# Patient Record
Sex: FEMALE | Race: WHITE | NOT HISPANIC OR LATINO | URBAN - METROPOLITAN AREA
[De-identification: names, ages, dates, MRNs, and addresses within clinical notes are randomized per-mention and may not be internally consistent; named-entity substitution may affect disease eponyms.]

---

## 2017-07-13 ENCOUNTER — OFFICE (OUTPATIENT)
Dept: URBAN - METROPOLITAN AREA CLINIC 64 | Facility: CLINIC | Age: 77
End: 2017-07-13

## 2017-07-13 VITALS
HEIGHT: 64 IN | SYSTOLIC BLOOD PRESSURE: 129 MMHG | DIASTOLIC BLOOD PRESSURE: 64 MMHG | WEIGHT: 135 LBS | HEART RATE: 72 BPM

## 2017-07-13 DIAGNOSIS — K59.1 FUNCTIONAL DIARRHEA: ICD-10-CM

## 2017-07-13 DIAGNOSIS — K51.90 ULCERATIVE COLITIS, UNSPECIFIED, WITHOUT COMPLICATIONS: ICD-10-CM

## 2017-07-13 PROCEDURE — 99213 OFFICE O/P EST LOW 20 MIN: CPT | Performed by: NURSE PRACTITIONER

## 2017-07-17 LAB
C DIFFICILE TOXINS A+B, EIA: NEGATIVE
CALPROTECTIN, FECAL: 44 UG/G (ref 0–120)
RESULT: RESULT 1: (no result)
STOOL CULTURE: CAMPYLOBACTER CULTURE: (no result)
STOOL CULTURE: E COLI SHIGA TOXIN EIA: NEGATIVE
STOOL CULTURE: SALMONELLA/SHIGELLA SCREEN: (no result)
WHITE BLOOD CELLS (WBC), STOOL: (no result)

## 2017-08-18 ENCOUNTER — HOSPITAL ENCOUNTER (OUTPATIENT)
Dept: INFUSION THERAPY | Facility: HOSPITAL | Age: 77
Discharge: HOME | End: 2017-08-18
Attending: FAMILY MEDICINE | Admitting: FAMILY MEDICINE

## 2017-09-14 ENCOUNTER — OFFICE (OUTPATIENT)
Dept: URBAN - METROPOLITAN AREA CLINIC 64 | Facility: CLINIC | Age: 77
End: 2017-09-14

## 2017-09-14 VITALS
HEIGHT: 64 IN | WEIGHT: 131 LBS | DIASTOLIC BLOOD PRESSURE: 68 MMHG | HEART RATE: 87 BPM | SYSTOLIC BLOOD PRESSURE: 124 MMHG

## 2017-09-14 DIAGNOSIS — K59.1 FUNCTIONAL DIARRHEA: ICD-10-CM

## 2017-09-14 DIAGNOSIS — K51.90 ULCERATIVE COLITIS, UNSPECIFIED, WITHOUT COMPLICATIONS: ICD-10-CM

## 2017-09-14 PROCEDURE — 99213 OFFICE O/P EST LOW 20 MIN: CPT | Performed by: NURSE PRACTITIONER

## 2017-12-07 ENCOUNTER — OFFICE (OUTPATIENT)
Dept: URBAN - METROPOLITAN AREA CLINIC 64 | Facility: CLINIC | Age: 77
End: 2017-12-07

## 2017-12-07 VITALS
HEART RATE: 94 BPM | WEIGHT: 139 LBS | SYSTOLIC BLOOD PRESSURE: 134 MMHG | HEIGHT: 64 IN | DIASTOLIC BLOOD PRESSURE: 72 MMHG

## 2017-12-07 DIAGNOSIS — K51.90 ULCERATIVE COLITIS, UNSPECIFIED, WITHOUT COMPLICATIONS: ICD-10-CM

## 2017-12-07 PROCEDURE — 99213 OFFICE O/P EST LOW 20 MIN: CPT | Performed by: NURSE PRACTITIONER

## 2017-12-07 RX ORDER — DICYCLOMINE HYDROCHLORIDE 10 MG/1
CAPSULE ORAL
Qty: 180 | Refills: 3 | Status: ACTIVE

## 2018-01-22 ENCOUNTER — HOSPITAL ENCOUNTER (OUTPATIENT)
Dept: CARDIOLOGY | Facility: HOSPITAL | Age: 78
Discharge: HOME | End: 2018-01-22
Attending: INTERNAL MEDICINE | Admitting: INTERNAL MEDICINE

## 2018-02-05 ENCOUNTER — HOSPITAL ENCOUNTER (OUTPATIENT)
Dept: PREADMISSION TESTING | Facility: HOSPITAL | Age: 78
Discharge: HOME | End: 2018-02-05
Attending: INTERNAL MEDICINE | Admitting: INTERNAL MEDICINE

## 2018-02-05 LAB
ANION GAP SERPL CALC-SCNC: 11.6 MMOL/L (ref 10–20)
BASOPHILS # BLD AUTO: 0 10*3/UL (ref 0–0.2)
BASOPHILS NFR BLD AUTO: 1 % (ref 0–2)
BUN SERPL-MCNC: 9 MG/DL (ref 8–20)
BUN/CREAT SERPL: 15 (ref 5.4–26.2)
CALCIUM SERPL-MCNC: 9 MG/DL (ref 8.9–10.3)
CHLORIDE SERPL-SCNC: 102 MMOL/L (ref 101–111)
CONV CO2: 27 MMOL/L (ref 22–32)
CREAT UR-MCNC: 0.6 MG/DL (ref 0.4–1)
DIFFERENTIAL METHOD BLD: (no result)
EOSINOPHIL # BLD AUTO: 0 10*3/UL (ref 0–0.3)
EOSINOPHIL # BLD AUTO: 1 % (ref 0–3)
ERYTHROCYTE [DISTWIDTH] IN BLOOD BY AUTOMATED COUNT: 14 % (ref 11.5–14.5)
GLUCOSE SERPL-MCNC: 241 MG/DL (ref 65–99)
HCT VFR BLD AUTO: 36 % (ref 35–49)
HGB BLD-MCNC: 11.8 G/DL (ref 12–15)
INR PPP: 1.1
LYMPHOCYTES # BLD AUTO: 0.6 10*3/UL (ref 0.8–4.8)
LYMPHOCYTES NFR BLD AUTO: 19 % (ref 18–42)
MCH RBC QN AUTO: 27.3 PG (ref 26–32)
MCHC RBC AUTO-ENTMCNC: 32.9 G/DL (ref 32–36)
MCV RBC AUTO: 83 FL (ref 80–94)
MONOCYTES # BLD AUTO: 0.2 10*3/UL (ref 0.1–1.3)
MONOCYTES NFR BLD AUTO: 7 % (ref 2–11)
NEUTROPHILS # BLD AUTO: 2.2 10*3/UL (ref 2.3–8.6)
NEUTROPHILS NFR BLD AUTO: 72 % (ref 50–75)
NRBC BLD AUTO-RTO: 0 /100{WBCS}
NRBC/RBC NFR BLD MANUAL: 0 10*3/UL
PLATELET # BLD AUTO: 210 10*3/UL (ref 150–450)
PMV BLD AUTO: 7.9 FL (ref 7.4–10.4)
POTASSIUM SERPL-SCNC: 3.6 MMOL/L (ref 3.6–5.1)
PROTHROMBIN TIME: 12 SEC (ref 9.6–11.7)
RBC # BLD AUTO: 4.34 10*6/UL (ref 4–5.4)
SODIUM SERPL-SCNC: 137 MMOL/L (ref 136–144)
WBC # BLD AUTO: 3.1 10*3/UL (ref 4.5–11.5)

## 2018-03-06 ENCOUNTER — HOSPITAL ENCOUNTER (OUTPATIENT)
Dept: INFUSION THERAPY | Facility: HOSPITAL | Age: 78
Discharge: HOME | End: 2018-03-06
Attending: FAMILY MEDICINE | Admitting: FAMILY MEDICINE

## 2018-08-01 ENCOUNTER — HOSPITAL ENCOUNTER (OUTPATIENT)
Dept: LAB | Facility: HOSPITAL | Age: 78
Discharge: HOME | End: 2018-08-01
Attending: INTERNAL MEDICINE | Admitting: INTERNAL MEDICINE

## 2018-08-01 LAB
ALBUMIN SERPL-MCNC: 3.8 G/DL (ref 3.5–4.8)
ALBUMIN/GLOB SERPL: 1.6 {RATIO} (ref 1–1.7)
ALP SERPL-CCNC: 35 IU/L (ref 32–91)
ALT SERPL-CCNC: 19 IU/L (ref 14–54)
ANION GAP SERPL CALC-SCNC: 11.7 MMOL/L (ref 10–20)
AST SERPL-CCNC: 26 IU/L (ref 15–41)
BILIRUB SERPL-MCNC: 0.7 MG/DL (ref 0.3–1.2)
BUN SERPL-MCNC: 8 MG/DL (ref 8–20)
BUN/CREAT SERPL: 13.3 (ref 5.4–26.2)
CALCIUM SERPL-MCNC: 9.2 MG/DL (ref 8.9–10.3)
CHLORIDE SERPL-SCNC: 101 MMOL/L (ref 101–111)
CHOLEST SERPL-MCNC: 116 MG/DL
CHOLEST/HDLC SERPL: 3 {RATIO}
CK SERPL-CCNC: 43 IU/L (ref 38–234)
CONV CO2: 30 MMOL/L (ref 22–32)
CONV LDL CHOLESTEROL DIRECT: 63 MG/DL (ref 0–100)
CONV TOTAL PROTEIN: 6.2 G/DL (ref 6.1–7.9)
CREAT UR-MCNC: 0.6 MG/DL (ref 0.4–1)
GLOBULIN UR ELPH-MCNC: 2.4 G/DL (ref 2.5–3.8)
GLUCOSE SERPL-MCNC: 120 MG/DL (ref 65–99)
HDLC SERPL-MCNC: 38 MG/DL
LDLC/HDLC SERPL: 1.6 {RATIO}
LIPID INTERPRETATION: ABNORMAL
POTASSIUM SERPL-SCNC: 3.7 MMOL/L (ref 3.6–5.1)
SODIUM SERPL-SCNC: 139 MMOL/L (ref 136–144)
TRIGL SERPL-MCNC: 76 MG/DL
VLDLC SERPL CALC-MCNC: 14.6 MG/DL

## 2018-09-12 ENCOUNTER — HOSPITAL ENCOUNTER (OUTPATIENT)
Dept: ONCOLOGY | Facility: CLINIC | Age: 78
Setting detail: INFUSION SERIES
Discharge: HOME | End: 2018-09-12
Attending: NURSE PRACTITIONER | Admitting: NURSE PRACTITIONER

## 2019-03-26 ENCOUNTER — OFFICE (OUTPATIENT)
Dept: URBAN - METROPOLITAN AREA CLINIC 64 | Facility: CLINIC | Age: 79
End: 2019-03-26

## 2019-03-26 VITALS
DIASTOLIC BLOOD PRESSURE: 59 MMHG | SYSTOLIC BLOOD PRESSURE: 118 MMHG | HEART RATE: 66 BPM | WEIGHT: 129 LBS | HEIGHT: 64 IN

## 2019-03-26 DIAGNOSIS — Z12.11 ENCOUNTER FOR SCREENING FOR MALIGNANT NEOPLASM OF COLON: ICD-10-CM

## 2019-03-26 DIAGNOSIS — K51.90 ULCERATIVE COLITIS, UNSPECIFIED, WITHOUT COMPLICATIONS: ICD-10-CM

## 2019-03-26 PROCEDURE — 99213 OFFICE O/P EST LOW 20 MIN: CPT | Performed by: NURSE PRACTITIONER

## 2019-03-26 RX ORDER — DICYCLOMINE HYDROCHLORIDE 10 MG/1
20 CAPSULE ORAL
Qty: 60 | Refills: 11 | Status: COMPLETED
Start: 2019-03-26 | End: 2021-10-11

## 2019-03-26 RX ORDER — BUDESONIDE 3 MG/1
9 CAPSULE ORAL
Qty: 90 | Refills: 3 | Status: COMPLETED
Start: 2019-03-26 | End: 2020-07-07

## 2019-07-19 ENCOUNTER — TELEPHONE (OUTPATIENT)
Dept: ONCOLOGY | Facility: CLINIC | Age: 79
End: 2019-07-19

## 2019-07-19 NOTE — TELEPHONE ENCOUNTER
Received call from  Dr. Saeed Wayne's office.  She states patient is needing to be scheduled for Prolia.  Chart reviewed.  Attempted to contact Bev and ISABEL on VM informing her that we would get the patient schedule and we would notify patient of date and time.  mathew Ramires

## 2019-07-25 ENCOUNTER — TELEPHONE (OUTPATIENT)
Dept: ONCOLOGY | Facility: CLINIC | Age: 79
End: 2019-07-25

## 2019-09-09 ENCOUNTER — LAB (OUTPATIENT)
Dept: LAB | Facility: HOSPITAL | Age: 79
End: 2019-09-09

## 2019-09-09 ENCOUNTER — TRANSCRIBE ORDERS (OUTPATIENT)
Dept: ADMINISTRATIVE | Facility: HOSPITAL | Age: 79
End: 2019-09-09

## 2019-09-09 DIAGNOSIS — E78.5 HYPERLIPIDEMIA, UNSPECIFIED HYPERLIPIDEMIA TYPE: ICD-10-CM

## 2019-09-09 DIAGNOSIS — I25.10 MILD CAD: ICD-10-CM

## 2019-09-09 DIAGNOSIS — R06.00 DYSPNEA, UNSPECIFIED TYPE: Primary | ICD-10-CM

## 2019-09-09 DIAGNOSIS — R06.00 DYSPNEA, UNSPECIFIED TYPE: ICD-10-CM

## 2019-09-09 LAB — BNP SERPL-MCNC: 214 PG/ML

## 2019-09-09 PROCEDURE — 83880 ASSAY OF NATRIURETIC PEPTIDE: CPT

## 2019-09-09 PROCEDURE — 36415 COLL VENOUS BLD VENIPUNCTURE: CPT

## 2019-09-12 ENCOUNTER — TELEPHONE (OUTPATIENT)
Dept: ONCOLOGY | Facility: HOSPITAL | Age: 79
End: 2019-09-12

## 2019-09-12 ENCOUNTER — HOSPITAL ENCOUNTER (OUTPATIENT)
Dept: ONCOLOGY | Facility: HOSPITAL | Age: 79
Setting detail: INFUSION SERIES
Discharge: HOME | End: 2019-09-12

## 2019-09-12 VITALS
DIASTOLIC BLOOD PRESSURE: 60 MMHG | RESPIRATION RATE: 18 BRPM | HEIGHT: 63 IN | WEIGHT: 122.2 LBS | SYSTOLIC BLOOD PRESSURE: 128 MMHG | HEART RATE: 69 BPM | BODY MASS INDEX: 21.65 KG/M2 | TEMPERATURE: 98.2 F

## 2019-09-12 DIAGNOSIS — M81.0 AGE-RELATED OSTEOPOROSIS WITHOUT CURRENT PATHOLOGICAL FRACTURE: Primary | ICD-10-CM

## 2019-09-12 PROCEDURE — 96372 THER/PROPH/DIAG INJ SC/IM: CPT | Performed by: NURSE PRACTITIONER

## 2019-09-12 PROCEDURE — 25010000002 DENOSUMAB 60 MG/ML SOLUTION PREFILLED SYRINGE: Performed by: FAMILY MEDICINE

## 2019-09-12 RX ORDER — METFORMIN HYDROCHLORIDE 500 MG/1
2000 TABLET, EXTENDED RELEASE ORAL
COMMUNITY
Start: 2019-08-24

## 2019-09-12 RX ORDER — ASPIRIN 81 MG/1
81 TABLET ORAL DAILY
COMMUNITY
Start: 2018-09-13

## 2019-09-12 RX ORDER — ISOSORBIDE MONONITRATE 60 MG/1
TABLET, EXTENDED RELEASE ORAL
COMMUNITY
Start: 2019-08-24 | End: 2019-09-18 | Stop reason: SDUPTHER

## 2019-09-12 RX ORDER — ATORVASTATIN CALCIUM 40 MG/1
TABLET, FILM COATED ORAL
COMMUNITY
Start: 2019-08-24 | End: 2019-09-18 | Stop reason: SDUPTHER

## 2019-09-12 RX ORDER — BUDESONIDE 3 MG/1
3 CAPSULE, COATED PELLETS ORAL EVERY MORNING
COMMUNITY
Start: 2017-12-18 | End: 2022-06-27

## 2019-09-12 RX ORDER — NITROGLYCERIN 0.4 MG/1
TABLET SUBLINGUAL
COMMUNITY
Start: 2018-03-09 | End: 2021-11-01 | Stop reason: SDUPTHER

## 2019-09-12 RX ORDER — FENOFIBRATE 145 MG/1
145 TABLET, COATED ORAL DAILY
Refills: 1 | COMMUNITY
Start: 2019-07-30 | End: 2019-09-18 | Stop reason: SDUPTHER

## 2019-09-12 RX ORDER — GLIMEPIRIDE 2 MG/1
TABLET ORAL EVERY 24 HOURS
COMMUNITY
Start: 2015-06-01 | End: 2020-10-05

## 2019-09-12 RX ADMIN — DENOSUMAB 60 MG: 60 INJECTION SUBCUTANEOUS at 11:28

## 2019-09-12 NOTE — TELEPHONE ENCOUNTER
The patient is in the office today for her Prolia. The patient is here by the request of Dr. Tipton. She was seen recently by Dr. Tipton where labs were done, all information has been faxed by Chandrika from [facility]. The patient stated that she had dental work done last week but denies any jaw pain. Spoke with Chandrika at [facility] where the approval to give the injection today was noted and to be faxed. Chandrika stated that Dr. Tipton is going to call the patient and talk to her about some other issues and would address the dental work/any pain the patient may have. She reported to having an appointment in four months with Dr. Tipton.   RN notified.

## 2019-09-18 RX ORDER — FENOFIBRATE 145 MG/1
TABLET, COATED ORAL EVERY 24 HOURS
COMMUNITY
Start: 2017-12-18 | End: 2020-10-05

## 2019-09-18 RX ORDER — METFORMIN HYDROCHLORIDE 500 MG/1
TABLET, EXTENDED RELEASE ORAL
COMMUNITY
Start: 2015-06-01 | End: 2019-09-23 | Stop reason: SDUPTHER

## 2019-09-18 RX ORDER — ISOSORBIDE MONONITRATE 60 MG/1
TABLET, EXTENDED RELEASE ORAL EVERY 24 HOURS
COMMUNITY
Start: 2018-09-14 | End: 2019-11-21 | Stop reason: SDUPTHER

## 2019-09-18 RX ORDER — ATORVASTATIN CALCIUM 40 MG/1
TABLET, FILM COATED ORAL
COMMUNITY
Start: 2018-09-14 | End: 2019-11-21 | Stop reason: SDUPTHER

## 2019-09-18 RX ORDER — ACETAMINOPHEN 160 MG
2000 TABLET,DISINTEGRATING ORAL EVERY EVENING
COMMUNITY
Start: 2017-12-19

## 2019-09-23 ENCOUNTER — OFFICE VISIT (OUTPATIENT)
Dept: CARDIOLOGY | Facility: CLINIC | Age: 79
End: 2019-09-23

## 2019-09-23 VITALS
SYSTOLIC BLOOD PRESSURE: 102 MMHG | HEART RATE: 64 BPM | OXYGEN SATURATION: 96 % | DIASTOLIC BLOOD PRESSURE: 59 MMHG | BODY MASS INDEX: 21.79 KG/M2 | WEIGHT: 123 LBS | HEIGHT: 63 IN

## 2019-09-23 DIAGNOSIS — I25.110 CORONARY ARTERY DISEASE INVOLVING NATIVE CORONARY ARTERY OF NATIVE HEART WITH UNSTABLE ANGINA PECTORIS (HCC): ICD-10-CM

## 2019-09-23 DIAGNOSIS — E11.9 TYPE 2 DIABETES MELLITUS WITHOUT COMPLICATION, WITHOUT LONG-TERM CURRENT USE OF INSULIN (HCC): ICD-10-CM

## 2019-09-23 DIAGNOSIS — I10 ESSENTIAL HYPERTENSION: ICD-10-CM

## 2019-09-23 DIAGNOSIS — E78.5 DYSLIPIDEMIA: ICD-10-CM

## 2019-09-23 DIAGNOSIS — I20.0 UNSTABLE ANGINA (HCC): Primary | ICD-10-CM

## 2019-09-23 DIAGNOSIS — I50.33 ACUTE ON CHRONIC DIASTOLIC CONGESTIVE HEART FAILURE (HCC): ICD-10-CM

## 2019-09-23 PROCEDURE — 93000 ELECTROCARDIOGRAM COMPLETE: CPT | Performed by: INTERNAL MEDICINE

## 2019-09-23 PROCEDURE — 99214 OFFICE O/P EST MOD 30 MIN: CPT | Performed by: INTERNAL MEDICINE

## 2019-09-23 NOTE — PROGRESS NOTES
Subjective:     Encounter Date:09/23/2019      Patient ID: Mindi Quinteros is a 78 y.o. female.    Chief Complaint: Acute visit for chest pain and shortness of breath and elevated BNP level  History of Present Illness See below      Past Medical History:  Past Medical History:   Diagnosis Date   • Age-related osteoporosis without current pathological fracture 9/12/2019   • CAD (coronary artery disease)    • Diabetes (CMS/HCC)    • HTN (hypertension)    • Hyperlipemia      Past Surgical History:  Past Surgical History:   Procedure Laterality Date   • ANKLE ARTHROSCOPY     • BELPHAROPTOSIS REPAIR     • CARDIAC CATHETERIZATION     • CATARACT EXTRACTION     • HEMORROIDECTOMY     • HYSTERECTOMY        Allergies:  Allergies   Allergen Reactions   • Asacol [Mesalamine] Swelling     Home Meds:  Current Meds:     Current Outpatient Medications:   •  aspirin (ASPIR-LOW) 81 MG EC tablet, Daily., Disp: , Rfl:   •  atorvastatin (LIPITOR) 40 MG tablet, ATORVASTATIN CALCIUM 40 MG TABS, Disp: , Rfl:   •  Budesonide (ENTOCORT EC) 3 MG 24 hr capsule, BUDESONIDE 3 MG CPEP, Disp: , Rfl:   •  Calcium Citrate-Vitamin D (CALCIUM + D PO), Take  by mouth., Disp: , Rfl:   •  Cholecalciferol (VITAMIN D3) 2000 units capsule, VITAMIN D3 CAPS, Disp: , Rfl:   •  Coenzyme Q10 10 MG capsule, COQ10 CAPS, Disp: , Rfl:   •  denosumab (PROLIA) 60 MG/ML solution prefilled syringe syringe, PROLIA 60 MG/ML SOSY, Disp: , Rfl:   •  fenofibrate (TRICOR) 145 MG tablet, Daily., Disp: , Rfl:   •  glimepiride (AMARYL) 2 MG tablet, Daily., Disp: , Rfl:   •  isosorbide mononitrate (IMDUR) 60 MG 24 hr tablet, Daily., Disp: , Rfl:   •  metFORMIN ER (GLUCOPHAGE-XR) 500 MG 24 hr tablet, Take 500 mg by mouth. 4 pills a day, Disp: , Rfl:   •  metoprolol tartrate (LOPRESSOR) 25 MG tablet, Every 12 (Twelve) Hours., Disp: , Rfl:   •  nitroglycerin (NITROSTAT) 0.4 MG SL tablet, NITROSTAT 0.4 MG SUBL, Disp: , Rfl:   Social History:   Social History     Tobacco Use   •  "Smoking status: Never Smoker   • Smokeless tobacco: Never Used   Substance Use Topics   • Alcohol use: Not on file      Family History:  Family History   Problem Relation Age of Onset   • Diabetes Mother    • Heart disease Father    • Heart disease Brother    • Diabetes Brother         The following portions of the patient's history were reviewed and updated as appropriate: allergies, current medications, past family history, past medical history, past social history, past surgical history and problem list.    Review of Systems   Cardiovascular: Positive for chest pain. Negative for leg swelling and palpitations.   Respiratory: Positive for shortness of breath.    Neurological: Negative for dizziness and numbness.         ECG 12 Lead  Date/Time: 9/23/2019 1:32 PM  Performed by: Natan Terrazas MD  Authorized by: Natan Terrazas MD   Comparison: compared with previous ECG   Comparison to previous ECG: EKG done today reviewed by me shows sinus rhythm with a rate of 64 bpm with first-degree AV block incomplete right bundle branch block LVH and ST segment depression in inferior and lateral leads which is worse compared to EKG from 3/14/2019                  Objective:     Physical Exam   /59 (BP Location: Left arm, Patient Position: Sitting, Cuff Size: Adult)   Pulse 64   Ht 160 cm (63\")   Wt 55.8 kg (123 lb)   SpO2 96%   BMI 21.79 kg/m²   General:  Appears in no acute distress  Eyes: Sclera is anicteric,  conjunctiva is clear   HEENT:  No JVD. Thyroid not visibly enlarged. No mucosal pallor or cyanosis  Respiratory: Respirations regular and unlabored at rest.  Bilaterally good breath sounds, with good air entry in all fields. No crackles, rubs or wheezes auscultated  Cardiovascular: S1,S2 Regular rate and rhythm. No murmur, rub or gallop auscultated.No pretibial pitting edema  Gastrointestinal: Abdomen soft, flat, non tender. Bowel sounds present. No hepatosplenomegaly. No " ascites  Musculoskeletal:  No abnormal movements  Extremities: No digital clubbing or cyanosis  Skin: Color pink. Skin warm and dry to touch. No rashes  No xanthoma  Neuro: Alert and awake, no lateralizing deficits appreciated    Lab Review:       Assessment:          Diagnosis Plan   1. Unstable angina (CMS/[facility])  Stress Test With Myocardial Perfusion One Day   2. Coronary artery disease involving native coronary artery of native heart with unstable angina pectoris (CMS/HCC)  Stress Test With Myocardial Perfusion One Day   3. Essential hypertension  Stress Test With Myocardial Perfusion One Day   4. Dyslipidemia  Stress Test With Myocardial Perfusion One Day   5. Type 2 diabetes mellitus without complication, without long-term current use of insulin (CMS/[facility])  Stress Test With Myocardial Perfusion One Day   6. Acute on chronic diastolic congestive heart failure (CMS/HCC)         CC: Acute visit for chest pain and shortness of breath and abnormal BNP ,    History of Present Illness:    This is a 78-year-old with PMH     # CAD, cardiac cath 2/7/18  calcified 50% proximal 70% mid LAD and 70% mid LCX, normal LVEF  #  diabetes  #  hypertension, hyperlipidemia  #  ulcerative colitis  #  allergy to aspertane  #  hemorrhoidectomy, hysterectomy, bladder repair, left ankle surgery,      here for   followup.. patient is complaining of an episode of severe shortness of breath associated with chest tightness which lasted 2 to 3 hours the other day with no aggravating or relieving factors,   denies any palpitation,lightheadedness dizziness loss of conscious.  patient's is a diabetes is running good in the 120s had a recent hemoglobin A1c done 5//14/18 was 6.1. . labs from 08/01/2018 showed cholesterol 116 triglycerides 76 HDL low at 38 LDL 63 repeat labs from 11/14/2018, CMP, CBC unremarkable except for glucose of 122, cholesterol 136 LDL 75 HDL 39., hemoglobin A1c of 7.2. patient's arterial blood pressure is 102/59   patient is  under lot of stress due to 's stroke and having to take care of him.  Patient had BNP level done 9/9/2019 which was elevated at 214     ASSESSMENT:  #  dyspnea on exertion, chest pain prolonged consistent with unstable angina, abnormal EKG  #  CAD  # chf  #  hyperlipidemia  #  diabetes hypertension     PLAN:  Reviewed abnormal EKG results with patient  Reviewed previous cardiac cath result patient seemed to be surprised to hear about her CAD  Patient's arterial blood pressure is 102 not much room to increase medical management  We will schedule stress test to guide therapy to see which of the 2 vessels is the culprit for her symptoms and consider revascularization  Risk benefits alternatives explained at length, spent 40 minutes time with patient face-to-face explaining the same and reviewing cardiac cath films   counseled on walking exercise for low HDL   reviewed labs with patient   Will continue her on aspirin beta-blockers ,  long-acting nitrate   give nitroglycerin sublingual care instructions   patient  has symptoms on maximal medical management , will bring her back for rota ablation possible PCI. risk benefits alternatives explained   reviewed diet and exercise instructions       Plan:

## 2019-10-03 ENCOUNTER — APPOINTMENT (OUTPATIENT)
Dept: CARDIOLOGY | Facility: HOSPITAL | Age: 79
End: 2019-10-03

## 2019-10-10 ENCOUNTER — HOSPITAL ENCOUNTER (OUTPATIENT)
Dept: CARDIOLOGY | Facility: HOSPITAL | Age: 79
Discharge: HOME | End: 2019-10-10

## 2019-10-10 DIAGNOSIS — E11.9 TYPE 2 DIABETES MELLITUS WITHOUT COMPLICATION, WITHOUT LONG-TERM CURRENT USE OF INSULIN (HCC): ICD-10-CM

## 2019-10-10 DIAGNOSIS — I10 ESSENTIAL HYPERTENSION: ICD-10-CM

## 2019-10-10 DIAGNOSIS — I25.110 CORONARY ARTERY DISEASE INVOLVING NATIVE CORONARY ARTERY OF NATIVE HEART WITH UNSTABLE ANGINA PECTORIS (HCC): ICD-10-CM

## 2019-10-10 DIAGNOSIS — E78.5 DYSLIPIDEMIA: ICD-10-CM

## 2019-10-10 DIAGNOSIS — I20.0 UNSTABLE ANGINA (HCC): ICD-10-CM

## 2019-10-10 PROCEDURE — 0 TECHNETIUM SESTAMIBI: Performed by: INTERNAL MEDICINE

## 2019-10-10 PROCEDURE — 78452 HT MUSCLE IMAGE SPECT MULT: CPT

## 2019-10-10 PROCEDURE — 93016 CV STRESS TEST SUPVJ ONLY: CPT | Performed by: INTERNAL MEDICINE

## 2019-10-10 PROCEDURE — 25010000002 REGADENOSON 0.4 MG/5ML SOLUTION: Performed by: INTERNAL MEDICINE

## 2019-10-10 PROCEDURE — 93017 CV STRESS TEST TRACING ONLY: CPT

## 2019-10-10 PROCEDURE — A9500 TC99M SESTAMIBI: HCPCS | Performed by: INTERNAL MEDICINE

## 2019-10-10 RX ADMIN — TECHNETIUM TC 99M SESTAMIBI 1 DOSE: 1 INJECTION INTRAVENOUS at 14:30

## 2019-10-10 RX ADMIN — TECHNETIUM TC 99M SESTAMIBI 1 DOSE: 1 INJECTION INTRAVENOUS at 13:45

## 2019-10-10 RX ADMIN — REGADENOSON 0.4 MG: 0.08 INJECTION, SOLUTION INTRAVENOUS at 14:30

## 2019-10-11 ENCOUNTER — OFFICE VISIT (OUTPATIENT)
Dept: CARDIOLOGY | Facility: CLINIC | Age: 79
End: 2019-10-11

## 2019-10-11 ENCOUNTER — TELEPHONE (OUTPATIENT)
Dept: CARDIOLOGY | Facility: CLINIC | Age: 79
End: 2019-10-11

## 2019-10-11 VITALS
WEIGHT: 122 LBS | HEIGHT: 63 IN | SYSTOLIC BLOOD PRESSURE: 103 MMHG | DIASTOLIC BLOOD PRESSURE: 62 MMHG | OXYGEN SATURATION: 97 % | BODY MASS INDEX: 21.62 KG/M2 | HEART RATE: 68 BPM

## 2019-10-11 DIAGNOSIS — I10 ESSENTIAL HYPERTENSION: ICD-10-CM

## 2019-10-11 DIAGNOSIS — I50.32 HYPERTENSIVE HEART DISEASE WITH CHRONIC DIASTOLIC CONGESTIVE HEART FAILURE (HCC): ICD-10-CM

## 2019-10-11 DIAGNOSIS — I25.110 CORONARY ARTERY DISEASE INVOLVING NATIVE CORONARY ARTERY OF NATIVE HEART WITH UNSTABLE ANGINA PECTORIS (HCC): Primary | ICD-10-CM

## 2019-10-11 DIAGNOSIS — E11.9 TYPE 2 DIABETES MELLITUS WITHOUT COMPLICATION, WITHOUT LONG-TERM CURRENT USE OF INSULIN (HCC): ICD-10-CM

## 2019-10-11 DIAGNOSIS — I11.0 HYPERTENSIVE HEART DISEASE WITH CHRONIC DIASTOLIC CONGESTIVE HEART FAILURE (HCC): ICD-10-CM

## 2019-10-11 DIAGNOSIS — E78.5 DYSLIPIDEMIA: ICD-10-CM

## 2019-10-11 PROCEDURE — 99214 OFFICE O/P EST MOD 30 MIN: CPT | Performed by: INTERNAL MEDICINE

## 2019-10-11 RX ORDER — ERGOCALCIFEROL (VITAMIN D2) 10 MCG
TABLET ORAL DAILY
COMMUNITY
End: 2020-10-05

## 2019-10-11 NOTE — TELEPHONE ENCOUNTER
Patient contacted and made aware that her PCP checked her cholesterol in July, she does not need a recheck until her f/u.

## 2019-10-11 NOTE — TELEPHONE ENCOUNTER
Patient was told that Dr Terrazas wanted to check her cholesterol to see if he needed to adjust her medications. He ordered labs to be done prior to her 6 mth f/u. She wants to know if her cholesterol needs to be checked now, in case her medication needs to be changed. Please advise and call patient.

## 2019-10-13 ENCOUNTER — APPOINTMENT (OUTPATIENT)
Dept: GENERAL RADIOLOGY | Facility: HOSPITAL | Age: 79
End: 2019-10-13

## 2019-10-13 ENCOUNTER — APPOINTMENT (OUTPATIENT)
Dept: CT IMAGING | Facility: HOSPITAL | Age: 79
End: 2019-10-13

## 2019-10-13 ENCOUNTER — PREP FOR SURGERY (OUTPATIENT)
Dept: OTHER | Facility: HOSPITAL | Age: 79
End: 2019-10-13

## 2019-10-13 ENCOUNTER — HOSPITAL ENCOUNTER (INPATIENT)
Facility: HOSPITAL | Age: 79
LOS: 1 days | Discharge: HOME HEALTH | End: 2019-10-15
Attending: EMERGENCY MEDICINE | Admitting: SURGERY

## 2019-10-13 DIAGNOSIS — K80.00 CALCULUS OF GALLBLADDER WITH ACUTE CHOLECYSTITIS WITHOUT OBSTRUCTION: ICD-10-CM

## 2019-10-13 DIAGNOSIS — R10.13 EPIGASTRIC PAIN: Primary | ICD-10-CM

## 2019-10-13 DIAGNOSIS — K80.10 CHOLECYSTITIS WITH CHOLELITHIASIS: Primary | ICD-10-CM

## 2019-10-13 DIAGNOSIS — K80.10 CHOLECYSTITIS WITH CHOLELITHIASIS: ICD-10-CM

## 2019-10-13 LAB
ABO GROUP BLD: NORMAL
ALBUMIN SERPL-MCNC: 3.9 G/DL (ref 3.5–4.8)
ALBUMIN/GLOB SERPL: 1.5 G/DL (ref 1–1.7)
ALP SERPL-CCNC: 42 U/L (ref 32–91)
ALT SERPL W P-5'-P-CCNC: 18 U/L (ref 14–54)
ANION GAP SERPL CALCULATED.3IONS-SCNC: 13.6 MMOL/L (ref 5–15)
ANION GAP SERPL CALCULATED.3IONS-SCNC: 16.5 MMOL/L (ref 5–15)
APTT PPP: 26.1 SECONDS (ref 24–31)
AST SERPL-CCNC: 27 U/L (ref 15–41)
BASOPHILS # BLD AUTO: 0 10*3/MM3 (ref 0–0.2)
BASOPHILS # BLD AUTO: 0 10*3/MM3 (ref 0–0.2)
BASOPHILS NFR BLD AUTO: 0.2 % (ref 0–1.5)
BASOPHILS NFR BLD AUTO: 0.7 % (ref 0–1.5)
BILIRUB SERPL-MCNC: 0.6 MG/DL (ref 0.3–1.2)
BILIRUB UR QL STRIP: NEGATIVE
BLD GP AB SCN SERPL QL: NEGATIVE
BUN BLD-MCNC: 10 MG/DL (ref 8–20)
BUN BLD-MCNC: 7 MG/DL (ref 8–20)
BUN/CREAT SERPL: 11.7 (ref 5.4–26.2)
BUN/CREAT SERPL: 14.3 (ref 5.4–26.2)
CALCIUM SPEC-SCNC: 8.4 MG/DL (ref 8.9–10.3)
CALCIUM SPEC-SCNC: 9.5 MG/DL (ref 8.9–10.3)
CHLORIDE SERPL-SCNC: 101 MMOL/L (ref 101–111)
CHLORIDE SERPL-SCNC: 98 MMOL/L (ref 101–111)
CLARITY UR: CLEAR
CO2 SERPL-SCNC: 25 MMOL/L (ref 22–32)
CO2 SERPL-SCNC: 26 MMOL/L (ref 22–32)
COLOR UR: YELLOW
CREAT BLD-MCNC: 0.6 MG/DL (ref 0.4–1)
CREAT BLD-MCNC: 0.7 MG/DL (ref 0.4–1)
DEPRECATED RDW RBC AUTO: 42 FL (ref 37–54)
DEPRECATED RDW RBC AUTO: 43.8 FL (ref 37–54)
EOSINOPHIL # BLD AUTO: 0 10*3/MM3 (ref 0–0.4)
EOSINOPHIL # BLD AUTO: 0 10*3/MM3 (ref 0–0.4)
EOSINOPHIL NFR BLD AUTO: 0.2 % (ref 0.3–6.2)
EOSINOPHIL NFR BLD AUTO: 0.4 % (ref 0.3–6.2)
ERYTHROCYTE [DISTWIDTH] IN BLOOD BY AUTOMATED COUNT: 14.2 % (ref 12.3–15.4)
ERYTHROCYTE [DISTWIDTH] IN BLOOD BY AUTOMATED COUNT: 14.2 % (ref 12.3–15.4)
GFR SERPL CREATININE-BSD FRML MDRD: 81 ML/MIN/1.73
GFR SERPL CREATININE-BSD FRML MDRD: 97 ML/MIN/1.73
GLOBULIN UR ELPH-MCNC: 2.6 GM/DL (ref 2.5–3.8)
GLUCOSE BLD-MCNC: 233 MG/DL (ref 65–99)
GLUCOSE BLD-MCNC: 254 MG/DL (ref 65–99)
GLUCOSE BLDC GLUCOMTR-MCNC: 125 MG/DL (ref 70–105)
GLUCOSE BLDC GLUCOMTR-MCNC: 195 MG/DL (ref 70–105)
GLUCOSE BLDC GLUCOMTR-MCNC: 289 MG/DL (ref 70–105)
GLUCOSE UR STRIP-MCNC: ABNORMAL MG/DL
HCT VFR BLD AUTO: 35.2 % (ref 34–46.6)
HCT VFR BLD AUTO: 40.4 % (ref 34–46.6)
HGB BLD-MCNC: 12.1 G/DL (ref 12–15.9)
HGB BLD-MCNC: 13.2 G/DL (ref 12–15.9)
HGB UR QL STRIP.AUTO: NEGATIVE
INR PPP: 1.13 (ref 0.9–1.1)
KETONES UR QL STRIP: NEGATIVE
LEUKOCYTE ESTERASE UR QL STRIP.AUTO: NEGATIVE
LIPASE SERPL-CCNC: 25 U/L (ref 22–51)
LYMPHOCYTES # BLD AUTO: 0.4 10*3/MM3 (ref 0.7–3.1)
LYMPHOCYTES # BLD AUTO: 0.5 10*3/MM3 (ref 0.7–3.1)
LYMPHOCYTES NFR BLD AUTO: 10.9 % (ref 19.6–45.3)
LYMPHOCYTES NFR BLD AUTO: 5.1 % (ref 19.6–45.3)
MCH RBC QN AUTO: 28.2 PG (ref 26.6–33)
MCH RBC QN AUTO: 28.7 PG (ref 26.6–33)
MCHC RBC AUTO-ENTMCNC: 32.7 G/DL (ref 31.5–35.7)
MCHC RBC AUTO-ENTMCNC: 34.3 G/DL (ref 31.5–35.7)
MCV RBC AUTO: 83.9 FL (ref 79–97)
MCV RBC AUTO: 86.4 FL (ref 79–97)
MONOCYTES # BLD AUTO: 0.3 10*3/MM3 (ref 0.1–0.9)
MONOCYTES # BLD AUTO: 0.5 10*3/MM3 (ref 0.1–0.9)
MONOCYTES NFR BLD AUTO: 6.1 % (ref 5–12)
MONOCYTES NFR BLD AUTO: 6.2 % (ref 5–12)
NEUTROPHILS # BLD AUTO: 4 10*3/MM3 (ref 1.7–7)
NEUTROPHILS # BLD AUTO: 7.2 10*3/MM3 (ref 1.7–7)
NEUTROPHILS NFR BLD AUTO: 81.9 % (ref 42.7–76)
NEUTROPHILS NFR BLD AUTO: 88.3 % (ref 42.7–76)
NITRITE UR QL STRIP: NEGATIVE
NRBC BLD AUTO-RTO: 0 /100 WBC (ref 0–0.2)
NRBC BLD AUTO-RTO: 0 /100 WBC (ref 0–0.2)
PH UR STRIP.AUTO: 7.5 [PH] (ref 5–8)
PLATELET # BLD AUTO: 182 10*3/MM3 (ref 140–450)
PLATELET # BLD AUTO: 229 10*3/MM3 (ref 140–450)
PMV BLD AUTO: 7.4 FL (ref 6–12)
PMV BLD AUTO: 7.6 FL (ref 6–12)
POTASSIUM BLD-SCNC: 3.5 MMOL/L (ref 3.6–5.1)
POTASSIUM BLD-SCNC: 3.6 MMOL/L (ref 3.6–5.1)
PROT SERPL-MCNC: 6.5 G/DL (ref 6.1–7.9)
PROT UR QL STRIP: NEGATIVE
PROTHROMBIN TIME: 11.6 SECONDS (ref 9.6–11.7)
RBC # BLD AUTO: 4.2 10*6/MM3 (ref 3.77–5.28)
RBC # BLD AUTO: 4.68 10*6/MM3 (ref 3.77–5.28)
RH BLD: POSITIVE
SODIUM BLD-SCNC: 136 MMOL/L (ref 136–144)
SODIUM BLD-SCNC: 137 MMOL/L (ref 136–144)
SP GR UR STRIP: 1.01 (ref 1–1.03)
T&S EXPIRATION DATE: NORMAL
UROBILINOGEN UR QL STRIP: ABNORMAL
WBC NRBC COR # BLD: 4.9 10*3/MM3 (ref 3.4–10.8)
WBC NRBC COR # BLD: 8.1 10*3/MM3 (ref 3.4–10.8)

## 2019-10-13 PROCEDURE — 99233 SBSQ HOSP IP/OBS HIGH 50: CPT | Performed by: INTERNAL MEDICINE

## 2019-10-13 PROCEDURE — 25010000002 PIPERACILLIN SOD-TAZOBACTAM PER 1 G: Performed by: EMERGENCY MEDICINE

## 2019-10-13 PROCEDURE — G0378 HOSPITAL OBSERVATION PER HR: HCPCS

## 2019-10-13 PROCEDURE — 25010000002 KETOROLAC TROMETHAMINE PER 15 MG: Performed by: NURSE PRACTITIONER

## 2019-10-13 PROCEDURE — 85610 PROTHROMBIN TIME: CPT | Performed by: INTERNAL MEDICINE

## 2019-10-13 PROCEDURE — 83690 ASSAY OF LIPASE: CPT | Performed by: EMERGENCY MEDICINE

## 2019-10-13 PROCEDURE — 85025 COMPLETE CBC W/AUTO DIFF WBC: CPT | Performed by: EMERGENCY MEDICINE

## 2019-10-13 PROCEDURE — 87040 BLOOD CULTURE FOR BACTERIA: CPT | Performed by: INTERNAL MEDICINE

## 2019-10-13 PROCEDURE — 99222 1ST HOSP IP/OBS MODERATE 55: CPT | Performed by: SURGERY

## 2019-10-13 PROCEDURE — 86901 BLOOD TYPING SEROLOGIC RH(D): CPT | Performed by: INTERNAL MEDICINE

## 2019-10-13 PROCEDURE — 86850 RBC ANTIBODY SCREEN: CPT | Performed by: INTERNAL MEDICINE

## 2019-10-13 PROCEDURE — 86900 BLOOD TYPING SEROLOGIC ABO: CPT | Performed by: INTERNAL MEDICINE

## 2019-10-13 PROCEDURE — 82962 GLUCOSE BLOOD TEST: CPT

## 2019-10-13 PROCEDURE — P9612 CATHETERIZE FOR URINE SPEC: HCPCS

## 2019-10-13 PROCEDURE — 63710000001 INSULIN LISPRO (HUMAN) PER 5 UNITS: Performed by: NURSE PRACTITIONER

## 2019-10-13 PROCEDURE — 81003 URINALYSIS AUTO W/O SCOPE: CPT | Performed by: EMERGENCY MEDICINE

## 2019-10-13 PROCEDURE — 83036 HEMOGLOBIN GLYCOSYLATED A1C: CPT | Performed by: NURSE PRACTITIONER

## 2019-10-13 PROCEDURE — 25010000002 PROCHLORPERAZINE 10 MG/2ML SOLUTION: Performed by: EMERGENCY MEDICINE

## 2019-10-13 PROCEDURE — 86900 BLOOD TYPING SEROLOGIC ABO: CPT

## 2019-10-13 PROCEDURE — 71045 X-RAY EXAM CHEST 1 VIEW: CPT

## 2019-10-13 PROCEDURE — 74176 CT ABD & PELVIS W/O CONTRAST: CPT

## 2019-10-13 PROCEDURE — 85025 COMPLETE CBC W/AUTO DIFF WBC: CPT | Performed by: NURSE PRACTITIONER

## 2019-10-13 PROCEDURE — 80053 COMPREHEN METABOLIC PANEL: CPT | Performed by: EMERGENCY MEDICINE

## 2019-10-13 PROCEDURE — 25010000002 PIPERACILLIN SOD-TAZOBACTAM PER 1 G: Performed by: NURSE PRACTITIONER

## 2019-10-13 PROCEDURE — 99284 EMERGENCY DEPT VISIT MOD MDM: CPT

## 2019-10-13 PROCEDURE — 99223 1ST HOSP IP/OBS HIGH 75: CPT | Performed by: INTERNAL MEDICINE

## 2019-10-13 PROCEDURE — 86901 BLOOD TYPING SEROLOGIC RH(D): CPT

## 2019-10-13 PROCEDURE — 85730 THROMBOPLASTIN TIME PARTIAL: CPT | Performed by: INTERNAL MEDICINE

## 2019-10-13 RX ORDER — ISOSORBIDE MONONITRATE 60 MG/1
60 TABLET, EXTENDED RELEASE ORAL
Status: DISCONTINUED | OUTPATIENT
Start: 2019-10-13 | End: 2019-10-15 | Stop reason: HOSPADM

## 2019-10-13 RX ORDER — ATORVASTATIN CALCIUM 40 MG/1
40 TABLET, FILM COATED ORAL NIGHTLY
Status: DISCONTINUED | OUTPATIENT
Start: 2019-10-13 | End: 2019-10-15 | Stop reason: HOSPADM

## 2019-10-13 RX ORDER — BISACODYL 10 MG
10 SUPPOSITORY, RECTAL RECTAL DAILY PRN
Status: DISCONTINUED | OUTPATIENT
Start: 2019-10-13 | End: 2019-10-15 | Stop reason: HOSPADM

## 2019-10-13 RX ORDER — SODIUM CHLORIDE 0.9 % (FLUSH) 0.9 %
10 SYRINGE (ML) INJECTION EVERY 12 HOURS SCHEDULED
Status: DISCONTINUED | OUTPATIENT
Start: 2019-10-13 | End: 2019-10-15 | Stop reason: HOSPADM

## 2019-10-13 RX ORDER — ACETAMINOPHEN 160 MG/5ML
650 SOLUTION ORAL EVERY 4 HOURS PRN
Status: DISCONTINUED | OUTPATIENT
Start: 2019-10-13 | End: 2019-10-15 | Stop reason: HOSPADM

## 2019-10-13 RX ORDER — NITROGLYCERIN 0.4 MG/1
0.4 TABLET SUBLINGUAL
Status: DISCONTINUED | OUTPATIENT
Start: 2019-10-13 | End: 2019-10-15 | Stop reason: HOSPADM

## 2019-10-13 RX ORDER — NICOTINE POLACRILEX 4 MG
15 LOZENGE BUCCAL
Status: DISCONTINUED | OUTPATIENT
Start: 2019-10-13 | End: 2019-10-15 | Stop reason: HOSPADM

## 2019-10-13 RX ORDER — FENOFIBRATE 145 MG/1
145 TABLET, COATED ORAL DAILY
Status: DISCONTINUED | OUTPATIENT
Start: 2019-10-13 | End: 2019-10-15 | Stop reason: HOSPADM

## 2019-10-13 RX ORDER — ONDANSETRON 2 MG/ML
4 INJECTION INTRAMUSCULAR; INTRAVENOUS EVERY 6 HOURS PRN
Status: DISCONTINUED | OUTPATIENT
Start: 2019-10-13 | End: 2019-10-13

## 2019-10-13 RX ORDER — ALUMINA, MAGNESIA, AND SIMETHICONE 2400; 2400; 240 MG/30ML; MG/30ML; MG/30ML
15 SUSPENSION ORAL EVERY 6 HOURS PRN
Status: DISCONTINUED | OUTPATIENT
Start: 2019-10-13 | End: 2019-10-15 | Stop reason: HOSPADM

## 2019-10-13 RX ORDER — SODIUM CHLORIDE 9 MG/ML
100 INJECTION, SOLUTION INTRAVENOUS CONTINUOUS
Status: DISCONTINUED | OUTPATIENT
Start: 2019-10-13 | End: 2019-10-15 | Stop reason: HOSPADM

## 2019-10-13 RX ORDER — CHOLECALCIFEROL (VITAMIN D3) 125 MCG
5 CAPSULE ORAL NIGHTLY PRN
Status: DISCONTINUED | OUTPATIENT
Start: 2019-10-13 | End: 2019-10-15 | Stop reason: HOSPADM

## 2019-10-13 RX ORDER — HYDRALAZINE HYDROCHLORIDE 20 MG/ML
10 INJECTION INTRAMUSCULAR; INTRAVENOUS EVERY 6 HOURS PRN
Status: DISCONTINUED | OUTPATIENT
Start: 2019-10-13 | End: 2019-10-15 | Stop reason: HOSPADM

## 2019-10-13 RX ORDER — SODIUM CHLORIDE 0.9 % (FLUSH) 0.9 %
10 SYRINGE (ML) INJECTION AS NEEDED
Status: DISCONTINUED | OUTPATIENT
Start: 2019-10-13 | End: 2019-10-15 | Stop reason: HOSPADM

## 2019-10-13 RX ORDER — BUDESONIDE 3 MG/1
3 CAPSULE, COATED PELLETS ORAL DAILY
Status: DISCONTINUED | OUTPATIENT
Start: 2019-10-13 | End: 2019-10-15 | Stop reason: HOSPADM

## 2019-10-13 RX ORDER — ONDANSETRON 2 MG/ML
4 INJECTION INTRAMUSCULAR; INTRAVENOUS EVERY 4 HOURS PRN
Status: DISCONTINUED | OUTPATIENT
Start: 2019-10-13 | End: 2019-10-15 | Stop reason: HOSPADM

## 2019-10-13 RX ORDER — ONDANSETRON 4 MG/1
4 TABLET, FILM COATED ORAL EVERY 6 HOURS PRN
Status: DISCONTINUED | OUTPATIENT
Start: 2019-10-13 | End: 2019-10-15 | Stop reason: HOSPADM

## 2019-10-13 RX ORDER — PANTOPRAZOLE SODIUM 40 MG/10ML
40 INJECTION, POWDER, LYOPHILIZED, FOR SOLUTION INTRAVENOUS ONCE
Status: COMPLETED | OUTPATIENT
Start: 2019-10-13 | End: 2019-10-13

## 2019-10-13 RX ORDER — KETOROLAC TROMETHAMINE 15 MG/ML
15 INJECTION, SOLUTION INTRAMUSCULAR; INTRAVENOUS EVERY 6 HOURS PRN
Status: DISPENSED | OUTPATIENT
Start: 2019-10-13 | End: 2019-10-15

## 2019-10-13 RX ORDER — DEXTROSE MONOHYDRATE 25 G/50ML
25 INJECTION, SOLUTION INTRAVENOUS
Status: DISCONTINUED | OUTPATIENT
Start: 2019-10-13 | End: 2019-10-15 | Stop reason: HOSPADM

## 2019-10-13 RX ORDER — ACETAMINOPHEN 325 MG/1
650 TABLET ORAL EVERY 4 HOURS PRN
Status: DISCONTINUED | OUTPATIENT
Start: 2019-10-13 | End: 2019-10-15 | Stop reason: HOSPADM

## 2019-10-13 RX ORDER — PROCHLORPERAZINE EDISYLATE 5 MG/ML
10 INJECTION INTRAMUSCULAR; INTRAVENOUS ONCE
Status: COMPLETED | OUTPATIENT
Start: 2019-10-13 | End: 2019-10-13

## 2019-10-13 RX ORDER — ASPIRIN 81 MG/1
81 TABLET ORAL DAILY
Status: DISCONTINUED | OUTPATIENT
Start: 2019-10-13 | End: 2019-10-15 | Stop reason: HOSPADM

## 2019-10-13 RX ORDER — ONDANSETRON 4 MG/1
4 TABLET, FILM COATED ORAL EVERY 6 HOURS PRN
Status: DISCONTINUED | OUTPATIENT
Start: 2019-10-13 | End: 2019-10-13

## 2019-10-13 RX ORDER — ACETAMINOPHEN 650 MG/1
650 SUPPOSITORY RECTAL EVERY 4 HOURS PRN
Status: DISCONTINUED | OUTPATIENT
Start: 2019-10-13 | End: 2019-10-15 | Stop reason: HOSPADM

## 2019-10-13 RX ADMIN — PIPERACILLIN AND TAZOBACTAM 3.38 G: 3; .375 INJECTION, POWDER, LYOPHILIZED, FOR SOLUTION INTRAVENOUS at 17:51

## 2019-10-13 RX ADMIN — Medication 10 ML: at 09:29

## 2019-10-13 RX ADMIN — METOPROLOL TARTRATE 25 MG: 25 TABLET, FILM COATED ORAL at 21:42

## 2019-10-13 RX ADMIN — ISOSORBIDE MONONITRATE 60 MG: 60 TABLET, EXTENDED RELEASE ORAL at 09:22

## 2019-10-13 RX ADMIN — SODIUM CHLORIDE 100 ML/HR: 900 INJECTION, SOLUTION INTRAVENOUS at 21:43

## 2019-10-13 RX ADMIN — INSULIN LISPRO 4 UNITS: 100 INJECTION, SOLUTION INTRAVENOUS; SUBCUTANEOUS at 21:42

## 2019-10-13 RX ADMIN — ATORVASTATIN CALCIUM 40 MG: 40 TABLET, FILM COATED ORAL at 21:42

## 2019-10-13 RX ADMIN — KETOROLAC TROMETHAMINE 15 MG: 15 INJECTION, SOLUTION INTRAMUSCULAR; INTRAVENOUS at 10:36

## 2019-10-13 RX ADMIN — PANTOPRAZOLE SODIUM 40 MG: 40 INJECTION, POWDER, FOR SOLUTION INTRAVENOUS at 01:35

## 2019-10-13 RX ADMIN — METOPROLOL TARTRATE 25 MG: 25 TABLET, FILM COATED ORAL at 09:22

## 2019-10-13 RX ADMIN — KETOROLAC TROMETHAMINE 15 MG: 15 INJECTION, SOLUTION INTRAMUSCULAR; INTRAVENOUS at 03:49

## 2019-10-13 RX ADMIN — INSULIN LISPRO 3 UNITS: 100 INJECTION, SOLUTION INTRAVENOUS; SUBCUTANEOUS at 09:17

## 2019-10-13 RX ADMIN — ACETAMINOPHEN 650 MG: 650 SUPPOSITORY RECTAL at 14:50

## 2019-10-13 RX ADMIN — PROCHLORPERAZINE EDISYLATE 10 MG: 5 INJECTION INTRAMUSCULAR; INTRAVENOUS at 01:36

## 2019-10-13 RX ADMIN — INSULIN LISPRO 2 UNITS: 100 INJECTION, SOLUTION INTRAVENOUS; SUBCUTANEOUS at 17:50

## 2019-10-13 RX ADMIN — PIPERACILLIN AND TAZOBACTAM 3.38 G: 3; .375 INJECTION, POWDER, LYOPHILIZED, FOR SOLUTION INTRAVENOUS at 09:19

## 2019-10-13 RX ADMIN — SODIUM CHLORIDE 500 ML: 900 INJECTION, SOLUTION INTRAVENOUS at 02:19

## 2019-10-13 RX ADMIN — SODIUM CHLORIDE 75 ML/HR: 900 INJECTION, SOLUTION INTRAVENOUS at 03:50

## 2019-10-13 RX ADMIN — PIPERACILLIN AND TAZOBACTAM 3.38 G: 3; .375 INJECTION, POWDER, LYOPHILIZED, FOR SOLUTION INTRAVENOUS at 03:51

## 2019-10-13 NOTE — H&P
[facility] MEDICAL [facility] HOSPITALIST     Dayana Tipton MD    CHIEF COMPLAINT:     Epigastric abdominal pain    HISTORY OF PRESENT ILLNESS:    She is a 70-year-old female past medical history of coronary artery disease and ulcerative colitis, scented to the ER for a 1 day history of sharp stabbing epigastric and right upper quadrant abdominal pain.  It was not associated with eating.  It was associated with nausea but no vomiting.  She states she ate her normal dinner at 830 and then later on in the night she had a severe abdominal pain that prompted her to come to the ER.  In the ER she had a CT abdomen done showing possible formation around the gallbladder, was given pain control and antibiotics and admitted for further care.  Only she still reports some abdominal pain and nausea but has not had any vomiting.  She reports she is thirsty.      Record review show she is currently getting outpt workup for her CAD and had stress test 10/10/19 results pending (previous LHCcalcified 50% proximal 70% mid LAD and 70% mid LCX, normal LVEF)      Past Medical History:   Diagnosis Date   • Age-related osteoporosis without current pathological fracture 9/12/2019   • Arthritis    • CAD (coronary artery disease)    • Diabetes (CMS/[facility])    • Elevated cholesterol    • HTN (hypertension)    • Hyperlipemia      Past Surgical History:   Procedure Laterality Date   • ANKLE ARTHROSCOPY     • BELPHAROPTOSIS REPAIR     • CARDIAC CATHETERIZATION     • CATARACT EXTRACTION     • COLONOSCOPY     • COSMETIC SURGERY     • ENDOSCOPY     • HEMORROIDECTOMY     • HYSTERECTOMY       Family History   Problem Relation Age of Onset   • Diabetes Mother    • Heart disease Father    • Heart disease Brother    • Diabetes Brother      Social History     Tobacco Use   • Smoking status: Never Smoker   • Smokeless tobacco: Never Used   Substance Use Topics   • Alcohol use: No     Frequency: Never   • Drug use: No     Medications Prior to Admission  "  Medication Sig Dispense Refill Last Dose   • aspirin (ASPIR-LOW) 81 MG EC tablet Daily.   10/12/2019 at 9:30PM time   • atorvastatin (LIPITOR) 40 MG tablet ATORVASTATIN CALCIUM 40 MG TABS   10/12/2019 at Unknown time   • Budesonide (ENTOCORT EC) 3 MG 24 hr capsule BUDESONIDE 3 MG CPEP   10/12/2019 at Unknown time   • Calcium Citrate-Vitamin D (CALCIUM + D PO) Take  by mouth.   10/12/2019 at Unknown time   • Cholecalciferol (VITAMIN D3) 2000 units capsule VITAMIN D3 CAPS   10/12/2019 at Unknown time   • Coenzyme Q10 10 MG capsule COQ10 CAPS   10/12/2019 at Unknown time   • denosumab (PROLIA) 60 MG/ML solution prefilled syringe syringe PROLIA 60 MG/ML SOSY   10/12/2019 at Unknown time   • fenofibrate (TRICOR) 145 MG tablet Daily.   10/12/2019 at Unknown time   • glimepiride (AMARYL) 2 MG tablet Daily. 1 1/2   10/12/2019 at Unknown time   • isosorbide mononitrate (IMDUR) 60 MG 24 hr tablet Daily.   10/12/2019 at Unknown time   • metFORMIN ER (GLUCOPHAGE-XR) 500 MG 24 hr tablet Take 500 mg by mouth. 4 pills a day   10/12/2019 at Unknown time   • metoprolol tartrate (LOPRESSOR) 25 MG tablet Every 12 (Twelve) Hours.   10/12/2019 at Unknown time   • Vitamin D, Cholecalciferol, (CHOLECALCIFEROL) 400 units tablet Take  by mouth Daily. Vit D2 50,000 weekly   10/12/2019 at Unknown time   • nitroglycerin (NITROSTAT) 0.4 MG SL tablet NITROSTAT 0.4 MG SUBL   Unknown at Unknown time     Allergies:  Asacol [mesalamine]      There is no immunization history on file for this patient.        REVIEW OF SYSTEMS:  Please see the above history of present illness for pertinent positives and negatives.  The remainder of the patient's systems have been reviewed and are negative.     Vital Signs  Temp:  [97.6 °F (36.4 °C)-98.4 °F (36.9 °C)] 98.4 °F (36.9 °C)  Heart Rate:  [] 112  Resp:  [18-20] 18  BP: (135-184)/(36-79) 143/79    Flowsheet Rows      First Filed Value   Admission Height  160 cm (63\") Documented at 10/13/2019 0107 "   Admission Weight  58.3 kg (128 lb 8.5 oz) [stretcher scale] Documented at 10/13/2019 0107           Physical Exam:  Physical Exam   Constitutional: Patient appears  in no acute distress     HEENT:   Head: Normocephalic and atraumatic.   Eyes:  Pupils are equal, round, and reactive to light. EOM are intact. Sclera are anicteric and non-injected.  Mouth and Throat: Patient has moist mucous membranes. Oropharynx is clear of any erythema or exudate.       Neck: Neck supple.  No thyromegaly present. No lymphadenopathy present. No  masses.     Cardiovascular: Inspection: No JVD present. Palpation: No parasternal heave. Pedal pulses +1 bilaterally. No leg edema. Auscultation: Regular rate, regular rhythm, S1 normal and S2 normal. reveals no gallop and no friction rub. No Carotid bruit bilaterally.    Pulmonary/Chest: Inspection: No distress, no use of accessory muscles. Lungs are clear to auscultation bilaterally. No respiratory distress. No wheezes. No rhonchi. No rales.     Abdomen /Gastrointestinal: Inspection: no distension. TTP in the epigastric an RUQ area but no guarding.    Musculoskeletal: Normal Muscle tone. Age appropriate, no deformities.    Neurological: Patient is alert and oriented to person, place, and time. Cranial nerves II-XII are grossly intact with no focal deficits. Sensori-motor exam is normal. No cerebellar signs.    Skin: Skin is warm. No rash noted. Nails show no clubbing.  No cyanosis or erythema. No bruising.      Results Review:    I reviewed the patient's new clinical results.  Lab Results (most recent)     Procedure Component Value Units Date/Time    Basic Metabolic Panel [[id number]]  (Abnormal) Collected:  10/13/19 0701    Specimen:  Blood Updated:  10/13/19 0742     Glucose 233 mg/dL      BUN 7 mg/dL      Creatinine 0.60 mg/dL      Sodium 136 mmol/L      Potassium 3.5 mmol/L      Chloride 98 mmol/L      CO2 25.0 mmol/L      Calcium 8.4 mg/dL      eGFR Non African Amer 97 mL/min/1.73       BUN/Creatinine Ratio 11.7     Anion Gap 16.5 mmol/L     Narrative:       The MDRD GFR formula is only valid for adults with stable renal function between ages 18 and 70.    CBC Auto Differential [[id number]]  (Abnormal) Collected:  10/13/19 0701    Specimen:  Blood Updated:  10/13/19 0710     WBC 8.10 10*3/mm3      RBC 4.68 10*6/mm3      Hemoglobin 13.2 g/dL      Hematocrit 40.4 %      MCV 86.4 fL      MCH 28.2 pg      MCHC 32.7 g/dL      RDW 14.2 %      RDW-SD 43.8 fl      MPV 7.6 fL      Platelets 229 10*3/mm3      Neutrophil % 88.3 %      Lymphocyte % 5.1 %      Monocyte % 6.2 %      Eosinophil % 0.2 %      Basophil % 0.2 %      Neutrophils, Absolute 7.20 10*3/mm3      Lymphocytes, Absolute 0.40 10*3/mm3      Monocytes, Absolute 0.50 10*3/mm3      Eosinophils, Absolute 0.00 10*3/mm3      Basophils, Absolute 0.00 10*3/mm3      nRBC 0.0 /100 WBC     Hemoglobin A1c [[id number]] Collected:  10/13/19 0134    Specimen:  Blood Updated:  10/13/19 0638    Comprehensive Metabolic Panel [[id number]]  (Abnormal) Collected:  10/13/19 0134    Specimen:  Blood Updated:  10/13/19 0223     Glucose 254 mg/dL      BUN 10 mg/dL      Creatinine 0.70 mg/dL      Sodium 137 mmol/L      Potassium 3.6 mmol/L      Chloride 101 mmol/L      CO2 26.0 mmol/L      Calcium 9.5 mg/dL      Total Protein 6.5 g/dL      Albumin 3.90 g/dL      ALT (SGPT) 18 U/L      AST (SGOT) 27 U/L      Alkaline Phosphatase 42 U/L      Total Bilirubin 0.6 mg/dL      eGFR Non African Amer 81 mL/min/1.73      Globulin 2.6 gm/dL      A/G Ratio 1.5 g/dL      BUN/Creatinine Ratio 14.3     Anion Gap 13.6 mmol/L     Narrative:       The MDRD GFR formula is only valid for adults with stable renal function between ages 18 and 70.    Lipase [[id number]]  (Normal) Collected:  10/13/19 0134    Specimen:  Blood Updated:  10/13/19 0223     Lipase 25 U/L     Urinalysis With Culture If Indicated - Urine, Catheter [[id number]]  (Abnormal) Collected:  10/13/19 0157    Specimen:  Urine,  Catheter Updated:  10/13/19 0208     Color, UA Yellow     Appearance, UA Clear     pH, UA 7.5     Specific Gravity, UA 1.015     Glucose,  mg/dL (2+)     Ketones, UA Negative     Bilirubin, UA Negative     Blood, UA Negative     Protein, UA Negative     Leuk Esterase, UA Negative     Nitrite, UA Negative     Urobilinogen, UA 1.0 E.U./dL    Narrative:       Urine microscopic not indicated.    CBC & Differential [[id number]] Collected:  10/13/19 0134    Specimen:  Blood Updated:  10/13/19 0151    Narrative:       The following orders were created for panel order CBC & Differential.  Procedure                               Abnormality         Status                     ---------                               -----------         ------                     CBC Auto Differential[[id number]]        Abnormal            Final result                 Please view results for these tests on the individual orders.    CBC Auto Differential [[id number]]  (Abnormal) Collected:  10/13/19 0134    Specimen:  Blood Updated:  10/13/19 0151     WBC 4.90 10*3/mm3      RBC 4.20 10*6/mm3      Hemoglobin 12.1 g/dL      Hematocrit 35.2 %      MCV 83.9 fL      MCH 28.7 pg      MCHC 34.3 g/dL      RDW 14.2 %      RDW-SD 42.0 fl      MPV 7.4 fL      Platelets 182 10*3/mm3      Neutrophil % 81.9 %      Lymphocyte % 10.9 %      Monocyte % 6.1 %      Eosinophil % 0.4 %      Basophil % 0.7 %      Neutrophils, Absolute 4.00 10*3/mm3      Lymphocytes, Absolute 0.50 10*3/mm3      Monocytes, Absolute 0.30 10*3/mm3      Eosinophils, Absolute 0.00 10*3/mm3      Basophils, Absolute 0.00 10*3/mm3      nRBC 0.0 /100 WBC           Imaging Results (most recent)     Procedure Component Value Units Date/Time    CT Abdomen Pelvis Without Contrast [[id number]] Collected:  10/13/19 0048     Updated:  10/13/19 0253    Narrative:       CT OF THE ABDOMEN AND PELVIS WITHOUT CONTRAST    Clinical indication: Abdominal pain.    Comparison: None.    Procedure: Noncontrast  CT images of the abdomen and pelvis were obtained.  CT dose lowering techniques were used, to include: automated exposure control, adjustment for patient size, and or use of iterative reconstruction.    Findings:     Lung Bases: Lung bases are clear. No pleural effusion. Heart size is normal without pericardial effusion. Moderate hiatal hernia.    Liver and biliary system: The liver is normal in size and configuration without focal lesion. No intra or extrahepatic biliary ductal dilatation is noted. The gallbladder is distended with layering radiodense intraluminal material present. The   gallbladder wall appears thickened and there is a small amount of pericholecystic fluid.    Pancreas: The pancreas is unremarkable.     Spleen: The spleen is normal.    Adrenals: The adrenal glands are within normal limits.     Kidneys: No urolithiasis or hydronephrosis. Simple appearing left renal cortical cyst is present.    Gastrointestinal: The stomach and scattered loops of small and large bowel have a nonobstructive pattern. Diverticulosis without acute diverticulitis. The appendix is normal in the right lower quadrant.     Mesentery and retroperitoneum: No mesenteric or retroperitoneal adenopathy. No abnormal fluid collection, mass or free air. Atherosclerosis in the infrarenal aorta and iliac vessels.    Pelvis: The urinary bladder is moderately distended with a smooth contour. Rectum is normal. No free fluid. No deep pelvic or inguinal adenopathy.     Reproductive: No acute abnormality.    Body wall: Normal.    Bones: No acute osseous abnormality.      Impression:       Impression:   1. No urolithiasis or hydronephrosis.  2. Cholelithiasis, gallbladder wall thickening and adjacent fluid are suspicious for acute cholecystitis in the correct clinical setting. Consider right upper quadrant ultrasound if clinically warranted.    Electronically signed by:  Rolando Mercedes M.D.    10/13/2019 12:52 AM        Reviewed  personally    ECG/EMG Results (most recent)     None        Will get pre-op EKG as none done in the ER      Assessment/Plan      ABD pain likey d/t acute cholecystitis  -CT abd as above  -pain control, anti-emetics  -c/w zosyn  -NPO for now  -general surgery consulted     CAD   -no current chest pain, but was getting workup this past week  -get pre-op EKG  -c/w ASA, statin, BB, imdur  -consult cardiology to try to get stress test results    HTN, HLD  -chronic   -medicines as above    Type II DM  -chronic  -SSI, hold home po meds    Ulcerative colitis  -chronic, no evidence for exacerbation  -c/w budesonide    DVT ppx-SCD    High risk     I discussed the patient's findings and my recommendations with patient.     Boni Gomez DO  10/13/19  8:50 AM

## 2019-10-13 NOTE — NURSING NOTE
Dr. Gomez returned call about patient's fever. Said to obtain blood cultures if patient becomes febrile again. Will notify patient's floor nurse, Kinza.

## 2019-10-13 NOTE — CONSULTS
Leeanne Quinteros is a 78 y.o. female.     History of present illness  Ms. Quinteros is seen this morning in consultation at the request of Dr. Gomez for epigastric discomfort and right upper quadrant discomfort.  Started yesterday.  No prior history of similar complaint.  He was being worked up by Dr. Dallas as an outpatient for some vague chest discomfort and epigastric discomfort which may be related to her gallbladder.  Had a stress test done recently and were trying to get the results of that.  Work-up shows that she has gallstones thickened gallbladder wall and pericholecystic fluid consistent with acute cholecystitis cholelithiasis.  We have recommended that she undergo a laparoscopic cholecystectomy.  Of course we are needing cardiology clearance to do that.    Past Medical History:   Diagnosis Date   • Age-related osteoporosis without current pathological fracture 9/12/2019   • Arthritis    • CAD (coronary artery disease)    • Diabetes (CMS/HCC)    • Elevated cholesterol    • HTN (hypertension)    • Hyperlipemia        Past Surgical History:   Procedure Laterality Date   • ANKLE ARTHROSCOPY     • BELPHAROPTOSIS REPAIR     • CARDIAC CATHETERIZATION     • CATARACT EXTRACTION     • COLONOSCOPY     • COSMETIC SURGERY     • ENDOSCOPY     • HEMORROIDECTOMY     • HYSTERECTOMY           Current Facility-Administered Medications:   •  acetaminophen (TYLENOL) tablet 650 mg, 650 mg, Oral, Q4H PRN **OR** acetaminophen (TYLENOL) 160 MG/5ML solution 650 mg, 650 mg, Oral, Q4H PRN **OR** acetaminophen (TYLENOL) suppository 650 mg, 650 mg, Rectal, Q4H PRN, Tucker, Genet, APRN  •  aluminum-magnesium hydroxide-simethicone (MAALOX MAX) 400-400-40 MG/5ML suspension 15 mL, 15 mL, Oral, Q6H PRN, CeballosGenet, APRN  •  aspirin EC tablet 81 mg, 81 mg, Oral, Daily, Boni Gomez M, DO  •  atorvastatin (LIPITOR) tablet 40 mg, 40 mg, Oral, Nightly, Boni Gomez, DO  •  bisacodyl (DULCOLAX) suppository 10 mg, 10 mg, Rectal, Daily  PRN, Genet Ceballos, APRN  •  Budesonide (ENTOCORT EC) 24 hr capsule 3 mg, 3 mg, Oral, Daily, Boni Gomez, DO  •  dextrose (D50W) 25 g/ 50mL Intravenous Solution 25 g, 25 g, Intravenous, Q15 Min PRN, Tucker, Genet, APRN  •  dextrose (D50W) 25 g/ 50mL Intravenous Solution 25 g, 25 g, Intravenous, Q15 Min PRN, Boni Gomez, DO  •  dextrose (GLUTOSE) oral gel 15 g, 15 g, Oral, Q15 Min PRN, Tucker, Genet, APRN  •  dextrose (GLUTOSE) oral gel 15 g, 15 g, Oral, Q15 Min PRN, Boni Gomez, DO  •  fenofibrate (TRICOR) tablet 145 mg, 145 mg, Oral, Daily, Boni Gomez, DO  •  glucagon (human recombinant) (GLUCAGEN DIAGNOSTIC) injection 1 mg, 1 mg, Subcutaneous, PRN, Genet Ceballos, APRN  •  glucagon (human recombinant) (GLUCAGEN DIAGNOSTIC) injection 1 mg, 1 mg, Subcutaneous, Q15 Min PRN, Boni Gomez, DO  •  hydrALAZINE (APRESOLINE) injection 10 mg, 10 mg, Intravenous, Q6H PRN, Boni Gomez, DO  •  insulin lispro (humaLOG) injection 0-7 Units, 0-7 Units, Subcutaneous, 4x Daily With Meals & Nightly, Genet Ceballos, APRN, 3 Units at 10/13/19 0917  •  isosorbide mononitrate (IMDUR) 24 hr tablet 60 mg, 60 mg, Oral, Q24H, Boni Gomez, DO, 60 mg at 10/13/19 0922  •  ketorolac (TORADOL) injection 15 mg, 15 mg, Intravenous, Q6H PRN, Genet Ceballos, APRN, 15 mg at 10/13/19 0349  •  magnesium hydroxide (MILK OF MAGNESIA) suspension 2400 mg/10mL 10 mL, 10 mL, Oral, Daily PRN, Ceballos, Genet, APRN  •  melatonin tablet 5 mg, 5 mg, Oral, Nightly PRN, Genet Ceballos, APRN  •  metoprolol tartrate (LOPRESSOR) tablet 25 mg, 25 mg, Oral, Q12H, Boni Gomez, DO, 25 mg at 10/13/19 0922  •  nitroglycerin (NITROSTAT) SL tablet 0.4 mg, 0.4 mg, Sublingual, Q5 Min PRN, Boni Gomez, DO  •  ondansetron (ZOFRAN) tablet 4 mg, 4 mg, Oral, Q6H PRN **OR** ondansetron (ZOFRAN) injection 4 mg, 4 mg, Intravenous, Q4H PRN, Boni Gomez, DO  •  piperacillin-tazobactam (ZOSYN) IVPB 3.375 g in 100 mL NS (CD), 3.375 g, Intravenous, Q8H, Genet Ceballos, APRN,  3.375 g at 10/13/19 0919  •  [COMPLETED] Insert peripheral IV, , , Once **AND** sodium chloride 0.9 % flush 10 mL, 10 mL, Intravenous, PRN, Otto Cortez MD  •  sodium chloride 0.9 % flush 10 mL, 10 mL, Intravenous, Q12H, Ceballos, Genet, APRN, 10 mL at 10/13/19 0929  •  sodium chloride 0.9 % flush 10 mL, 10 mL, Intravenous, PRN, Ceballos, Genet, APRN  •  sodium chloride 0.9 % infusion, 75 mL/hr, Intravenous, Continuous, Ceballos, Genet, APRN, Last Rate: 75 mL/hr at 10/13/19 0900, 75 mL/hr at 10/13/19 0900    Allergies   Allergen Reactions   • Asacol [Mesalamine] Swelling       Family History   Problem Relation Age of Onset   • Diabetes Mother    • Heart disease Father    • Heart disease Brother    • Diabetes Brother        Social History     Socioeconomic History   • Marital status:      Spouse name: Not on file   • Number of children: Not on file   • Years of education: Not on file   • Highest education level: Not on file   Tobacco Use   • Smoking status: Never Smoker   • Smokeless tobacco: Never Used   Substance and Sexual Activity   • Alcohol use: No     Frequency: Never   • Drug use: No   • Sexual activity: Defer       The following portions of the patient's history were reviewed and updated as appropriate: allergies, current medications, past family history, past medical history, past social history, past surgical history and problem list.      Assessment/Plan   Complete review of systems is done and unremarkable with exception of the chief complaint and the above-noted exceptions    Physical exam shows a pleasant 78-year-old female.  HEENT is negative.  Heart is regular.  Lungs are clear.  Abdomen is soft.  She is tender primarily in the right upper quadrant fairly lateral over the gallbladder.  No true guarding or rebound.  No palpable mass.  Extremities show equal range of motion in the upper and lower extremities.  She has symmetrical strength and usage.  Neuro shows no obvious focal  deficit.    Impression: Acute cholecystitis cholelithiasis    Recommendation: Laparoscopic cholecystectomy after cleared by cardiology    Vijay Guerra DO  10/13/2019  9:49 AM

## 2019-10-13 NOTE — ED PROVIDER NOTES
Subjective   Chief complaint abdominal pain.  This is a 78-year-old presents with epigastric abdominal pain the patient states began around 10 PM tonight.  She states is a sharp stabbing constant epigastric abdominal pain with no radiation no other alleviating or exacerbating factors.  She states she has had a hysterectomy and oophorectomy but no other abdominal surgeries.  She states does have a prior history of ulcerative colitis.  She has had no fevers or chills.  She has nausea but no vomiting.  No diarrhea melena or hematochezia.  No dysuria frequency urgency or hematuria.  She states she feels somewhat different than she felt in the past with her ulcerative colitis.  She rates her pain a 6/10        History provided by:  Patient      Review of Systems   Constitutional: Negative for chills and fever.   HENT: Negative for congestion and sore throat.    Eyes: Negative for redness.   Respiratory: Negative for shortness of breath.    Cardiovascular: Negative for chest pain.   Gastrointestinal: Positive for abdominal pain and nausea.   Endocrine: Negative for cold intolerance and heat intolerance.   Genitourinary: Negative for difficulty urinating and dysuria.   Musculoskeletal: Negative for back pain.   Skin: Negative for rash.   Allergic/Immunologic: Negative for immunocompromised state.   Neurological: Negative for dizziness and weakness.   Hematological: Negative for adenopathy.   Psychiatric/Behavioral: Negative for confusion.   All other systems reviewed and are negative.      Past Medical History:   Diagnosis Date   • Age-related osteoporosis without current pathological fracture 9/12/2019   • CAD (coronary artery disease)    • Diabetes (CMS/HCC)    • HTN (hypertension)    • Hyperlipemia        Allergies   Allergen Reactions   • Asacol [Mesalamine] Swelling       Past Surgical History:   Procedure Laterality Date   • ANKLE ARTHROSCOPY     • BELPHAROPTOSIS REPAIR     • CARDIAC CATHETERIZATION     • CATARACT  EXTRACTION     • HEMORROIDECTOMY     • HYSTERECTOMY         Family History   Problem Relation Age of Onset   • Diabetes Mother    • Heart disease Father    • Heart disease Brother    • Diabetes Brother        Social History     Socioeconomic History   • Marital status:      Spouse name: Not on file   • Number of children: Not on file   • Years of education: Not on file   • Highest education level: Not on file   Tobacco Use   • Smoking status: Never Smoker   • Smokeless tobacco: Never Used           Objective   Physical Exam   Constitutional: She is oriented to person, place, and time. She appears well-developed and well-nourished. No distress.   HENT:   Head: Normocephalic and atraumatic.   Right Ear: External ear normal.   Left Ear: External ear normal.   Nose: Nose normal.   Eyes: Conjunctivae and EOM are normal. Pupils are equal, round, and reactive to light.   Neck: Normal range of motion. Neck supple. No JVD present.   Cardiovascular: Normal rate, regular rhythm, normal heart sounds and intact distal pulses.   Pulmonary/Chest: Effort normal and breath sounds normal. No stridor. She has no wheezes. She has no rales.   Abdominal: Soft. Bowel sounds are normal. She exhibits no mass. There is tenderness in the epigastric area. There is no rebound and no guarding. No hernia.   Musculoskeletal: Normal range of motion.   Lymphadenopathy:     She has no cervical adenopathy.   Neurological: She is alert and oriented to person, place, and time. No cranial nerve deficit.   Skin: Skin is warm and dry. Capillary refill takes less than 2 seconds. No rash noted.   Psychiatric: She has a normal mood and affect.   Nursing note and vitals reviewed.      Procedures           ED Course      CT Abdomen Pelvis Without Contrast   Final Result   Impression:    1. No urolithiasis or hydronephrosis.   2. Cholelithiasis, gallbladder wall thickening and adjacent fluid are suspicious for acute cholecystitis in the correct clinical  setting. Consider right upper quadrant ultrasound if clinically warranted.      Electronically signed by:  Rolando Mercedes M.D.     10/13/2019 12:52 AM        Lab Results (last 72 hours)     Procedure Component Value Units Date/Time    CBC & Differential [[id number]] Collected:  10/13/19 0134    Specimen:  Blood Updated:  10/13/19 0151    Narrative:       The following orders were created for panel order CBC & Differential.  Procedure                               Abnormality         Status                     ---------                               -----------         ------                     CBC Auto Differential[[id number]]        Abnormal            Final result                 Please view results for these tests on the individual orders.    Comprehensive Metabolic Panel [[id number]]  (Abnormal) Collected:  10/13/19 0134    Specimen:  Blood Updated:  10/13/19 0223     Glucose 254 mg/dL      BUN 10 mg/dL      Creatinine 0.70 mg/dL      Sodium 137 mmol/L      Potassium 3.6 mmol/L      Chloride 101 mmol/L      CO2 26.0 mmol/L      Calcium 9.5 mg/dL      Total Protein 6.5 g/dL      Albumin 3.90 g/dL      ALT (SGPT) 18 U/L      AST (SGOT) 27 U/L      Alkaline Phosphatase 42 U/L      Total Bilirubin 0.6 mg/dL      eGFR Non African Amer 81 mL/min/1.73      Globulin 2.6 gm/dL      A/G Ratio 1.5 g/dL      BUN/Creatinine Ratio 14.3     Anion Gap 13.6 mmol/L     Narrative:       The MDRD GFR formula is only valid for adults with stable renal function between ages 18 and 70.    Lipase [[id number]]  (Normal) Collected:  10/13/19 0134    Specimen:  Blood Updated:  10/13/19 0223     Lipase 25 U/L     CBC Auto Differential [[id number]]  (Abnormal) Collected:  10/13/19 0134    Specimen:  Blood Updated:  10/13/19 0151     WBC 4.90 10*3/mm3      RBC 4.20 10*6/mm3      Hemoglobin 12.1 g/dL      Hematocrit 35.2 %      MCV 83.9 fL      MCH 28.7 pg      MCHC 34.3 g/dL      RDW 14.2 %      RDW-SD 42.0 fl      MPV 7.4 fL      Platelets 182  "10*3/mm3      Neutrophil % 81.9 %      Lymphocyte % 10.9 %      Monocyte % 6.1 %      Eosinophil % 0.4 %      Basophil % 0.7 %      Neutrophils, Absolute 4.00 10*3/mm3      Lymphocytes, Absolute 0.50 10*3/mm3      Monocytes, Absolute 0.30 10*3/mm3      Eosinophils, Absolute 0.00 10*3/mm3      Basophils, Absolute 0.00 10*3/mm3      nRBC 0.0 /100 WBC     Urinalysis With Culture If Indicated - Urine, Catheter [[id number]]  (Abnormal) Collected:  10/13/19 0157    Specimen:  Urine, Catheter Updated:  10/13/19 0208     Color, UA Yellow     Appearance, UA Clear     pH, UA 7.5     Specific Gravity, UA 1.015     Glucose,  mg/dL (2+)     Ketones, UA Negative     Bilirubin, UA Negative     Blood, UA Negative     Protein, UA Negative     Leuk Esterase, UA Negative     Nitrite, UA Negative     Urobilinogen, UA 1.0 E.U./dL    Narrative:       Urine microscopic not indicated.        Medications   sodium chloride 0.9 % flush 10 mL (not administered)   piperacillin-tazobactam (ZOSYN) IVPB 3.375 g in 100 mL NS (CD) (not administered)   sodium chloride 0.9 % bolus 500 mL (500 mL Intravenous New Bag 10/13/19 0219)   prochlorperazine (COMPAZINE) injection 10 mg (10 mg Intravenous Given 10/13/19 0136)   pantoprazole (PROTONIX) injection 40 mg (40 mg Intravenous Given 10/13/19 0135)     BP (!) 184/58   Pulse 69   Temp 97.6 °F (36.4 °C) (Oral)   Resp 18   Ht 160 cm (63\")   Wt 58.3 kg (128 lb 8.5 oz) Comment: stretcher scale  SpO2 99%   BMI 22.77 kg/m²     Old records reviewed.  There are no previous laboratories available for comparison.  Patient did have a previous BNP of 214 on 9/9/2019.  I discussed results with the patient family as well as call discussed results with the hospitalist on-call who agreed to admit          MDM  Differential diagnosis; this does not constitute the entirety of considered causes:        DKA, intra-abdominal infection, dissection, pneumoperitoneum, peritonitis, peptic ulcer disease, pancreatitis, " hepatitis, ischemic bowel, bowel obstruction, appendicitis, cholelithiasis, cholecystitis    Final diagnoses:   Epigastric pain   Calculus of gallbladder with acute cholecystitis without obstruction              Otto Cortez MD  10/13/19 4470

## 2019-10-13 NOTE — CONSULTS
Cardiology Consult Note      Requesting Physician    Boni Gomez DO    PATIENT IDENTIFICATION    Name: Mindi Quinteros Age: 78 y.o. Sex: female :  1940 MRN: [id number]    REASON FOR CONSULTATION:  This is a 78-year-old with PMH      # CAD, cardiac cath 18  calcified 50% proximal 70% mid LAD and 70% mid LCX, normal LVEF  #  diabetes  #  hypertension, hyperlipidemia  #  ulcerative colitis  #  allergy to aspertane  #  hemorrhoidectomy, hysterectomy, bladder repair, left ankle surgery,     Patient was seen by her primary cardiologist, Dr. Dallas, 2019 with episodes of severe shortness of breath associated with chest tightness lasting 2-3 hours.  Plan at that time was to schedule patient for outpatient nuclear stress test.       CC:  Surgical cardiac risk assessment undergo gallbladder surgery    HISTORY OF PRESENT ILLNESS:   Patient presented to the ER with report of sharp, stabbing right upper quadrant abdominal pain.   In the ER she had a CT abdomen done showing possible formation around the gallbladder, was given pain control and antibiotics and admitted for further care and surgical consult.    Surgery has evaluated the patient recommending laparoscopic cholecystectomy after clearance by cardiology.    Upon my evaluation, patient is lying supine in bed.  She has recently received pain medication and is very groggy.  She is able to state that she has had no recent or current chest pain or palpitations.  She denies any shortness of breath or lower extremity edema.  Chest x-ray shows mild nonspecific diffuse interstitial prominence throughout both lungs- possibly represent an atypical pneumonia or mild pulmonary edema.  EKG shows NSR, IRBBB, 1 deg AVB.      REVIEW OF SYSTEMS:  Pertinent items are noted in HPI, all other systems reviewed and negative    OBJECTIVE       ASSESSMENT/PLAN    Cholecystitis with cholelithiasis    Epigastric pain            Vital Signs  Visit Vitals  /57   Pulse  "90   Temp (!) 102.8 °F (39.3 °C) Comment: recheck nurse is aware   Resp 16   Ht 160 cm (63\")   Wt 55.6 kg (122 lb 9.2 oz)   SpO2 93%   BMI 21.71 kg/m²     Oxygen Therapy  SpO2: 93 %  Device (Oxygen Therapy): room air  Flowsheet Rows      First Filed Value   Admission Height  160 cm (63\") Documented at 10/13/2019 0107   Admission Weight  58.3 kg (128 lb 8.5 oz) [stretcher scale] Documented at 10/13/2019 0107        Intake & Output (last 3 days)       10/10 0701 - 10/11 0700 10/11 0701 - 10/12 0700 10/12 0701 - 10/13 0700 10/13 0701 - 10/14 0700    IV Piggyback   500     Total Intake(mL/kg)   500 (8.6)     Urine (mL/kg/hr)    800 (1.6)    Stool    0    Total Output    800    Net   +500 -800            Stool Unmeasured Occurrence    1 x        Lines, Drains & Airways    Active LDAs     Name:   Placement date:   Placement time:   Site:   Days:    Peripheral IV 10/13/19 0147 Left Forearm   10/13/19    0147    Forearm   less than 1                Medical History    Past Medical History:   Diagnosis Date   • Age-related osteoporosis without current pathological fracture 9/12/2019   • Arthritis    • CAD (coronary artery disease)    • Diabetes (CMS/HCC)    • Elevated cholesterol    • HTN (hypertension)    • Hyperlipemia         Surgical History    Past Surgical History:   Procedure Laterality Date   • ANKLE ARTHROSCOPY     • BELPHAROPTOSIS REPAIR     • CARDIAC CATHETERIZATION     • CATARACT EXTRACTION     • COLONOSCOPY     • COSMETIC SURGERY     • ENDOSCOPY     • HEMORROIDECTOMY     • HYSTERECTOMY          Family History    Family History   Problem Relation Age of Onset   • Diabetes Mother    • Heart disease Father    • Heart disease Brother    • Diabetes Brother        Social History    Social History     Tobacco Use   • Smoking status: Never Smoker   • Smokeless tobacco: Never Used   Substance Use Topics   • Alcohol use: No     Frequency: Never        Allergies    Allergies   Allergen Reactions   • Asacol [Mesalamine] " "Swelling              /57   Pulse 90   Temp (!) 102.8 °F (39.3 °C) Comment: recheck nurse is aware  Resp 16   Ht 160 cm (63\")   Wt 55.6 kg (122 lb 9.2 oz)   SpO2 93%   BMI 21.71 kg/m²   Intake/Output last 3 shifts:  I/O last 3 completed shifts:  In: 500 [IV Piggyback:500]  Out: -   Intake/Output this shift:  I/O this shift:  In: -   Out: 800 [Urine:800]    PHYSICAL EXAM:    General: Alert, cooperative, no distress, appears stated age  Head:  Normocephalic, atraumatic, mucous membranes moist  Eyes:  Conjunctiva/corneas clear, EOM's intact     Neck:  Supple,  no adenopathy;      Lungs: Clear to auscultation bilaterally, no wheezes rhonchi rales are noted  Chest wall: No tenderness  Heart::  Regular rate and rhythm, S1 and S2 normal, no murmur, rub or gallop  Abdomen: Soft, non-tender, nondistended bowel sounds active  Extremities: No cyanosis, clubbing, or edema groin soft no hematoma.  Pulses: 2+ and symmetric all extremities  Skin:  No rashes or lesions  Neuro/psych: A&O x3. CN II through XII are grossly intact with appropriate affect      Scheduled Meds:        aspirin 81 mg Oral Daily   atorvastatin 40 mg Oral Nightly   Budesonide 3 mg Oral Daily   fenofibrate 145 mg Oral Daily   insulin lispro 0-7 Units Subcutaneous 4x Daily With Meals & Nightly   isosorbide mononitrate 60 mg Oral Q24H   metoprolol tartrate 25 mg Oral Q12H   piperacillin-tazobactam 3.375 g Intravenous Q8H   sodium chloride 10 mL Intravenous Q12H       Continuous Infusions:      sodium chloride 100 mL/hr Last Rate: 75 mL/hr (10/13/19 1401)       PRN Meds:    •  acetaminophen **OR** acetaminophen **OR** acetaminophen  •  aluminum-magnesium hydroxide-simethicone  •  bisacodyl  •  dextrose  •  dextrose  •  dextrose  •  dextrose  •  glucagon (human recombinant)  •  glucagon (human recombinant)  •  hydrALAZINE  •  ketorolac  •  magnesium hydroxide  •  melatonin  •  nitroglycerin  •  ondansetron **OR** ondansetron  •  [COMPLETED] Insert " peripheral IV **AND** sodium chloride  •  sodium chloride        Results Review:     I reviewed the patient's new clinical results.    CBC    Results from last 7 days   Lab Units 10/13/19  0701 10/13/19  0134   WBC 10*3/mm3 8.10 4.90   HEMOGLOBIN g/dL 13.2 12.1   PLATELETS 10*3/mm3 229 182     Cr Clearance Estimated Creatinine Clearance: 50.9 mL/min (by C-G formula based on SCr of 0.6 mg/dL).  Coag   Results from last 7 days   Lab Units 10/13/19  1328   INR  1.13*   APTT seconds 26.1     HbA1C No results found for: HGBA1C  Blood Glucose   Glucose   Date/Time Value Ref Range Status   10/13/2019 1157 125 (H) 70 - 105 mg/dL Final     Comment:     Serial Number: [id number]:  [id number]     Infection     CMP Results from last 7 days   Lab Units 10/13/19  0701 10/13/19  0134   SODIUM mmol/L 136 137   POTASSIUM mmol/L 3.5* 3.6   CHLORIDE mmol/L 98* 101   CO2 mmol/L 25.0 26.0   BUN mg/dL 7* 10   CREATININE mg/dL 0.60 0.70   GLUCOSE mg/dL 233* 254*   ALBUMIN g/dL  --  3.90   BILIRUBIN mg/dL  --  0.6   ALK PHOS U/L  --  42   AST (SGOT) U/L  --  27   ALT (SGPT) U/L  --  18   LIPASE U/L  --  25     ABG      UA  Results from last 7 days   Lab Units 10/13/19  0157   NITRITE UA  Negative     ELYSE  No results found for: POCMETH, POCAMPHET, POCBARBITUR, POCBENZO, POCCOCAINE, POCOPIATES, POCOXYCODO, POCPHENCYC, POCPROPOXY, POCTHC, POCTRICYC  Lysis Labs Results from last 7 days   Lab Units 10/13/19  1328 10/13/19  0701 10/13/19  0134   INR  1.13*  --   --    APTT seconds 26.1  --   --    HEMOGLOBIN g/dL  --  13.2 12.1   PLATELETS 10*3/mm3  --  229 182   CREATININE mg/dL  --  0.60 0.70     Radiology(recent) Ct Abdomen Pelvis Without Contrast    Result Date: 10/13/2019  Impression: 1. No urolithiasis or hydronephrosis. 2. Cholelithiasis, gallbladder wall thickening and adjacent fluid are suspicious for acute cholecystitis in the correct clinical setting. Consider right upper quadrant ultrasound if clinically warranted.  Electronically signed by:  Rolando Mercedes M.D.  10/13/2019 12:52 AM    Xr Chest 1 View    Result Date: 10/13/2019   1. Mild nonspecific diffuse interstitial prominence throughout both lungs, which could represent atypical pneumonia or mild pulmonary edema. 2. Partially visualized moderate hiatal hernia.  Electronically Signed By-Edwin Mcintyre On:10/13/2019 3:22 PM This report was finalized on [id number] by  Edwin Mcintyre, .              Xrays, labs reviewed personally by physician.    ECG/EMG Results (most recent)     None            Medication Review:   I have reviewed the patient's current medication list  Scheduled Meds:  aspirin 81 mg Oral Daily   atorvastatin 40 mg Oral Nightly   Budesonide 3 mg Oral Daily   fenofibrate 145 mg Oral Daily   insulin lispro 0-7 Units Subcutaneous 4x Daily With Meals & Nightly   isosorbide mononitrate 60 mg Oral Q24H   metoprolol tartrate 25 mg Oral Q12H   piperacillin-tazobactam 3.375 g Intravenous Q8H   sodium chloride 10 mL Intravenous Q12H     Continuous Infusions:  sodium chloride 100 mL/hr Last Rate: 75 mL/hr (10/13/19 1401)     PRN Meds:.•  acetaminophen **OR** acetaminophen **OR** acetaminophen  •  aluminum-magnesium hydroxide-simethicone  •  bisacodyl  •  dextrose  •  dextrose  •  dextrose  •  dextrose  •  glucagon (human recombinant)  •  glucagon (human recombinant)  •  hydrALAZINE  •  ketorolac  •  magnesium hydroxide  •  melatonin  •  nitroglycerin  •  ondansetron **OR** ondansetron  •  [COMPLETED] Insert peripheral IV **AND** sodium chloride  •  sodium chloride    Imaging:  Imaging Results (last 72 hours)     Procedure Component Value Units Date/Time    XR Chest 1 View [[id number]] Collected:  10/13/19 1519     Updated:  10/13/19 1524    Narrative:       DATE OF EXAM:  10/13/2019 2:10 PM     PROCEDURE:  XR CHEST 1 VW-     INDICATIONS:  pre- op; R10.13-Epigastric pain; K80.00-Calculus of gallbladder with  acute cholecystitis without obstruction     COMPARISON:  Chest  radiographs to 5/20/2018. CT abdomen and pelvis 10/13/2019.     TECHNIQUE:   Single radiographic AP view of the chest was obtained.     FINDINGS:  Calcified remnants of prior granulomatous disease. Subsegmental  atelectasis and/or scarring in the left lung base. Mild diffuse  interstitial prominence throughout both lungs. No pneumothorax.  Cardiomediastinal contours within normal limits given technique.  Partially visualized moderate hiatal hernia. No acute osseous  abnormality is identified.        Impression:          1. Mild nonspecific diffuse interstitial prominence throughout both  lungs, which could represent atypical pneumonia or mild pulmonary edema.  2. Partially visualized moderate hiatal hernia.     Electronically Signed By-Edwin Mcintyre On:10/13/2019 3:22 PM  This report was finalized on [id number] by  Edwin Mcintyre, .    CT Abdomen Pelvis Without Contrast [[id number]] Collected:  10/13/19 0048     Updated:  10/13/19 0253    Narrative:       CT OF THE ABDOMEN AND PELVIS WITHOUT CONTRAST    Clinical indication: Abdominal pain.    Comparison: None.    Procedure: Noncontrast CT images of the abdomen and pelvis were obtained.  CT dose lowering techniques were used, to include: automated exposure control, adjustment for patient size, and or use of iterative reconstruction.    Findings:     Lung Bases: Lung bases are clear. No pleural effusion. Heart size is normal without pericardial effusion. Moderate hiatal hernia.    Liver and biliary system: The liver is normal in size and configuration without focal lesion. No intra or extrahepatic biliary ductal dilatation is noted. The gallbladder is distended with layering radiodense intraluminal material present. The   gallbladder wall appears thickened and there is a small amount of pericholecystic fluid.    Pancreas: The pancreas is unremarkable.     Spleen: The spleen is normal.    Adrenals: The adrenal glands are within normal limits.     Kidneys: No urolithiasis  or hydronephrosis. Simple appearing left renal cortical cyst is present.    Gastrointestinal: The stomach and scattered loops of small and large bowel have a nonobstructive pattern. Diverticulosis without acute diverticulitis. The appendix is normal in the right lower quadrant.     Mesentery and retroperitoneum: No mesenteric or retroperitoneal adenopathy. No abnormal fluid collection, mass or free air. Atherosclerosis in the infrarenal aorta and iliac vessels.    Pelvis: The urinary bladder is moderately distended with a smooth contour. Rectum is normal. No free fluid. No deep pelvic or inguinal adenopathy.     Reproductive: No acute abnormality.    Body wall: Normal.    Bones: No acute osseous abnormality.      Impression:       Impression:   1. No urolithiasis or hydronephrosis.  2. Cholelithiasis, gallbladder wall thickening and adjacent fluid are suspicious for acute cholecystitis in the correct clinical setting. Consider right upper quadrant ultrasound if clinically warranted.    Electronically signed by:  Rolando Mercedes M.D.    10/13/2019 12:52 AM            DAKOTA Michel  10/13/19  4:14 PM

## 2019-10-13 NOTE — CONSULTS
CARDIOLOGY CONSULT      Requesting Provider    Boni Gomez DO      PATIENT IDENTIFICATION    Name: Mindi Quinteros Age: 78 y.o. Sex: female :  1940 MRN: [id number]    REASON FOR CONSULTATION:  This is a 78-year-old with PMH      # CAD, cardiac cath 18  calcified 50% proximal 70% mid LAD and 70% mid LCX, normal LVEF  #  diabetes  #  hypertension, hyperlipidemia  #  ulcerative colitis  #  allergy to aspertane  #  hemorrhoidectomy, hysterectomy, bladder repair, left ankle surgery,      Patient was seen by her primary cardiologist, Dr. Dallas, 2019 with episodes of severe shortness of breath associated with chest tightness lasting 2-3 hours.  Plan at that time was to schedule patient for outpatient nuclear stress test.    Evaluation for preoperative clearance    CHIEF COMPLAINT:  Abdominal pain    HISTORY OF PRESENT ILLNESS:   Patient presented to the ER with report of sharp, stabbing right upper quadrant abdominal pain.   In the ER she had a CT abdomen done showing possible formation around the gallbladder, was given pain control and antibiotics and admitted for further care and surgical consult.    Surgery has evaluated the patient recommending laparoscopic cholecystectomy after clearance by cardiology.     Upon my evaluation, patient is lying supine in bed.  She has recently received pain medication and is very groggy.  She is able to state that she has had no recent or current chest pain or palpitations.  She denies any shortness of breath or lower extremity edema.  Chest x-ray shows mild nonspecific diffuse interstitial prominence throughout both lungs- possibly represent an atypical pneumonia or mild pulmonary edema.  EKG shows NSR, IRBBB, 1 deg AVB.    The patient reports that she saw Dr. Terrazas a couple of days ago and he went over the results of her stress test that were abnormal.  He had recommended cardiac catheterization however the patient said she was not excited about the prospect of  "proceeding with another catheterization.  She was supposed to follow-up in 6 months.    IMPRESSIONS  Acute cholecystitis with cholelithiasis  Coronary artery disease with abnormal nuclear stress test recently  Hypertension with hypertensive cardiovascular disease  Hyperlipidemia    RECOMMENDATIONS:  Patient will be moderate risk for Mace for percutaneous cholecystectomy.  Patient will be moderate to high or high risk for open procedure.    Medical History    Past Medical History:   Diagnosis Date   • Age-related osteoporosis without current pathological fracture 9/12/2019   • Arthritis    • CAD (coronary artery disease)    • Diabetes (CMS/HCC)    • Elevated cholesterol    • HTN (hypertension)    • Hyperlipemia         Surgical History    Past Surgical History:   Procedure Laterality Date   • ANKLE ARTHROSCOPY     • BELPHAROPTOSIS REPAIR     • CARDIAC CATHETERIZATION     • CATARACT EXTRACTION     • COLONOSCOPY     • COSMETIC SURGERY     • ENDOSCOPY     • HEMORROIDECTOMY     • HYSTERECTOMY          Family History    Family History   Problem Relation Age of Onset   • Diabetes Mother    • Heart disease Father    • Heart disease Brother    • Diabetes Brother        Social History    Social History     Tobacco Use   • Smoking status: Never Smoker   • Smokeless tobacco: Never Used   Substance Use Topics   • Alcohol use: No     Frequency: Never        Allergies    Allergies   Allergen Reactions   • Asacol [Mesalamine] Swelling       REVIEW OF SYSTEMS:  Pertinent items are noted in HPI, all other systems reviewed and negative    OBJECTIVE     VITAL SIGNS:  Visit Vitals  BP 91/54   Pulse 93   Temp 98.5 °F (36.9 °C)   Resp 16   Ht 160 cm (63\")   Wt 55.6 kg (122 lb 9.2 oz)   SpO2 95%   BMI 21.71 kg/m²     Oxygen Therapy  SpO2: 95 %  Device (Oxygen Therapy): room air  Flowsheet Rows      First Filed Value   Admission Height  160 cm (63\") Documented at 10/13/2019 0107   Admission Weight  58.3 kg (128 lb 8.5 oz) [stretcher scale] " "Documented at 10/13/2019 0107        Intake & Output (last 3 days)       10/11 0701 - 10/12 0700 10/12 0701 - 10/13 0700 10/13 0701 - 10/14 0700    P.O.   240    I.V. (mL/kg)   612 (11)    IV Piggyback  500     Total Intake(mL/kg)  500 (8.6) 852 (15.3)    Urine (mL/kg/hr)   1100 (1.6)    Stool   0    Total Output   1100    Net  +500 -248           Stool Unmeasured Occurrence   1 x        Lines, Drains & Airways    Active LDAs     Name:   Placement date:   Placement time:   Site:   Days:    Peripheral IV 10/13/19 0147 Left Forearm   10/13/19    0147    Forearm   less than 1              BP 91/54   Pulse 93   Temp 98.5 °F (36.9 °C)   Resp 16   Ht 160 cm (63\")   Wt 55.6 kg (122 lb 9.2 oz)   SpO2 95%   BMI 21.71 kg/m²     INTAKE/OUTPUT:    Intake/Output this shift:  No intake/output data recorded.  Intake/Output last 3 shifts:  I/O last 3 completed shifts:  In: 1352 [P.O.:240; I.V.:612; IV Piggyback:500]  Out: 1100 [Urine:1100]      PHYSICAL EXAM:    General: Alert, cooperative, no distress, appears stated age  Head:  Normocephalic, atraumatic, mucous membranes moist  Eyes:  Conjunctiva/corneas clear, EOM's intact     Neck:  Supple,  no adenopathy;      Lungs: Clear to auscultation bilaterally, no wheezes rhonchi rales are noted  Chest wall: No tenderness  Heart::  Regular rate and rhythm, S1 and S2 normal, 1/6 holosystolic murmur.  No rub or gallop  Abdomen: Mild tenderness to palpation diffusely.  Bowel sounds normal  Extremities: No cyanosis, clubbing, or edema  Pulses: 2+ and symmetric all extremities  Skin:  No rashes or lesions  Neuro/psych: A&O x3. CN II through XII are grossly intact with appropriate affect    Scheduled Meds:        aspirin 81 mg Oral Daily   atorvastatin 40 mg Oral Nightly   Budesonide 3 mg Oral Daily   fenofibrate 145 mg Oral Daily   insulin lispro 0-7 Units Subcutaneous 4x Daily With Meals & Nightly   isosorbide mononitrate 60 mg Oral Q24H   metoprolol tartrate 25 mg Oral Q12H "   piperacillin-tazobactam 3.375 g Intravenous Q8H   sodium chloride 10 mL Intravenous Q12H       Continuous Infusions:      sodium chloride 100 mL/hr Last Rate: 100 mL/hr (10/13/19 1830)       PRN Meds:    •  acetaminophen **OR** acetaminophen **OR** acetaminophen  •  aluminum-magnesium hydroxide-simethicone  •  bisacodyl  •  dextrose  •  dextrose  •  dextrose  •  dextrose  •  glucagon (human recombinant)  •  glucagon (human recombinant)  •  hydrALAZINE  •  ketorolac  •  magnesium hydroxide  •  melatonin  •  nitroglycerin  •  ondansetron **OR** ondansetron  •  [COMPLETED] Insert peripheral IV **AND** sodium chloride  •  sodium chloride     Data Review:     Xrays, labs reviewed personally by provider.    CBC    Results from last 7 days   Lab Units 10/13/19  0701 10/13/19  0134   WBC 10*3/mm3 8.10 4.90   HEMOGLOBIN g/dL 13.2 12.1   PLATELETS 10*3/mm3 229 182     Cr Clearance Estimated Creatinine Clearance: 50.9 mL/min (by C-G formula based on SCr of 0.6 mg/dL).  Coag   Results from last 7 days   Lab Units 10/13/19  1328   INR  1.13*   APTT seconds 26.1     HbA1C No results found for: HGBA1C  Blood Glucose   Glucose   Date/Time Value Ref Range Status   10/13/2019 1659 195 (H) 70 - 105 mg/dL Final     Comment:     Serial Number: [id number]:  [id number]   10/13/2019 1157 125 (H) 70 - 105 mg/dL Final     Comment:     Serial Number: [id number]:  [id number]     Infection     CMP Results from last 7 days   Lab Units 10/13/19  0701 10/13/19  0134   SODIUM mmol/L 136 137   POTASSIUM mmol/L 3.5* 3.6   CHLORIDE mmol/L 98* 101   CO2 mmol/L 25.0 26.0   BUN mg/dL 7* 10   CREATININE mg/dL 0.60 0.70   GLUCOSE mg/dL 233* 254*   ALBUMIN g/dL  --  3.90   BILIRUBIN mg/dL  --  0.6   ALK PHOS U/L  --  42   AST (SGOT) U/L  --  27   ALT (SGPT) U/L  --  18   LIPASE U/L  --  25     ABG      UA  Results from last 7 days   Lab Units 10/13/19  0157   NITRITE UA  Negative     ELYSE  No results found for: POCMETH, POCAMPHET, POCBARBITUR,  POCBENZO, POCCOCAINE, POCOPIATES, POCOXYCODO, POCPHENCYC, POCPROPOXY, POCTHC, POCTRICYC  Lysis Labs Results from last 7 days   Lab Units 10/13/19  1328 10/13/19  0701 10/13/19  0134   INR  1.13*  --   --    APTT seconds 26.1  --   --    HEMOGLOBIN g/dL  --  13.2 12.1   PLATELETS 10*3/mm3  --  229 182   CREATININE mg/dL  --  0.60 0.70     Radiology(recent) Ct Abdomen Pelvis Without Contrast    Result Date: 10/13/2019  Impression: 1. No urolithiasis or hydronephrosis. 2. Cholelithiasis, gallbladder wall thickening and adjacent fluid are suspicious for acute cholecystitis in the correct clinical setting. Consider right upper quadrant ultrasound if clinically warranted. Electronically signed by:  Rolando Mercedes M.D.  10/13/2019 12:52 AM    Xr Chest 1 View    Result Date: 10/13/2019   1. Mild nonspecific diffuse interstitial prominence throughout both lungs, which could represent atypical pneumonia or mild pulmonary edema. 2. Partially visualized moderate hiatal hernia.  Electronically Signed By-Edwin Mcintyre On:10/13/2019 3:22 PM This report was finalized on [id number] by  Edwin Mcintyre, .              ECG/EMG Results (most recent)     None          Imaging:  Imaging Results (last 72 hours)     Procedure Component Value Units Date/Time    XR Chest 1 View [[id number]] Collected:  10/13/19 1519     Updated:  10/13/19 1524    Narrative:       DATE OF EXAM:  10/13/2019 2:10 PM     PROCEDURE:  XR CHEST 1 VW-     INDICATIONS:  pre- op; R10.13-Epigastric pain; K80.00-Calculus of gallbladder with  acute cholecystitis without obstruction     COMPARISON:  Chest radiographs to 5/20/2018. CT abdomen and pelvis 10/13/2019.     TECHNIQUE:   Single radiographic AP view of the chest was obtained.     FINDINGS:  Calcified remnants of prior granulomatous disease. Subsegmental  atelectasis and/or scarring in the left lung base. Mild diffuse  interstitial prominence throughout both lungs. No pneumothorax.  Cardiomediastinal contours within  normal limits given technique.  Partially visualized moderate hiatal hernia. No acute osseous  abnormality is identified.        Impression:          1. Mild nonspecific diffuse interstitial prominence throughout both  lungs, which could represent atypical pneumonia or mild pulmonary edema.  2. Partially visualized moderate hiatal hernia.     Electronically Signed By-Edwin Mcintyre On:10/13/2019 3:22 PM  This report was finalized on [id number] by  Edwin Mcintyre, .    CT Abdomen Pelvis Without Contrast [[id number]] Collected:  10/13/19 0048     Updated:  10/13/19 0253    Narrative:       CT OF THE ABDOMEN AND PELVIS WITHOUT CONTRAST    Clinical indication: Abdominal pain.    Comparison: None.    Procedure: Noncontrast CT images of the abdomen and pelvis were obtained.  CT dose lowering techniques were used, to include: automated exposure control, adjustment for patient size, and or use of iterative reconstruction.    Findings:     Lung Bases: Lung bases are clear. No pleural effusion. Heart size is normal without pericardial effusion. Moderate hiatal hernia.    Liver and biliary system: The liver is normal in size and configuration without focal lesion. No intra or extrahepatic biliary ductal dilatation is noted. The gallbladder is distended with layering radiodense intraluminal material present. The   gallbladder wall appears thickened and there is a small amount of pericholecystic fluid.    Pancreas: The pancreas is unremarkable.     Spleen: The spleen is normal.    Adrenals: The adrenal glands are within normal limits.     Kidneys: No urolithiasis or hydronephrosis. Simple appearing left renal cortical cyst is present.    Gastrointestinal: The stomach and scattered loops of small and large bowel have a nonobstructive pattern. Diverticulosis without acute diverticulitis. The appendix is normal in the right lower quadrant.     Mesentery and retroperitoneum: No mesenteric or retroperitoneal adenopathy. No abnormal  fluid collection, mass or free air. Atherosclerosis in the infrarenal aorta and iliac vessels.    Pelvis: The urinary bladder is moderately distended with a smooth contour. Rectum is normal. No free fluid. No deep pelvic or inguinal adenopathy.     Reproductive: No acute abnormality.    Body wall: Normal.    Bones: No acute osseous abnormality.      Impression:       Impression:   1. No urolithiasis or hydronephrosis.  2. Cholelithiasis, gallbladder wall thickening and adjacent fluid are suspicious for acute cholecystitis in the correct clinical setting. Consider right upper quadrant ultrasound if clinically warranted.    Electronically signed by:  Rolando Mercedes M.D.    10/13/2019 12:52 AM            Stanley Morrissey DO  10/13/19  7:13 PM

## 2019-10-14 ENCOUNTER — ANESTHESIA EVENT (OUTPATIENT)
Dept: PERIOP | Facility: HOSPITAL | Age: 79
End: 2019-10-14

## 2019-10-14 ENCOUNTER — ANESTHESIA (OUTPATIENT)
Dept: PERIOP | Facility: HOSPITAL | Age: 79
End: 2019-10-14

## 2019-10-14 LAB
ANION GAP SERPL CALCULATED.3IONS-SCNC: 13.1 MMOL/L (ref 5–15)
APTT PPP: 30.9 SECONDS (ref 24–31)
BASOPHILS # BLD AUTO: 0 10*3/MM3 (ref 0–0.2)
BASOPHILS NFR BLD AUTO: 0.1 % (ref 0–1.5)
BH CV STRESS COMMENTS STAGE 1: NORMAL
BH CV STRESS DOSE REGADENOSON STAGE 1: 0.4
BH CV STRESS DURATION MIN STAGE 1: 0
BH CV STRESS DURATION SEC STAGE 1: 10
BH CV STRESS PROTOCOL 1: NORMAL
BH CV STRESS RECOVERY BP: NORMAL MMHG
BH CV STRESS RECOVERY HR: 112 BPM
BH CV STRESS STAGE 1: 1
BUN BLD-MCNC: 9 MG/DL (ref 8–20)
BUN/CREAT SERPL: 11.3 (ref 5.4–26.2)
CALCIUM SPEC-SCNC: 7.5 MG/DL (ref 8.9–10.3)
CHLORIDE SERPL-SCNC: 100 MMOL/L (ref 101–111)
CO2 SERPL-SCNC: 25 MMOL/L (ref 22–32)
CREAT BLD-MCNC: 0.8 MG/DL (ref 0.4–1)
DEPRECATED RDW RBC AUTO: 42.4 FL (ref 37–54)
EOSINOPHIL # BLD AUTO: 0 10*3/MM3 (ref 0–0.4)
EOSINOPHIL NFR BLD AUTO: 0.1 % (ref 0.3–6.2)
ERYTHROCYTE [DISTWIDTH] IN BLOOD BY AUTOMATED COUNT: 14.2 % (ref 12.3–15.4)
GFR SERPL CREATININE-BSD FRML MDRD: 69 ML/MIN/1.73
GLUCOSE BLD-MCNC: 209 MG/DL (ref 65–99)
GLUCOSE BLDC GLUCOMTR-MCNC: 163 MG/DL (ref 70–105)
GLUCOSE BLDC GLUCOMTR-MCNC: 164 MG/DL (ref 70–105)
GLUCOSE BLDC GLUCOMTR-MCNC: 181 MG/DL (ref 70–105)
GLUCOSE BLDC GLUCOMTR-MCNC: 183 MG/DL (ref 70–105)
HBA1C MFR BLD: 6.5 % (ref 3.5–5.6)
HCT VFR BLD AUTO: 33.9 % (ref 34–46.6)
HGB BLD-MCNC: 11.4 G/DL (ref 12–15.9)
INR PPP: 1.37 (ref 0.9–1.1)
LV EF NUC BP: 67 %
LYMPHOCYTES # BLD AUTO: 0.3 10*3/MM3 (ref 0.7–3.1)
LYMPHOCYTES NFR BLD AUTO: 4.2 % (ref 19.6–45.3)
MAXIMAL PREDICTED HEART RATE: 142 BPM
MCH RBC QN AUTO: 28.5 PG (ref 26.6–33)
MCHC RBC AUTO-ENTMCNC: 33.6 G/DL (ref 31.5–35.7)
MCV RBC AUTO: 84.7 FL (ref 79–97)
MONOCYTES # BLD AUTO: 0.6 10*3/MM3 (ref 0.1–0.9)
MONOCYTES NFR BLD AUTO: 9.2 % (ref 5–12)
NEUTROPHILS # BLD AUTO: 5.4 10*3/MM3 (ref 1.7–7)
NEUTROPHILS NFR BLD AUTO: 86.4 % (ref 42.7–76)
NRBC BLD AUTO-RTO: 0.1 /100 WBC (ref 0–0.2)
PLATELET # BLD AUTO: 161 10*3/MM3 (ref 140–450)
PMV BLD AUTO: 8 FL (ref 6–12)
POTASSIUM BLD-SCNC: 3.1 MMOL/L (ref 3.6–5.1)
PROTHROMBIN TIME: 13.7 SECONDS (ref 9.6–11.7)
RBC # BLD AUTO: 4 10*6/MM3 (ref 3.77–5.28)
SODIUM BLD-SCNC: 135 MMOL/L (ref 136–144)
STRESS BASELINE BP: NORMAL MMHG
STRESS BASELINE HR: 70 BPM
STRESS TARGET HR: 121 BPM
WBC NRBC COR # BLD: 6.2 10*3/MM3 (ref 3.4–10.8)

## 2019-10-14 PROCEDURE — 87015 SPECIMEN INFECT AGNT CONCNTJ: CPT | Performed by: SURGERY

## 2019-10-14 PROCEDURE — 25010000002 DEXAMETHASONE PER 1 MG: Performed by: NURSE ANESTHETIST, CERTIFIED REGISTERED

## 2019-10-14 PROCEDURE — 25010000002 MORPHINE PER 10 MG: Performed by: NURSE ANESTHETIST, CERTIFIED REGISTERED

## 2019-10-14 PROCEDURE — 78452 HT MUSCLE IMAGE SPECT MULT: CPT | Performed by: INTERNAL MEDICINE

## 2019-10-14 PROCEDURE — 85025 COMPLETE CBC W/AUTO DIFF WBC: CPT | Performed by: INTERNAL MEDICINE

## 2019-10-14 PROCEDURE — 87186 SC STD MICRODIL/AGAR DIL: CPT | Performed by: SURGERY

## 2019-10-14 PROCEDURE — 25010000002 HEPARIN (PORCINE) PER 1000 UNITS: Performed by: SURGERY

## 2019-10-14 PROCEDURE — 88304 TISSUE EXAM BY PATHOLOGIST: CPT | Performed by: SURGERY

## 2019-10-14 PROCEDURE — 87077 CULTURE AEROBIC IDENTIFY: CPT | Performed by: SURGERY

## 2019-10-14 PROCEDURE — 80048 BASIC METABOLIC PNL TOTAL CA: CPT | Performed by: INTERNAL MEDICINE

## 2019-10-14 PROCEDURE — 87075 CULTR BACTERIA EXCEPT BLOOD: CPT | Performed by: SURGERY

## 2019-10-14 PROCEDURE — 87205 SMEAR GRAM STAIN: CPT | Performed by: SURGERY

## 2019-10-14 PROCEDURE — 82962 GLUCOSE BLOOD TEST: CPT

## 2019-10-14 PROCEDURE — 85610 PROTHROMBIN TIME: CPT | Performed by: SURGERY

## 2019-10-14 PROCEDURE — 25010000002 PROPOFOL 10 MG/ML EMULSION: Performed by: NURSE ANESTHETIST, CERTIFIED REGISTERED

## 2019-10-14 PROCEDURE — 25010000003 POTASSIUM CHLORIDE 10 MEQ/100ML SOLUTION: Performed by: NURSE PRACTITIONER

## 2019-10-14 PROCEDURE — 25010000002 FENTANYL CITRATE (PF) 100 MCG/2ML SOLUTION: Performed by: NURSE ANESTHETIST, CERTIFIED REGISTERED

## 2019-10-14 PROCEDURE — 99232 SBSQ HOSP IP/OBS MODERATE 35: CPT | Performed by: HOSPITALIST

## 2019-10-14 PROCEDURE — 0FT44ZZ RESECTION OF GALLBLADDER, PERCUTANEOUS ENDOSCOPIC APPROACH: ICD-10-PCS | Performed by: SURGERY

## 2019-10-14 PROCEDURE — 85730 THROMBOPLASTIN TIME PARTIAL: CPT | Performed by: SURGERY

## 2019-10-14 PROCEDURE — 25010000002 ONDANSETRON PER 1 MG: Performed by: ANESTHESIOLOGY

## 2019-10-14 PROCEDURE — 99232 SBSQ HOSP IP/OBS MODERATE 35: CPT | Performed by: INTERNAL MEDICINE

## 2019-10-14 PROCEDURE — 25010000002 PIPERACILLIN SOD-TAZOBACTAM PER 1 G: Performed by: NURSE PRACTITIONER

## 2019-10-14 PROCEDURE — 87070 CULTURE OTHR SPECIMN AEROBIC: CPT | Performed by: SURGERY

## 2019-10-14 PROCEDURE — 47562 LAPAROSCOPIC CHOLECYSTECTOMY: CPT | Performed by: NURSE PRACTITIONER

## 2019-10-14 PROCEDURE — 93018 CV STRESS TEST I&R ONLY: CPT | Performed by: INTERNAL MEDICINE

## 2019-10-14 PROCEDURE — 47562 LAPAROSCOPIC CHOLECYSTECTOMY: CPT | Performed by: SURGERY

## 2019-10-14 RX ORDER — ONDANSETRON 4 MG/1
4 TABLET, FILM COATED ORAL EVERY 6 HOURS PRN
Status: DISCONTINUED | OUTPATIENT
Start: 2019-10-14 | End: 2019-10-15 | Stop reason: HOSPADM

## 2019-10-14 RX ORDER — IPRATROPIUM BROMIDE AND ALBUTEROL SULFATE 2.5; .5 MG/3ML; MG/3ML
3 SOLUTION RESPIRATORY (INHALATION) ONCE AS NEEDED
Status: DISCONTINUED | OUTPATIENT
Start: 2019-10-14 | End: 2019-10-14

## 2019-10-14 RX ORDER — FLUMAZENIL 0.1 MG/ML
0.2 INJECTION INTRAVENOUS AS NEEDED
Status: DISCONTINUED | OUTPATIENT
Start: 2019-10-14 | End: 2019-10-14

## 2019-10-14 RX ORDER — PROMETHAZINE HYDROCHLORIDE 25 MG/1
25 TABLET ORAL ONCE AS NEEDED
Status: DISCONTINUED | OUTPATIENT
Start: 2019-10-14 | End: 2019-10-14

## 2019-10-14 RX ORDER — PROMETHAZINE HYDROCHLORIDE 25 MG/ML
6.25 INJECTION, SOLUTION INTRAMUSCULAR; INTRAVENOUS ONCE AS NEEDED
Status: DISCONTINUED | OUTPATIENT
Start: 2019-10-14 | End: 2019-10-14

## 2019-10-14 RX ORDER — NEOSTIGMINE METHYLSULFATE 5 MG/5 ML
SYRINGE (ML) INTRAVENOUS AS NEEDED
Status: DISCONTINUED | OUTPATIENT
Start: 2019-10-14 | End: 2019-10-14 | Stop reason: SURG

## 2019-10-14 RX ORDER — NALOXONE HCL 0.4 MG/ML
0.4 VIAL (ML) INJECTION
Status: DISCONTINUED | OUTPATIENT
Start: 2019-10-14 | End: 2019-10-15 | Stop reason: HOSPADM

## 2019-10-14 RX ORDER — ACETAMINOPHEN 325 MG/1
650 TABLET ORAL ONCE AS NEEDED
Status: DISCONTINUED | OUTPATIENT
Start: 2019-10-14 | End: 2019-10-14

## 2019-10-14 RX ORDER — LABETALOL HYDROCHLORIDE 5 MG/ML
5 INJECTION, SOLUTION INTRAVENOUS
Status: DISCONTINUED | OUTPATIENT
Start: 2019-10-14 | End: 2019-10-14

## 2019-10-14 RX ORDER — BUPIVACAINE HYDROCHLORIDE 2.5 MG/ML
INJECTION, SOLUTION INFILTRATION; PERINEURAL AS NEEDED
Status: DISCONTINUED | OUTPATIENT
Start: 2019-10-14 | End: 2019-10-15 | Stop reason: HOSPADM

## 2019-10-14 RX ORDER — SODIUM CHLORIDE, SODIUM LACTATE, POTASSIUM CHLORIDE, AND CALCIUM CHLORIDE .6; .31; .03; .02 G/100ML; G/100ML; G/100ML; G/100ML
INJECTION, SOLUTION INTRAVENOUS AS NEEDED
Status: DISCONTINUED | OUTPATIENT
Start: 2019-10-14 | End: 2019-10-14 | Stop reason: HOSPADM

## 2019-10-14 RX ORDER — ACETAMINOPHEN 650 MG/1
650 SUPPOSITORY RECTAL EVERY 4 HOURS PRN
Status: DISCONTINUED | OUTPATIENT
Start: 2019-10-14 | End: 2019-10-15 | Stop reason: HOSPADM

## 2019-10-14 RX ORDER — MORPHINE SULFATE 4 MG/ML
4 INJECTION, SOLUTION INTRAMUSCULAR; INTRAVENOUS
Status: DISCONTINUED | OUTPATIENT
Start: 2019-10-14 | End: 2019-10-15 | Stop reason: HOSPADM

## 2019-10-14 RX ORDER — PROMETHAZINE HYDROCHLORIDE 25 MG/1
25 SUPPOSITORY RECTAL ONCE AS NEEDED
Status: DISCONTINUED | OUTPATIENT
Start: 2019-10-14 | End: 2019-10-14

## 2019-10-14 RX ORDER — EPHEDRINE SULFATE 50 MG/ML
5 INJECTION, SOLUTION INTRAVENOUS ONCE AS NEEDED
Status: DISCONTINUED | OUTPATIENT
Start: 2019-10-14 | End: 2019-10-14

## 2019-10-14 RX ORDER — HEPARIN SODIUM 1000 [USP'U]/ML
INJECTION, SOLUTION INTRAVENOUS; SUBCUTANEOUS AS NEEDED
Status: DISCONTINUED | OUTPATIENT
Start: 2019-10-14 | End: 2019-10-14 | Stop reason: HOSPADM

## 2019-10-14 RX ORDER — MEPERIDINE HYDROCHLORIDE 25 MG/ML
12.5 INJECTION INTRAMUSCULAR; INTRAVENOUS; SUBCUTANEOUS
Status: DISCONTINUED | OUTPATIENT
Start: 2019-10-14 | End: 2019-10-14

## 2019-10-14 RX ORDER — ACETAMINOPHEN 325 MG/1
650 TABLET ORAL EVERY 4 HOURS PRN
Status: DISCONTINUED | OUTPATIENT
Start: 2019-10-14 | End: 2019-10-15 | Stop reason: HOSPADM

## 2019-10-14 RX ORDER — MORPHINE SULFATE 4 MG/ML
2 INJECTION, SOLUTION INTRAMUSCULAR; INTRAVENOUS
Status: DISCONTINUED | OUTPATIENT
Start: 2019-10-14 | End: 2019-10-14

## 2019-10-14 RX ORDER — OXYCODONE HYDROCHLORIDE 5 MG/1
7.5 TABLET ORAL ONCE AS NEEDED
Status: DISCONTINUED | OUTPATIENT
Start: 2019-10-14 | End: 2019-10-14

## 2019-10-14 RX ORDER — PROMETHAZINE HYDROCHLORIDE 25 MG/ML
12.5 INJECTION, SOLUTION INTRAMUSCULAR; INTRAVENOUS ONCE AS NEEDED
Status: DISCONTINUED | OUTPATIENT
Start: 2019-10-14 | End: 2019-10-14

## 2019-10-14 RX ORDER — NALOXONE HCL 0.4 MG/ML
0.4 VIAL (ML) INJECTION AS NEEDED
Status: DISCONTINUED | OUTPATIENT
Start: 2019-10-14 | End: 2019-10-14

## 2019-10-14 RX ORDER — GLYCOPYRROLATE 1 MG/5 ML
SYRINGE (ML) INTRAVENOUS AS NEEDED
Status: DISCONTINUED | OUTPATIENT
Start: 2019-10-14 | End: 2019-10-14 | Stop reason: SURG

## 2019-10-14 RX ORDER — PROPOFOL 10 MG/ML
VIAL (ML) INTRAVENOUS AS NEEDED
Status: DISCONTINUED | OUTPATIENT
Start: 2019-10-14 | End: 2019-10-14 | Stop reason: SURG

## 2019-10-14 RX ORDER — OXYCODONE HYDROCHLORIDE 5 MG/1
10 TABLET ORAL EVERY 4 HOURS PRN
Status: DISCONTINUED | OUTPATIENT
Start: 2019-10-14 | End: 2019-10-15 | Stop reason: HOSPADM

## 2019-10-14 RX ORDER — FENTANYL CITRATE 50 UG/ML
INJECTION, SOLUTION INTRAMUSCULAR; INTRAVENOUS AS NEEDED
Status: DISCONTINUED | OUTPATIENT
Start: 2019-10-14 | End: 2019-10-14 | Stop reason: SURG

## 2019-10-14 RX ORDER — DEXAMETHASONE SODIUM PHOSPHATE 4 MG/ML
INJECTION, SOLUTION INTRA-ARTICULAR; INTRALESIONAL; INTRAMUSCULAR; INTRAVENOUS; SOFT TISSUE AS NEEDED
Status: DISCONTINUED | OUTPATIENT
Start: 2019-10-14 | End: 2019-10-14 | Stop reason: SURG

## 2019-10-14 RX ORDER — ROCURONIUM BROMIDE 10 MG/ML
INJECTION, SOLUTION INTRAVENOUS AS NEEDED
Status: DISCONTINUED | OUTPATIENT
Start: 2019-10-14 | End: 2019-10-14 | Stop reason: SURG

## 2019-10-14 RX ORDER — ONDANSETRON 2 MG/ML
4 INJECTION INTRAMUSCULAR; INTRAVENOUS ONCE AS NEEDED
Status: DISCONTINUED | OUTPATIENT
Start: 2019-10-14 | End: 2019-10-14

## 2019-10-14 RX ORDER — ONDANSETRON 2 MG/ML
4 INJECTION INTRAMUSCULAR; INTRAVENOUS ONCE AS NEEDED
Status: COMPLETED | OUTPATIENT
Start: 2019-10-14 | End: 2019-10-14

## 2019-10-14 RX ORDER — FAMOTIDINE 20 MG/1
20 TABLET, FILM COATED ORAL 2 TIMES DAILY
Status: DISCONTINUED | OUTPATIENT
Start: 2019-10-14 | End: 2019-10-15 | Stop reason: HOSPADM

## 2019-10-14 RX ORDER — HYDROCODONE BITARTRATE AND ACETAMINOPHEN 5; 325 MG/1; MG/1
1 TABLET ORAL EVERY 4 HOURS PRN
Status: DISCONTINUED | OUTPATIENT
Start: 2019-10-14 | End: 2019-10-15 | Stop reason: HOSPADM

## 2019-10-14 RX ORDER — POTASSIUM CHLORIDE 7.45 MG/ML
10 INJECTION INTRAVENOUS
Status: DISCONTINUED | OUTPATIENT
Start: 2019-10-14 | End: 2019-10-15 | Stop reason: HOSPADM

## 2019-10-14 RX ORDER — HYDRALAZINE HYDROCHLORIDE 20 MG/ML
5 INJECTION INTRAMUSCULAR; INTRAVENOUS
Status: DISCONTINUED | OUTPATIENT
Start: 2019-10-14 | End: 2019-10-14

## 2019-10-14 RX ORDER — ONDANSETRON 2 MG/ML
4 INJECTION INTRAMUSCULAR; INTRAVENOUS EVERY 6 HOURS PRN
Status: DISCONTINUED | OUTPATIENT
Start: 2019-10-14 | End: 2019-10-15 | Stop reason: HOSPADM

## 2019-10-14 RX ORDER — PHENYLEPHRINE HCL IN 0.9% NACL 0.5 MG/5ML
.5-3 SYRINGE (ML) INTRAVENOUS
Status: DISCONTINUED | OUTPATIENT
Start: 2019-10-14 | End: 2019-10-14

## 2019-10-14 RX ORDER — ACETAMINOPHEN 160 MG/5ML
650 SOLUTION ORAL EVERY 4 HOURS PRN
Status: DISCONTINUED | OUTPATIENT
Start: 2019-10-14 | End: 2019-10-15 | Stop reason: HOSPADM

## 2019-10-14 RX ORDER — LIDOCAINE HYDROCHLORIDE 20 MG/ML
INJECTION, SOLUTION EPIDURAL; INFILTRATION; INTRACAUDAL; PERINEURAL AS NEEDED
Status: DISCONTINUED | OUTPATIENT
Start: 2019-10-14 | End: 2019-10-14 | Stop reason: SURG

## 2019-10-14 RX ORDER — PROMETHAZINE HYDROCHLORIDE 25 MG/ML
12.5 INJECTION, SOLUTION INTRAMUSCULAR; INTRAVENOUS EVERY 6 HOURS PRN
Status: DISCONTINUED | OUTPATIENT
Start: 2019-10-14 | End: 2019-10-15 | Stop reason: HOSPADM

## 2019-10-14 RX ORDER — ACETAMINOPHEN 650 MG/1
650 SUPPOSITORY RECTAL ONCE AS NEEDED
Status: DISCONTINUED | OUTPATIENT
Start: 2019-10-14 | End: 2019-10-14

## 2019-10-14 RX ORDER — MORPHINE SULFATE 4 MG/ML
6 INJECTION, SOLUTION INTRAMUSCULAR; INTRAVENOUS
Status: DISCONTINUED | OUTPATIENT
Start: 2019-10-14 | End: 2019-10-15 | Stop reason: HOSPADM

## 2019-10-14 RX ORDER — PHENYLEPHRINE HCL IN 0.9% NACL 0.5 MG/5ML
SYRINGE (ML) INTRAVENOUS AS NEEDED
Status: DISCONTINUED | OUTPATIENT
Start: 2019-10-14 | End: 2019-10-14 | Stop reason: SURG

## 2019-10-14 RX ADMIN — ROCURONIUM BROMIDE 35 MG: 10 INJECTION, SOLUTION INTRAVENOUS at 14:40

## 2019-10-14 RX ADMIN — PHENYLEPHRINE HYDROCHLORIDE 100 MCG: 10 INJECTION INTRAVENOUS at 14:51

## 2019-10-14 RX ADMIN — LABETALOL 20 MG/4 ML (5 MG/ML) INTRAVENOUS SYRINGE 5 MG: at 15:51

## 2019-10-14 RX ADMIN — FENTANYL CITRATE 50 MCG: 50 INJECTION, SOLUTION INTRAMUSCULAR; INTRAVENOUS at 15:03

## 2019-10-14 RX ADMIN — LABETALOL 20 MG/4 ML (5 MG/ML) INTRAVENOUS SYRINGE 5 MG: at 15:10

## 2019-10-14 RX ADMIN — FENTANYL CITRATE 50 MCG: 50 INJECTION, SOLUTION INTRAMUSCULAR; INTRAVENOUS at 14:35

## 2019-10-14 RX ADMIN — Medication 0.6 MG: at 15:45

## 2019-10-14 RX ADMIN — FAMOTIDINE 20 MG: 20 TABLET ORAL at 21:57

## 2019-10-14 RX ADMIN — ATORVASTATIN CALCIUM 40 MG: 40 TABLET, FILM COATED ORAL at 21:57

## 2019-10-14 RX ADMIN — PIPERACILLIN AND TAZOBACTAM 3.38 G: 3; .375 INJECTION, POWDER, LYOPHILIZED, FOR SOLUTION INTRAVENOUS at 02:16

## 2019-10-14 RX ADMIN — POTASSIUM CHLORIDE 10 MEQ: 7.46 INJECTION, SOLUTION INTRAVENOUS at 09:34

## 2019-10-14 RX ADMIN — LIDOCAINE HYDROCHLORIDE 100 MG: 20 INJECTION, SOLUTION EPIDURAL; INFILTRATION; INTRACAUDAL; PERINEURAL at 14:40

## 2019-10-14 RX ADMIN — Medication 10 ML: at 08:25

## 2019-10-14 RX ADMIN — LABETALOL 20 MG/4 ML (5 MG/ML) INTRAVENOUS SYRINGE 10 MG: at 15:50

## 2019-10-14 RX ADMIN — PIPERACILLIN AND TAZOBACTAM 3.38 G: 3; .375 INJECTION, POWDER, LYOPHILIZED, FOR SOLUTION INTRAVENOUS at 21:56

## 2019-10-14 RX ADMIN — PROPOFOL 120 MG: 10 INJECTION, EMULSION INTRAVENOUS at 14:40

## 2019-10-14 RX ADMIN — Medication 3 MG: at 15:45

## 2019-10-14 RX ADMIN — POTASSIUM CHLORIDE 10 MEQ: 7.46 INJECTION, SOLUTION INTRAVENOUS at 08:24

## 2019-10-14 RX ADMIN — POTASSIUM CHLORIDE 10 MEQ: 7.46 INJECTION, SOLUTION INTRAVENOUS at 10:46

## 2019-10-14 RX ADMIN — ACETAMINOPHEN 650 MG: 325 TABLET ORAL at 02:16

## 2019-10-14 RX ADMIN — DEXAMETHASONE SODIUM PHOSPHATE 4 MG: 4 INJECTION, SOLUTION INTRAMUSCULAR; INTRAVENOUS at 15:03

## 2019-10-14 RX ADMIN — POTASSIUM CHLORIDE 10 MEQ: 7.46 INJECTION, SOLUTION INTRAVENOUS at 11:49

## 2019-10-14 RX ADMIN — MORPHINE SULFATE 2 MG: 4 INJECTION INTRAVENOUS at 16:50

## 2019-10-14 RX ADMIN — PIPERACILLIN AND TAZOBACTAM 3.38 G: 3; .375 INJECTION, POWDER, LYOPHILIZED, FOR SOLUTION INTRAVENOUS at 10:46

## 2019-10-14 RX ADMIN — METOPROLOL TARTRATE 25 MG: 25 TABLET, FILM COATED ORAL at 21:57

## 2019-10-14 RX ADMIN — ONDANSETRON 4 MG: 2 INJECTION INTRAMUSCULAR; INTRAVENOUS at 15:16

## 2019-10-14 RX ADMIN — PHENYLEPHRINE HYDROCHLORIDE 100 MCG: 10 INJECTION INTRAVENOUS at 14:44

## 2019-10-14 NOTE — PROGRESS NOTES
Cardiology Progress Note    Patient Identification:  Name: Mindi Quinteros  Age: 78 y.o.  Sex: female  :  1940  MRN: [id number]                 Follow Up / Chief Complaint: Preop for cholecystectomy, epigastric pain, CAD  Chief Complaint   Patient presents with   • Abdominal Pain     pt has been told she has hernia       Interval History: Patient had CT showing cholelithiasis with acute cholecystitis       Subjective: Patient was seen on Thursday, 10/10/2019 and on Saturday started developing abdominal pain and right upper quadrant pain therefore presented to the hospital was found to have acute cholecystitis       History of Present Illness:     This is a 78-year-old with PMH      # CAD, cardiac cath 18  calcified 50% proximal 70% mid LAD and 70% mid LCX, normal LVEF  #  diabetes  #  hypertension, hyperlipidemia  #  ulcerative colitis  #  allergy to aspertane  #  hemorrhoidectomy, hysterectomy, bladder repair, left ankle surgery,       here with complaint of epigastric and right upper quadrant pain was found to have acute cholecystitis, is preop for cholecystectomy.Patient was recently seen in the office complaining of an episode of severe shortness of breath associated with chest tightness which lasted 2 to 3 hours the other day with no aggravating or relieving factors, denies any palpitation,lightheadedness dizziness loss of conscious.  patient's is a diabetes is running good in the 120s had a recent hemoglobin A1c done 18 was 6.1. . labs from 2018 showed cholesterol 116 triglycerides 76 HDL low at 38 LDL 63 repeat labs from 2018, CMP, CBC unremarkable except for glucose of 122, cholesterol 136 LDL 75 HDL 39., hemoglobin A1c of 7.2.  patient is under lot of stress due to 's stroke and having to take care of him.  Patient had BNP level done 2019 which was elevated at 214.  Patient had a Lexiscan Cardiolite 10/10/2019 which revealed equivocal results with predominantly fixed  inferoapical defect      ASSESSMENT:  #  Preoperative cardiovascular evaluation for cholecystectomy for the patient with acute cholecystitis  #  CAD  # Chronic diastolic CHF  #  hyperlipidemia  #  diabetes hypertension      PLAN:  Patient states that she is asymptomatic at the current time after therapy  Reviewed stress test results with patient patient was recently in the office on 10/10/2019 and refused any invasive treatment, because she was asymptomatic from cardiovascular standpoint at that time, on medical management.  Risk benefits alternatives explained at length with patient face-to-face explaining the risk benefits alternative   Patient has acute cholecystitis would benefit from cholecystectomy should carry a low to moderate risk since cholecystectomy is a low risk procedure ,will monitor closely perioperatively.  Discussed with general surgeon Dr. Guerra       Past Medical History:  Past Medical History:   Diagnosis Date   • Age-related osteoporosis without current pathological fracture 9/12/2019   • Arthritis    • CAD (coronary artery disease)    • Diabetes (CMS/HCC)    • Elevated cholesterol    • HTN (hypertension)    • Hyperlipemia      Past Surgical History:  Past Surgical History:   Procedure Laterality Date   • ANKLE ARTHROSCOPY     • BELPHAROPTOSIS REPAIR     • CARDIAC CATHETERIZATION     • CATARACT EXTRACTION     • COLONOSCOPY     • COSMETIC SURGERY     • ENDOSCOPY     • HEMORROIDECTOMY     • HYSTERECTOMY          Social History:   Social History     Tobacco Use   • Smoking status: Never Smoker   • Smokeless tobacco: Never Used   Substance Use Topics   • Alcohol use: No     Frequency: Never      Family History:  Family History   Problem Relation Age of Onset   • Diabetes Mother    • Heart disease Father    • Heart disease Brother    • Diabetes Brother           Allergies:  Allergies   Allergen Reactions   • Asacol [Mesalamine] Swelling     Scheduled Meds:    aspirin 81 mg Daily   atorvastatin 40  "mg Nightly   Budesonide 3 mg Daily   fenofibrate 145 mg Daily   insulin lispro 0-7 Units 4x Daily With Meals & Nightly   isosorbide mononitrate 60 mg Q24H   metoprolol tartrate 25 mg Q12H   piperacillin-tazobactam 3.375 g Q8H   sodium chloride 10 mL Q12H           INTAKE AND OUTPUT:    Intake/Output Summary (Last 24 hours) at 10/14/2019 1034  Last data filed at 10/14/2019 0700  Gross per 24 hour   Intake 972 ml   Output 2000 ml   Net -1028 ml       Review of Systems:   ROS  Constitutional: No weakness,fatigue, fever, rigors, chills   Eyes: No vision changes, eye pain   ENT/oropharynx: No difficulty swallowing, sore throat, epistaxis, changes in hearing   Cardiovascular: c/o chest pain, no palpitations, paroxysmal nocturnal dyspnea, orthopnea, diaphoresis, dizziness / syncopal episode   Respiratory: No shortness of breath, dyspnea on exertion, cough, wheezing hemoptysis   Gastrointestinal: c/o abdominal pain, nausea, no vomiting, diarrhea, bloody stools   Genitourinary: No hematuria, dysuria   Neurological: No headache, tremors, numbness,  one-sided weakness    Musculoskeletal: No cramps, myalgias,  joint pain, joint swelling   Integument: No rash, edema         Constitutional:  Temp:  [98.3 °F (36.8 °C)-102.8 °F (39.3 °C)] 98.3 °F (36.8 °C)  Heart Rate:  [77-99] 99  Resp:  [14-16] 15  BP: ()/(54-66) 105/61    Physical Exam   /61 (BP Location: Right arm, Patient Position: Lying)   Pulse 99   Temp 98.3 °F (36.8 °C) (Oral)   Resp 15   Ht 160 cm (63\")   Wt 56 kg (123 lb 7.3 oz)   SpO2 93%   BMI 21.87 kg/m²   General:  Appears in no acute distress  Eyes: Sclera is anicteric,  conjunctiva is clear   HEENT:  No JVD. Thyroid not visibly enlarged. No mucosal pallor or cyanosis  Respiratory: Respirations regular and unlabored at rest.  Bilaterally good breath sounds, with good air entry in all fields. No crackles, rubs or wheezes auscultated  Cardiovascular: S1,S2 Regular rate and rhythm. No murmur, rub or " gallop auscultated.No pretibial pitting edema  Gastrointestinal: Nondistended and soft  Musculoskeletal:  No abnormal movements  Extremities: No digital clubbing or cyanosis  Skin: Color pink. Skin warm and dry to touch. No rashes  No xanthoma  Neuro: Alert and awake, no lateralizing deficits appreciated    Impression:   1. No urolithiasis or hydronephrosis.  2. Cholelithiasis, gallbladder wall thickening and adjacent fluid are suspicious for acute cholecystitis in the correct clinical setting. Consider right upper quadrant ultrasound if clinically warranted.     Electronically signed by:  Rolando Mercedes M.D.    10/13/2019 12:52 AM    Cardiographics  Telemetry:     ECG:   ECG 12 Lead    (Results Pending)   ECG 12 Lead    (Results Pending)     I have personally reviewed EKG    Echocardiogram:      Lab Review   I have reviewed the labs          Results from last 7 days   Lab Units 10/14/19  0340   SODIUM mmol/L 135*   POTASSIUM mmol/L 3.1*   BUN mg/dL 9   CREATININE mg/dL 0.80   CALCIUM mg/dL 7.5*     @[id number]@  Results from last 7 days   Lab Units 10/14/19  0340 10/13/19  0701 10/13/19  0134   WBC 10*3/mm3 6.20 8.10 4.90   HEMOGLOBIN g/dL 11.4* 13.2 12.1   HEMATOCRIT % 33.9* 40.4 35.2   PLATELETS 10*3/mm3 161 229 182     Results from last 7 days   Lab Units 10/14/19  0340 10/13/19  1328   INR  1.37* 1.13*   APTT seconds 30.9 26.1       RADIOLOGY:  Imaging Results (last 24 hours)     Procedure Component Value Units Date/Time    XR Chest 1 View [[id number]] Collected:  10/13/19 1519     Updated:  10/13/19 1524    Narrative:       DATE OF EXAM:  10/13/2019 2:10 PM     PROCEDURE:  XR CHEST 1 VW-     INDICATIONS:  pre- op; R10.13-Epigastric pain; K80.00-Calculus of gallbladder with  acute cholecystitis without obstruction     COMPARISON:  Chest radiographs to 5/20/2018. CT abdomen and pelvis 10/13/2019.     TECHNIQUE:   Single radiographic AP view of the chest was obtained.     FINDINGS:  Calcified remnants of prior  "granulomatous disease. Subsegmental  atelectasis and/or scarring in the left lung base. Mild diffuse  interstitial prominence throughout both lungs. No pneumothorax.  Cardiomediastinal contours within normal limits given technique.  Partially visualized moderate hiatal hernia. No acute osseous  abnormality is identified.        Impression:          1. Mild nonspecific diffuse interstitial prominence throughout both  lungs, which could represent atypical pneumonia or mild pulmonary edema.  2. Partially visualized moderate hiatal hernia.     Electronically Signed By-Edwin Mcintyre On:10/13/2019 3:22 PM  This report was finalized on [id number] by  Edwin Mcintyre, .              )10/14/2019  Natan Terrazas MD      [facility] Ailyn/Transcription:   \"Dictated utilizing Dragon dictation\".   "

## 2019-10-14 NOTE — ANESTHESIA POSTPROCEDURE EVALUATION
Patient: Mindi Quinteros    Procedure Summary     Date:  10/14/19 Room / Location:  [facility] OR 08 / [facility] MAIN OR    Anesthesia Start:  1435 Anesthesia Stop:  1604    Procedure:  Laparoscopic cholecystectomy, possible open (N/A Abdomen) Diagnosis:       Cholecystitis with cholelithiasis      (Cholecystitis with cholelithiasis [K80.10])    Surgeon:  Vijay Guerra DO Provider:  Carmenza Calderón MD    Anesthesia Type:  general ASA Status:  3          Anesthesia Type: general  Last vitals  BP   104/49 (10/14/19 1315)   Temp   100.4 °F (38 °C) (10/14/19 1315)   Pulse   90 (10/14/19 1315)   Resp   28 (10/14/19 1315)     SpO2   93 % (10/14/19 1315)     Post Anesthesia Care and Evaluation    Patient location during evaluation: PACU  Patient participation: complete - patient participated  Level of consciousness: awake  Pain score: 0 (See nurse's notes for pain score)  Pain management: adequate  Airway patency: patent  Anesthetic complications: No anesthetic complications  PONV Status: none  Cardiovascular status: acceptable  Respiratory status: acceptable  Hydration status: acceptable    Comments: Patient seen and examined postoperatively; vital signs stable; SpO2 greater than or equal to 90%; cardiopulmonary status stable; nausea/vomiting adequately controlled; pain adequately controlled; no apparent anesthesia complications; patient discharged from anesthesia care when discharge criteria were met

## 2019-10-14 NOTE — PROGRESS NOTES
Discharge Planning Assessment   Diomedes     Patient Name: Mindi Quinteros  MRN: [id number]  Today's Date: 10/14/2019    Admit Date: 10/13/2019    Discharge Needs Assessment     Row Name 10/14/19 1437       Living Environment    Lives With  spouse    Unique Family Situation  Spouse is dibilitated from a stroke this year.    Current Living Arrangements  home/apartment/condo    Primary Care Provided by  self    Provides Primary Care For  spouse    Family Caregiver if Needed  child(kendall), adult    Able to Return to Prior Arrangements  yes       Resource/Environmental Concerns    Resource/Environmental Concerns  none    Transportation Concerns  car, none       Transition Planning    Patient/Family Anticipated Services at Transition  none    Transportation Anticipated  family or friend will provide       Discharge Needs Assessment    Readmission Within the Last 30 Days  no previous admission in last 30 days    Equipment Currently Used at Home  none    Equipment Needed After Discharge  -- unknown at this time.    Discharge Coordination/Progress  Patient may need to go to inpatient rehab.         Discharge Plan     Row Name 10/14/19 1439       Plan    Plan  Patient may need to go to inpatient rehab.     Patient/Family in Agreement with Plan  yes    Plan Comments  Barriers to Discharge: Patient in surgery at this time. Not medically ready for discharge.           Demographic Summary     Row Name 10/14/19 1434       General Information    Admission Type  inpatient    Arrived From  home;emergency department    Required Notices Provided  Observation Status Notice    Referral Source  other (see comments)    Reason for Consult  discharge planning    Preferred Language  English     Used During This Interaction  no        Functional Status     Row Name 10/14/19 1434       Functional Status    Usual Activity Tolerance  good    Current Activity Tolerance  fair       Functional Status, IADL    Medications  independent    Meal  Preparation  independent    Housekeeping  independent    Laundry  independent    Shopping  independent       Mental Status    General Appearance WDL  WDL       Mental Status Summary    Recent Changes in Mental Status/Cognitive Functioning  no changes        Psychosocial     Row Name 10/14/19 1435       Values/Beliefs    Spiritual, Cultural Beliefs, Buddhist Practices, Values that Affect Care  no       Behavior WDL    Behavior WDL  WDL       Emotion Mood WDL    Emotion/Mood/Affect WDL  WDL       Speech WDL    Speech WDL  WDL       Perceptual State WDL    Perceptual State WDL  WDL       Thought Process WDL    Thought Process WDL  WDL       Intellectual Performance WDL    Intellectual Performance WDL  WDL       Coping/Stress    Major Change/Loss/Stressor  medical condition/diagnosis    Patient Personal Strengths  able to adapt    Sources of Support  none    Reaction to Health Status  adjusting    Understanding of Condition and Treatment  adequate understanding of medical condition       C-SSRS (Screen-Recent) Past Month    Q1 Wished to be Dead (Past Month)  no    Q2 Suicidal Thoughts (Past Month)  no    Q6 Suicide Behavior (Lifetime)  no       Violence Risk    Feels Like Hurting Others  no    Previous Attempt to Harm Others  no        Abuse/Neglect     Row Name 10/14/19 1437       Personal Safety    Feels Unsafe at Home or Work/School  no    Feels Threatened by Someone  no    Does Anyone Try to Keep You From Having Contact with Others or Doing Things Outside Your Home?  no    Physical Signs of Abuse Present  no          Anna Naegele RN CM   Phone [phone]  Fax   [phone]

## 2019-10-14 NOTE — PROGRESS NOTES
[facility] Medicine Services Daily Progress Note                  Hospitalist Team  LOS 0 days      Patient Care Team:  Dayana Tipton MD as PCP - General  Dayana Tipton MD as PCP - Family Medicine  Natna Terrazas MD as Consulting Physician (Cardiology)    Assessment / Plan    ABD pain d/t acute cholecystitis  -CT abd noted  -pain control, anti-emetics  -c/w zosyn  -general surgery consulted and performed laparoscopic cholecystectomy 10/14/2019    CAD   -Patient had a stress Myoview on 10/10/2019:   1.  Lexiscan Cardiolite with predominantly fixed inferior and inferoapical wall consistent with soft tissue attenuation versus ischemia  2.  Normal wall motion LVEF of 67%.  -She is considered a low to moderate risk for cholecystectomy  -c/w ASA, statin, BB, imdur  -Cardiology following     HLD, chronic  -Continue statin    Primary hypertension  -chronic and controlled  -Continue beta-blocker     Type II DM  -chronic  -SSI, hold home po meds     Ulcerative colitis  -chronic, no evidence for exacerbation  -c/w budesonide     DVT ppx-SCD     High risk             Chief Complaint  Abdominal pain with nausea    Subjective  Patient reports doing okay since surgery.  She denies specific complaints at this time and is drowsy.    Brief Synopsis of Hospital Course/HPI    Patient is a 70-year-old female with a past medical history of coronary artery disease and ulcerative colitis,  presented to the ER planing of a 1 day history of sharp stabbing epigastric and right upper quadrant abdominal pain.  It was not associated with eating.  It was associated with nausea but no vomiting.  She states she ate her normal dinner at 830 and then later on in the night she had a severe abdominal pain that prompted her to come to the ER.  In the ER she had a CT abdomen done showing possible formation around the gallbladder, was given pain control and antibiotics and admitted for further care.  Only she  "still reports some abdominal pain and nausea but has not had any vomiting.    Record review show she is currently getting outpt workup for her CAD and had stress test 10/10/19 results pending (previous LHC: Calcified 50% proximal 70% mid LAD and 70% mid LCX, normal LVEF).    Review of systems  12 point review of systems was reviewed and was negative except as above.    Family History   Problem Relation Age of Onset   • Diabetes Mother    • Heart disease Father    • Heart disease Brother    • Diabetes Brother        Past Medical History:   Diagnosis Date   • Age-related osteoporosis without current pathological fracture 9/12/2019   • Arthritis    • CAD (coronary artery disease)    • Diabetes (CMS/HCC)    • Elevated cholesterol    • HTN (hypertension)    • Hyperlipemia        Social History     Socioeconomic History   • Marital status:      Spouse name: Not on file   • Number of children: Not on file   • Years of education: Not on file   • Highest education level: Not on file   Tobacco Use   • Smoking status: Never Smoker   • Smokeless tobacco: Never Used   Substance and Sexual Activity   • Alcohol use: No     Frequency: Never   • Drug use: No   • Sexual activity: Defer           Objective      Vital Signs  Temp:  [97.1 °F (36.2 °C)-101 °F (38.3 °C)] 97.1 °F (36.2 °C)  Heart Rate:  [72-99] 95  Resp:  [14-28] 16  BP: (104-133)/(47-66) 133/55  Oxygen Therapy  SpO2: 100 %  Pulse Oximetry Type: Intermittent  Device (Oxygen Therapy): nasal cannula  Flow (L/min): 2  Flowsheet Rows      First Filed Value   Admission Height  160 cm (63\") Documented at 10/13/2019 0107   Admission Weight  58.3 kg (128 lb 8.5 oz) [stretcher scale] Documented at 10/13/2019 0107        Intake & Output (last 3 days)       10/12 0701 - 10/13 0700 10/13 0701 - 10/14 0700 10/14 0701 - 10/15 0700    P.O.  360     I.V. (mL/kg)  612 (10.9) 700 (12.5)    IV Piggyback 500      Total Intake(mL/kg) 500 (8.6) 972 (17.4) 700 (12.5)    Urine (mL/kg/hr)  " 2800 (2.1) 300 (0.4)    Stool  0 0    Total Output  2800 300    Net +500 -1828 +400           Urine Unmeasured Occurrence  1 x     Stool Unmeasured Occurrence  2 x 1 x        Lines, Drains & Airways    Active LDAs     Name:   Placement date:   Placement time:   Site:   Days:    Peripheral IV 10/13/19 0147 Left Forearm   10/13/19    0147    Forearm   1                  Physical Exam:  Well-developed well-nourished thin elderly female in no acute distress sitting up in bed awake and alert; mucous membranes moist; sclerae anicteric; lungs clear to auscultation bilaterally; CV regular rate and rhythm; abdomen soft nontender nondistended with active bowel sounds; extremities with no edema, cyanosis or calf tenderness; palpable pedal pulses bilaterally; neurologic exam grossly nonfocal; no Castañeda catheter.    Procedures:    Procedure(s):  Laparoscopic cholecystectomy          Results Review:     I reviewed the patient's new clinical results.    Results from last 7 days   Lab Units 10/14/19  0340 10/13/19  0701 10/13/19  0134   WBC 10*3/mm3 6.20 8.10 4.90   HEMOGLOBIN g/dL 11.4* 13.2 12.1   HEMATOCRIT % 33.9* 40.4 35.2   PLATELETS 10*3/mm3 161 229 182     Results from last 7 days   Lab Units 10/14/19  0340 10/13/19  0701 10/13/19  0134   SODIUM mmol/L 135* 136 137   POTASSIUM mmol/L 3.1* 3.5* 3.6   CHLORIDE mmol/L 100* 98* 101   CO2 mmol/L 25.0 25.0 26.0   BUN mg/dL 9 7* 10   CREATININE mg/dL 0.80 0.60 0.70   CALCIUM mg/dL 7.5* 8.4* 9.5   BILIRUBIN mg/dL  --   --  0.6   ALK PHOS U/L  --   --  42   ALT (SGPT) U/L  --   --  18   AST (SGOT) U/L  --   --  27   GLUCOSE mg/dL 209* 233* 254*         Lab Results   Component Value Date    CALCIUM 7.5 (L) 10/14/2019     Hemoglobin A1C   Date Value Ref Range Status   10/13/2019 6.5 (H) 3.5 - 5.6 % Final     Results from last 7 days   Lab Units 10/14/19  0340 10/13/19  1328   INR  1.37* 1.13*               Microbiology Results (last 10 days)     Procedure Component Value - Date/Time     Body Fluid Culture - Body Fluid, Gallbladder [[id number]] Collected:  10/14/19 1504    Lab Status:  Preliminary result Specimen:  Body Fluid from Gallbladder Updated:  10/14/19 1951     Gram Stain Moderate (3+) WBCs seen      No organisms seen    Blood Culture - Blood, Hand, Left [[id number]] Collected:  10/13/19 1637    Lab Status:  Preliminary result Specimen:  Blood from Hand, Left Updated:  10/14/19 1645     Blood Culture No growth at 24 hours    Blood Culture - Blood, Hand, Right [[id number]] Collected:  10/13/19 1636    Lab Status:  Preliminary result Specimen:  Blood from Hand, Right Updated:  10/14/19 1645     Blood Culture No growth at 24 hours          ECG/EMG Results (most recent)     None                    Ct Abdomen Pelvis Without Contrast    Result Date: 10/13/2019  Impression: 1. No urolithiasis or hydronephrosis. 2. Cholelithiasis, gallbladder wall thickening and adjacent fluid are suspicious for acute cholecystitis in the correct clinical setting. Consider right upper quadrant ultrasound if clinically warranted. Electronically signed by:  Rolando Mercedes M.D.  10/13/2019 12:52 AM    Xr Chest 1 View    Result Date: 10/13/2019   1. Mild nonspecific diffuse interstitial prominence throughout both lungs, which could represent atypical pneumonia or mild pulmonary edema. 2. Partially visualized moderate hiatal hernia.  Electronically Signed By-Edwin Mcintyre On:10/13/2019 3:22 PM This report was finalized on [id number] by  Edwin Mcintyre, .      Xrays, labs reviewed personally by physician.    Medication Review:   I have reviewed the patient's current medication list    Scheduled Meds    aspirin 81 mg Oral Daily   atorvastatin 40 mg Oral Nightly   Budesonide 3 mg Oral Daily   famotidine 20 mg Oral BID   fenofibrate 145 mg Oral Daily   insulin lispro 0-7 Units Subcutaneous 4x Daily With Meals & Nightly   isosorbide mononitrate 60 mg Oral Q24H   metoprolol tartrate 25 mg Oral Q12H   piperacillin-tazobactam 3.375  g Intravenous Q8H   sodium chloride 10 mL Intravenous Q12H       Meds Infusions    sodium chloride 100 mL/hr Last Rate: 0 mL/hr (10/14/19 0426)       Meds PRN  •  acetaminophen **OR** acetaminophen **OR** acetaminophen  •  acetaminophen **OR** acetaminophen **OR** acetaminophen  •  aluminum-magnesium hydroxide-simethicone  •  bisacodyl  •  bupivacaine  •  dextrose  •  dextrose  •  dextrose  •  dextrose  •  glucagon (human recombinant)  •  glucagon (human recombinant)  •  hydrALAZINE  •  HYDROcodone-acetaminophen  •  ketorolac  •  magnesium hydroxide  •  melatonin  •  Morphine **AND** naloxone  •  Morphine **AND** naloxone  •  nitroglycerin  •  ondansetron **OR** ondansetron  •  ondansetron **OR** ondansetron  •  oxyCODONE  •  potassium chloride  •  promethazine **OR** promethazine  •  [COMPLETED] Insert peripheral IV **AND** sodium chloride  •  sodium chloride        Genia Pennington MD  10/14/19  8:24 PM

## 2019-10-14 NOTE — PLAN OF CARE
Problem: Patient Care Overview  Goal: Plan of Care Review  Outcome: Ongoing (interventions implemented as appropriate)    Goal: Individualization and Mutuality  Outcome: Ongoing (interventions implemented as appropriate)    Goal: Discharge Needs Assessment  Outcome: Ongoing (interventions implemented as appropriate)    Goal: Interprofessional Rounds/Family Conf  Outcome: Ongoing (interventions implemented as appropriate)      Problem: Cholecystectomy (Adult)  Goal: Signs and Symptoms of Listed Potential Problems Will be Absent, Minimized or Managed (Cholecystectomy)  Outcome: Ongoing (interventions implemented as appropriate)    Goal: Anesthesia/Sedation Recovery  Outcome: Ongoing (interventions implemented as appropriate)

## 2019-10-14 NOTE — OP NOTE
CHOLECYSTECTOMY LAPAROSCOPIC  Procedure Report    Patient Name:  Mindi Quinteros  YOB: 1940    Date of Surgery:  10/14/2019     Indications: Cholecystitis cholelithiasis    Pre-op Diagnosis:   Cholecystitis with cholelithiasis [K80.10]       Post-Op Diagnosis Codes:     * Cholecystitis with cholelithiasis [K80.10] with cystic duct obstruction    Procedure/CPT® Codes:      Procedure(s):  Laparoscopic cholecystectomy, possible open    Staff:  Surgeon(s):  Vijay Guerra DO    Assistant: Ese Dumont APRN    Anesthesia: General    Anesthetist: Lottie Barboza CRNA    Estimated Blood Loss: minimal    Implants:    Nothing was implanted during the procedure    Specimen:                  Findings: Acute cholecystitis cholelithiasis with cystic duct obstruction    Complications: None    Description of Procedure: Ms. Stevens is a 78-year-old female admitted with abdominal pain several days ago.  Work-up shows she has acute cholecystitis cholelithiasis.  We obtain cardiology clearance today finally so we are able to proceed with her lap klaudia.    Patient was taken the operating room placed in supine position.  General was done by Lottie Barboza CRNA and covering anesthesiologist.  Timeout done identity verified abdomen is prepped and draped after 3-minute dry time.  An infraumbilical incision is made and varies needle was passed through all layers.  Saline drop test is done is negative and the abdomen insufflated with CO2 to approximately 15 mmHg pressure.  Possible 11 mm trocar sheath is passed and the laparoscope introduced confirming intra-abdominal positioning with no injury.  Subxiphoid and 2 lateral ports and grafts placed under direct vision.  Gallbladder was a markedly distended we had to aspirate bile we aspirated about 75 cc of clear bile out of it to be able to grasp it.  She had severe dense adhesions to the gallbladder from the omentum and surrounding tissues all these were taken down using cautery  dissection.  We are eventually able then to grasp the area of Landaverde pouch placing the cholecysto duodenal ligament on traction.  This was carefully explored and the cystic duct identified we used Hydro dissection to be able to do this safely.  Cystic duct was then clipped twice proximally twice distally and the duct divided the artery was likewise identified clipped and divided and the gallbladder was removed from the liver bed using J-hook electrocautery it was placed in an Endo Catch bag and retrieved out the subxiphoid incision after enlarging the incision 3 times because of the thickness of the wall.  Liver bed was then inspected it was found to be hemostatic no ongoing bleeding we did place some Jyotsna into the liver bed prophylactically and we also placed a 19 Diomedes drain through 1 of the 5 mm ports into the liver bed.  We are concerned about postop bile leak or bleeding.  0 Vicryl stitches were then placed through both 11 mm port sites under direct vision all ports were then removed allowing CO2 to escape to the atmosphere proving no bleeding from the port sites fascial stitches were tied down then skin closed with 3-0 Vicryl in subcu manner sterile OpSite dressings applied over Mastisol and Steri-Strips.  She tolerated procedure well was awakened and transferred to recovery in satisfactory condition.  Final sponge instrument needle counts were correct.      Vijay Guerra DO     Date: 10/14/2019  Time: 3:54 PM

## 2019-10-14 NOTE — ANESTHESIA PREPROCEDURE EVALUATION
Anesthesia Evaluation     Patient summary reviewed and Nursing notes reviewed   NPO Solid Status: > 8 hours  NPO Liquid Status: > 8 hours           Airway   Mallampati: I  TM distance: >3 FB  Neck ROM: full  No difficulty expected and Narrow palate  Dental - normal exam         Pulmonary - negative pulmonary ROS and normal exam   Cardiovascular - negative cardio ROS and normal exam        Neuro/Psych- negative ROS  GI/Hepatic/Renal/Endo - negative ROS     Musculoskeletal (-) negative ROS    Abdominal  - normal exam    Bowel sounds: normal.   Substance History - negative use     OB/GYN negative ob/gyn ROS         Other                        Anesthesia Plan    ASA 3     general     Anesthetic plan, all risks, benefits, and alternatives have been provided, discussed and informed consent has been obtained with: patient.    Plan discussed with CRNA.

## 2019-10-14 NOTE — ANESTHESIA PROCEDURE NOTES
Airway  Urgency: elective    Date/Time: 10/14/2019 2:42 PM  Airway not difficult    General Information and Staff    Patient location during procedure: OR    Indications and Patient Condition  Indications for airway management: airway protection    Preoxygenated: yes  MILS maintained throughout  Mask difficulty assessment: 1 - vent by mask    Final Airway Details  Final airway type: endotracheal airway      Successful airway: ETT  Cuffed: yes   Successful intubation technique: direct laryngoscopy  Endotracheal tube insertion site: oral  Blade: Adalid  Blade size: 3  ETT size (mm): 7.0  Cormack-Lehane Classification: grade I - full view of glottis  Placement verified by: chest auscultation and capnometry   Measured from: lips  ETT/EBT  to lips (cm): 20  Number of attempts at approach: 1  Assessment: lips, teeth, and gum same as pre-op and atraumatic intubation

## 2019-10-15 VITALS
RESPIRATION RATE: 20 BRPM | TEMPERATURE: 98.4 F | OXYGEN SATURATION: 93 % | DIASTOLIC BLOOD PRESSURE: 57 MMHG | HEART RATE: 87 BPM | SYSTOLIC BLOOD PRESSURE: 111 MMHG | HEIGHT: 63 IN | BODY MASS INDEX: 21.88 KG/M2 | WEIGHT: 123.46 LBS

## 2019-10-15 PROBLEM — R10.13 EPIGASTRIC PAIN: Status: RESOLVED | Noted: 2019-10-13 | Resolved: 2019-10-15

## 2019-10-15 PROBLEM — K80.10 CHOLECYSTITIS WITH CHOLELITHIASIS: Status: RESOLVED | Noted: 2019-10-13 | Resolved: 2019-10-15

## 2019-10-15 LAB
GLUCOSE BLDC GLUCOMTR-MCNC: 172 MG/DL (ref 70–105)
GLUCOSE BLDC GLUCOMTR-MCNC: 264 MG/DL (ref 70–105)
GLUCOSE BLDC GLUCOMTR-MCNC: 312 MG/DL (ref 70–105)

## 2019-10-15 PROCEDURE — 97162 PT EVAL MOD COMPLEX 30 MIN: CPT

## 2019-10-15 PROCEDURE — 99232 SBSQ HOSP IP/OBS MODERATE 35: CPT | Performed by: INTERNAL MEDICINE

## 2019-10-15 PROCEDURE — 82962 GLUCOSE BLOOD TEST: CPT

## 2019-10-15 PROCEDURE — 25010000002 PIPERACILLIN SOD-TAZOBACTAM PER 1 G: Performed by: NURSE PRACTITIONER

## 2019-10-15 PROCEDURE — 63710000001 INSULIN LISPRO (HUMAN) PER 5 UNITS: Performed by: NURSE PRACTITIONER

## 2019-10-15 PROCEDURE — 99238 HOSP IP/OBS DSCHRG MGMT 30/<: CPT | Performed by: NURSE PRACTITIONER

## 2019-10-15 RX ORDER — OXYCODONE HYDROCHLORIDE 10 MG/1
10 TABLET ORAL EVERY 6 HOURS PRN
Qty: 28 TABLET | Refills: 0 | Status: SHIPPED | OUTPATIENT
Start: 2019-10-15 | End: 2019-10-19

## 2019-10-15 RX ORDER — HYDROCODONE BITARTRATE AND ACETAMINOPHEN 5; 325 MG/1; MG/1
1 TABLET ORAL EVERY 4 HOURS PRN
Qty: 25 TABLET | Refills: 0 | Status: SHIPPED | OUTPATIENT
Start: 2019-10-15 | End: 2019-10-24

## 2019-10-15 RX ADMIN — METOPROLOL TARTRATE 25 MG: 25 TABLET, FILM COATED ORAL at 08:54

## 2019-10-15 RX ADMIN — PIPERACILLIN AND TAZOBACTAM 3.38 G: 3; .375 INJECTION, POWDER, LYOPHILIZED, FOR SOLUTION INTRAVENOUS at 02:56

## 2019-10-15 RX ADMIN — Medication 10 ML: at 08:55

## 2019-10-15 RX ADMIN — INSULIN LISPRO 2 UNITS: 100 INJECTION, SOLUTION INTRAVENOUS; SUBCUTANEOUS at 08:55

## 2019-10-15 RX ADMIN — ISOSORBIDE MONONITRATE 60 MG: 60 TABLET, EXTENDED RELEASE ORAL at 08:55

## 2019-10-15 RX ADMIN — FENOFIBRATE 145 MG: 145 TABLET ORAL at 08:54

## 2019-10-15 RX ADMIN — PIPERACILLIN AND TAZOBACTAM 3.38 G: 3; .375 INJECTION, POWDER, LYOPHILIZED, FOR SOLUTION INTRAVENOUS at 09:01

## 2019-10-15 RX ADMIN — HYDROCODONE BITARTRATE AND ACETAMINOPHEN 1 TABLET: 5; 325 TABLET ORAL at 14:57

## 2019-10-15 RX ADMIN — BUDESONIDE 3 MG: 3 CAPSULE, COATED PELLETS ORAL at 09:00

## 2019-10-15 RX ADMIN — FAMOTIDINE 20 MG: 20 TABLET ORAL at 08:55

## 2019-10-15 RX ADMIN — ASPIRIN 81 MG: 81 TABLET, COATED ORAL at 08:54

## 2019-10-15 RX ADMIN — INSULIN LISPRO 2 UNITS: 100 INJECTION, SOLUTION INTRAVENOUS; SUBCUTANEOUS at 12:50

## 2019-10-15 NOTE — PROGRESS NOTES
Continued Stay Note  JOHNATHON Hercules     Patient Name: Mindi Quinteros  MRN: [id number]  Today's Date: 10/15/2019    Admit Date: 10/13/2019    Discharge Plan     Row Name 10/15/19 1603       Plan    Plan Comments  NAVYA provided pt with [facility] and  program for seniors with resources.        Discharge Codes    No documentation.       Expected Discharge Date and Time     Expected Discharge Date Expected Discharge Time    Oct 15, 2019         CHERELLE Coleman    Phone # [phone]  Cell #[phone]  Fax#[phone]  [email or web address]      CHERELLE Coleman

## 2019-10-15 NOTE — PROGRESS NOTES
Continued Stay Note  JOHNATHON Hercules     Patient Name: Mindi Quinteros  MRN: [id number]  Today's Date: 10/15/2019    Admit Date: 10/13/2019    Discharge Plan     Row Name 10/15/19 1007       Plan    Plan  Home with [facility], (referral and order in [facility]) and [facility] notified.    Patient/Family in Agreement with Plan  yes    Plan Comments  Patient to be discharged today.             Anna Naegele RN CM   Phone [phone]  Fax   [phone]

## 2019-10-15 NOTE — DISCHARGE PLACEMENT REQUEST
"StacyMindi figueroa (78 y.o. Female)     Date of Birth Social Security Number Address Home Phone MRN    1940  [address] [place] [address] [phone] [id number]    Gnosticism Marital Status          Confucianist        Admission Date Admission Type Admitting Provider Attending Provider Department, Room/Bed    10/13/19 Emergency Genia Pennington MD Hall, Kelli G, MD [facility] SURGICAL INPATIENT, 4109/1    Discharge Date Discharge Disposition Discharge Destination                       Attending Provider:  Genia Pennington MD    Allergies:  Asacol [Mesalamine]    Isolation:  None   Infection:  None   Code Status:  CPR    Ht:  160 cm (63\")   Wt:  56 kg (123 lb 7.3 oz)    Admission Cmt:  None   Principal Problem:  Cholecystitis with cholelithiasis [K80.10] More...                 Active Insurance as of 10/13/2019     Primary Coverage     Payor Plan Insurance Group Employer/Plan Group    MEDICARE MEDICARE A & B      Payor Plan Address Payor Plan Phone Number Payor Plan Fax Number Effective Dates    [address] [phone]  12/1/2005 - None Entered    [facility] [address]       Subscriber Name Subscriber Birth Date Member MINDI KIDD 1940 [id number]           Secondary Coverage     Payor Plan Insurance Group Employer/Plan Group    [facility] SUPP [id number]     Payor Plan Address Payor Plan Phone Number Payor Plan Fax Number Effective Dates    [address]   12/1/2016 - None Entered    [facility] [id number]       Subscriber Name Subscriber Birth Date Member MINDI KIDD 1940 [id number]                 Emergency Contacts      (Rel.) Home Phone Work Phone Mobile Phone    ORLANDO GARCIA (Spouse) [phone] -- --    RADHA NATHANAEL (Daughter) [phone] -- [phone]              "

## 2019-10-15 NOTE — THERAPY EVALUATION
Acute Care - Physical Therapy Initial Evaluation  [facility]     Patient Name: Mindi Quinteros  : 1940  MRN: [id number]  Today's Date: 10/15/2019                Admit Date: 10/13/2019    Visit Dx:     ICD-10-CM ICD-9-CM   1. Epigastric pain R10.13 789.06   2. Calculus of gallbladder with acute cholecystitis without obstruction K80.00 574.00   3. Cholecystitis with cholelithiasis K80.10 574.10     Patient Active Problem List   Diagnosis   • Age-related osteoporosis without current pathological fracture     Past Medical History:   Diagnosis Date   • Age-related osteoporosis without current pathological fracture 2019   • Arthritis    • CAD (coronary artery disease)    • Diabetes (CMS/HCC)    • Elevated cholesterol    • HTN (hypertension)    • Hyperlipemia      Past Surgical History:   Procedure Laterality Date   • ANKLE ARTHROSCOPY     • BELPHAROPTOSIS REPAIR     • CARDIAC CATHETERIZATION     • CATARACT EXTRACTION     • CHOLECYSTECTOMY N/A 10/14/2019    Procedure: Laparoscopic cholecystectomy, possible open;  Surgeon: Vijay Guerra DO;  Location: [facility] OR;  Service: General   • COLONOSCOPY     • COSMETIC SURGERY     • ENDOSCOPY     • HEMORROIDECTOMY     • HYSTERECTOMY          PT ASSESSMENT (last 12 hours)      Physical Therapy Evaluation     Row Name 10/15/19 110          PT Evaluation Time/Intention    Subjective Information  complains of;pain across abdomen  -SS (r) RM (t) SS (c)     Document Type  evaluation  -SS (r) RM (t) SS (c)     Mode of Treatment  physical therapy  -SS (r) RM (t) SS (c)     Patient Effort  good  -SS (r) RM (t) SS (c)     Comment  Pt is primary caregiver to   -SS (r) RM (t) SS (c)     Row Name 10/15/19 110          General Information    General Observations of Patient  Pt was in bathroom upon entry  -SS (r) RM (t) SS (c)     Prior Level of Function  independent:;all household mobility;gait;transfer;home management;driving;shopping  -SS (r) RM (t) SS (c)     Equipment  Currently Used at Home  none  -SS (r) RM (t) SS (c)     Pertinent History of Current Functional Problem  Pt is a 77 y/o F POD 1 s/p lap klaudia admitted for epigastric pain and acute cholecystitis.  -SS (r) RM (t) SS (c)     Existing Precautions/Restrictions  no known precautions/restrictions  -SS (r) RM (t) SS (c)     Row Name 10/15/19 1103          Living Environment    Living Arrangements  house  -SS (r) RM (t) SS (c)     Home Accessibility  wheelchair accessible  -SS (r) RM (t) SS (c)     Living Environment Comment  Pt reports living in a multilevel home and has to go to the basement for laundry, which is 13 steps. She is primary caregiver to her  and has to occasionally has to help him get up from WC.  -SS (r) RM (t) SS (c)     Row Name 10/15/19 1103          Cognitive Assessment/Intervention- PT/OT    Orientation Status (Cognition)  oriented x 4  -SS (r) RM (t) SS (c)     Row Name 10/15/19 1103          Bed Mobility Assessment/Treatment    Bed Mobility Assessment/Treatment  bed mobility (all) activities  -SS (r) RM (t) SS (c)     [place] Level (Bed Mobility)  independent  -SS (r) RM (t) SS (c)     Row Name 10/15/19 1103          Transfer Assessment/Treatment    Transfer Assessment/Treatment  sit-stand transfer;stand-sit transfer;stand pivot/stand step transfer  -SS (r) RM (t) SS (c)     Sit-Stand [place] (Transfers)  independent  -SS (r) RM (t) SS (c)     Stand-Sit [place] (Transfers)  independent  -SS (r) RM (t) SS (c)     Row Name 10/15/19 1103          Stand Pivot/Stand Step Transfer    Stand Pivot/Stand Step [place]  independent  -SS (r) RM (t) SS (c)     Row Name 10/15/19 1103          Gait/Stairs Assessment/Training    Gait/Stairs Assessment/Training  gait/ambulation independence  -SS (r) RM (t) SS (c)     [place] Level (Gait)  supervision  -SS (r) RM (t) SS (c)     Comment (Gait/Stairs)  Pt has slow gt speed, walks with limited trunk/BUE motion, and walks with limited knee  flexion despite having functional range. Pt reports this is typical for her  -SS (r) RM (t) SS (c)     Row Name 10/15/19 1103          General ROM    GENERAL ROM COMMENTS  Shoulder flexion limited 25%, all other ROM WFL  -SS (r) RM (t) SS (c)     Row Name 10/15/19 1103          MMT (Manual Muscle Testing)    General MMT Comments  3+/5 grossly MMT  -SS (r) RM (t) SS (c)     Row Name             Wound 10/14/19 1547 Other (See comments) abdomen Incision    Wound - Properties Group Date first assessed: 10/14/19  -SAMMI Time first assessed: 1547  -SAMMI Side: Other (See comments)  -SAMMI Location: abdomen  -SAMMI Primary Wound Type: Incision  -SAMMI    Row Name 10/15/19 1103          Physical Therapy Clinical Impression    Patient/Family Goals Statement (PT Clinical Impression)  To return home with   -SS (r) RM (t) SS (c)     Criteria for Skilled Interventions Met (PT Clinical Impression)  yes;treatment indicated  -SS (r) RM (t) SS (c)     Pathology/Pathophysiology Noted (Describe Specifically for Each System)  musculoskeletal  -SS (r) RM (t) SS (c)     Impairments Found (describe specific impairments)  aerobic capacity/endurance;gait, locomotion, and balance  -SS (r) RM (t) SS (c)     Rehab Potential (PT Clinical Summary)  good, to achieve stated therapy goals  -SS (r) RM (t) SS (c)     Predicted Duration of Therapy (PT)  Until d/c from [facility]  -SS (r) RM (t) SS (c)     Row Name 10/15/19 1103          Physical Therapy Goals    Gait Training Goal Selection (PT)  gait training, PT goal 1  -SS (r) RM (t) SS (c)     Row Name 10/15/19 1103          Gait Training Goal 1 (PT)    Activity/Assistive Device (Gait Training Goal 1, PT)  gait (walking locomotion)  -SS (r) RM (t) SS (c)     [place] Level (Gait Training Goal 1, PT)  independent  -SS (r) RM (t) SS (c)     Distance (Gait Goal 1, PT)  150'  -SS (r) RM (t) SS (c)     Time Frame (Gait Training Goal 1, PT)  long term goal (LTG);2 weeks  -SS (r) RM (t) SS (c)     Row Name 10/15/19  1103          Positioning and Restraints    Pre-Treatment Position  bathroom  -SS (r) RM (t) SS (c)     Post Treatment Position  bed  -SS (r) RM (t) SS (c)     In Bed  sitting EOB;with other staff With MD  -SS (r) RM (t) SS (c)       User Key  (r) = Recorded By, (t) = Taken By, (c) = Cosigned By    Initials Name Provider Type    SS Catie Galan, PT Physical Therapist    Ximena Dee RN Registered Nurse     Juan Carlos Ambrosio, PT Student PT Student        Physical Therapy Education     Title: PT OT SLP Therapies (Done)     Topic: Physical Therapy (Done)     Point: Mobility training (Done)     Learning Progress Summary           Patient Acceptance, E, VU by  at 10/15/2019  1:36 PM                   Point: Home exercise program (Done)     Learning Progress Summary           Patient Acceptance, E, VU by  at 10/15/2019  1:36 PM                               User Key     Initials Effective Dates Name Provider Type Discipline     08/22/19 -  Juan Carlos Ambrosio, PT Student PT Student PT              PT Recommendation and Plan  Anticipated Discharge Disposition (PT): home with home health  Planned Therapy Interventions (PT Eval): gait training, balance training, home exercise program, patient/family education, strengthening  Therapy Frequency (PT Clinical Impression): 3 times/wk  Outcome Summary/Treatment Plan (PT)  Anticipated Discharge Disposition (PT): home with home health  Outcome Summary: Pt is a 79 y/o F POD 1 s/p lap klaudia admitted for epigastric pain and acute cholecystitis. Upon assessment, pt is able to complete transfers and bed mobility independently and complete a gait of 100' with supervision. Pt is primary caregiver for  after he had a stroke. Pt has MMT grossly 3+/5. PT recommends home health upon d/c to address activity tolerance and safety. Will follow 3x/wk until d/c from [facility].     Time Calculation:   PT Charges     Row Name 10/15/19 0353             Time Calculation    Start Time  1103  -SS  (r) RM (t) SS (c)      Stop Time  1118  -SS (r) RM (t) SS (c)      Time Calculation (min)  15 min  -SS (r) RM (t)      PT Received On  10/15/19  -SS (r) RM (t) SS (c)      PT - Next Appointment  10/17/19  -SS (r) RM (t) SS (c)      PT Goal Re-Cert Due Date  10/29/19  -SS (r) RM (t) SS (c)         Time Calculation- PT    Total Timed Code Minutes- PT  0 minute(s)  -SS (r) RM (t) SS (c)        User Key  (r) = Recorded By, (t) = Taken By, (c) = Cosigned By    Initials Name Provider Type     Catie Galan, PT Physical Therapist    Juan Carlos Lambert, PT Student PT Student        Therapy Charges for Today     Code Description Service Date Service Provider Modifiers Qty    [id number] HC PT EVAL MOD COMPLEXITY 3 10/15/2019 Juan Carlos Ambrosoi, PT Student GP 1                 Juan Carlos Ambrosio PT Student  10/15/2019

## 2019-10-15 NOTE — DISCHARGE SUMMARY
Date of Admission: 10/13/2019    Date of Discharge:  10/15/2019    Length of stay:  LOS: 1 day     Discharge Diagnosis:   ABD pain d/t acute cholecystitis  -general surgery consulted and performed laparoscopic cholecystectomy 10/14/2019     CAD   -Patient had a stress Myoview on 10/10/2019:   1.  Lexiscan Cardiolite with predominantly fixed inferior and inferoapical wall consistent with soft tissue attenuation versus ischemia  2.  Normal wall motion LVEF of 67%.     HLD, chronic  -On statin     Primary hypertension  -On beta-blocker     Type II DM  -On glimeperide and metformin      Ulcerative colitis  -On budesonide    Presenting Problem/History of Present Illness  Active Hospital Problems    Diagnosis  POA   • **Cholecystitis with cholelithiasis [K80.10]  Unknown   • Epigastric pain [R10.13]  Yes      Resolved Hospital Problems   No resolved problems to display.            Hospital Course  Patient is a 70-year-old female with a past medical history of coronary artery disease and ulcerative colitis,  presented to the ER on 10/13/2019 complaining of a 1 day history of sharp stabbing epigastric and right upper quadrant abdominal pain.  It was not associated with eating.  It was associated with nausea but no vomiting.  She stated she ate her normal dinner at 830 and then later on in the night she had a severe abdominal pain that prompted her to come to the ER.  In the ER she had a CT abdomen done showing possible formation around the gallbladder, was given pain control and antibiotics and admitted for further care.  Only she still reported some abdominal pain and nausea but has not had any vomiting.    Record review show she is currently getting outpt workup for her CAD and had stress test 10/10/19 results pending (previous [facility]: Calcified 50% proximal 70% mid LAD and 70% mid LCX, normal LVEF).  General surgery and cardiology consulted upon admission.    10/14/2019 Cardiology saw patient and plan for outpatient nuclear  stress test and cleared patient for surgery.  Patient underwent laparoscopic cholecystectomy by Dr. Guerra.    10/15/2019 Patient is stable for discharge.  Patient is to follow-up with Dr. Terrazas in 2 weeks.  Patient to follow-up with Dr. Guerra in 1 week.  Patient to follow-up with PCP in 1 week.  Patient to take medications as prescribed.    Past Medical History:     Past Medical History:   Diagnosis Date   • Age-related osteoporosis without current pathological fracture 9/12/2019   • Arthritis    • CAD (coronary artery disease)    • Diabetes (CMS/HCC)    • Elevated cholesterol    • HTN (hypertension)    • Hyperlipemia        Past Surgical History:     Past Surgical History:   Procedure Laterality Date   • ANKLE ARTHROSCOPY     • BELPHAROPTOSIS REPAIR     • CARDIAC CATHETERIZATION     • CATARACT EXTRACTION     • COLONOSCOPY     • COSMETIC SURGERY     • ENDOSCOPY     • HEMORROIDECTOMY     • HYSTERECTOMY         Social History:   Social History     Socioeconomic History   • Marital status:      Spouse name: Not on file   • Number of children: Not on file   • Years of education: Not on file   • Highest education level: Not on file   Tobacco Use   • Smoking status: Never Smoker   • Smokeless tobacco: Never Used   Substance and Sexual Activity   • Alcohol use: No     Frequency: Never   • Drug use: No   • Sexual activity: Defer         Procedures Performed    Procedure(s):  Laparoscopic cholecystectomy, possible open  -------------------       Consults:   Consults     Date and Time Order Name Status Description    10/13/2019 0914 Inpatient Cardiology Consult Completed     10/13/2019 0306 Inpatient General Surgery Consult            Pertinent Test Results:     Microbiology Results (last 10 days)     Procedure Component Value - Date/Time    Body Fluid Culture - Body Fluid, Gallbladder [[id number]]  (Abnormal) Collected:  10/14/19 1074    Lab Status:  Preliminary result Specimen:  Body Fluid from Gallbladder  Updated:  10/15/19 1009     Body Fluid Culture Scant growth (1+) Gram Negative Bacilli     Gram Stain Moderate (3+) WBCs seen      No organisms seen    Blood Culture - Blood, Hand, Left [[id number]] Collected:  10/13/19 1637    Lab Status:  Preliminary result Specimen:  Blood from Hand, Left Updated:  10/14/19 1645     Blood Culture No growth at 24 hours    Blood Culture - Blood, Hand, Right [[id number]] Collected:  10/13/19 1636    Lab Status:  Preliminary result Specimen:  Blood from Hand, Right Updated:  10/14/19 1645     Blood Culture No growth at 24 hours               Blood Culture   Date Value Ref Range Status   10/13/2019 No growth at 24 hours  Preliminary   10/13/2019 No growth at 24 hours  Preliminary       Body Fluid Culture   Date Value Ref Range Status   10/14/2019 Scant growth (1+) Gram Negative Bacilli (A)  Preliminary              Imaging Results (all)     Procedure Component Value Units Date/Time    XR Chest 1 View [[id number]] Collected:  10/13/19 1519     Updated:  10/13/19 1524    Narrative:       DATE OF EXAM:  10/13/2019 2:10 PM     PROCEDURE:  XR CHEST 1 VW-     INDICATIONS:  pre- op; R10.13-Epigastric pain; K80.00-Calculus of gallbladder with  acute cholecystitis without obstruction     COMPARISON:  Chest radiographs to 5/20/2018. CT abdomen and pelvis 10/13/2019.     TECHNIQUE:   Single radiographic AP view of the chest was obtained.     FINDINGS:  Calcified remnants of prior granulomatous disease. Subsegmental  atelectasis and/or scarring in the left lung base. Mild diffuse  interstitial prominence throughout both lungs. No pneumothorax.  Cardiomediastinal contours within normal limits given technique.  Partially visualized moderate hiatal hernia. No acute osseous  abnormality is identified.        Impression:          1. Mild nonspecific diffuse interstitial prominence throughout both  lungs, which could represent atypical pneumonia or mild pulmonary edema.  2. Partially visualized moderate  hiatal hernia.     Electronically Signed By-Edwin Mcintyre On:10/13/2019 3:22 PM  This report was finalized on [id number] by  Edwin Mcintyre, .    CT Abdomen Pelvis Without Contrast [[id number]] Collected:  10/13/19 0048     Updated:  10/13/19 0253    Narrative:       CT OF THE ABDOMEN AND PELVIS WITHOUT CONTRAST    Clinical indication: Abdominal pain.    Comparison: None.    Procedure: Noncontrast CT images of the abdomen and pelvis were obtained.  CT dose lowering techniques were used, to include: automated exposure control, adjustment for patient size, and or use of iterative reconstruction.    Findings:     Lung Bases: Lung bases are clear. No pleural effusion. Heart size is normal without pericardial effusion. Moderate hiatal hernia.    Liver and biliary system: The liver is normal in size and configuration without focal lesion. No intra or extrahepatic biliary ductal dilatation is noted. The gallbladder is distended with layering radiodense intraluminal material present. The   gallbladder wall appears thickened and there is a small amount of pericholecystic fluid.    Pancreas: The pancreas is unremarkable.     Spleen: The spleen is normal.    Adrenals: The adrenal glands are within normal limits.     Kidneys: No urolithiasis or hydronephrosis. Simple appearing left renal cortical cyst is present.    Gastrointestinal: The stomach and scattered loops of small and large bowel have a nonobstructive pattern. Diverticulosis without acute diverticulitis. The appendix is normal in the right lower quadrant.     Mesentery and retroperitoneum: No mesenteric or retroperitoneal adenopathy. No abnormal fluid collection, mass or free air. Atherosclerosis in the infrarenal aorta and iliac vessels.    Pelvis: The urinary bladder is moderately distended with a smooth contour. Rectum is normal. No free fluid. No deep pelvic or inguinal adenopathy.     Reproductive: No acute abnormality.    Body wall: Normal.    Bones: No acute  osseous abnormality.      Impression:       Impression:   1. No urolithiasis or hydronephrosis.  2. Cholelithiasis, gallbladder wall thickening and adjacent fluid are suspicious for acute cholecystitis in the correct clinical setting. Consider right upper quadrant ultrasound if clinically warranted.    Electronically signed by:  Rolando Mercedes M.D.    10/13/2019 12:52 AM          ECG 12 Lead    (Results Pending)   ECG 12 Lead    (Results Pending)       Condition on Discharge:  stable    Vital Signs  Temp:  [97.1 °F (36.2 °C)-100.4 °F (38 °C)] 98.5 °F (36.9 °C)  Heart Rate:  [] 91  Resp:  [16-28] 18  BP: (100-133)/(47-74) 129/68    Physical Exam:  Physical Exam    Discharge Disposition      Discharge Medications     Discharge Medications      ASK your doctor about these medications      Instructions Start Date   ASPIR-LOW 81 MG EC tablet  Generic drug:  aspirin   Every 24 Hours      atorvastatin 40 MG tablet  Commonly known as:  LIPITOR   ATORVASTATIN CALCIUM 40 MG TABS      Budesonide 3 MG 24 hr capsule  Commonly known as:  ENTOCORT EC   BUDESONIDE 3 MG CPEP      CALCIUM + D PO   Oral      Coenzyme Q10 10 MG capsule   COQ10 CAPS      fenofibrate 145 MG tablet  Commonly known as:  TRICOR   Every 24 Hours      glimepiride 2 MG tablet  Commonly known as:  AMARYL   Every 24 Hours, 1 1/2      isosorbide mononitrate 60 MG 24 hr tablet  Commonly known as:  IMDUR   Every 24 Hours      metFORMIN  MG 24 hr tablet  Commonly known as:  GLUCOPHAGE-XR   500 mg, Oral, 4 pills a day      metoprolol tartrate 25 MG tablet  Commonly known as:  LOPRESSOR   Every 12 Hours      NITROSTAT 0.4 MG SL tablet  Generic drug:  nitroglycerin   NITROSTAT 0.4 MG SUBL      PROLIA 60 MG/ML solution prefilled syringe syringe  Generic drug:  denosumab   PROLIA 60 MG/ML SOSY      Vitamin D (Cholecalciferol) 400 units tablet  Commonly known as:  CHOLECALCIFEROL   Oral, Daily, Vit D2 50,000 weekly       Vitamin D3 2000 units capsule   VITAMIN  D3 CAPS             Discharge Diet:     Activity at Discharge:     Follow-up Appointments  Future Appointments   Date Time Provider Department Center   5/29/2020  9:40 AM Natan Terrazas MD [facility]     Additional Instructions for the Follow-ups that You Need to Schedule     Ambulatory Referral to Home Health   As directed      [facility] to evaluate and treat as indicated.    Order Comments:  [facility] to evaluate and treat as indicated.     Face to Face Visit Date:  10/15/2019    Follow-up provider for Plan of Care?:  I treated the patient in an acute care facility and will not continue treatment after discharge.    Follow-up provider:  VEE MELCHOR [[id number]]    Reason/Clinical Findings:  s/p surgery, ALEX remains    Describe mobility limitations that make leaving home difficult:  patient is unable to leave home.    Nursing/Therapeutic Services Requested:  Other    Frequency:  1 Week 1               Test Results Pending at Discharge   Order Current Status    Anaerobic Culture - Body Fluid, Gallbladder In process    Tissue Pathology Exam In process    Blood Culture - Blood, Hand, Left Preliminary result    Blood Culture - Blood, Hand, Right Preliminary result    Body Fluid Culture - Body Fluid, Gallbladder Preliminary result           Risk for Readmission (LACE) Score: 5 (10/15/2019  6:00 AM)

## 2019-10-15 NOTE — PROGRESS NOTES
Cardiology Progress Note    Patient Identification:  Name: Mindi Quinteros  Age: 78 y.o.  Sex: female  :  1940  MRN: [id number]                 Follow Up / Chief Complaint: Preop for cholecystectomy, epigastric pain, CAD  Chief Complaint   Patient presents with   • Abdominal Pain     pt has been told she has hernia       Interval History: Patient had cholecystectomy 10/14/2019       Subjective: Patient was seen on Thursday, 10/10/2019 and on Saturday started developing abdominal pain and right upper quadrant pain therefore presented to the hospital was found to have acute cholecystitis, underwent cholecystitis 10/14/2019 2 days pain-free       History of Present Illness:     This is a 78-year-old with PMH      # CAD, cardiac cath 18  calcified 50% proximal 70% mid LAD and 70% mid LCX, normal LVEF  #  diabetes  #  hypertension, hyperlipidemia  #  ulcerative colitis  #  allergy to aspertane  #  hemorrhoidectomy, hysterectomy, bladder repair, left ankle surgery,       here with complaint of epigastric and right upper quadrant pain was found to have acute cholecystitis, is preop for cholecystectomy.Patient was recently seen in the office complaining of an episode of severe shortness of breath associated with chest tightness which lasted 2 to 3 hours the other day with no aggravating or relieving factors, denies any palpitation,lightheadedness dizziness loss of conscious.  patient's is a diabetes is running good in the 120s had a recent hemoglobin A1c done 18 was 6.1. . labs from 2018 showed cholesterol 116 triglycerides 76 HDL low at 38 LDL 63 repeat labs from 2018, CMP, CBC unremarkable except for glucose of 122, cholesterol 136 LDL 75 HDL 39., hemoglobin A1c of 7.2.  patient is under lot of stress due to 's stroke and having to take care of him.  Patient had BNP level done 2019 which was elevated at 214.  Patient had a Lexiscan Cardiolite 10/10/2019 which revealed equivocal  results with predominantly fixed inferoapical defect      ASSESSMENT:  #  Preoperative cardiovascular evaluation for cholecystectomy for the patient with acute cholecystitis  #  CAD  # Chronic diastolic CHF  #  hyperlipidemia  #  diabetes   # hypertension      PLAN:  Patient states that she is asymptomatic at the current time after therapy  Patient underwent cholecystectomy 10/14/2019.  Ambulated today with rehab without chest pain or shortness of breath.  Continues to have mild incisional pain  We will put her on telemetry  Increase activity as tolerated  DC home if stable from surgical standpoint  We will follow-up as outpatient  Diabetes control       Past Medical History:  Past Medical History:   Diagnosis Date   • Age-related osteoporosis without current pathological fracture 9/12/2019   • Arthritis    • CAD (coronary artery disease)    • Diabetes (CMS/HCC)    • Elevated cholesterol    • HTN (hypertension)    • Hyperlipemia      Past Surgical History:  Past Surgical History:   Procedure Laterality Date   • ANKLE ARTHROSCOPY     • BELPHAROPTOSIS REPAIR     • CARDIAC CATHETERIZATION     • CATARACT EXTRACTION     • COLONOSCOPY     • COSMETIC SURGERY     • ENDOSCOPY     • HEMORROIDECTOMY     • HYSTERECTOMY          Social History:   Social History     Tobacco Use   • Smoking status: Never Smoker   • Smokeless tobacco: Never Used   Substance Use Topics   • Alcohol use: No     Frequency: Never      Family History:  Family History   Problem Relation Age of Onset   • Diabetes Mother    • Heart disease Father    • Heart disease Brother    • Diabetes Brother           Allergies:  Allergies   Allergen Reactions   • Asacol [Mesalamine] Swelling     Scheduled Meds:    aspirin 81 mg Daily   atorvastatin 40 mg Nightly   Budesonide 3 mg Daily   famotidine 20 mg BID   fenofibrate 145 mg Daily   insulin lispro 0-7 Units 4x Daily With Meals & Nightly   isosorbide mononitrate 60 mg Q24H   metoprolol tartrate 25 mg Q12H  "  piperacillin-tazobactam 3.375 g Q8H   sodium chloride 10 mL Q12H           INTAKE AND OUTPUT:    Intake/Output Summary (Last 24 hours) at 10/15/2019 1058  Last data filed at 10/15/2019 0852  Gross per 24 hour   Intake 1751 ml   Output 560 ml   Net 1191 ml       Review of Systems:   ROS  Constitutional: No weakness,fatigue, fever, rigors, chills   Eyes: No vision changes, eye pain   ENT/oropharynx: No difficulty swallowing, sore throat, epistaxis, changes in hearing   Cardiovascular: c/o chest pain, no palpitations, paroxysmal nocturnal dyspnea, orthopnea, diaphoresis, dizziness / syncopal episode   Respiratory: No shortness of breath, dyspnea on exertion, cough, wheezing hemoptysis   Gastrointestinal: c/o abdominal pain, no nausea, no vomiting, diarrhea, bloody stools   Genitourinary: No hematuria, dysuria   Neurological: No headache, tremors, numbness,  one-sided weakness    Musculoskeletal: No cramps, myalgias,  joint pain, joint swelling   Integument: No rash, edema         Constitutional:  Temp:  [97.1 °F (36.2 °C)-100.4 °F (38 °C)] 98.4 °F (36.9 °C)  Heart Rate:  [] 64  Resp:  [16-28] 18  BP: (100-133)/(47-74) 126/74    Physical Exam   /74 (BP Location: Right arm, Patient Position: Lying)   Pulse 64   Temp 98.4 °F (36.9 °C) (Oral)   Resp 18   Ht 160 cm (63\")   Wt 56 kg (123 lb 7.3 oz)   SpO2 97%   BMI 21.87 kg/m²   General:  Appears in no acute distress  Eyes: Sclera is anicteric,  conjunctiva is clear   HEENT:  No JVD. Thyroid not visibly enlarged. No mucosal pallor or cyanosis  Respiratory: Respirations regular and unlabored at rest.  Bilaterally good breath sounds, with good air entry in all fields. No crackles, rubs or wheezes auscultated  Cardiovascular: S1,S2 Regular rate and rhythm. No murmur, rub or gallop auscultated.No pretibial pitting edema  Gastrointestinal: Nondistended and soft  Musculoskeletal:  No abnormal movements  Extremities: No digital clubbing or cyanosis  Skin: Color " "pink. Skin warm and dry to touch. No rashes  No xanthoma  Neuro: Alert and awake, no lateralizing deficits appreciated    Impression:   1. No urolithiasis or hydronephrosis.  2. Cholelithiasis, gallbladder wall thickening and adjacent fluid are suspicious for acute cholecystitis in the correct clinical setting. Consider right upper quadrant ultrasound if clinically warranted.     Electronically signed by:  Rolando Mercedes M.D.    10/13/2019 12:52 AM    Cardiographics  Telemetry: Sinus rhythm    ECG:   ECG 12 Lead    (Results Pending)   ECG 12 Lead    (Results Pending)     I have personally reviewed EKG    Echocardiogram:      Lab Review   I have reviewed the labs          Results from last 7 days   Lab Units 10/14/19  0340   SODIUM mmol/L 135*   POTASSIUM mmol/L 3.1*   BUN mg/dL 9   CREATININE mg/dL 0.80   CALCIUM mg/dL 7.5*     @[id number]@  Results from last 7 days   Lab Units 10/14/19  0340 10/13/19  0701 10/13/19  0134   WBC 10*3/mm3 6.20 8.10 4.90   HEMOGLOBIN g/dL 11.4* 13.2 12.1   HEMATOCRIT % 33.9* 40.4 35.2   PLATELETS 10*3/mm3 161 229 182     Results from last 7 days   Lab Units 10/14/19  0340 10/13/19  1328   INR  1.37* 1.13*   APTT seconds 30.9 26.1       RADIOLOGY:  Imaging Results (last 24 hours)     ** No results found for the last 24 hours. **              )10/15/2019  Natan Terrazas MD      EMR Dragon/Transcription:   \"Dictated utilizing Dragon dictation\".   "

## 2019-10-15 NOTE — PLAN OF CARE
Problem: Patient Care Overview  Goal: Plan of Care Review  Outcome: Ongoing (interventions implemented as appropriate)   10/15/19 3816   OTHER   Outcome Summary Pt is a 79 y/o F POD 1 s/p lap klaudia admitted for epigastric pain and acute cholecystitis. Upon assessment, pt is able to complete transfers and bed mobility independently and complete a gait trial of 100' with supervision. Pt is primary caregiver for  after he had a stroke. Pt has MMT grossly 3+/5. PT recommends home health upon d/c to address activity tolerance and safety. Will follow 3x/wk until d/c from [facility].

## 2019-10-16 ENCOUNTER — READMISSION MANAGEMENT (OUTPATIENT)
Dept: CALL CENTER | Facility: HOSPITAL | Age: 79
End: 2019-10-16

## 2019-10-16 LAB
LAB AP CASE REPORT: NORMAL
PATH REPORT.FINAL DX SPEC: NORMAL
PATH REPORT.GROSS SPEC: NORMAL

## 2019-10-16 NOTE — OUTREACH NOTE
Prep Survey      Responses   Facility patient discharged from?  [facility]   Is patient eligible?  Yes   Discharge diagnosis  Cholecystitis with cholelithiasis     Does the patient have one of the following disease processes/diagnoses(primary or secondary)?  General Surgery   Does the patient have Home health ordered?  Yes   What is the Home health agency?   [facility]   Is there a DME ordered?  No   Comments regarding appointments  Dr. Guerra Oct 29 @ 11, 00   Prep survey completed?  Yes          Lottie Conley LPN

## 2019-10-16 NOTE — PROGRESS NOTES
Case Management Discharge Note    Final Note: Home with [facility], (referral and order in Epic) and [facility] notified.         Final Discharge Disposition Code: 06 - home with home health care

## 2019-10-17 ENCOUNTER — READMISSION MANAGEMENT (OUTPATIENT)
Dept: CALL CENTER | Facility: HOSPITAL | Age: 79
End: 2019-10-17

## 2019-10-17 NOTE — OUTREACH NOTE
General Surgery Week 1 Survey      Responses   Facility patient discharged from?  [facility]   Does the patient have one of the following disease processes/diagnoses(primary or secondary)?  General Surgery   Is there a successful TCM telephone encounter documented?  No   Week 1 attempt successful?  Yes   Call start time  1107   Call end time  1117   Discharge diagnosis  Cholecystitis with cholelithiasis     Meds reviewed with patient/caregiver?  Yes   Does the patient have all medications related to this admission filled (includes all antibiotics, pain medications, etc.)  No   What is keeping the patient from filling the prescriptions?  Script on hold per patient [PATIENT PREFERS TAKING TYLENOL OR IBUPROFEN FOR PAIN. ]   Is the patient taking all medications as directed (includes completed medication regime)?  No   What is preventing the patient from taking all medications as directed?  Other   Nursing Interventions  Nurse provided patient education   Does the patient have a follow up appointment scheduled with their surgeon?  Yes   Has the patient kept scheduled appointments due by today?  N/A   What is the Home health agency?   [facility]   Has home health visited the patient within 72 hours of discharge?  No   Home health interventions  Called Home Health agency   Home health comments  DARSHAN AT [facility] STATES THEY ARE WAITING ON MD ORDERS TO GET STARTED WITH PATIENT'S HOME HEALTH SERVICES. DARSHAN STATES AS SOON AS THEY RECEIVE ORDERS, THEY WILL CALL THIS PATIENT. PATIENT NOTIFIED OF SAME INFORMATION.    Did the patient receive a copy of their discharge instructions?  Yes   Nursing interventions  Reviewed instructions with patient   What is the patient's perception of their health status since discharge?  Improving   Nursing interventions  Nurse provided patient education   Is the patient /caregiver able to teach back basic post-op care?  Continue use of incentive spirometry at least 1 week post discharge,  Practice 'cough and deep breath', Drive as instructed by MD in discharge instructions, Take showers only when approved by MD-sponge bathe until then, No tub bath, swimming, or hot tub until instructed by MD, Do not remove steri-strips, Keep incision areas clean,dry and protected, Lifting as instructed by MD in discharge instructions   Is the patient/caregiver able to teach back signs and symptoms of incisional infection?  Increased redness, swelling or pain at the incisonal site, Increased drainage or bleeding, Incisional warmth, Pus or odor from incision, Fever   Is the patient/caregiver able to teach back steps to recovery at home?  Set small, achievable goals for return to baseline health, Rest and rebuild strength, gradually increase activity, Make a list of questions for surgeon's appointment   Is the patient/caregiver able to teach back the hierarchy of who to call/visit for symptoms/problems? PCP, Specialist, Home health nurse, Urgent Care, ED, 911  Yes   Week 1 call completed?  Yes   Revoked  No further contact(revokes)-requires comment          Darling Galan LPN

## 2019-10-18 LAB
BACTERIA FLD CULT: ABNORMAL
BACTERIA FLD CULT: ABNORMAL
BACTERIA SPEC AEROBE CULT: NORMAL
BACTERIA SPEC AEROBE CULT: NORMAL
GRAM STN SPEC: ABNORMAL
GRAM STN SPEC: ABNORMAL

## 2019-10-19 LAB — BACTERIA SPEC ANAEROBE CULT: NORMAL

## 2019-10-19 RX ORDER — OXYCODONE HYDROCHLORIDE 5 MG/1
5 TABLET ORAL EVERY 4 HOURS PRN
Qty: 30 TABLET | Refills: 0 | Status: SHIPPED | OUTPATIENT
Start: 2019-10-19 | End: 2020-10-05

## 2019-10-20 ENCOUNTER — NURSE TRIAGE (OUTPATIENT)
Dept: CALL CENTER | Facility: HOSPITAL | Age: 79
End: 2019-10-20

## 2019-10-20 NOTE — TELEPHONE ENCOUNTER
"Caller states that the upper right abd incision is still draining.  She states that she has to change the dressing every 5 or 6 hours and the drainage is pinkish with possibly a small amount of yellow. Discussed options with caller and also explained that antibiotics are not routinely given after surgery and the rationale for this.  Pre op antibiotic rationale discussed with caller.  Caller then states that she has one more question, she states that her lips and the inside of her mouth are swollen.  She states that she thinks she is allergic to tylenol and she had been taking norco.  She states that she changed to plain oxycodone and felt like her lips and the inside of her mouth are still swelling.  Caller states that her lips are peeling.  Instructed caller to go to be seen as she is having an allergic reaction to something.    Reason for Disposition  • [1] Caller requesting NON-URGENT health information AND [2] PCP's office is the best resource    Additional Information  • Negative: [1] Caller is not with the adult (patient) AND [2] reporting urgent symptoms  • Negative: Lab result questions  • Negative: Medication questions  • Negative: Caller cannot be reached by phone  • Negative: Caller has already spoken to PCP or another triager  • Negative: RN needs further essential information from caller in order to complete triage  • Negative: Requesting regular office appointment  • Negative: Health Information question, no triage required and triager able to answer question  • Negative: General information question, no triage required and triager able to answer question    Answer Assessment - Initial Assessment Questions  1. REASON FOR CALL or QUESTION: \"What is your reason for calling today?\" or \"How can I best help you?\" or \"What question do you have that I can help answer?\"      I had gall bladder surgery and was discharged on Friday.  I have quite a bit of drainage from one of my incisions.  I was not given an " antibiotic after surgery.  I am having an allergic reaction to something.    Protocols used: INFORMATION ONLY CALL-ADULT-AH

## 2019-10-29 ENCOUNTER — OFFICE VISIT (OUTPATIENT)
Dept: SURGERY | Facility: CLINIC | Age: 79
End: 2019-10-29

## 2019-10-29 VITALS
WEIGHT: 123.6 LBS | DIASTOLIC BLOOD PRESSURE: 65 MMHG | SYSTOLIC BLOOD PRESSURE: 152 MMHG | HEIGHT: 63 IN | TEMPERATURE: 97.1 F | HEART RATE: 66 BPM | OXYGEN SATURATION: 96 % | BODY MASS INDEX: 21.9 KG/M2

## 2019-10-29 DIAGNOSIS — K80.01 CHOLELITHIASIS AND ACUTE CHOLECYSTITIS WITH OBSTRUCTION: Primary | ICD-10-CM

## 2019-10-29 PROBLEM — I20.9 ANGINA PECTORIS: Status: ACTIVE | Noted: 2018-03-09

## 2019-10-29 PROBLEM — I25.10 CHRONIC CORONARY ARTERY DISEASE: Status: ACTIVE | Noted: 2018-03-09

## 2019-10-29 PROBLEM — E78.5 HYPERLIPIDEMIA: Status: ACTIVE | Noted: 2019-10-29

## 2019-10-29 PROBLEM — R06.09 DYSPNEA ON EXERTION: Status: ACTIVE | Noted: 2017-12-19

## 2019-10-29 PROCEDURE — 99024 POSTOP FOLLOW-UP VISIT: CPT | Performed by: SURGERY

## 2019-10-29 RX ORDER — MONTELUKAST SODIUM 4 MG/1
2 TABLET, CHEWABLE ORAL DAILY
COMMUNITY
Start: 2019-10-24 | End: 2022-07-28

## 2019-10-29 NOTE — PROGRESS NOTES
SUBJECTIVE:    Ms Quinteros is seen in the office today follow-up from her lap klaudia done several weeks ago for acute disease with stones.  She is feeling much better.  She is experiencing loose stool.  I have explained that foods with fat Rhoda grease will cause this.  Started on colestipol by her primary doctor and that is helped.    OBJECTIVE:    Incisions are healing appropriately without infection.    ASSESSMENT:    Satisfactory postop progress    PLAN:    Recheck in the office as needed

## 2019-11-21 RX ORDER — ATORVASTATIN CALCIUM 40 MG/1
TABLET, FILM COATED ORAL
Qty: 90 TABLET | Refills: 3 | Status: SHIPPED | OUTPATIENT
Start: 2019-11-21 | End: 2020-12-10

## 2019-11-21 RX ORDER — ISOSORBIDE MONONITRATE 60 MG/1
TABLET, EXTENDED RELEASE ORAL
Qty: 90 TABLET | Refills: 3 | Status: SHIPPED | OUTPATIENT
Start: 2019-11-21 | End: 2020-11-12

## 2020-04-13 ENCOUNTER — OFFICE VISIT (OUTPATIENT)
Dept: CARDIOLOGY | Facility: CLINIC | Age: 80
End: 2020-04-13

## 2020-04-13 VITALS
HEART RATE: 70 BPM | DIASTOLIC BLOOD PRESSURE: 50 MMHG | WEIGHT: 130 LBS | SYSTOLIC BLOOD PRESSURE: 120 MMHG | BODY MASS INDEX: 23.03 KG/M2

## 2020-04-13 DIAGNOSIS — I10 ESSENTIAL HYPERTENSION: ICD-10-CM

## 2020-04-13 DIAGNOSIS — I25.10 CORONARY ARTERY DISEASE INVOLVING NATIVE CORONARY ARTERY OF NATIVE HEART WITHOUT ANGINA PECTORIS: ICD-10-CM

## 2020-04-13 DIAGNOSIS — I50.32 CHRONIC HEART FAILURE WITH PRESERVED EJECTION FRACTION (HCC): Primary | ICD-10-CM

## 2020-04-13 DIAGNOSIS — E78.5 DYSLIPIDEMIA: ICD-10-CM

## 2020-04-13 DIAGNOSIS — E11.9 TYPE 2 DIABETES MELLITUS WITHOUT COMPLICATION, WITHOUT LONG-TERM CURRENT USE OF INSULIN (HCC): ICD-10-CM

## 2020-04-13 PROCEDURE — 99442 PR PHYS/QHP TELEPHONE EVALUATION 11-20 MIN: CPT | Performed by: INTERNAL MEDICINE

## 2020-04-13 RX ORDER — FUROSEMIDE 20 MG/1
20 TABLET ORAL DAILY
Qty: 30 TABLET | Refills: 11 | Status: SHIPPED | OUTPATIENT
Start: 2020-04-13 | End: 2020-10-05

## 2020-04-13 RX ORDER — FENOFIBRATE 160 MG/1
160 TABLET ORAL DAILY
COMMUNITY
Start: 2020-02-09

## 2020-04-13 NOTE — PROGRESS NOTES
You have chosen to receive care through a telephone visit. Do you consent to use a telephone visit for your medical care today? Yes  This visit has been rescheduled as a phone visit to comply with patient safety concerns in accordance with CDC recommendations. Total time of discussion was 15 minutes.      Subjective:     Encounter Date:04/13/2020      Patient ID: Mindi Quinteros is a 79 y.o. female.    Chief Complaint :  History of Present Illness      CC: Follow-up for CAD, chronic diastolic CHF    History of Present Illness:    This is a 79-year-old with PMH     # CAD, cardiac cath 2/7/18  calcified 50% proximal 70% mid LAD and 70% mid LCX, normal LVEF  #  diabetes  #  hypertension, hyperlipidemia  #  ulcerative colitis  #  allergy to aspertane  #  hemorrhoidectomy, hysterectomy, bladder repair, left ankle surgery,      here for   Followup.  Patient denies any chest pain or shortness of breath.  Patient is complaining of occasional edema.  Denies any aggravating or relieving factors.  Patient's is a diabetes is running good in the 120s had a   hemoglobin A1c done 5//14/18 was 6.1. . labs from 08/01/2018 showed cholesterol 116 triglycerides 76 HDL low at 38 LDL 63 repeat labs from 11/14/2018, CMP, CBC unremarkable except for glucose of 122, cholesterol 136 LDL 75 HDL 39., hemoglobin A1c of 7.2. patient's arterial blood pressure is 120/50 today.   patient is under lot of stress due to 's stroke and having to take care of him.  Patient had BNP level done 9/9/2019 which was elevated at 214.       ASSESSMENT:    #  CAD  # Chronic HFpEF  #  hyperlipidemia  #  diabetes   # hypertension     PLAN:  We will add furosemide for CHF   counseled on walking exercise for low HDL   reviewed labs with patient   Will continue her on aspirin beta-blockers ,  long-acting nitrate   give nitroglycerin sublingual care instructions   patient  has symptoms on maximal medical management , will bring her back for rota ablation possible  PCI. risk benefits alternatives explained.  Spent 15 minutes with patient explaining risk benefits alternatives   reviewed diet and exercise instructions  We will check lipid profile CMP and CK before visit, patient says she has had labs done with PMD Dr. Shah will get the labs before next visit        Assessment:          Diagnosis Plan   1. Chronic heart failure with preserved ejection fraction (CMS/HCC)     2. Coronary artery disease involving native coronary artery of native heart without angina pectoris     3. Dyslipidemia     4. Type 2 diabetes mellitus without complication, without long-term current use of insulin (CMS/HCC)     5. Essential hypertension            Plan:         Past Medical History:  Past Medical History:   Diagnosis Date   • Age-related osteoporosis without current pathological fracture 9/12/2019   • Arthritis    • CAD (coronary artery disease)    • Diabetes (CMS/HCC)    • Elevated cholesterol    • HTN (hypertension)    • Hyperlipemia      Past Surgical History:  Past Surgical History:   Procedure Laterality Date   • ANKLE ARTHROSCOPY     • BELPHAROPTOSIS REPAIR     • CARDIAC CATHETERIZATION     • CATARACT EXTRACTION     • CHOLECYSTECTOMY N/A 10/14/2019    Procedure: Laparoscopic cholecystectomy, possible open;  Surgeon: Vijay Guerra DO;  Location: [facility];  Service: General   • COLONOSCOPY     • COSMETIC SURGERY     • ENDOSCOPY     • HEMORROIDECTOMY     • HYSTERECTOMY        Allergies:  Allergies   Allergen Reactions   • Asacol [Mesalamine] Swelling     Home Meds:  Current Meds:     Current Outpatient Medications:   •  aspirin (ASPIR-LOW) 81 MG EC tablet, Daily., Disp: , Rfl:   •  atorvastatin (LIPITOR) 40 MG tablet, TAKE 1 TABLET DAILY AT BEDTIME, Disp: 90 tablet, Rfl: 3  •  Budesonide (ENTOCORT EC) 3 MG 24 hr capsule, BUDESONIDE 3 MG CPEP, Disp: , Rfl:   •  Calcium Citrate-Vitamin D (CALCIUM + D PO), Take  by mouth., Disp: , Rfl:   •  Cholecalciferol (VITAMIN D3) 2000 units  capsule, VITAMIN D3 CAPS, Disp: , Rfl:   •  Coenzyme Q10 10 MG capsule, COQ10 CAPS, Disp: , Rfl:   •  colestipol (COLESTID) 1 g tablet, , Disp: , Rfl:   •  denosumab (PROLIA) 60 MG/ML solution prefilled syringe syringe, PROLIA 60 MG/ML SOSY, Disp: , Rfl:   •  fenofibrate 160 MG tablet, Daily., Disp: , Rfl:   •  glimepiride (AMARYL) 2 MG tablet, Daily. 1 1/2, Disp: , Rfl:   •  isosorbide mononitrate (IMDUR) 60 MG 24 hr tablet, TAKE 1 TABLET DAILY, Disp: 90 tablet, Rfl: 3  •  metFORMIN ER (GLUCOPHAGE-XR) 500 MG 24 hr tablet, Take 500 mg by mouth. 4 pills a day, Disp: , Rfl:   •  metoprolol tartrate (LOPRESSOR) 25 MG tablet, TAKE 1 TABLET TWICE A DAY, Disp: 180 tablet, Rfl: 3  •  nitroglycerin (NITROSTAT) 0.4 MG SL tablet, NITROSTAT 0.4 MG SUBL, Disp: , Rfl:   •  fenofibrate (TRICOR) 145 MG tablet, Daily., Disp: , Rfl:   •  furosemide (LASIX) 20 MG tablet, Take 1 tablet by mouth Daily., Disp: 30 tablet, Rfl: 11  •  oxyCODONE (ROXICODONE) 5 MG immediate release tablet, Take 1 tablet by mouth Every 4 (Four) Hours As Needed for Moderate Pain ., Disp: 30 tablet, Rfl: 0  •  Vitamin D, Cholecalciferol, (CHOLECALCIFEROL) 400 units tablet, Take  by mouth Daily. Vit D2 50,000 weekly, Disp: , Rfl:   Social History:   Social History     Tobacco Use   • Smoking status: Never Smoker   • Smokeless tobacco: Never Used   Substance Use Topics   • Alcohol use: No     Frequency: Never      Family History:  Family History   Problem Relation Age of Onset   • Diabetes Mother    • Heart disease Father    • Heart disease Brother    • Diabetes Brother         The following portions of the patient's history were reviewed and updated as appropriate: allergies, current medications, past family history, past medical history, past social history, past surgical history and problem list.      Review of Systems   Constitution: Negative for fever and malaise/fatigue.   HENT: Negative for congestion and hearing loss.    Eyes: Negative for double vision  and visual disturbance.   Cardiovascular: Positive for leg swelling. Negative for chest pain, claudication, dyspnea on exertion, palpitations and syncope.   Respiratory: Negative for cough and shortness of breath.    Endocrine: Negative for cold intolerance.   Skin: Negative for color change and rash.   Musculoskeletal: Negative for arthritis and joint pain.   Gastrointestinal: Negative for abdominal pain and heartburn.   Genitourinary: Negative for hematuria.   Neurological: Positive for excessive daytime sleepiness and dizziness.   Psychiatric/Behavioral: Negative for depression. The patient is not nervous/anxious.    All other systems reviewed and are negative.    Comprehensive review of systems were reviewed and all others review of systems were found to be negative other than HPI    Procedures       Objective:     Physical Exam  /50   Pulse 70   Wt 59 kg (130 lb)   BMI 23.03 kg/m²       Lab Reviewed:

## 2020-10-05 ENCOUNTER — OFFICE VISIT (OUTPATIENT)
Dept: ORTHOPEDIC SURGERY | Facility: CLINIC | Age: 80
End: 2020-10-05

## 2020-10-05 ENCOUNTER — LAB (OUTPATIENT)
Dept: LAB | Facility: HOSPITAL | Age: 80
End: 2020-10-05

## 2020-10-05 VITALS
HEIGHT: 63 IN | SYSTOLIC BLOOD PRESSURE: 120 MMHG | HEART RATE: 84 BPM | BODY MASS INDEX: 22.18 KG/M2 | WEIGHT: 125.2 LBS | DIASTOLIC BLOOD PRESSURE: 72 MMHG

## 2020-10-05 DIAGNOSIS — K51.90 ULCERATIVE COLITIS WITHOUT COMPLICATIONS, UNSPECIFIED LOCATION (HCC): ICD-10-CM

## 2020-10-05 DIAGNOSIS — M81.0 AGE-RELATED OSTEOPOROSIS WITHOUT CURRENT PATHOLOGICAL FRACTURE: Primary | ICD-10-CM

## 2020-10-05 DIAGNOSIS — M81.0 AGE-RELATED OSTEOPOROSIS WITHOUT CURRENT PATHOLOGICAL FRACTURE: ICD-10-CM

## 2020-10-05 DIAGNOSIS — Z82.62 FAMILY HX OSTEOPOROSIS: ICD-10-CM

## 2020-10-05 DIAGNOSIS — E55.9 VITAMIN D DEFICIENCY: ICD-10-CM

## 2020-10-05 DIAGNOSIS — E11.69 TYPE 2 DIABETES MELLITUS WITH OTHER SPECIFIED COMPLICATION, UNSPECIFIED WHETHER LONG TERM INSULIN USE (HCC): ICD-10-CM

## 2020-10-05 LAB
25(OH)D3 SERPL-MCNC: 45.1 NG/ML (ref 30–100)
ALBUMIN SERPL-MCNC: 4.3 G/DL (ref 3.5–5.2)
ALBUMIN/GLOB SERPL: 1.7 G/DL
ALP SERPL-CCNC: 68 U/L (ref 39–117)
ALT SERPL W P-5'-P-CCNC: 13 U/L (ref 1–33)
ANION GAP SERPL CALCULATED.3IONS-SCNC: 11 MMOL/L (ref 5–15)
AST SERPL-CCNC: 19 U/L (ref 1–32)
BASOPHILS # BLD AUTO: 0.03 10*3/MM3 (ref 0–0.2)
BASOPHILS NFR BLD AUTO: 0.6 % (ref 0–1.5)
BILIRUB SERPL-MCNC: 0.4 MG/DL (ref 0–1.2)
BUN SERPL-MCNC: 12 MG/DL (ref 8–23)
BUN/CREAT SERPL: 19 (ref 7–25)
CA-I BLD-MCNC: 5.3 MG/DL (ref 4.6–5.4)
CA-I SERPL ISE-MCNC: 1.33 MMOL/L (ref 1.15–1.35)
CALCIUM SPEC-SCNC: 9.6 MG/DL (ref 8.6–10.5)
CHLORIDE SERPL-SCNC: 100 MMOL/L (ref 98–107)
CO2 SERPL-SCNC: 28 MMOL/L (ref 22–29)
CREAT SERPL-MCNC: 0.63 MG/DL (ref 0.57–1)
DEPRECATED RDW RBC AUTO: 36.9 FL (ref 37–54)
EOSINOPHIL # BLD AUTO: 0.05 10*3/MM3 (ref 0–0.4)
EOSINOPHIL NFR BLD AUTO: 1.1 % (ref 0.3–6.2)
ERYTHROCYTE [DISTWIDTH] IN BLOOD BY AUTOMATED COUNT: 12.3 % (ref 12.3–15.4)
GFR SERPL CREATININE-BSD FRML MDRD: 91 ML/MIN/1.73
GLOBULIN UR ELPH-MCNC: 2.6 GM/DL
GLUCOSE SERPL-MCNC: 193 MG/DL (ref 65–99)
HCT VFR BLD AUTO: 36.7 % (ref 34–46.6)
HGB BLD-MCNC: 12.1 G/DL (ref 12–15.9)
IMM GRANULOCYTES # BLD AUTO: 0.02 10*3/MM3 (ref 0–0.05)
IMM GRANULOCYTES NFR BLD AUTO: 0.4 % (ref 0–0.5)
LYMPHOCYTES # BLD AUTO: 0.64 10*3/MM3 (ref 0.7–3.1)
LYMPHOCYTES NFR BLD AUTO: 13.6 % (ref 19.6–45.3)
MAGNESIUM SERPL-MCNC: 1.7 MG/DL (ref 1.6–2.4)
MCH RBC QN AUTO: 27.5 PG (ref 26.6–33)
MCHC RBC AUTO-ENTMCNC: 33 G/DL (ref 31.5–35.7)
MCV RBC AUTO: 83.4 FL (ref 79–97)
MONOCYTES # BLD AUTO: 0.37 10*3/MM3 (ref 0.1–0.9)
MONOCYTES NFR BLD AUTO: 7.8 % (ref 5–12)
NEUTROPHILS NFR BLD AUTO: 3.61 10*3/MM3 (ref 1.7–7)
NEUTROPHILS NFR BLD AUTO: 76.5 % (ref 42.7–76)
NRBC BLD AUTO-RTO: 0 /100 WBC (ref 0–0.2)
PHOSPHATE SERPL-MCNC: 3.2 MG/DL (ref 2.5–4.5)
PLATELET # BLD AUTO: 222 10*3/MM3 (ref 140–450)
PMV BLD AUTO: 10 FL (ref 6–12)
POTASSIUM SERPL-SCNC: 4.2 MMOL/L (ref 3.5–5.2)
PROT SERPL-MCNC: 6.9 G/DL (ref 6–8.5)
PTH-INTACT SERPL-MCNC: 13.3 PG/ML (ref 15–65)
RBC # BLD AUTO: 4.4 10*6/MM3 (ref 3.77–5.28)
SODIUM SERPL-SCNC: 139 MMOL/L (ref 136–145)
TSH SERPL DL<=0.05 MIU/L-ACNC: 1.53 UIU/ML (ref 0.27–4.2)
WBC # BLD AUTO: 4.72 10*3/MM3 (ref 3.4–10.8)

## 2020-10-05 PROCEDURE — 83516 IMMUNOASSAY NONANTIBODY: CPT

## 2020-10-05 PROCEDURE — 85025 COMPLETE CBC W/AUTO DIFF WBC: CPT

## 2020-10-05 PROCEDURE — 36415 COLL VENOUS BLD VENIPUNCTURE: CPT

## 2020-10-05 PROCEDURE — 83735 ASSAY OF MAGNESIUM: CPT

## 2020-10-05 PROCEDURE — 83970 ASSAY OF PARATHORMONE: CPT

## 2020-10-05 PROCEDURE — 86255 FLUORESCENT ANTIBODY SCREEN: CPT

## 2020-10-05 PROCEDURE — 99204 OFFICE O/P NEW MOD 45 MIN: CPT | Performed by: PHYSICIAN ASSISTANT

## 2020-10-05 PROCEDURE — 80053 COMPREHEN METABOLIC PANEL: CPT

## 2020-10-05 PROCEDURE — 84100 ASSAY OF PHOSPHORUS: CPT

## 2020-10-05 PROCEDURE — 82306 VITAMIN D 25 HYDROXY: CPT

## 2020-10-05 PROCEDURE — 82784 ASSAY IGA/IGD/IGG/IGM EACH: CPT

## 2020-10-05 PROCEDURE — 84443 ASSAY THYROID STIM HORMONE: CPT

## 2020-10-05 PROCEDURE — 82330 ASSAY OF CALCIUM: CPT

## 2020-10-05 RX ORDER — GLIMEPIRIDE 2 MG/1
3 TABLET ORAL
COMMUNITY
End: 2022-06-03 | Stop reason: HOSPADM

## 2020-10-05 NOTE — PROGRESS NOTES
ORTHOPEDIC VISIT    Referring Provider: No ref. provider found  Primary Care Provider: Dayana Tipton MD         Subjective:  Chief Complaint:  Chief Complaint   Patient presents with   • Osteoporosis     New Consult, prev on Prolia last injection in 2017 reports only getting it once a year from the practitioner administering injections; thinks was on Prolia for 4 years total time       HPI:  Mindi Quinteros is a 79 y.o. female who presents for initial visit for osteoporosis.  The patient complains of Complains of: back pain and loss of height.  And denies Denies: hip pain, generalized bone pain and Any significant fractures over the age of 50.  Their risk factors include risk factors: Vitamin D deficiency, Low sun exposure, Steroid use, Elevated falls risk, Hypogonadism and Medication use.  The patient has the following associated illnesses: Associated illnesses: Vitamin D deficiency and Diabetes mellitus, ulcerative colitis.  Treatment to date has included Treatment to date: Prolia, Calcium supplementation and Vitamin D supplementation.  Patient reports that she has previously been on Prolia for about 4 years between 2013 and 2017, however she reports only having 1 injection/year.  Review of records show that she did have a Prolia injection on 9/12/2019.  She currently takes 2000 mg of calcium and 2000 international units of D3 per day.  She denies any significant fractures over the age of 50.  She reports a family history of osteoporosis in her paternal grandmother, and she is unsure if she had any fractures.  She reports menopause in her early 50s.  She does not smoke or drink alcohol.  She does not get any significant physical activity.  She does occasionally take oral steroids for her ulcerative colitis and is currently on Entocort.  She denies any history of cancer or kidney stones.  She does have chronic diarrhea.  She does see a dentist regularly.  She has not fallen in the last year, does not feel  unsteady with walking, however she is worried about falling.  She scored a 4 out of 14 on the STEADI risk for falling assessment.  Previous labs were reviewed.  Her most recent DEXA scan was done at [facility] radiology on 9/28/2020, which revealed a T score of -2.2 in the left femoral neck, with FRAX scores of 15.0% and 4.7%.  A DEXA scan done at [facility] radiology on 7/25/2017 did reveal a T score of -2.6 in the left total hip.    STEADI Fall Risk Assessment was completed, and patient is at MODERATE risk for falls. Assessment completed on:10/5/2020      Past Medical History:   Diagnosis Date   • Age-related osteoporosis without current pathological fracture 9/12/2019   • Arthritis    • CAD (coronary artery disease)    • Diabetes (CMS/HCC)    • Elevated cholesterol    • HTN (hypertension)    • Hyperlipemia        Past Surgical History:   Procedure Laterality Date   • ANKLE ARTHROSCOPY     • BELPHAROPTOSIS REPAIR     • CARDIAC CATHETERIZATION     • CATARACT EXTRACTION     • CHOLECYSTECTOMY N/A 10/14/2019    Procedure: Laparoscopic cholecystectomy, possible open;  Surgeon: Vijay Guerra DO;  Location: [facility];  Service: General   • COLONOSCOPY     • COSMETIC SURGERY     • ENDOSCOPY     • HEMORROIDECTOMY     • HYSTERECTOMY         Family History   Problem Relation Age of Onset   • Diabetes Mother    • Heart disease Father    • Heart disease Brother    • Diabetes Brother    • Osteoporosis Paternal Grandmother        Social History     Occupational History   • Not on file   Tobacco Use   • Smoking status: Never Smoker   • Smokeless tobacco: Never Used   Substance and Sexual Activity   • Alcohol use: No     Frequency: Never   • Drug use: No   • Sexual activity: Defer        Medications:    Current Outpatient Medications:   •  aspirin (ASPIR-LOW) 81 MG EC tablet, Daily., Disp: , Rfl:   •  atorvastatin (LIPITOR) 40 MG tablet, TAKE 1 TABLET DAILY AT BEDTIME, Disp: 90 tablet, Rfl: 3  •  Budesonide (ENTOCORT EC) 3 MG  "24 hr capsule, BUDESONIDE 3 MG CPEP, Disp: , Rfl:   •  Calcium Carbonate (CALCIUM 500 PO), Take 1,000 mg by mouth 2 (two) times a day., Disp: , Rfl:   •  Cholecalciferol (VITAMIN D3) 2000 units capsule, VITAMIN D3 CAPS, Disp: , Rfl:   •  Coenzyme Q10 10 MG capsule, COQ10 CAPS, Disp: , Rfl:   •  colestipol (COLESTID) 1 g tablet, , Disp: , Rfl:   •  fenofibrate 160 MG tablet, Daily., Disp: , Rfl:   •  glimepiride (AMARYL) 2 MG tablet, Take 2 mg by mouth Every Morning Before Breakfast., Disp: , Rfl:   •  isosorbide mononitrate (IMDUR) 60 MG 24 hr tablet, TAKE 1 TABLET DAILY, Disp: 90 tablet, Rfl: 3  •  metFORMIN ER (GLUCOPHAGE-XR) 500 MG 24 hr tablet, Take 500 mg by mouth. 4 pills a day, Disp: , Rfl:   •  metoprolol tartrate (LOPRESSOR) 25 MG tablet, TAKE ONE TABLET BY MOUTH TWICE A DAY, Disp: 60 tablet, Rfl: 0  •  nitroglycerin (NITROSTAT) 0.4 MG SL tablet, NITROSTAT 0.4 MG SUBL, Disp: , Rfl:     Allergies:  Allergies   Allergen Reactions   • Asacol [Mesalamine] Swelling   • Diclofenac Swelling     Gums swell only per patient         Review of Systems:  Gen -no fever, chills , sweats, headache   Eyes - no irritation or discharge   ENT -  no ear pain , runny nose , sore throat , difficulty swallowing   Resp - no cough , congestion , excessive expectoration   CVS - no chest pain , palpitations.   Abd - no pain , nausea , vomiting , diarrhea   Skin - no rash , lesions.   Neuro - no dizziness    Please see HPI for any other pertinent positives.  All other systems were reviewed and are negative.       Objective   Objective:    /72 (BP Location: Left arm, Patient Position: Sitting, Cuff Size: Adult)   Pulse 84   Ht 160 cm (63\")   Wt 56.8 kg (125 lb 3.2 oz)   BMI 22.18 kg/m²     Physical Examination:  Thin, somewhat frail appearing individual in no acute distress, patient is alert and cooperative with the exam, appears to have normal mood and affect with a normal attention span and concentration, ambulating " unassisted  normocephalic, atraumatic, extraocular movements intact, conjunctiva and sclera are clear without nystagmus, normal canals with grossly normal hearing, no nasal deformity, discharge, inflammation, or lesions  no lymphadenopathy or thyromegaly  normal respiratory effort  abdomen is nontender, without guarding or rebound and nondistended  Appears slightly kyphotic  pulses normal in all 4 extremities, no clubbing, cyanosis, or edema noted  cranial nerves II-XII grossly intact with no focal defects  skin intact without lesions or rashes visible         Imaging:  DEXA scan at [facility] radiology on 9/28/2020, revealed a T score of -2.2 in left femoral neck, with FRAX scores of 15.0% and 4.7%            Assessment:  1. Age-related osteoporosis without current pathological fracture    2. Vitamin D deficiency    3. Family hx osteoporosis    4. Ulcerative colitis without complications, unspecified location (CMS/[facility])    5. Type 2 diabetes mellitus with other specified complication, unspecified whether long term insulin use (CMS/[facility])                 Plan:  Today, I would like to check a CBC, CMP, celiac panel, ionized calcium, magnesium, phosphorus, PTH, TSH, and vitamin D to rule out secondary causes.  I recommend 1200 mg of calcium daily, either through diet or supplementation.  Calcium citrate is more easily absorbed and causes less side effects.  She should continue her vitamin D.  According to her hip FRAX score of 4.7%, she is at very high risk for fracture.  I have discussed various treatment options with the patient, including Reclast versus Prolia.  And the importance of compliance with Prolia injections that are every 6 months.  She would like to restart Prolia at this time.  We did review the risks and benefits of this medication today, including but not limited to, rash, hypocalcemia, osteonecrosis of the jaw, atypical femur fractures, and the risk of multiple vertebral fractures upon discontinuation.   She voiced understanding and would like to proceed.  Pending labs ordered today, she will be restarted on Prolia.  She will be referred for physical therapy for osteoporosis and fall prevention.  She will be given an osteoporosis folder today containing everything discussed, including weightbearing exercises and fall prevention.  I will plan to see her yearly and as needed, and she will be notified when the injection is available here in the office.  She should call with any questions or concerns.  PATIENT EDUCATION:    Education was provided to: patient  Patient response: expressed understanding, receptive and May need additional review  Topics discussed: medical condition, workup results, treatment options, therapeutic risks and benefits, medication use, health promotion, diet and nutrition, support systems available, drug interactions, compliance with medication, osteoporosis risks, prolia, prolia vs reclast  Informed how: verbally, demonstration, literature             STEPHANIE Owusu  10/05/20  15:23 EDT

## 2020-10-05 NOTE — PATIENT INSTRUCTIONS
Osteoporosis    Osteoporosis happens when your bones get thin and weak. This can cause your bones to break (fracture) more easily. You can do things at home to make your bones stronger.  Follow these instructions at home:    Activity  · Exercise as told by your doctor. Ask your doctor what activities are safe for you. You should do:  ? Exercises that make your muscles work to hold your body weight up (weight-bearing exercises). These include michelle chi, yoga, and walking.  ? Exercises to make your muscles stronger. One example is lifting weights.  Lifestyle  · Limit alcohol intake to no more than 1 drink a day for nonpregnant women and 2 drinks a day for men. One drink equals 12 oz of beer, 5 oz of wine, or 1½ oz of hard liquor.  · Do not use any products that have nicotine or tobacco in them. These include cigarettes and e-cigarettes. If you need help quitting, ask your doctor.  Preventing falls  · Use tools to help you move around (mobility aids) as needed. These include canes, walkers, scooters, and crutches.  · Keep rooms well-lit and free of clutter.  · Put away things that could make you trip. These include cords and rugs.  · Install safety rails on stairs. Install grab bars in bathrooms.  · Use rubber mats in slippery areas, like bathrooms.  · Wear shoes that:  ? Fit you well.  ? Support your feet.  ? Have closed toes.  ? Have rubber soles or low heels.  · Tell your doctor about all of the medicines you are taking. Some medicines can make you more likely to fall.  General instructions  · Eat plenty of calcium and vitamin D. These nutrients are good for your bones. Good sources of calcium and vitamin D include:  ? Some fatty fish, such as salmon and tuna.  ? Foods that have calcium and vitamin D added to them (fortified foods). For example, some breakfast cereals are fortified with calcium and vitamin D.  ? Egg yolks.  ? Cheese.  ? Liver.  · Take over-the-counter and prescription medicines only as told by your  doctor.  · Keep all follow-up visits as told by your doctor. This is important.  Contact a doctor if:  · You have not been tested (screened) for osteoporosis and you are:  ? A woman who is age 65 or older.  ? A man who is age 70 or older.  Get help right away if:  · You fall.  · You get hurt.  Summary  · Osteoporosis happens when your bones get thin and weak.  · Weak bones can break (fracture) more easily.  · Eat plenty of calcium and vitamin D. These nutrients are good for your bones.  · Tell your doctor about all of the medicines that you take.  This information is not intended to replace advice given to you by your health care provider. Make sure you discuss any questions you have with your health care provider.  Document Released: 03/11/2013 Document Revised: 11/30/2018 Document Reviewed: 10/12/2018  Elsevier Patient Education © 2020 Else[employer] Inc[employer]

## 2020-10-06 ENCOUNTER — TELEPHONE (OUTPATIENT)
Dept: ORTHOPEDIC SURGERY | Facility: CLINIC | Age: 80
End: 2020-10-06

## 2020-10-06 LAB
ENDOMYSIUM IGA SER QL: NEGATIVE
IGA SERPL-MCNC: 168 MG/DL (ref 64–422)
TTG IGA SER-ACNC: <2 U/ML (ref 0–3)

## 2020-10-06 NOTE — TELEPHONE ENCOUNTER
Benefits investigation started for Prolia. Thank you for your request. The Summary of Benefits will be available in 5 business days.

## 2020-10-06 NOTE — TELEPHONE ENCOUNTER
----- Message from STEPHANIE Owusu sent at 10/5/2020  3:23 PM EDT -----  Prolia pending labs ordered today

## 2020-10-14 NOTE — TELEPHONE ENCOUNTER
[employer] SOB received, No PA required, 0% OOP for one dose of Prolia.     Primary Medical Benefit COVERAGE DETAILS: Benefits subject to a $198.00 deductible ($198.00 met) and 20% co-insurance for the administration and cost of Prolia.    Secondary Medical Benefit COVERAGE DETAILS: For the Secondary MD Purchase access option, this plan is a Medicare Supplement Plan F and it covers the Medicare Part B deductible, co-insurance and 100% of the excess  Charges.     Call placed to patient, left message on machine advising of need to schedule but that I needed to speak with her personally to schedule so to please ask to speak with me personally.

## 2020-10-23 ENCOUNTER — OFFICE VISIT (OUTPATIENT)
Dept: CARDIOLOGY | Facility: CLINIC | Age: 80
End: 2020-10-23

## 2020-10-23 ENCOUNTER — LAB (OUTPATIENT)
Dept: LAB | Facility: HOSPITAL | Age: 80
End: 2020-10-23

## 2020-10-23 VITALS
WEIGHT: 123 LBS | BODY MASS INDEX: 21.79 KG/M2 | HEIGHT: 63 IN | DIASTOLIC BLOOD PRESSURE: 70 MMHG | OXYGEN SATURATION: 97 % | HEART RATE: 70 BPM | SYSTOLIC BLOOD PRESSURE: 126 MMHG

## 2020-10-23 DIAGNOSIS — I50.32 CHRONIC HEART FAILURE WITH PRESERVED EJECTION FRACTION (HCC): ICD-10-CM

## 2020-10-23 DIAGNOSIS — R42 DIZZINESS: ICD-10-CM

## 2020-10-23 DIAGNOSIS — E78.5 DYSLIPIDEMIA: ICD-10-CM

## 2020-10-23 DIAGNOSIS — I25.10 CORONARY ARTERY DISEASE INVOLVING NATIVE CORONARY ARTERY OF NATIVE HEART WITHOUT ANGINA PECTORIS: Primary | ICD-10-CM

## 2020-10-23 DIAGNOSIS — E11.9 TYPE 2 DIABETES MELLITUS WITHOUT COMPLICATION, WITHOUT LONG-TERM CURRENT USE OF INSULIN (HCC): ICD-10-CM

## 2020-10-23 DIAGNOSIS — I11.0 HYPERTENSIVE HEART DISEASE WITH CHRONIC DIASTOLIC CONGESTIVE HEART FAILURE (HCC): ICD-10-CM

## 2020-10-23 DIAGNOSIS — I10 ESSENTIAL HYPERTENSION: ICD-10-CM

## 2020-10-23 DIAGNOSIS — I50.32 HYPERTENSIVE HEART DISEASE WITH CHRONIC DIASTOLIC CONGESTIVE HEART FAILURE (HCC): ICD-10-CM

## 2020-10-23 DIAGNOSIS — R27.0 ATAXIA: ICD-10-CM

## 2020-10-23 DIAGNOSIS — R41.3 IMPAIRED MEMORY: ICD-10-CM

## 2020-10-23 DIAGNOSIS — I25.10 CORONARY ARTERY DISEASE INVOLVING NATIVE CORONARY ARTERY OF NATIVE HEART WITHOUT ANGINA PECTORIS: ICD-10-CM

## 2020-10-23 LAB
ALBUMIN SERPL-MCNC: 4.2 G/DL (ref 3.5–5.2)
ALBUMIN/GLOB SERPL: 1.9 G/DL
ALP SERPL-CCNC: 76 U/L (ref 39–117)
ALT SERPL W P-5'-P-CCNC: 13 U/L (ref 1–33)
ANION GAP SERPL CALCULATED.3IONS-SCNC: 9.7 MMOL/L (ref 5–15)
AST SERPL-CCNC: 26 U/L (ref 1–32)
BILIRUB SERPL-MCNC: 0.5 MG/DL (ref 0–1.2)
BUN SERPL-MCNC: 9 MG/DL (ref 8–23)
BUN/CREAT SERPL: 14.3 (ref 7–25)
CALCIUM SPEC-SCNC: 9.6 MG/DL (ref 8.6–10.5)
CHLORIDE SERPL-SCNC: 102 MMOL/L (ref 98–107)
CHOLEST SERPL-MCNC: 104 MG/DL (ref 0–200)
CO2 SERPL-SCNC: 28.3 MMOL/L (ref 22–29)
CREAT SERPL-MCNC: 0.63 MG/DL (ref 0.57–1)
FOLATE SERPL-MCNC: 13.6 NG/ML (ref 4.78–24.2)
GFR SERPL CREATININE-BSD FRML MDRD: 91 ML/MIN/1.73
GLOBULIN UR ELPH-MCNC: 2.2 GM/DL
GLUCOSE SERPL-MCNC: 192 MG/DL (ref 65–99)
HDLC SERPL-MCNC: 33 MG/DL (ref 40–60)
LDLC SERPL CALC-MCNC: 53 MG/DL (ref 0–100)
LDLC/HDLC SERPL: 1.58 {RATIO}
NT-PROBNP SERPL-MCNC: 516.3 PG/ML (ref 0–1800)
POTASSIUM SERPL-SCNC: 4.1 MMOL/L (ref 3.5–5.2)
PROT SERPL-MCNC: 6.4 G/DL (ref 6–8.5)
SODIUM SERPL-SCNC: 140 MMOL/L (ref 136–145)
TRIGL SERPL-MCNC: 95 MG/DL (ref 0–150)
VIT B12 BLD-MCNC: <150 PG/ML (ref 211–946)
VLDLC SERPL-MCNC: 18 MG/DL (ref 5–40)

## 2020-10-23 PROCEDURE — 82746 ASSAY OF FOLIC ACID SERUM: CPT

## 2020-10-23 PROCEDURE — 82607 VITAMIN B-12: CPT

## 2020-10-23 PROCEDURE — 93000 ELECTROCARDIOGRAM COMPLETE: CPT | Performed by: INTERNAL MEDICINE

## 2020-10-23 PROCEDURE — 80061 LIPID PANEL: CPT

## 2020-10-23 PROCEDURE — 83880 ASSAY OF NATRIURETIC PEPTIDE: CPT

## 2020-10-23 PROCEDURE — 36415 COLL VENOUS BLD VENIPUNCTURE: CPT

## 2020-10-23 PROCEDURE — 99214 OFFICE O/P EST MOD 30 MIN: CPT | Performed by: INTERNAL MEDICINE

## 2020-10-23 PROCEDURE — 80053 COMPREHEN METABOLIC PANEL: CPT

## 2020-10-23 RX ORDER — DICYCLOMINE HYDROCHLORIDE 10 MG/1
10 CAPSULE ORAL 2 TIMES DAILY
COMMUNITY
End: 2021-11-01

## 2020-10-23 RX ORDER — METOPROLOL SUCCINATE 25 MG/1
25 TABLET, EXTENDED RELEASE ORAL DAILY
Qty: 90 TABLET | Refills: 3 | Status: SHIPPED | OUTPATIENT
Start: 2020-10-23 | End: 2021-03-05

## 2020-10-23 NOTE — PROGRESS NOTES
Subjective:     Encounter Date:10/23/2020      Patient ID: Mindi Quinteros is a 79 y.o. female.    Chief Complaint : Complaining of difficulty finding words, dizziness, ataxia.  Here for follow-up for CAD, chronic CHF, diabetes hypertension dyslipidemia  History of Present Illness        This is a 79-year-old with PMH     # CAD, cardiac cath 2/7/18  calcified 50% proximal 70% mid LAD and 70% mid LCX, normal LVEF  #  diabetes  #  hypertension, hyperlipidemia  #  ulcerative colitis  #  allergy to aspertane  #  hemorrhoidectomy, hysterectomy, bladder repair, left ankle surgery,      here for   Followup.  Patient denies any chest pain or shortness of breath.  Patient is complaining of difficulty with balance and occasional dizziness denies any loss of consciousness no aggravating or relieving factors.  Also is having episodes where she has difficulty finding words to speak..  Denies any aggravating or relieving factors.  Patient's is a diabetes is running good in the 120s had a   hemoglobin A1c done 5//14/18 was 6.1.. labs from 08/01/2018 showed cholesterol 116 triglycerides 76 HDL low at 38 LDL 63 repeat labs from 11/14/2018, CMP, CBC unremarkable except for glucose of 122, cholesterol 136 LDL 75 HDL 39., hemoglobin A1c of 7.2.  Labs from 10/5/2020 revealed normal CMP.  10/30/2019 revealed hemoglobin A1c of 6.5.    Patient's arterial blood pressure is 126/70, heart rate 70, O2 sat of 97% on room air.   patient is under lot of stress due to 's stroke and having to take care of him.  Patient had BNP level done 9/9/2019 which was elevated at 214.       ASSESSMENT:    #Dizziness  #Loss of balance/ataxia  #Impaired memory/difficulty finding words  #  CAD  # Chronic HFpEF  #  hyperlipidemia  #  diabetes   # hypertension     PLAN:  We will decrease metoprolol to 25 daily  Check B12 and folate  Check CMP lipid profile and BNP level  Reviewed EKG results   counseled on walking exercise for low HDL   reviewed labs with  patient   Will continue her on aspirin beta-blockers ,  long-acting nitrate   give nitroglycerin sublingual care instructions  Patient is asymptomatic from CAD will continue medical management and consider PCI only if she has symptoms on medical management.   reviewed diet and exercise instructions  If patient's difficulty with finding words and ataxia has not improved I advised her to see PMD Dr. Tipton          Assessment:          Diagnosis Plan   1. Coronary artery disease involving native coronary artery of native heart without angina pectoris  Comprehensive Metabolic Panel    Lipid Panel    BNP    metoprolol succinate XL (TOPROL-XL) 25 MG 24 hr tablet    Vitamin B12    Folate   2. Dizziness  Comprehensive Metabolic Panel    Lipid Panel    BNP    metoprolol succinate XL (TOPROL-XL) 25 MG 24 hr tablet    Vitamin B12    Folate   3. Impaired memory  Comprehensive Metabolic Panel    Lipid Panel    BNP    metoprolol succinate XL (TOPROL-XL) 25 MG 24 hr tablet    Vitamin B12    Folate   4. Chronic heart failure with preserved ejection fraction (CMS/HCC)  Comprehensive Metabolic Panel    Lipid Panel    BNP    metoprolol succinate XL (TOPROL-XL) 25 MG 24 hr tablet    Vitamin B12    Folate   5. Dyslipidemia  Comprehensive Metabolic Panel    Lipid Panel    BNP    metoprolol succinate XL (TOPROL-XL) 25 MG 24 hr tablet    Vitamin B12    Folate   6. Type 2 diabetes mellitus without complication, without long-term current use of insulin (CMS/[facility])  Comprehensive Metabolic Panel    Lipid Panel    BNP    metoprolol succinate XL (TOPROL-XL) 25 MG 24 hr tablet    Vitamin B12    Folate   7. Essential hypertension  Comprehensive Metabolic Panel    Lipid Panel    BNP    metoprolol succinate XL (TOPROL-XL) 25 MG 24 hr tablet    Vitamin B12    Folate   8. Hypertensive heart disease with chronic diastolic congestive heart failure (CMS/HCC)  Comprehensive Metabolic Panel    Lipid Panel    BNP    metoprolol succinate XL (TOPROL-XL) 25 MG  24 hr tablet    Vitamin B12    Folate   9. Ataxia  Comprehensive Metabolic Panel    Lipid Panel    BNP    metoprolol succinate XL (TOPROL-XL) 25 MG 24 hr tablet    Vitamin B12    Folate          Plan:         Past Medical History:  Past Medical History:   Diagnosis Date   • Age-related osteoporosis without current pathological fracture 9/12/2019   • Arthritis    • CAD (coronary artery disease)    • Diabetes (CMS/HCC)    • Elevated cholesterol    • HTN (hypertension)    • Hyperlipemia      Past Surgical History:  Past Surgical History:   Procedure Laterality Date   • ANKLE ARTHROSCOPY     • BELPHAROPTOSIS REPAIR     • CARDIAC CATHETERIZATION     • CATARACT EXTRACTION     • CHOLECYSTECTOMY N/A 10/14/2019    Procedure: Laparoscopic cholecystectomy, possible open;  Surgeon: Vijay Guerra DO;  Location: [facility] OR;  Service: General   • COLONOSCOPY     • COSMETIC SURGERY     • ENDOSCOPY     • HEMORROIDECTOMY     • HYSTERECTOMY        Allergies:  Allergies   Allergen Reactions   • Asacol [Mesalamine] Swelling   • Diclofenac Swelling     Gums swell only per patient     Home Meds:  Current Meds:     Current Outpatient Medications:   •  aspirin (ASPIR-LOW) 81 MG EC tablet, Daily., Disp: , Rfl:   •  atorvastatin (LIPITOR) 40 MG tablet, TAKE 1 TABLET DAILY AT BEDTIME, Disp: 90 tablet, Rfl: 3  •  Budesonide (ENTOCORT EC) 3 MG 24 hr capsule, BUDESONIDE 3 MG CPEP, Disp: , Rfl:   •  Calcium Carbonate (CALCIUM 500 PO), Take 1,000 mg by mouth 2 (two) times a day., Disp: , Rfl:   •  Cholecalciferol (VITAMIN D3) 2000 units capsule, VITAMIN D3 CAPS, Disp: , Rfl:   •  Coenzyme Q10 10 MG capsule, COQ10 CAPS, Disp: , Rfl:   •  colestipol (COLESTID) 1 g tablet, , Disp: , Rfl:   •  dicyclomine (BENTYL) 10 MG capsule, Take 10 mg by mouth 2 (two) times a day., Disp: , Rfl:   •  fenofibrate 160 MG tablet, Daily., Disp: , Rfl:   •  glimepiride (AMARYL) 2 MG tablet, Take 2 mg by mouth Every Morning Before Breakfast., Disp: , Rfl:   •   "isosorbide mononitrate (IMDUR) 60 MG 24 hr tablet, TAKE 1 TABLET DAILY, Disp: 90 tablet, Rfl: 3  •  metFORMIN ER (GLUCOPHAGE-XR) 500 MG 24 hr tablet, Take 500 mg by mouth. 4 pills a day, Disp: , Rfl:   •  nitroglycerin (NITROSTAT) 0.4 MG SL tablet, NITROSTAT 0.4 MG SUBL, Disp: , Rfl:   •  metoprolol succinate XL (TOPROL-XL) 25 MG 24 hr tablet, Take 1 tablet by mouth Daily., Disp: 90 tablet, Rfl: 3  Social History:   Social History     Tobacco Use   • Smoking status: Never Smoker   • Smokeless tobacco: Never Used   Substance Use Topics   • Alcohol use: No     Frequency: Never      Family History:  Family History   Problem Relation Age of Onset   • Diabetes Mother    • Heart disease Father    • Heart disease Brother    • Diabetes Brother    • Osteoporosis Paternal Grandmother         The following portions of the patient's history were reviewed and updated as appropriate: allergies, current medications, past family history, past medical history, past social history, past surgical history and problem list.      Review of Systems   Cardiovascular: Negative for chest pain, leg swelling and palpitations.   Respiratory: Negative for shortness of breath.    Neurological: Positive for dizziness. Negative for numbness.     All other systems are negative      ECG 12 Lead    Date/Time: 10/23/2020 11:07 AM  Performed by: Natan Terrazas MD  Authorized by: Natan Terrazas MD   Comparison: compared with previous ECG from 9/23/2019  Comparison to previous ECG: EKG done today reviewed by me shows sinus rhythm with rate of 65 bpm with first-degree AV block, intraventricular conduction delay, no new change compared to EKG from 9/23/2019                 Objective:     Physical Exam  /70 (BP Location: Left arm, Patient Position: Sitting, Cuff Size: Adult)   Pulse 70   Ht 160 cm (63\")   Wt 55.8 kg (123 lb)   SpO2 97%   BMI 21.79 kg/m²   General:  Appears in no acute distress  Eyes: Sclera is anicteric,  " conjunctiva is clear   HEENT:  No JVD. Thyroid not visibly enlarged. No mucosal pallor or cyanosis  Respiratory: Respirations regular and unlabored at rest.  Bilaterally good breath sounds, with good air entry in all fields. No crackles, rubs or wheezes auscultated  Cardiovascular: S1,S2 Regular rate and rhythm. No murmur, rub or gallop auscultated. No pretibial pitting edema  Gastrointestinal: Abdomen soft, flat, non tender. Bowel sounds present.   Musculoskeletal:  No abnormal movements  Extremities: No digital clubbing or cyanosis  Skin: Color pink. Skin warm and dry to touch. No rashes  No xanthoma  Neuro: Alert and awake, no lateralizing deficits appreciated    Lab Reviewed:

## 2020-10-24 ENCOUNTER — TELEPHONE (OUTPATIENT)
Dept: CARDIOLOGY | Facility: CLINIC | Age: 80
End: 2020-10-24

## 2020-10-24 NOTE — TELEPHONE ENCOUNTER
Called patient with low B12 level advised her to follow-up with PMD for B12 injections evaluation and treatment

## 2020-10-26 NOTE — TELEPHONE ENCOUNTER
Call placed to patient to schedule for first Prolia injection, left message on machine to call back to speak with me personally to get scheduled for the Prolia injection.

## 2020-11-03 ENCOUNTER — CLINICAL SUPPORT (OUTPATIENT)
Dept: ORTHOPEDIC SURGERY | Facility: CLINIC | Age: 80
End: 2020-11-03

## 2020-11-03 VITALS
BODY MASS INDEX: 21.79 KG/M2 | HEIGHT: 63 IN | HEART RATE: 71 BPM | WEIGHT: 123 LBS | DIASTOLIC BLOOD PRESSURE: 65 MMHG | SYSTOLIC BLOOD PRESSURE: 128 MMHG

## 2020-11-03 DIAGNOSIS — M81.0 AGE-RELATED OSTEOPOROSIS WITHOUT CURRENT PATHOLOGICAL FRACTURE: Primary | ICD-10-CM

## 2020-11-03 PROCEDURE — 96372 THER/PROPH/DIAG INJ SC/IM: CPT | Performed by: PHYSICIAN ASSISTANT

## 2020-11-03 NOTE — PROGRESS NOTES
Patient presents for First Prolia injection. Patient's last Calcium was 9.6mg/dL. Injection administered and patient tolerated without complication after waiting in office for 15 minutes.

## 2020-11-12 RX ORDER — ISOSORBIDE MONONITRATE 60 MG/1
TABLET, EXTENDED RELEASE ORAL
Qty: 90 TABLET | Refills: 3 | Status: SHIPPED | OUTPATIENT
Start: 2020-11-12 | End: 2021-11-02

## 2020-12-10 RX ORDER — ATORVASTATIN CALCIUM 40 MG/1
TABLET, FILM COATED ORAL
Qty: 88 TABLET | Refills: 1 | Status: SHIPPED | OUTPATIENT
Start: 2020-12-10 | End: 2021-03-05

## 2021-03-05 RX ORDER — ATORVASTATIN CALCIUM 40 MG/1
TABLET, FILM COATED ORAL
Qty: 90 TABLET | Refills: 1 | Status: SHIPPED | OUTPATIENT
Start: 2021-03-05 | End: 2021-03-08

## 2021-03-08 RX ORDER — ATORVASTATIN CALCIUM 40 MG/1
TABLET, FILM COATED ORAL
Qty: 90 TABLET | Refills: 1 | Status: SHIPPED | OUTPATIENT
Start: 2021-03-08 | End: 2021-03-12

## 2021-03-12 RX ORDER — METOPROLOL SUCCINATE 25 MG/1
25 TABLET, EXTENDED RELEASE ORAL DAILY
Qty: 90 TABLET | Refills: 1 | Status: SHIPPED | OUTPATIENT
Start: 2021-03-12 | End: 2021-03-16 | Stop reason: SDUPTHER

## 2021-03-12 RX ORDER — ATORVASTATIN CALCIUM 40 MG/1
TABLET, FILM COATED ORAL
Qty: 90 TABLET | Refills: 0 | Status: SHIPPED | OUTPATIENT
Start: 2021-03-12 | End: 2022-01-19 | Stop reason: SDUPTHER

## 2021-03-15 ENCOUNTER — TELEPHONE (OUTPATIENT)
Dept: CARDIOLOGY | Facility: CLINIC | Age: 81
End: 2021-03-15

## 2021-03-15 NOTE — TELEPHONE ENCOUNTER
Pt called LM   Confused about her Metoprolol and how she is supposed to be taking it,  Nightly or bid.  She said Dr Terrazas prescribed it one way and Dr Jara prescribed it another way.

## 2021-03-16 RX ORDER — METOPROLOL SUCCINATE 25 MG/1
25 TABLET, EXTENDED RELEASE ORAL DAILY
Qty: 90 TABLET | Refills: 3 | OUTPATIENT
Start: 2021-03-16 | End: 2021-11-02 | Stop reason: SDUPTHER

## 2021-03-16 NOTE — TELEPHONE ENCOUNTER
LOV Oct 2020 Metoprolol was decreased to 25mg daily     Called spoke to pt.   She said her pharm is [facility] on [address]     Not [facility].      Called [facility] they said rx is cx , she does not have an account set up with them.     [facility]ed [facility] said they are closed, will try again.

## 2021-05-06 ENCOUNTER — CLINICAL SUPPORT (OUTPATIENT)
Dept: ORTHOPEDIC SURGERY | Facility: CLINIC | Age: 81
End: 2021-05-06

## 2021-05-06 VITALS
BODY MASS INDEX: 22.08 KG/M2 | HEIGHT: 63 IN | WEIGHT: 124.6 LBS | SYSTOLIC BLOOD PRESSURE: 133 MMHG | DIASTOLIC BLOOD PRESSURE: 71 MMHG | HEART RATE: 65 BPM

## 2021-05-06 DIAGNOSIS — M81.0 AGE-RELATED OSTEOPOROSIS WITHOUT CURRENT PATHOLOGICAL FRACTURE: ICD-10-CM

## 2021-05-06 PROCEDURE — 96372 THER/PROPH/DIAG INJ SC/IM: CPT | Performed by: PHYSICIAN ASSISTANT

## 2021-05-06 NOTE — PROGRESS NOTES
Patient presents for Prolia injection. Patient had no issues with the last Prolia injection. Patient last Calcium was 9.6mg/dL. Injection administered and patient tolerated without complication.

## 2021-08-04 RX ORDER — METOPROLOL SUCCINATE 25 MG/1
25 TABLET, EXTENDED RELEASE ORAL DAILY
COMMUNITY
End: 2021-08-04 | Stop reason: SDUPTHER

## 2021-08-04 NOTE — TELEPHONE ENCOUNTER
Received refill request for Metoprolol bid.  LOV it was decreased to daily.    There is another phone note re this     I called [facility] spoke to [facility]   Called in refill for med daily not bid    Tried calling pt NA, No VM    Will try again

## 2021-08-11 RX ORDER — METOPROLOL SUCCINATE 25 MG/1
25 TABLET, EXTENDED RELEASE ORAL DAILY
Qty: 90 TABLET | Refills: 3 | OUTPATIENT
Start: 2021-08-11 | End: 2021-11-01

## 2021-08-16 ENCOUNTER — TELEPHONE (OUTPATIENT)
Dept: CARDIOLOGY | Facility: CLINIC | Age: 81
End: 2021-08-16

## 2021-08-16 NOTE — TELEPHONE ENCOUNTER
DIRECTIONS HAVE CHANGED ON METOPROLOL BOTTLE. OLD DIRECTIONS WERE TO TAKE TWICE A DAY.  NEW BOTTLE SAYS TO TAKE ONCE A DAY.

## 2021-08-16 NOTE — TELEPHONE ENCOUNTER
Tried getting a hold of pt multiple times last week.     There is a phone note.   There is also a phone note from March when we explained to pt take one a day of 25mg.    Called pt , she answered she said okay she understands.     Completed

## 2021-10-11 ENCOUNTER — OFFICE (OUTPATIENT)
Dept: URBAN - METROPOLITAN AREA CLINIC 64 | Facility: CLINIC | Age: 81
End: 2021-10-11

## 2021-10-11 VITALS
HEIGHT: 64 IN | SYSTOLIC BLOOD PRESSURE: 105 MMHG | WEIGHT: 124 LBS | DIASTOLIC BLOOD PRESSURE: 51 MMHG | HEART RATE: 69 BPM

## 2021-10-11 DIAGNOSIS — K51.90 ULCERATIVE COLITIS, UNSPECIFIED, WITHOUT COMPLICATIONS: ICD-10-CM

## 2021-10-11 PROCEDURE — 99213 OFFICE O/P EST LOW 20 MIN: CPT | Performed by: NURSE PRACTITIONER

## 2021-10-18 ENCOUNTER — TELEPHONE (OUTPATIENT)
Dept: ORTHOPEDIC SURGERY | Facility: CLINIC | Age: 81
End: 2021-10-18

## 2021-10-26 ENCOUNTER — TELEPHONE (OUTPATIENT)
Dept: ORTHOPEDIC SURGERY | Facility: CLINIC | Age: 81
End: 2021-10-26

## 2021-11-01 ENCOUNTER — OFFICE VISIT (OUTPATIENT)
Dept: CARDIOLOGY | Facility: CLINIC | Age: 81
End: 2021-11-01

## 2021-11-01 VITALS
OXYGEN SATURATION: 96 % | WEIGHT: 124 LBS | HEART RATE: 68 BPM | SYSTOLIC BLOOD PRESSURE: 115 MMHG | BODY MASS INDEX: 21.97 KG/M2 | DIASTOLIC BLOOD PRESSURE: 55 MMHG | HEIGHT: 63 IN

## 2021-11-01 DIAGNOSIS — I25.10 CORONARY ARTERY DISEASE INVOLVING NATIVE CORONARY ARTERY OF NATIVE HEART WITHOUT ANGINA PECTORIS: ICD-10-CM

## 2021-11-01 DIAGNOSIS — E11.9 TYPE 2 DIABETES MELLITUS WITHOUT COMPLICATION, WITHOUT LONG-TERM CURRENT USE OF INSULIN (HCC): ICD-10-CM

## 2021-11-01 DIAGNOSIS — I50.32 CHRONIC HEART FAILURE WITH PRESERVED EJECTION FRACTION (HCC): ICD-10-CM

## 2021-11-01 DIAGNOSIS — I10 ESSENTIAL HYPERTENSION: ICD-10-CM

## 2021-11-01 DIAGNOSIS — R42 DIZZINESS: Primary | ICD-10-CM

## 2021-11-01 DIAGNOSIS — E78.5 DYSLIPIDEMIA: ICD-10-CM

## 2021-11-01 PROCEDURE — 93000 ELECTROCARDIOGRAM COMPLETE: CPT | Performed by: INTERNAL MEDICINE

## 2021-11-01 PROCEDURE — 99214 OFFICE O/P EST MOD 30 MIN: CPT | Performed by: INTERNAL MEDICINE

## 2021-11-01 RX ORDER — NITROGLYCERIN 0.4 MG/1
0.4 TABLET SUBLINGUAL
Qty: 25 TABLET | Refills: 11 | Status: SHIPPED | OUTPATIENT
Start: 2021-11-01 | End: 2022-06-03 | Stop reason: HOSPADM

## 2021-11-01 RX ORDER — CYANOCOBALAMIN 1000 UG/ML
1000 INJECTION, SOLUTION INTRAMUSCULAR; SUBCUTANEOUS
COMMUNITY
Start: 2021-10-04

## 2021-11-01 NOTE — PROGRESS NOTES
Subjective:     Encounter Date:11/01/2021      Patient ID: Mindi Quinteros is a 80 y.o. female.    Chief Complaint : Follow-up for CAD, hypertension, dyslipidemia, diabetes  History of Present Illness      This is a 80-year-old with PMH      # CAD, cardiac cath 2/7/18  calcified 50% proximal 70% mid LAD and 70% mid LCX, normal LVEF  #  diabetes  #  hypertension, hyperlipidemia  #  ulcerative colitis  #  allergy to aspertane  #  hemorrhoidectomy, hysterectomy, bladder repair, left ankle surgery,       here for   Followup.  Patient denies any chest pain or shortness of breath.  Patient is complaining of difficulty with balance and occasional dizziness denies any loss of consciousness no aggravating or relieving factors.  Also is having episodes where she has difficulty finding words to speak..  Denies any aggravating or relieving factors.  Patient's is a diabetes is running good in the 120s had a   hemoglobin A1c done 5//14/18 was 6.1.. labs from 08/01/2018 showed cholesterol 116 triglycerides 76 HDL low at 38 LDL 63 repeat labs from 11/14/2018, CMP, CBC unremarkable except for glucose of 122, cholesterol 136 LDL 75 HDL 39., hemoglobin A1c of 7.2.  Labs from 10/5/2020 revealed normal CMP.  10/30/2019 revealed hemoglobin A1c of 6.5. Patient had BNP level done 9/9/2019 which was elevated at 214.  Labs from 10/23/2020 reveal cholesterol 104 triglycerides 95 HDL 43 LDL 53.  Patient's B12 is low is getting supplementation.    Patient's arterial blood pressure is 115/55, heart rate 68, O2 sat of 96% on room air.         ASSESSMENT:     #Dizziness  #Loss of balance/ataxia/B12 deficiency  #Impaired memory/difficulty finding words  #  CAD  # Chronic HFpEF  #  hyperlipidemia  #  diabetes   # hypertension      PLAN:  Reviewed low B12 with patient.  Reviewed EKG results with patient.   counseled on walking exercise for low HDL   Will continue her on aspirin beta-blockers ,  long-acting nitrate to help with CAD, chronic HFpEF,  hypertension, dyslipidemia as tolerated.   Patient is asymptomatic from CAD will continue medical management and consider PCI only if she has symptoms on medical management.  Patient is having issues with B12 deficiency advised her to follow-up with PMD.  Discussed about COVID-19 vaccination.  Patient already took both the doses.           ECG 12 Lead    Date/Time: 11/1/2021 6:56 PM  Performed by: Natan Terrazas MD  Authorized by: Natan Terrazas MD   Comparison: compared with previous ECG from 10/23/2020  Comparison to previous ECG: EKG done today reviewed/interpreted by me reveals sinus rhythm with rate of 63 bpm with first-degree AV block, PACs, nonspecific ST-T changes, no new change compared to EKG from 10/23/2020              The following portions of the patient's history were reviewed and updated as appropriate: allergies, current medications, past family history, past medical history, past social history, past surgical history and problem list.    Assessment:         [facility]     Diagnosis Plan   1. Dizziness     2. Coronary artery disease involving native coronary artery of native heart without angina pectoris     3. Chronic heart failure with preserved ejection fraction (HCC)     4. Dyslipidemia     5. Type 2 diabetes mellitus without complication, without long-term current use of insulin (HCC)     6. Essential hypertension            Plan:               Past Medical History:  Past Medical History:   Diagnosis Date   • Age-related osteoporosis without current pathological fracture     prolia([id number], 9/12/2019), restarted prolia(11/2020)   • Arthritis    • CAD (coronary artery disease)    • Diabetes (HCC)    • Elevated cholesterol    • HTN (hypertension)    • Hyperlipemia      Past Surgical History:  Past Surgical History:   Procedure Laterality Date   • ANKLE ARTHROSCOPY     • BELPHAROPTOSIS REPAIR     • CARDIAC CATHETERIZATION     • CATARACT EXTRACTION     • CHOLECYSTECTOMY N/A  10/14/2019    Procedure: Laparoscopic cholecystectomy, possible open;  Surgeon: Vijay Guerra DO;  Location: [facility] MAIN OR;  Service: General   • COLONOSCOPY     • COSMETIC SURGERY     • ENDOSCOPY     • HEMORROIDECTOMY     • HYSTERECTOMY        Allergies:  Allergies   Allergen Reactions   • Asacol [Mesalamine] Swelling   • Diclofenac Swelling     Gums swell only per patient     Home Meds:  Current Meds:     Current Outpatient Medications:   •  aspirin (ASPIR-LOW) 81 MG EC tablet, Daily., Disp: , Rfl:   •  atorvastatin (LIPITOR) 40 MG tablet, TAKE ONE TABLET BY MOUTH EVERY NIGHT AT BEDTIME, Disp: 90 tablet, Rfl: 0  •  Budesonide (ENTOCORT EC) 3 MG 24 hr capsule, BUDESONIDE 3 MG CPEP, Disp: , Rfl:   •  Calcium Carbonate (CALCIUM 500 PO), Take 1,000 mg by mouth 2 (two) times a day., Disp: , Rfl:   •  Cholecalciferol (VITAMIN D3) 2000 units capsule, VITAMIN D3 CAPS, Disp: , Rfl:   •  colestipol (COLESTID) 1 g tablet, , Disp: , Rfl:   •  cyanocobalamin 1000 MCG/ML injection, , Disp: , Rfl:   •  fenofibrate 160 MG tablet, Daily., Disp: , Rfl:   •  glimepiride (AMARYL) 2 MG tablet, Take 2 mg by mouth Every Morning Before Breakfast., Disp: , Rfl:   •  isosorbide mononitrate (IMDUR) 60 MG 24 hr tablet, TAKE ONE TABLET BY MOUTH DAILY, Disp: 90 tablet, Rfl: 3  •  metFORMIN ER (GLUCOPHAGE-XR) 500 MG 24 hr tablet, Take 500 mg by mouth. 4 pills a day, Disp: , Rfl:   •  nitroglycerin (Nitrostat) 0.4 MG SL tablet, Place 1 tablet under the tongue Every 5 (Five) Minutes As Needed for Chest Pain. Take no more than 3 doses in 15 minutes., Disp: 25 tablet, Rfl: 11  •  metoprolol succinate XL (TOPROL-XL) 25 MG 24 hr tablet, Take 1 tablet by mouth Daily., Disp: 90 tablet, Rfl: 3    Current Facility-Administered Medications:   •  denosumab (PROLIA) syringe 60 mg, 60 mg, Subcutaneous, Q6 Months, Elisabeth Royal PA, 60 mg at 05/06/21 0850  Social History:   Social History     Tobacco Use   • Smoking status: Never Smoker   • Smokeless  "tobacco: Never Used   Substance Use Topics   • Alcohol use: No      Family History:  Family History   Problem Relation Age of Onset   • Diabetes Mother    • Heart disease Father    • Heart disease Brother    • Diabetes Brother    • Osteoporosis Paternal Grandmother               Review of Systems   Cardiovascular: Negative for chest pain, leg swelling and palpitations.   Respiratory: Negative for shortness of breath.    Neurological: Negative for dizziness and numbness.     All other systems are negative         Objective:     Physical Exam  /55 (BP Location: Left arm, Patient Position: Sitting, Cuff Size: Adult)   Pulse 68   Ht 160 cm (63\")   Wt 56.2 kg (124 lb)   SpO2 96%   BMI 21.97 kg/m²   General:  Appears in no acute distress  Eyes: Sclera is anicteric,  conjunctiva is clear   HEENT:  No JVD.  No carotid bruits  Respiratory: Respirations regular and unlabored at rest.  Clear to auscultation  Cardiovascular: S1,S2 Regular rate and rhythm. No murmur, rub or gallop auscultated.   Extremities: No digital clubbing or cyanosis, no edema  Skin: Color pink. Skin warm and dry to touch. No rashes  No xanthoma  Neuro: Alert and awake, no lateralizing deficits appreciated    Lab Reviewed:                  "

## 2021-11-02 RX ORDER — ISOSORBIDE MONONITRATE 60 MG/1
TABLET, EXTENDED RELEASE ORAL
Qty: 90 TABLET | Refills: 3 | Status: SHIPPED | OUTPATIENT
Start: 2021-11-02 | End: 2022-01-19 | Stop reason: SDUPTHER

## 2021-11-02 RX ORDER — METOPROLOL SUCCINATE 25 MG/1
25 TABLET, EXTENDED RELEASE ORAL DAILY
Qty: 90 TABLET | Refills: 3 | OUTPATIENT
Start: 2021-11-02 | End: 2022-01-19 | Stop reason: SDUPTHER

## 2021-11-02 NOTE — TELEPHONE ENCOUNTER
Rx Refill Note  Requested Prescriptions     Pending Prescriptions Disp Refills   • metoprolol tartrate (LOPRESSOR) 25 MG tablet [Pharmacy Med Name: METOPROLOL TARTRATE 25 MG TAB] 90 tablet 3     Sig: TAKE ONE TABLET BY MOUTH DAILY   • isosorbide mononitrate (IMDUR) 60 MG 24 hr tablet [Pharmacy Med Name: ISOSORBIDE MONONIT ER 60 MG TB] 90 tablet 3     Sig: TAKE ONE TABLET BY MOUTH DAILY      Last office visit with prescribing clinician: 11/1/2021      Next office visit with prescribing clinician: 11/7/2022            Aileen Toledo MA  11/02/21, 13:20 EDT

## 2021-11-04 NOTE — TELEPHONE ENCOUNTER
Labs from PCP done on 10/29/2021 reviewed:  Creatinine 0.59  Calcium 10.0  Albumin 4.4  Alk phos 47

## 2021-11-08 ENCOUNTER — OFFICE VISIT (OUTPATIENT)
Dept: ORTHOPEDIC SURGERY | Facility: CLINIC | Age: 81
End: 2021-11-08

## 2021-11-08 VITALS
DIASTOLIC BLOOD PRESSURE: 69 MMHG | HEART RATE: 60 BPM | WEIGHT: 125.2 LBS | SYSTOLIC BLOOD PRESSURE: 130 MMHG | BODY MASS INDEX: 22.18 KG/M2 | HEIGHT: 63 IN

## 2021-11-08 DIAGNOSIS — E11.69 TYPE 2 DIABETES MELLITUS WITH OTHER SPECIFIED COMPLICATION, UNSPECIFIED WHETHER LONG TERM INSULIN USE (HCC): ICD-10-CM

## 2021-11-08 DIAGNOSIS — M81.0 AGE-RELATED OSTEOPOROSIS WITHOUT CURRENT PATHOLOGICAL FRACTURE: Primary | ICD-10-CM

## 2021-11-08 DIAGNOSIS — E55.9 VITAMIN D DEFICIENCY: ICD-10-CM

## 2021-11-08 DIAGNOSIS — Z82.62 FAMILY HX OSTEOPOROSIS: ICD-10-CM

## 2021-11-08 DIAGNOSIS — K51.90 ULCERATIVE COLITIS WITHOUT COMPLICATIONS, UNSPECIFIED LOCATION (HCC): ICD-10-CM

## 2021-11-08 DIAGNOSIS — Z51.81 MEDICATION MONITORING ENCOUNTER: ICD-10-CM

## 2021-11-08 PROCEDURE — 96372 THER/PROPH/DIAG INJ SC/IM: CPT | Performed by: PHYSICIAN ASSISTANT

## 2021-11-08 PROCEDURE — 99214 OFFICE O/P EST MOD 30 MIN: CPT | Performed by: PHYSICIAN ASSISTANT

## 2021-11-08 RX ORDER — FERROUS SULFATE 325(65) MG
325 TABLET ORAL 2 TIMES WEEKLY
Status: ON HOLD | COMMUNITY
End: 2022-05-15

## 2021-11-08 RX ORDER — DICYCLOMINE HYDROCHLORIDE 10 MG/1
10 CAPSULE ORAL DAILY
COMMUNITY

## 2021-11-08 RX ORDER — MULTIPLE VITAMINS W/ MINERALS TAB 9MG-400MCG
1 TAB ORAL DAILY
Status: ON HOLD | COMMUNITY
End: 2022-05-15

## 2021-11-08 NOTE — PATIENT INSTRUCTIONS
Osteoporosis    Osteoporosis is when the bones get thin and weak. This can cause your bones to break (fracture) more easily.  What are the causes?  The exact cause of this condition is not known.  What increases the risk?  · Having family members with this condition.  · Not eating enough healthy foods.  · Taking certain medicines.  · Being female.  · Being age 50 or older.  · Smoking or using other products that contain nicotine or tobacco, such as e-cigarettes or chewing tobacco.  · Not exercising.  · Being of  or  ancestry.  · Having a small body frame.  What are the signs or symptoms?  A broken bone might be the first sign, especially if the break results from a fall or injury that usually would not cause a bone to break.  Other signs and symptoms include:  · Pain in the neck or low back.  · Being hunched over (stooped posture).  · Getting shorter.  How is this treated?  · Eating more foods with more calcium and vitamin D in them.  · Doing exercises.  · Stopping tobacco use.  · Limiting how much alcohol you drink.  · Taking medicines to slow bone loss or help make the bones stronger.  · Taking supplements of calcium and vitamin D every day.  · Taking medicines to replace chemicals in the body (hormone replacement medicines).  · Monitoring your levels of calcium and vitamin D.  The goal of treatment is to strengthen your bones and lower your risk for a bone break.  Follow these instructions at home:  Eating and drinking  Eat plenty of calcium and vitamin D. These nutrients are good for your bones. Good sources of calcium and vitamin D include:  · Some fish, such as salmon and tuna.  · Foods that have calcium and vitamin D added to them (fortified foods), such as some breakfast cereals.  · Egg yolks.  · Cheese.  · Liver.    Activity  Do exercises as told by your doctor. Ask your doctor what exercises are safe for you. You should do:  · Exercises that make your muscles work to hold your body weight up  (weight-bearing exercises). These include michelle chi, yoga, and walking.  · Exercises to make your muscles stronger. One example is lifting weights.  Lifestyle  · Do not drink alcohol if:  ? Your doctor tells you not to drink.  ? You are pregnant, may be pregnant, or are planning to become pregnant.  · If you drink alcohol:  ? Limit how much you use to:  § 0-1 drink a day for women.  § 0-2 drinks a day for men.  · Know how much alcohol is in your drink. In the [place] one drink equals one 12 oz bottle of beer (355 mL), one 5 oz glass of wine (148 mL), or one 1½ oz glass of hard liquor (44 mL).  · Do not smoke or use any products that contain nicotine or tobacco. If you need help quitting, ask your doctor.  Preventing falls  · Use tools to help you move around (mobility aids) as needed. These include canes, walkers, scooters, and crutches.  · Keep rooms well-lit.  · Put away things on the floor that could make you trip. These include cords and rugs.  · Install safety rails on stairs. Install grab bars in bathrooms.  · Use rubber mats in slippery areas, like bathrooms.  · Wear shoes that:  ? Fit you well.  ? Support your feet.  ? Have closed toes.  ? Have rubber soles or low heels.  · Tell your doctor about all of the medicines you are taking. Some medicines can make you more likely to fall.  General instructions  · Take over-the-counter and prescription medicines only as told by your doctor.  · Keep all follow-up visits.  Contact a doctor if:  · You have not been tested (screened) for osteoporosis and you are:  ? A woman who is age 65 or older.  ? A man who is age 70 or older.  Get help right away if:  · You fall.  · You get hurt.  Summary  · Osteoporosis happens when your bones get thin and weak.  · Weak bones can break (fracture) more easily.  · Eat plenty of calcium and vitamin D. These are good for your bones.  · Tell your doctor about all of the medicines that you take.  This information is not intended to replace  advice given to you by your health care provider. Make sure you discuss any questions you have with your health care provider.  Document Revised: 06/03/2021 Document Reviewed: 06/03/2021  Elsevier Patient Education © 2021 [employer]      Fall Prevention in the Home, Adult  Falls can cause injuries. They can happen to people of all ages. There are many things you can do to make your home safe and to help prevent falls. Ask for help when making these changes, if needed.  What actions can I take to prevent falls?  General Instructions  · Use good lighting in all rooms. Replace any light bulbs that burn out.  · Turn on the lights when you go into a dark area. Use night-lights.  · Keep items that you use often in easy-to-reach places. Lower the shelves around your home if necessary.  · Set up your furniture so you have a clear path. Avoid moving your furniture around.  · Do not have throw rugs and other things on the floor that can make you trip.  · Avoid walking on wet floors.  · If any of your floors are uneven, fix them.  · Add color or contrast paint or tape to clearly freya and help you see:  ? Any grab bars or handrails.  ? First and last steps of stairways.  ? Where the edge of each step is.  · If you use a stepladder:  ? Make sure that it is fully opened. Do not climb a closed stepladder.  ? Make sure that both sides of the stepladder are locked into place.  ? Ask someone to hold the stepladder for you while you use it.  · If there are any pets around you, be aware of where they are.  What can I do in the bathroom?         · Keep the floor dry. Clean up any water that spills onto the floor as soon as it happens.  · Remove soap buildup in the tub or shower regularly.  · Use non-skid mats or decals on the floor of the tub or shower.  · Attach bath mats securely with double-sided, non-slip rug tape.  · If you need to sit down in the shower, use a plastic, non-slip stool.  · Install grab bars by the toilet and in  the tub and shower. Do not use towel bars as grab bars.  What can I do in the bedroom?  · Make sure that you have a light by your bed that is easy to reach.  · Do not use any sheets or blankets that are too big for your bed. They should not hang down onto the floor.  · Have a firm chair that has side arms. You can use this for support while you get dressed.  What can I do in the kitchen?  · Clean up any spills right away.  · If you need to reach something above you, use a strong step stool that has a grab bar.  · Keep electrical cords out of the way.  · Do not use floor polish or wax that makes floors slippery. If you must use wax, use non-skid floor wax.  What can I do with my stairs?  · Do not leave any items on the stairs.  · Make sure that you have a light switch at the top of the stairs and the bottom of the stairs. If you do not have them, ask someone to add them for you.  · Make sure that there are handrails on both sides of the stairs, and use them. Fix handrails that are broken or loose. Make sure that handrails are as long as the stairways.  · Install non-slip stair treads on all stairs in your home.  · Avoid having throw rugs at the top or bottom of the stairs. If you do have throw rugs, attach them to the floor with carpet tape.  · Choose a carpet that does not hide the edge of the steps on the stairway.  · Check any carpeting to make sure that it is firmly attached to the stairs. Fix any carpet that is loose or worn.  What can I do on the outside of my home?  · Use bright outdoor lighting.  · Regularly fix the edges of walkways and driveways and fix any cracks.  · Remove anything that might make you trip as you walk through a door, such as a raised step or threshold.  · Trim any bushes or trees on the path to your home.  · Regularly check to see if handrails are loose or broken. Make sure that both sides of any steps have handrails.  · Install guardrails along the edges of any raised decks and  porches.  · Clear walking paths of anything that might make someone trip, such as tools or rocks.  · Have any leaves, snow, or ice cleared regularly.  · Use sand or salt on walking paths during winter.  · Clean up any spills in your garage right away. This includes grease or oil spills.  What other actions can I take?  · Wear shoes that:  ? Have a low heel. Do not wear high heels.  ? Have rubber bottoms.  ? Are comfortable and fit you well.  ? Are closed at the toe. Do not wear open-toe sandals.  · Use tools that help you move around (mobility aids) if they are needed. These include:  ? Canes.  ? Walkers.  ? Scooters.  ? Crutches.  · Review your medicines with your doctor. Some medicines can make you feel dizzy. This can increase your chance of falling.  Ask your doctor what other things you can do to help prevent falls.  Where to find more information  · Centers for Disease Control and Prevention, STEADI: https://cdc.gov  · National [place] on Aging: https://nt2cwln.aida.nih.gov  Contact a doctor if:  · You are afraid of falling at home.  · You feel weak, drowsy, or dizzy at home.  · You fall at home.  Summary  · There are many simple things that you can do to make your home safe and to help prevent falls.  · Ways to make your home safe include removing tripping hazards and installing grab bars in the bathroom.  · Ask for help when making these changes in your home.  This information is not intended to replace advice given to you by your health care provider. Make sure you discuss any questions you have with your health care provider.  Document Revised: 04/09/2020 Document Reviewed: 08/02/2018  [employer] Patient Education © 2021 [employer]      Sit-to-Stand Exercise    The sit-to-stand exercise (also known as the chair stand or chair rise exercise) strengthens your lower body and helps you maintain or improve your mobility and independence. The goal is to do the sit-to-stand exercise without using your hands.  This will be easier as you become stronger. You should always talk with your health care provider before starting any exercise program, especially if you have had recent surgery.  Do the exercise exactly as told by your health care provider and adjust it as directed. It is normal to feel mild stretching, pulling, tightness, or discomfort as you do this exercise, but you should stop right away if you feel sudden pain or your pain gets worse. Do not begin doing this exercise until told by your health care provider.  What the sit-to-stand exercise does  The sit-to-stand exercise helps to strengthen the muscles in your thighs and the muscles in the center of your body that give you stability (core muscles). This exercise is especially helpful if:  · You have had knee or hip surgery.  · You have trouble getting up from a chair, out of a car, or off the toilet.  How to do the sit-to-stand exercise  1. Sit toward the front edge of a sturdy chair without armrests. Your knees should be bent and your feet should be flat on the floor and shoulder-width apart.  2. Place your hands lightly on each side of the seat. Keep your back and neck as straight as possible, with your chest slightly forward.  3. Breathe in slowly. Lean forward and slightly shift your weight to the front of your feet.  4. Breathe out as you slowly stand up. Use your hands as little as possible.  5. Stand and pause for a full breath in and out.  6. Breathe in as you sit down slowly. Tighten your core and abdominal muscles to control your lowering as much as possible.  7. Breathe out slowly.  8. Do this exercise 10-15 times. If needed, do it fewer times until you build up strength.  9. Rest for 1 minute, then do another set of 10-15 repetitions.  To change the difficulty of the sit-to-stand exercise  · If the exercise is too difficult, use a chair with sturdy armrests, and push off the armrests to help you come to the standing position. You can also use the  armrests to help slowly lower yourself back to sitting. As this gets easier, try to use your arms less. You can also place a firm cushion or pillow on the chair to make the surface higher.  · If this exercise is too easy, do not use your arms to help raise or lower yourself. You can also wear a weighted vest, use hand weights, increase your repetitions, or try a lower chair.  General tips  · You may feel tired when starting an exercise routine. This is normal.  · You may have muscle soreness that lasts a few days. This is normal. As you get stronger, you may not feel muscle soreness.  · Use smooth, steady movements.  · Do not  hold your breath during strength exercises. This can cause unsafe changes in your blood pressure.  · Breathe in slowly through your nose, and breathe out slowly through your mouth.  Summary  · Strengthening your lower body is an important step to help you move safely and independently.  · The sit-to-stand exercise helps strengthen the muscles in your thighs and core.  · You should always talk with your health care provider before starting any exercise program, especially if you have had recent surgery.  This information is not intended to replace advice given to you by your health care provider. Make sure you discuss any questions you have with your health care provider.  Document Revised: 10/16/2019 Document Reviewed: 02/08/2018  Elsevier Patient Education © 2021 [employer] Inc.      Advance Care Planning and Advance Directives     You make decisions on a daily basis - decisions about where you want to live, your career, your home, your life. Perhaps one of the most important decisions you face is your choice for future medical care. Take time to talk with your family and your healthcare team and start planning today.  Advance Care Planning is a process that can help you:  · Understand possible future healthcare decisions in light of your own experiences  · Reflect on those decision in light of  your goals and values  · Discuss your decisions with those closest to you and the healthcare professionals that care for you  · Make a plan by creating a document that reflects your wishes    Surrogate Decision Maker  In the event of a medical emergency, which has left you unable to communicate or to make your own decisions, you would need someone to make decisions for you.  It is important to discuss your preferences for medical treatment with this person while you are in good health.     Qualities of a surrogate decision maker:  • Willing to take on this role and responsibility  • Knows what you want for future medical care  • Willing to follow your wishes even if they don't agree with them  • Able to make difficult medical decisions under stressful circumstances    Advance Directives  These are legal documents you can create that will guide your healthcare team and decision maker(s) when needed. These documents can be stored in the electronic medical record.    · Living Will - a legal document to guide your care if you have a terminal condition or a serious illness and are unable to communicate. States vary by statute in document names/types, but most forms may include one or more of the following:        -  Directions regarding life-prolonging treatments        -  Directions regarding artificially provided nutrition/hydration        -  Choosing a healthcare decision maker        -  Direction regarding organ/tissue donation    · Durable Power of  for Healthcare - this document names an -in-fact to make medical decisions for you, but it may also allow this person to make personal and financial decisions for you. Please seek the advice of an  if you need this type of document.    **Advance Directives are not required and no one may discriminate against you if you do not sign one.    Medical Orders  Many states allow specific forms/orders signed by your physician to record your wishes for  medical treatment in your current state of health. This form, signed in personal communication with your physician, addresses resuscitation and other medical interventions that you may or may not want.      For more information or to schedule a time with a [facility] Advance Care Planning Facilitator contact: [facility]/ACP or call [phone] and someone will contact you directly.

## 2021-11-08 NOTE — PROGRESS NOTES
ORTHO FOLLOW UP       Subjective:    HPI:   Minid Quinteros is a 80 y.o. female who presents in follow-up for osteoporosis.  She is currently on Prolia and reports that she is doing well with no noticeable side effects.  This medication was restarted on 11/3/2020 with her last injection on 5/6/2021.  She also continues 2000 mg of calcium and 2000 international units of D3 per day.  She denies any significant fractures since last office visit, reporting 3 broken toes.  She gets physical activity pushing her  in a wheelchair.  She has fallen once without significant injury in the last year.  She does report that her back feels weak and she would like to repeat physical therapy as that did help her last year.  She denies any new pain or change in her existing pain.  Recent labs from her PCP were reviewed.  Her DEXA scan is currently up-to-date.    STEADI Fall Risk Assessment was completed, and patient is at HIGH risk for falls. Assessment completed on:11/8/2021      Past Medical History:   Diagnosis Date   • Age-related osteoporosis without current pathological fracture     prolia([id number], 9/12/2019), restarted prolia(11/3/20)   • Arthritis    • CAD (coronary artery disease)    • Diabetes (HCC)    • Elevated cholesterol    • HTN (hypertension)    • Hyperlipemia        Past Surgical History:   Procedure Laterality Date   • ANKLE ARTHROSCOPY     • BELPHAROPTOSIS REPAIR     • CARDIAC CATHETERIZATION     • CATARACT EXTRACTION     • CHOLECYSTECTOMY N/A 10/14/2019    Procedure: Laparoscopic cholecystectomy, possible open;  Surgeon: Vijay Guerra DO;  Location: [facility] MAIN OR;  Service: General   • COLONOSCOPY     • COSMETIC SURGERY     • ENDOSCOPY     • HEMORROIDECTOMY     • HYSTERECTOMY         Social History     Occupational History   • Not on file   Tobacco Use   • Smoking status: Never Smoker   • Smokeless tobacco: Never Used   Vaping Use   • Vaping Use: Never used   Substance and Sexual Activity   • Alcohol  use: No   • Drug use: No   • Sexual activity: Defer      The following portions of the patient's history were reviewed and updated as appropriate: allergies, current medications, past family history, past medical history, past social history, past surgical history and problem list.    Medications:    Current Outpatient Medications:   •  aspirin (ASPIR-LOW) 81 MG EC tablet, Daily., Disp: , Rfl:   •  atorvastatin (LIPITOR) 40 MG tablet, TAKE ONE TABLET BY MOUTH EVERY NIGHT AT BEDTIME, Disp: 90 tablet, Rfl: 0  •  Budesonide (ENTOCORT EC) 3 MG 24 hr capsule, BUDESONIDE 3 MG CPEP, Disp: , Rfl:   •  Calcium Carbonate (CALCIUM 500 PO), Take 600 mg by mouth 2 (two) times a day., Disp: , Rfl:   •  Cholecalciferol (VITAMIN D3) 2000 units capsule, VITAMIN D3 CAPS, Disp: , Rfl:   •  colestipol (COLESTID) 1 g tablet, , Disp: , Rfl:   •  Cyanocobalamin (Vitamin B-12) 6000 MCG sublingual tablet, Place  under the tongue., Disp: , Rfl:   •  cyanocobalamin 1000 MCG/ML injection, , Disp: , Rfl:   •  dicyclomine (BENTYL) 10 MG capsule, Take 10 mg by mouth Daily As Needed., Disp: , Rfl:   •  fenofibrate 160 MG tablet, Daily., Disp: , Rfl:   •  ferrous sulfate 325 (65 FE) MG tablet, Take 325 mg by mouth 2 (Two) Times a Week., Disp: , Rfl:   •  glimepiride (AMARYL) 2 MG tablet, Take 2 mg by mouth Every Morning Before Breakfast., Disp: , Rfl:   •  isosorbide mononitrate (IMDUR) 60 MG 24 hr tablet, TAKE ONE TABLET BY MOUTH DAILY, Disp: 90 tablet, Rfl: 3  •  metFORMIN ER (GLUCOPHAGE-XR) 500 MG 24 hr tablet, Take 500 mg by mouth. 4 pills a day, Disp: , Rfl:   •  metoprolol succinate XL (TOPROL-XL) 25 MG 24 hr tablet, Take 1 tablet by mouth Daily., Disp: 90 tablet, Rfl: 3  •  multivitamin with minerals (MULTIVITAMIN ADULT PO), Take 1 tablet by mouth Daily., Disp: , Rfl:   •  nitroglycerin (Nitrostat) 0.4 MG SL tablet, Place 1 tablet under the tongue Every 5 (Five) Minutes As Needed for Chest Pain. Take no more than 3 doses in 15 minutes.,  "Disp: 25 tablet, Rfl: 11    Current Facility-Administered Medications:   •  denosumab (PROLIA) syringe 60 mg, 60 mg, Subcutaneous, Q6 Months, Elisabeth Royal PA, 60 mg at 21 0901    Allergies:  Allergies   Allergen Reactions   • Asacol [Mesalamine] Swelling   • Diclofenac Swelling     Gums swell only per patient       Review of Systems:  Gen -no fever, chills , sweats, headache   Eyes - no irritation or discharge   ENT -  no ear pain , runny nose , sore throat , difficulty swallowing   Resp - no cough , congestion , excessive expectoration   CVS - no chest pain , palpitations.   Abd - no pain , nausea , vomiting , diarrhea   Skin - no rash , lesions.   Neuro - no dizziness    Please see HPI for any other pertinent positives.  All other systems were reviewed and are negative.       Objective   Objective:    /69 (BP Location: Left arm, Patient Position: Sitting, Cuff Size: Adult)   Pulse 60   Ht 160 cm (63\")   Wt 56.8 kg (125 lb 3.2 oz)   BMI 22.18 kg/m²     Physical Examination:  Thin, somewhat frail-appearing individual in no acute distress, patient is alert and cooperative with the exam, appears to have normal mood and affect with a normal attention span and concentration, ambulating unassisted  normocephalic, atraumatic, extraocular movements intact, conjunctiva and sclera are clear, grossly normal hearing  no lymphadenopathy or thyromegaly  normal respiratory effort  abdomen is nontender, without guarding or rebound and nondistended  Kyphotic  no LE edema noted  cranial nerves II-XII grossly intact with no focal defects  skin intact without lesions or rashes visible        [facility]agin[facility], revealed a T score -2.2 in the left femoral neck, FRAX scores of 15.0% and 4.7%.  11/3/2020-patient restarted on Prolia  2021-Prolia injection  2021-Prolia injection            Assessment:  1. Age-related osteoporosis without current pathological fracture    2. " Vitamin D deficiency    3. Family hx osteoporosis    4. Ulcerative colitis without complications, unspecified location (HCC)    5. Type 2 diabetes mellitus with other specified complication, unspecified whether long term insulin use (HCC)    6. Medication monitoring encounter         Currently on Prolia since 11/3/2020        Plan:  Today, she will be given orders to have her vitamin D checked with her next set of blood work and a new DEXA scan will be ordered for September 2022 at [facility].  Prolia injection today.  According to her hip FRAX score of 4.7%,  She continues to be at very high risk for hip fracture in the next 10 years.  At this time, I am recommending that she continue her Prolia injections every 6 months, as well as her calcium and vitamin D daily.  We did review weightbearing exercises and fall prevention today.  She will also be referred for formal physical therapy to help with muscle strengthening and fall prevention.  I will plan to see her back in 1 year and as needed, and she will be scheduled for next Prolia injection in 6 months.  She should call with any questions or concerns.    Advance Care Planning   ACP discussion was held with the patient during this visit. Patient does not have an advance directive, information provided.             STEPHANIE Owusu  11/08/21  09:15 EST    EMR Dragon/Transcription disclaimer:  Much of this encounter note is an electronic transcription/translation of spoken language to printed text. The electronic translation of spoken language may permit erroneous, or at times, nonsensical words or phrases to be inadvertently transcribed; Although I have reviewed the note for such errors, some may still exist.

## 2021-11-12 ENCOUNTER — TREATMENT (OUTPATIENT)
Dept: PHYSICAL THERAPY | Facility: CLINIC | Age: 81
End: 2021-11-12

## 2021-11-12 DIAGNOSIS — M81.0 OSTEOPOROSIS WITHOUT PATHOLOGICAL FRACTURE: Primary | ICD-10-CM

## 2021-11-12 PROCEDURE — 97110 THERAPEUTIC EXERCISES: CPT | Performed by: PHYSICAL THERAPIST

## 2021-11-12 PROCEDURE — 97162 PT EVAL MOD COMPLEX 30 MIN: CPT | Performed by: PHYSICAL THERAPIST

## 2021-11-12 PROCEDURE — 97530 THERAPEUTIC ACTIVITIES: CPT | Performed by: PHYSICAL THERAPIST

## 2021-11-12 NOTE — PROGRESS NOTES
Physical Therapy Initial Evaluation and Plan of Care    Patient: Mindi Quinteros   : 1940  Diagnosis/ICD-10 Code:  Osteoporosis without pathological fracture [M81.0]  Referring practitioner: STEPHANIE Owusu  Date of Initial Visit: 2021  Today's Date: 2021  Patient seen for 1 sessions           Subjective Questionnaire: Oswestry: 25/50 ( 50%)      Subjective Evaluation    History of Present Illness  Mechanism of injury: Pt is referred to therapy due to LBP and Osteoporosis. Pt reports her back pain started about 9 months ago. She went to therapy for a while and it helped. She still does her exercises. Feels her back is getting weak. She can't keep her back straight. Her  is in a wc and she has to help him with bathing and dressing and occasionally lifting him. She has to push his wc on the carpet and it is hard on her. Her daughter lives with them and helps out.   She also co pain in R knee, old injury that flares up every once in a while. It started hurting again couple of days.     Aggravating factors: walking, standing, shopping, house work    Relieving factors: rest, sit in her recliner    Functional limitation: house work, can't  line, can't stand longer than 10-15 min, vacuuming, mopping, cooking.     She is able to drive. Has 4 steps to get to the house. Steps down to the  basement but her daughter does the laundry.         Quality of life: good    Pain  Current pain ratin  At best pain ratin  At worst pain ratin  Location: back and R knee  Quality: dull ache  Progression: worsening    Social Support  Lives in: multiple-level home    Treatments  Previous treatment: physical therapy  Patient Goals  Patient goals for therapy: decreased pain, increased motion and increased strength  Patient goal: stand up straight and be able to walk a distance         Precautions: OP/ fall risk    Objective          Postural Observations  Seated posture: poor  Standing posture:  poor    Additional Postural Observation Details  Flexed posture, ambulates without a.d ,  flexed posture  FH, RS, Inc thoracic kyphosis,dec lumbar lordosis    Tenderness     Right Knee   Tenderness in the medial joint line.     Active Range of Motion   Left Hip   Normal active range of motion    Right Hip   Normal active range of motion  Left Knee   Flexion: 130 degrees   Extension: 0 degrees     Right Knee   Flexion: 78 degrees with pain  Extension: 0 degrees     Additional Active Range of Motion Details  Standing lumbar flex: wfl , no inc pain   Standing lumbar ext: max limitation , hardly to neutral, inc LBP  Supine flex: wfl, no inc pain  Prone ext: NA, reports she never gets on her stomach because she has a hard time getting up  Measured in supine    Strength/Myotome Testing     Left Hip   Normal muscle strength    Right Hip   Planes of Motion   Flexion: 4  Abduction: 4  Adduction: 4    Left Knee   Flexion: 5  Extension: 5  Quadriceps contraction: good    Right Knee   Flexion: 4  Extension: 4  Quadriceps contraction: fair    Left Ankle/Foot   Normal strength    Right Ankle/Foot   Normal strength    Functional Assessment     Comments  Sit to stand: needs to use arms , unable to stand w/o UE assist              Assessment & Plan     Assessment  Impairments: abnormal gait, abnormal or restricted ROM, activity intolerance, lacks appropriate home exercise program and pain with function  Assessment details: Pt is a 81 y/o f referred to therapy due to LBP, Osteoporosis.  Pt presents with poor posture, flexed posture, FH/RS, dec spinal mobility, dec tolerance to walking/ standing, inc pain with physical activities.   Upon initial evaluation pt exhibits the above impairments and functional limitations. Impairments affect going shopping, standing long enough to cook, has to take breaks, unable to perf her house work and clean, vacuum, mop .  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  Pt will benefit from skilled physical therapy to address impairments, resolve pain and maximize function.   Prognosis: good  Functional Limitations: carrying objects, lifting, walking, pulling, pushing, uncomfortable because of pain, standing and unable to perform repetitive tasks  Goals  Plan Goals: STGs:  In 4 weeks  1- Pt will  report at least 35 % improvement and pain reduction   2- Pt will be independent with initial HEP   3- Pt will tolerate progression of HEP and her exercise program     LTGs: By DC   1- Pt will report at least 75 % improvement and pain reduction   2- Pt will be independent with final HEP and self management of her condition   3- Pt will improve Oswestry score , =< 25%  indicating functional improvement   4- Pt will voice readiness for DC with independent program   5- Pt will demonstrate improved posture and body mechanics  6- Pt will be able to perf sit to stand x10 w/o UE assist    Plan  Therapy options: will be seen for skilled physical therapy services  Planned modality interventions: high voltage pulsed current (pain management) and ultrasound  Planned therapy interventions: abdominal trunk stabilization, balance/weight-bearing training, functional ROM exercises, home exercise program, manual therapy, neuromuscular re-education, postural training, strengthening, stretching and therapeutic activities  Frequency: 1x week  Duration in weeks: 12  Treatment plan discussed with: patient        See flow sheet for treatment detail    History # of Personal Factors and/or Comorbidities: MODERATE (1-2)  Examination of Body System(s): # of elements: MODERATE (3)  Clinical Presentation: EVOLVING  Clinical Decision Making: MODERATE          Timed:         Manual Therapy:         mins  62966;     Therapeutic Exercise:  15       mins  00111;     Neuromuscular Karon:        mins  88392;    Therapeutic Activity:   10       mins  42032;     Gait Training:           mins  14257;      Ultrasound:          mins  31923;    Ionto                                   mins   83129  Self Care                            mins   92671  Canal repositioning           mins    03647      Un-Timed:  Electrical Stimulation:         mins  47811 ( );  Dry Needling          mins self-pay  Traction          mins 82821  Low Eval          Mins  20852  Mod Eval     25     Mins  25817  High Eval                            Mins  51075  Re-Eval                               mins  46636        Timed Treatment: 25     mins   Total Treatment:     50   mins    PT SIGNATURE: Abrahan Sauceda PT, CLT   DATE TREATMENT INITIATED: 11/12/2021    Initial Certification  Certification Period: 2/10/2022  I certify that the therapy services are furnished while this patient is under my care.  The services outlined above are required by this patient, and will be reviewed every 90 days.     PHYSICIAN: Elisabeth Royal PA      DATE:     Please sign and return via fax to [phone].. Thank you, [facility].

## 2021-11-23 ENCOUNTER — TREATMENT (OUTPATIENT)
Dept: PHYSICAL THERAPY | Facility: CLINIC | Age: 81
End: 2021-11-23

## 2021-11-23 DIAGNOSIS — M81.0 OSTEOPOROSIS WITHOUT PATHOLOGICAL FRACTURE: Primary | ICD-10-CM

## 2021-11-23 PROCEDURE — 97112 NEUROMUSCULAR REEDUCATION: CPT | Performed by: PHYSICAL THERAPIST

## 2021-11-23 PROCEDURE — 97110 THERAPEUTIC EXERCISES: CPT | Performed by: PHYSICAL THERAPIST

## 2021-11-23 NOTE — PROGRESS NOTES
Physical Therapy Daily Progress Note    Patient: Mindi Quinteros  : 1940  Referring practitioner: STEPHANIE Owusu  Today's Date: 2021    VISIT#: 2    Subjective   Pt reports: doing ok, her back hurts a little. Her knee is better. The exercises going ok.       Objective     See Exercise, Manual, and Modality Logs for complete treatment. Progressed with there ex and HEP as noted.     Patient Education:    Assessment & Plan     Assessment    Assessment details: Good antonino to today's rx session, demonstrates understanding of her HEP and new exercises. Requires frequent vcs for postural corrections.           Progress per Plan of Care            Timed:         Manual Therapy:         mins  32307;     Therapeutic Exercise:    15     mins  78032;     Neuromuscular Karon:   15     mins  75390;    Therapeutic Activity:          mins  14941;     Gait Training:           mins  35684;     Ultrasound:          mins  43049;    Ionto                                   mins   59330  Self Care                            mins   66808  Canal repositioning           mins    83691    Un-Timed:  Electrical Stimulation:         mins  76663 ( );  Traction          mins 36276  Low Eval          Mins  49563  Mod Eval          Mins  62710  High Eval                            Mins  50099  Re-Eval                               mins  03647    Timed Treatment: 30     mins   Total Treatment:   30     mins    Abrahan Sauceda, PT, CLT  Physical Therapist

## 2021-12-01 ENCOUNTER — TREATMENT (OUTPATIENT)
Dept: PHYSICAL THERAPY | Facility: CLINIC | Age: 81
End: 2021-12-01

## 2021-12-01 DIAGNOSIS — M81.0 OSTEOPOROSIS WITHOUT PATHOLOGICAL FRACTURE: Primary | ICD-10-CM

## 2021-12-01 PROCEDURE — 97112 NEUROMUSCULAR REEDUCATION: CPT | Performed by: PHYSICAL THERAPIST

## 2021-12-01 PROCEDURE — 97110 THERAPEUTIC EXERCISES: CPT | Performed by: PHYSICAL THERAPIST

## 2021-12-01 NOTE — PROGRESS NOTES
Physical Therapy Daily Progress Note    Patient: Mindi Quinteros  : 1940  Referring practitioner: STEPHANIE Owusu  Today's Date: 2021    VISIT#: 3    Subjective   Pt reports: doing ok today, was not feeling well for a few days but doing better now. Doing ok with her HEP.       Objective     See Exercise, Manual, and Modality Logs for complete treatment.     Patient Education:    Assessment & Plan     Assessment    Assessment details: Good antonino today's treatment session. demonstrates understanding of her HEP. Still requires vcs for postural corrections.           Progress per Plan of Care            Timed:         Manual Therapy:         mins  08692;     Therapeutic Exercise:    15     mins  06262;     Neuromuscular Karon:    15    mins  87499;    Therapeutic Activity:          mins  30518;     Gait Training:           mins  06022;     Ultrasound:          mins  51988;    Ionto                                   mins   86540  Self Care                            mins   00221  Canal repositioning           mins    74256    Un-Timed:  Electrical Stimulation:         mins  56021 (MC );  Traction          mins 26653  Low Eval          Mins  06460  Mod Eval          Mins  59248  High Eval                            Mins  54989  Re-Eval                               mins  20686    Timed Treatment:  30    mins   Total Treatment:    30    mins    Abrahan Sauceda, PT, CLT  Physical Therapist

## 2021-12-10 ENCOUNTER — TREATMENT (OUTPATIENT)
Dept: PHYSICAL THERAPY | Facility: CLINIC | Age: 81
End: 2021-12-10

## 2021-12-10 DIAGNOSIS — M81.0 OSTEOPOROSIS WITHOUT PATHOLOGICAL FRACTURE: Primary | ICD-10-CM

## 2021-12-10 PROCEDURE — 97112 NEUROMUSCULAR REEDUCATION: CPT | Performed by: PHYSICAL THERAPIST

## 2021-12-10 PROCEDURE — 97530 THERAPEUTIC ACTIVITIES: CPT | Performed by: PHYSICAL THERAPIST

## 2021-12-10 PROCEDURE — 97110 THERAPEUTIC EXERCISES: CPT | Performed by: PHYSICAL THERAPIST

## 2021-12-10 NOTE — PROGRESS NOTES
Physical Therapy Daily Progress Note    Patient: Mindi Quinteros  : 1940  Referring practitioner: STEPHANIE Owusu  Today's Date: 12/10/2021    VISIT#: 4    Subjective   Pt reports: she is doing ok, doing HEP. Feels pretty good in the mornings and can walk straighter but keeps bending       Objective     See Exercise, Manual, and Modality Logs for complete treatment.     Patient Education:    Assessment & Plan     Assessment    Assessment details: Good antonino to today's rx session. Requires frequent vc to correct posture.           Progress per Plan of Care            Timed:         Manual Therapy:         mins  17640;     Therapeutic Exercise:   15      mins  68392;     Neuromuscular Karon:  15     mins  02553;    Therapeutic Activity:    10     mins  48841;     Gait Training:           mins  16680;     Ultrasound:          mins  34252;    Ionto                                   mins   09680  Self Care                            mins   80951  Canal repositioning           mins    60203    Un-Timed:  Electrical Stimulation:         mins  43608 ( );  Traction          mins 50174  Low Eval          Mins  68981  Mod Eval          Mins  32764  High Eval                            Mins  26814  Re-Eval                               mins  45501    Timed Treatment:  40    mins   Total Treatment:    40    mins    Abrahan Sauceda, PT, CLT  Physical Therapist   Patient is a 53 y/o female with Medical Hx of Type II DM,  CAD on ASA, Hypothyroidism, COPD, Dextrocardia,  Sciatica/Peripheral Neuralgia, Charcot foot deformity s/p reconstruction on 1/11/19, presenting to ED with bilateral lower extremity edema. and currently admitted to ICU for lactic acidosis, borderline BP, severe hyponatremia and electrolytes imbalance    *Hematochezia  -borderline BB, high lactate, r/o ischemic colitis  -get CT angio  -IV hydration  -NPO  -trend lactate     *Elevated Lfts / elevated INR since march   -US noted hepatomegaly?  -hepatitis panel, acute is pending. add hep AigG, hep B sAb, core Ab  -AST trending down  -consider Vitamin K 10 mg po for 3 days (unclear circumstances at home, nutrition status) Patient is a 53 y/o female with Medical Hx of Type II DM,  CAD on ASA, Hypothyroidism, COPD, Dextrocardia,  Sciatica/Peripheral Neuralgia, Charcot foot deformity s/p reconstruction on 1/11/19, presenting to ED with bilateral lower extremity edema. and currently admitted to ICU for lactic acidosis, borderline BP, severe hyponatremia and electrolytes imbalance    *Hematochezia  -borderline BB, high lactate, r/o ischemic colitis  -get CT angio  -IV hydration  -NPO  -trend lactate   -correct INR / unclear reason why elevated but this has been going on since march    *Elevated Lfts / elevated INR since march   -US noted hepatomegaly?  -hepatitis panel, acute is pending. add hep AigG, hep B sAb, core Ab  -AST trending down  -   Patient is a 53 y/o female with Medical Hx of Type II DM,  CAD on ASA, Hypothyroidism, COPD, Dextrocardia,  Sciatica/Peripheral Neuralgia, Charcot foot deformity s/p reconstruction on 1/11/19, presenting to ED with bilateral lower extremity edema. and currently admitted to ICU for lactic acidosis, borderline BP, severe hyponatremia and electrolytes imbalance    *Blood per rectum and Melena  -currently hypotensive   -get CT angio  -IV hydration  -NPO  -trend lactate, trend CBC  -correct INR / unclear reason why elevated but this has been going on since march  -IV PPI drip  -possible EGD     *Elevated Lfts / elevated INR since march   -US noted hepatomegaly?  -hepatitis panel, acute is pending. add hep AigG, hep B sAb, core Ab  -AST trending down  -if trends up again -> full CLD workup    Patient is a 51 y/o female with Medical Hx of Type II DM,  CAD on ASA, Hypothyroidism, COPD, Dextrocardia,  Sciatica/Peripheral Neuralgia, Charcot foot deformity s/p reconstruction on 1/11/19, presenting to ED with bilateral lower extremity edema. and currently admitted to ICU for lactic acidosis, borderline BP, severe hyponatremia and electrolytes imbalance    *Blood per rectum and Melena  -currently hypotensive   -get CT angio  -transfuse to target 8  -NPO  -trend lactate, trend CBC  -correct INR / unclear reason why elevated but this has been going on since march  -IV PPI drip  -possible EGD     *Elevated Lfts / elevated INR since march   -US noted hepatomegaly?  -hepatitis panel, acute is pending. add hep AigG, hep B sAb, core Ab  -AST trending down  -if trends up again -> full CLD workup    Patient is a 51 y/o female with Medical Hx of Type II DM,  CAD on ASA, Hypothyroidism, COPD, Dextrocardia,  Sciatica/Peripheral Neuralgia, Charcot foot deformity s/p reconstruction on 1/11/19, presenting to ED with bilateral lower extremity edema. and currently admitted to ICU for lactic acidosis, borderline BP, severe hyponatremia and electrolytes imbalance    *Blood per rectum and Melena  -currently hypotensive, altered  -transfuse to target 8  -NPO  -trend lactate, trend CBC  -correct INR / unclear reason why elevated but this has been going on since march  -IV PPI drip  -Discussed with daughter Lorena valenzuela [phone]: the risks of proceeding with EGD with anesthesia at this point outweighs the benefit     *Elevated Lfts / elevated INR since march   -US noted hepatomegaly?  -hepatitis panel, acute is pending. add hep AigG, hep B sAb, core Ab  -AST trending down  -if trends up again -> full CLD workup   -no asterixis, no signs of liver failure

## 2021-12-17 ENCOUNTER — TREATMENT (OUTPATIENT)
Dept: PHYSICAL THERAPY | Facility: CLINIC | Age: 81
End: 2021-12-17

## 2021-12-17 DIAGNOSIS — M81.0 OSTEOPOROSIS WITHOUT PATHOLOGICAL FRACTURE: Primary | ICD-10-CM

## 2021-12-17 PROCEDURE — 97112 NEUROMUSCULAR REEDUCATION: CPT | Performed by: PHYSICAL THERAPIST

## 2021-12-17 PROCEDURE — 97110 THERAPEUTIC EXERCISES: CPT | Performed by: PHYSICAL THERAPIST

## 2021-12-17 PROCEDURE — 97530 THERAPEUTIC ACTIVITIES: CPT | Performed by: PHYSICAL THERAPIST

## 2021-12-17 NOTE — PROGRESS NOTES
Physical Therapy Progress Note/ DC summary    Patient: Mindi Quinteros  : 1940  Referring practitioner: STEPHANIE Owusu  Today's Date: 2021    VISIT#: 5    Subjective   Pt reports: doing better feels therapy has helped. Her back doesn't hurt as much and is able to keep her back straighter. Able to do a little more around the house with less pain. It is hard for her to do her exercises since she has to take care of her  and has been taking him to therapy 2xweek.       Objective     See Exercise, Manual, and Modality Logs for complete treatment.     Patient Education:    Assessment & Plan     Assessment    Assessment details: Pt is a 81 y/o f referred to therapy due to LBP, Osteoporosis.  Pt presents with poor posture, flexed posture, FH/RS, dec spinal mobility, dec tolerance to walking/ standing, inc pain with physical activities.   She has responded well to therapy, subjective improvement is reported. Reports at least 50% improvement. She is able to do more around the house with less pain. Is able to walk and do her shopping with less pain. All STGs met, LTGs partially met. She would like to be DC from therapy to continue with her HEP .   Oswestry score: 21/50  ( 42%)    Goals  Plan Goals: Plan Goals: STGs:  In 4 weeks ( all MET)   1- Pt will  report at least 35 % improvement and pain reduction   2- Pt will be independent with initial HEP   3- Pt will tolerate progression of HEP and her exercise program     LTGs: By DC ( partially )  1- Pt will report at least 75 % improvement and pain reduction   2- Pt will be independent with final HEP and self management of her condition   3- Pt will improve Oswestry score , =< 25%  indicating functional improvement   4- Pt will voice readiness for DC with independent program   5- Pt will demonstrate improved posture and body mechanics  6- Pt will be able to perf sit to stand x10 w/o UE assist       Plan  Plan details: Pt will be DC to continue with her HEP  and self management of her condition.                       Timed:         Manual Therapy:         mins  69855;     Therapeutic Exercise:   15      mins  71163;     Neuromuscular Karon:   15     mins  49724;    Therapeutic Activity:    10      mins  86390;     Gait Training:           mins  22557;     Ultrasound:          mins  44249;    Ionto                                   mins   23132  Self Care                            mins   30648  Canal repositioning           mins    03293    Un-Timed:  Electrical Stimulation:         mins  26846 ( );  Traction          mins 43313  Low Eval          Mins  42413  Mod Eval          Mins  61416  High Eval                            Mins  72542  Re-Eval                               mins  49090    Timed Treatment:  40    mins   Total Treatment:    40    mins    Abrahan Sauceda PT, CLT  Physical Therapist  [place] License number: [id number]

## 2022-01-19 RX ORDER — ATORVASTATIN CALCIUM 40 MG/1
40 TABLET, FILM COATED ORAL
Qty: 90 TABLET | Refills: 1 | Status: SHIPPED | OUTPATIENT
Start: 2022-01-19 | End: 2022-01-21 | Stop reason: SDUPTHER

## 2022-01-19 RX ORDER — ISOSORBIDE MONONITRATE 60 MG/1
60 TABLET, EXTENDED RELEASE ORAL DAILY
Qty: 90 TABLET | Refills: 1 | Status: SHIPPED | OUTPATIENT
Start: 2022-01-19 | End: 2022-01-21 | Stop reason: SDUPTHER

## 2022-01-19 RX ORDER — METOPROLOL SUCCINATE 25 MG/1
25 TABLET, EXTENDED RELEASE ORAL DAILY
Qty: 90 TABLET | Refills: 1 | OUTPATIENT
Start: 2022-01-19 | End: 2022-01-21 | Stop reason: SDUPTHER

## 2022-01-19 NOTE — TELEPHONE ENCOUNTER
Rx Refill Note  Requested Prescriptions      No prescriptions requested or ordered in this encounter      Last office visit with prescribing clinician: 11/1/2021      Next office visit with prescribing clinician: 11/7/2022            Tatum Hammer MA  01/19/22, 12:42 EST

## 2022-01-21 RX ORDER — METOPROLOL SUCCINATE 25 MG/1
25 TABLET, EXTENDED RELEASE ORAL DAILY
Qty: 90 TABLET | Refills: 3 | OUTPATIENT
Start: 2022-01-21 | End: 2022-02-07 | Stop reason: SDUPTHER

## 2022-01-21 RX ORDER — ATORVASTATIN CALCIUM 40 MG/1
40 TABLET, FILM COATED ORAL
Qty: 90 TABLET | Refills: 3 | Status: SHIPPED | OUTPATIENT
Start: 2022-01-21

## 2022-01-21 RX ORDER — ISOSORBIDE MONONITRATE 60 MG/1
60 TABLET, EXTENDED RELEASE ORAL DAILY
Qty: 90 TABLET | Refills: 3 | Status: SHIPPED | OUTPATIENT
Start: 2022-01-21 | End: 2022-06-03 | Stop reason: HOSPADM

## 2022-01-21 NOTE — TELEPHONE ENCOUNTER
Rx Refill Note  Requested Prescriptions     Pending Prescriptions Disp Refills   • metoprolol succinate XL (TOPROL-XL) 25 MG 24 hr tablet 90 tablet 3     Sig: Take 1 tablet by mouth Daily.   • atorvastatin (LIPITOR) 40 MG tablet 90 tablet 3     Sig: Take 1 tablet by mouth every night at bedtime.   • isosorbide mononitrate (IMDUR) 60 MG 24 hr tablet 90 tablet 3     Sig: Take 1 tablet by mouth Daily.      Last office visit with prescribing clinician: 11/1/2021      Next office visit with prescribing clinician: 11/7/2022            Jocy Mart MA  01/21/22, 08:45 EST

## 2022-02-07 RX ORDER — METOPROLOL SUCCINATE 25 MG/1
25 TABLET, EXTENDED RELEASE ORAL DAILY
Qty: 90 TABLET | Refills: 3 | OUTPATIENT
Start: 2022-02-07 | End: 2022-02-08 | Stop reason: SDUPTHER

## 2022-02-07 NOTE — TELEPHONE ENCOUNTER
Rx Refill Note  Requested Prescriptions      No prescriptions requested or ordered in this encounter      Last office visit with prescribing clinician: 11/1/2021      Next office visit with prescribing clinician: 11/7/2022            Tatum Hammer MA  02/07/22, 09:43 EST

## 2022-02-08 ENCOUNTER — TELEPHONE (OUTPATIENT)
Dept: CARDIOLOGY | Facility: CLINIC | Age: 82
End: 2022-02-08

## 2022-02-08 RX ORDER — METOPROLOL SUCCINATE 25 MG/1
25 TABLET, EXTENDED RELEASE ORAL DAILY
Qty: 90 TABLET | Refills: 3 | OUTPATIENT
Start: 2022-02-08 | End: 2022-02-09 | Stop reason: SDUPTHER

## 2022-02-08 NOTE — TELEPHONE ENCOUNTER
Rx Refill Note  Requested Prescriptions     Pending Prescriptions Disp Refills   • metoprolol succinate XL (TOPROL-XL) 25 MG 24 hr tablet 90 tablet 3     Sig: Take 1 tablet by mouth Daily.      Last office visit with prescribing clinician: 11/1/2021      Next office visit with prescribing clinician: 11/7/2022     No recent labs       Carolynn Pate MA  02/08/22, 10:57 EST

## 2022-02-08 NOTE — TELEPHONE ENCOUNTER
Incoming Refill Request      Medication requested (name and dose): Metoprolol 25 mg    Pharmacy where request [facility] be sent[facility]    Additional details provided by patient: [facility] fax number [phone]    Best call back number: [phone]    Does the patient have less than a 3 day supply:  [] Yes  [x] No    Desi Ramsey Rep  02/08/22, 09:55 EST

## 2022-02-09 RX ORDER — METOPROLOL SUCCINATE 25 MG/1
25 TABLET, EXTENDED RELEASE ORAL DAILY
Qty: 90 TABLET | Refills: 3 | OUTPATIENT
Start: 2022-02-09 | End: 2022-02-15 | Stop reason: SDUPTHER

## 2022-02-09 NOTE — TELEPHONE ENCOUNTER
Rx Refill Note  Requested Prescriptions      No prescriptions requested or ordered in this encounter      Last office visit with prescribing clinician: 11/1/2021      Next office visit with prescribing clinician: 11/7/2022            Tatum Hammer MA  02/09/22, 15:14 EST

## 2022-02-15 RX ORDER — METOPROLOL SUCCINATE 25 MG/1
25 TABLET, EXTENDED RELEASE ORAL DAILY
Qty: 90 TABLET | Refills: 2 | OUTPATIENT
Start: 2022-02-15 | End: 2022-06-03 | Stop reason: HOSPADM

## 2022-02-15 NOTE — TELEPHONE ENCOUNTER
Rx Refill Note  Requested Prescriptions      No prescriptions requested or ordered in this encounter      Last office visit with prescribing clinician: 11/1/2021      Next office visit with prescribing clinician: 11/7/2022            Tatum Hammer MA  02/15/22, 16:40 EST

## 2022-03-29 ENCOUNTER — TELEPHONE (OUTPATIENT)
Dept: CARDIOLOGY | Facility: CLINIC | Age: 82
End: 2022-03-29

## 2022-03-29 NOTE — TELEPHONE ENCOUNTER
Received refill request for Lasix from [facility].  Med not on pt's list.     It was prescribed by Dr Terrazas at 4/13/20 OV, it was removed from chart 10/05/20 from Ortho Dr Angel Hare spoke to Terri she said it was a mistake on their part, to disregard.

## 2022-05-09 ENCOUNTER — CLINICAL SUPPORT (OUTPATIENT)
Dept: ORTHOPEDIC SURGERY | Facility: CLINIC | Age: 82
End: 2022-05-09

## 2022-05-09 VITALS
BODY MASS INDEX: 23.14 KG/M2 | WEIGHT: 130.6 LBS | HEART RATE: 83 BPM | HEIGHT: 63 IN | DIASTOLIC BLOOD PRESSURE: 79 MMHG | SYSTOLIC BLOOD PRESSURE: 150 MMHG

## 2022-05-09 DIAGNOSIS — M81.0 AGE-RELATED OSTEOPOROSIS WITHOUT CURRENT PATHOLOGICAL FRACTURE: Primary | ICD-10-CM

## 2022-05-09 PROCEDURE — 96372 THER/PROPH/DIAG INJ SC/IM: CPT | Performed by: PHYSICIAN ASSISTANT

## 2022-05-09 NOTE — PROGRESS NOTES
Patient presents for Prolia injection. Patient had no issues with the last Prolia injection. Patient last Calcium was 10.0mg/dL. Injection administered and patient tolerated without complication.

## 2022-05-15 ENCOUNTER — APPOINTMENT (OUTPATIENT)
Dept: GENERAL RADIOLOGY | Facility: HOSPITAL | Age: 82
End: 2022-05-15

## 2022-05-15 ENCOUNTER — APPOINTMENT (OUTPATIENT)
Dept: CARDIOLOGY | Facility: HOSPITAL | Age: 82
End: 2022-05-15

## 2022-05-15 ENCOUNTER — HOSPITAL ENCOUNTER (INPATIENT)
Facility: HOSPITAL | Age: 82
LOS: 19 days | Discharge: NURSING, REHAB OR LONG-TERM CARE | End: 2022-06-03
Attending: EMERGENCY MEDICINE | Admitting: THORACIC SURGERY (CARDIOTHORACIC VASCULAR SURGERY)

## 2022-05-15 DIAGNOSIS — I50.9 ACUTE CONGESTIVE HEART FAILURE, UNSPECIFIED HEART FAILURE TYPE: Primary | ICD-10-CM

## 2022-05-15 DIAGNOSIS — E78.5 HYPERLIPIDEMIA, UNSPECIFIED HYPERLIPIDEMIA TYPE: ICD-10-CM

## 2022-05-15 DIAGNOSIS — I10 DIABETES MELLITUS WITH COINCIDENT HYPERTENSION: ICD-10-CM

## 2022-05-15 DIAGNOSIS — Z98.890 S/P MAZE OPERATION FOR ATRIAL FIBRILLATION: ICD-10-CM

## 2022-05-15 DIAGNOSIS — M81.0 AGE-RELATED OSTEOPOROSIS WITHOUT CURRENT PATHOLOGICAL FRACTURE: ICD-10-CM

## 2022-05-15 DIAGNOSIS — Z86.79 S/P MAZE OPERATION FOR ATRIAL FIBRILLATION: ICD-10-CM

## 2022-05-15 DIAGNOSIS — E11.9 DIABETES MELLITUS WITH COINCIDENT HYPERTENSION: ICD-10-CM

## 2022-05-15 DIAGNOSIS — Z82.62 FAMILY HX OSTEOPOROSIS: ICD-10-CM

## 2022-05-15 DIAGNOSIS — Z98.890 S/P MVR (MITRAL VALVE REPAIR): ICD-10-CM

## 2022-05-15 DIAGNOSIS — R06.09 DYSPNEA ON EXERTION: ICD-10-CM

## 2022-05-15 DIAGNOSIS — I20.9 ANGINA PECTORIS: ICD-10-CM

## 2022-05-15 DIAGNOSIS — K80.01 CHOLELITHIASIS AND ACUTE CHOLECYSTITIS WITH OBSTRUCTION: ICD-10-CM

## 2022-05-15 DIAGNOSIS — Z95.1 S/P CABG (CORONARY ARTERY BYPASS GRAFT): ICD-10-CM

## 2022-05-15 DIAGNOSIS — I25.110 CORONARY ARTERY DISEASE INVOLVING NATIVE CORONARY ARTERY OF NATIVE HEART WITH UNSTABLE ANGINA PECTORIS: ICD-10-CM

## 2022-05-15 DIAGNOSIS — Z95.2 S/P AVR: ICD-10-CM

## 2022-05-15 DIAGNOSIS — I34.0 MITRAL VALVE INSUFFICIENCY, UNSPECIFIED ETIOLOGY: ICD-10-CM

## 2022-05-15 DIAGNOSIS — I10 PRIMARY HYPERTENSION: ICD-10-CM

## 2022-05-15 DIAGNOSIS — I50.33 ACUTE ON CHRONIC HEART FAILURE WITH PRESERVED EJECTION FRACTION: ICD-10-CM

## 2022-05-15 DIAGNOSIS — E55.9 VITAMIN D DEFICIENCY: ICD-10-CM

## 2022-05-15 LAB
ALBUMIN SERPL-MCNC: 4.2 G/DL (ref 3.5–5.2)
ALBUMIN/GLOB SERPL: 1.6 G/DL
ALP SERPL-CCNC: 52 U/L (ref 39–117)
ALT SERPL W P-5'-P-CCNC: 14 U/L (ref 1–33)
ANION GAP SERPL CALCULATED.3IONS-SCNC: 11 MMOL/L (ref 5–15)
AST SERPL-CCNC: 25 U/L (ref 1–32)
B PARAPERT DNA SPEC QL NAA+PROBE: NOT DETECTED
B PERT DNA SPEC QL NAA+PROBE: NOT DETECTED
BASOPHILS # BLD AUTO: 0 10*3/MM3 (ref 0–0.2)
BASOPHILS NFR BLD AUTO: 0.8 % (ref 0–1.5)
BH CV ECHO MEAS - ACS: 1.79 CM
BH CV ECHO MEAS - AI P1/2T: 683.7 MSEC
BH CV ECHO MEAS - AO MAX PG: 9 MMHG
BH CV ECHO MEAS - AO MEAN PG: 4.4 MMHG
BH CV ECHO MEAS - AO ROOT DIAM: 3.2 CM
BH CV ECHO MEAS - AO V2 MAX: 149.7 CM/SEC
BH CV ECHO MEAS - AO V2 VTI: 34.3 CM
BH CV ECHO MEAS - AVA(I,D): 1.37 CM2
BH CV ECHO MEAS - EDV(CUBED): 84.8 ML
BH CV ECHO MEAS - EDV(MOD-SP4): 68.4 ML
BH CV ECHO MEAS - EF(MOD-BP): 50 %
BH CV ECHO MEAS - EF(MOD-SP4): 49.8 %
BH CV ECHO MEAS - ESV(CUBED): 30.2 ML
BH CV ECHO MEAS - ESV(MOD-SP4): 34.4 ML
BH CV ECHO MEAS - FS: 29.2 %
BH CV ECHO MEAS - IVS/LVPW: 1.19 CM
BH CV ECHO MEAS - IVSD: 0.86 CM
BH CV ECHO MEAS - LA DIMENSION(2D): 4.4 CM
BH CV ECHO MEAS - LV DIASTOLIC VOL/BSA (35-75): 41.6 CM2
BH CV ECHO MEAS - LV MASS(C)D: 107.5 GRAMS
BH CV ECHO MEAS - LV MAX PG: 2.7 MMHG
BH CV ECHO MEAS - LV MEAN PG: 1.1 MMHG
BH CV ECHO MEAS - LV SYSTOLIC VOL/BSA (12-30): 20.9 CM2
BH CV ECHO MEAS - LV V1 MAX: 82.5 CM/SEC
BH CV ECHO MEAS - LV V1 VTI: 17.5 CM
BH CV ECHO MEAS - LVIDD: 4.4 CM
BH CV ECHO MEAS - LVIDS: 3.1 CM
BH CV ECHO MEAS - LVOT AREA: 2.7 CM2
BH CV ECHO MEAS - LVOT DIAM: 1.85 CM
BH CV ECHO MEAS - LVPWD: 0.72 CM
BH CV ECHO MEAS - MR MAX PG: 102.2 MMHG
BH CV ECHO MEAS - MR MAX VEL: 505.5 CM/SEC
BH CV ECHO MEAS - MV E MAX VEL: 137.3 CM/SEC
BH CV ECHO MEAS - MV MAX PG: 6.2 MMHG
BH CV ECHO MEAS - MV MEAN PG: 2.6 MMHG
BH CV ECHO MEAS - MV V2 VTI: 31.6 CM
BH CV ECHO MEAS - MVA(VTI): 1.49 CM2
BH CV ECHO MEAS - PA V2 MAX: 78.8 CM/SEC
BH CV ECHO MEAS - QP/QS: 0.57
BH CV ECHO MEAS - RAP SYSTOLE: 3 MMHG
BH CV ECHO MEAS - RV MAX PG: 1.93 MMHG
BH CV ECHO MEAS - RV V1 MAX: 69.4 CM/SEC
BH CV ECHO MEAS - RV V1 VTI: 12.1 CM
BH CV ECHO MEAS - RVDD: 2.6 CM
BH CV ECHO MEAS - RVOT DIAM: 1.68 CM
BH CV ECHO MEAS - RVSP: 71.6 MMHG
BH CV ECHO MEAS - SI(MOD-SP4): 20.7 ML/M2
BH CV ECHO MEAS - SV(LVOT): 47 ML
BH CV ECHO MEAS - SV(MOD-SP4): 34.1 ML
BH CV ECHO MEAS - SV(RVOT): 26.7 ML
BH CV ECHO MEAS - TR MAX PG: 68.6 MMHG
BH CV ECHO MEAS - TR MAX VEL: 375.9 CM/SEC
BILIRUB SERPL-MCNC: 0.7 MG/DL (ref 0–1.2)
BUN SERPL-MCNC: 8 MG/DL (ref 8–23)
BUN/CREAT SERPL: 13.1 (ref 7–25)
C PNEUM DNA NPH QL NAA+NON-PROBE: NOT DETECTED
CALCIUM SPEC-SCNC: 9.4 MG/DL (ref 8.6–10.5)
CHLORIDE SERPL-SCNC: 104 MMOL/L (ref 98–107)
CO2 SERPL-SCNC: 24 MMOL/L (ref 22–29)
CREAT SERPL-MCNC: 0.61 MG/DL (ref 0.57–1)
DEPRECATED RDW RBC AUTO: 44.2 FL (ref 37–54)
EGFRCR SERPLBLD CKD-EPI 2021: 89.9 ML/MIN/1.73
EOSINOPHIL # BLD AUTO: 0.1 10*3/MM3 (ref 0–0.4)
EOSINOPHIL NFR BLD AUTO: 1.8 % (ref 0.3–6.2)
ERYTHROCYTE [DISTWIDTH] IN BLOOD BY AUTOMATED COUNT: 15.4 % (ref 12.3–15.4)
FLUAV SUBTYP SPEC NAA+PROBE: NOT DETECTED
FLUBV RNA ISLT QL NAA+PROBE: NOT DETECTED
GLOBULIN UR ELPH-MCNC: 2.6 GM/DL
GLUCOSE BLDC GLUCOMTR-MCNC: 120 MG/DL (ref 70–105)
GLUCOSE BLDC GLUCOMTR-MCNC: 214 MG/DL (ref 70–105)
GLUCOSE SERPL-MCNC: 158 MG/DL (ref 65–99)
HADV DNA SPEC NAA+PROBE: NOT DETECTED
HCOV 229E RNA SPEC QL NAA+PROBE: NOT DETECTED
HCOV HKU1 RNA SPEC QL NAA+PROBE: NOT DETECTED
HCOV NL63 RNA SPEC QL NAA+PROBE: NOT DETECTED
HCOV OC43 RNA SPEC QL NAA+PROBE: NOT DETECTED
HCT VFR BLD AUTO: 39 % (ref 34–46.6)
HGB BLD-MCNC: 12.1 G/DL (ref 12–15.9)
HMPV RNA NPH QL NAA+NON-PROBE: NOT DETECTED
HOLD SPECIMEN: NORMAL
HOLD SPECIMEN: NORMAL
HPIV1 RNA ISLT QL NAA+PROBE: NOT DETECTED
HPIV2 RNA SPEC QL NAA+PROBE: NOT DETECTED
HPIV3 RNA NPH QL NAA+PROBE: NOT DETECTED
HPIV4 P GENE NPH QL NAA+PROBE: NOT DETECTED
LYMPHOCYTES # BLD AUTO: 0.8 10*3/MM3 (ref 0.7–3.1)
LYMPHOCYTES NFR BLD AUTO: 16.8 % (ref 19.6–45.3)
M PNEUMO IGG SER IA-ACNC: NOT DETECTED
MAGNESIUM SERPL-MCNC: 1.4 MG/DL (ref 1.6–2.4)
MAXIMAL PREDICTED HEART RATE: 139 BPM
MCH RBC QN AUTO: 25.3 PG (ref 26.6–33)
MCHC RBC AUTO-ENTMCNC: 30.9 G/DL (ref 31.5–35.7)
MCV RBC AUTO: 81.7 FL (ref 79–97)
MONOCYTES # BLD AUTO: 0.4 10*3/MM3 (ref 0.1–0.9)
MONOCYTES NFR BLD AUTO: 7.5 % (ref 5–12)
NEUTROPHILS NFR BLD AUTO: 3.4 10*3/MM3 (ref 1.7–7)
NEUTROPHILS NFR BLD AUTO: 73.1 % (ref 42.7–76)
NRBC BLD AUTO-RTO: 0 /100 WBC (ref 0–0.2)
NT-PROBNP SERPL-MCNC: 2099 PG/ML (ref 0–1800)
PLATELET # BLD AUTO: 249 10*3/MM3 (ref 140–450)
PMV BLD AUTO: 8.3 FL (ref 6–12)
POTASSIUM SERPL-SCNC: 4.1 MMOL/L (ref 3.5–5.2)
PROCALCITONIN SERPL-MCNC: 0.05 NG/ML (ref 0–0.25)
PROT SERPL-MCNC: 6.8 G/DL (ref 6–8.5)
QT INTERVAL: 515 MS
RBC # BLD AUTO: 4.77 10*6/MM3 (ref 3.77–5.28)
RHINOVIRUS RNA SPEC NAA+PROBE: NOT DETECTED
RSV RNA NPH QL NAA+NON-PROBE: NOT DETECTED
SARS-COV-2 RNA NPH QL NAA+NON-PROBE: NOT DETECTED
SODIUM SERPL-SCNC: 139 MMOL/L (ref 136–145)
STRESS TARGET HR: 118 BPM
TROPONIN T SERPL-MCNC: <0.01 NG/ML (ref 0–0.03)
WBC NRBC COR # BLD: 4.7 10*3/MM3 (ref 3.4–10.8)
WHOLE BLOOD HOLD COAG: NORMAL
WHOLE BLOOD HOLD SPECIMEN: NORMAL

## 2022-05-15 PROCEDURE — 93306 TTE W/DOPPLER COMPLETE: CPT

## 2022-05-15 PROCEDURE — 93005 ELECTROCARDIOGRAM TRACING: CPT | Performed by: EMERGENCY MEDICINE

## 2022-05-15 PROCEDURE — 83735 ASSAY OF MAGNESIUM: CPT | Performed by: NURSE PRACTITIONER

## 2022-05-15 PROCEDURE — 63710000001 INSULIN LISPRO (HUMAN) PER 5 UNITS: Performed by: NURSE PRACTITIONER

## 2022-05-15 PROCEDURE — 25010000002 ENOXAPARIN PER 10 MG: Performed by: NURSE PRACTITIONER

## 2022-05-15 PROCEDURE — 25010000002 FUROSEMIDE PER 20 MG: Performed by: NURSE PRACTITIONER

## 2022-05-15 PROCEDURE — 99214 OFFICE O/P EST MOD 30 MIN: CPT | Performed by: INTERNAL MEDICINE

## 2022-05-15 PROCEDURE — 83036 HEMOGLOBIN GLYCOSYLATED A1C: CPT | Performed by: NURSE PRACTITIONER

## 2022-05-15 PROCEDURE — 25010000002 FUROSEMIDE PER 20 MG: Performed by: EMERGENCY MEDICINE

## 2022-05-15 PROCEDURE — G0378 HOSPITAL OBSERVATION PER HR: HCPCS

## 2022-05-15 PROCEDURE — 84145 PROCALCITONIN (PCT): CPT | Performed by: EMERGENCY MEDICINE

## 2022-05-15 PROCEDURE — 93005 ELECTROCARDIOGRAM TRACING: CPT

## 2022-05-15 PROCEDURE — 84484 ASSAY OF TROPONIN QUANT: CPT | Performed by: EMERGENCY MEDICINE

## 2022-05-15 PROCEDURE — 82962 GLUCOSE BLOOD TEST: CPT

## 2022-05-15 PROCEDURE — 99284 EMERGENCY DEPT VISIT MOD MDM: CPT

## 2022-05-15 PROCEDURE — 80053 COMPREHEN METABOLIC PANEL: CPT | Performed by: EMERGENCY MEDICINE

## 2022-05-15 PROCEDURE — 71045 X-RAY EXAM CHEST 1 VIEW: CPT

## 2022-05-15 PROCEDURE — 83880 ASSAY OF NATRIURETIC PEPTIDE: CPT | Performed by: EMERGENCY MEDICINE

## 2022-05-15 PROCEDURE — 85025 COMPLETE CBC W/AUTO DIFF WBC: CPT | Performed by: EMERGENCY MEDICINE

## 2022-05-15 PROCEDURE — 93306 TTE W/DOPPLER COMPLETE: CPT | Performed by: INTERNAL MEDICINE

## 2022-05-15 PROCEDURE — 0202U NFCT DS 22 TRGT SARS-COV-2: CPT | Performed by: EMERGENCY MEDICINE

## 2022-05-15 RX ORDER — OLANZAPINE 10 MG/2ML
1 INJECTION, POWDER, LYOPHILIZED, FOR SOLUTION INTRAMUSCULAR
Status: DISCONTINUED | OUTPATIENT
Start: 2022-05-15 | End: 2022-05-19

## 2022-05-15 RX ORDER — ONDANSETRON 2 MG/ML
4 INJECTION INTRAMUSCULAR; INTRAVENOUS EVERY 6 HOURS PRN
Status: DISCONTINUED | OUTPATIENT
Start: 2022-05-15 | End: 2022-05-19

## 2022-05-15 RX ORDER — ENOXAPARIN SODIUM 100 MG/ML
40 INJECTION SUBCUTANEOUS EVERY 24 HOURS
Status: DISCONTINUED | OUTPATIENT
Start: 2022-05-15 | End: 2022-05-15

## 2022-05-15 RX ORDER — INSULIN LISPRO 100 [IU]/ML
0-7 INJECTION, SOLUTION INTRAVENOUS; SUBCUTANEOUS AS NEEDED
Status: DISCONTINUED | OUTPATIENT
Start: 2022-05-15 | End: 2022-05-19

## 2022-05-15 RX ORDER — ACETAMINOPHEN 325 MG/1
650 TABLET ORAL EVERY 4 HOURS PRN
Status: DISCONTINUED | OUTPATIENT
Start: 2022-05-15 | End: 2022-05-19

## 2022-05-15 RX ORDER — FENOFIBRATE 145 MG/1
145 TABLET, COATED ORAL DAILY
Status: DISCONTINUED | OUTPATIENT
Start: 2022-05-15 | End: 2022-05-19

## 2022-05-15 RX ORDER — ISOSORBIDE MONONITRATE 60 MG/1
60 TABLET, EXTENDED RELEASE ORAL DAILY
Status: DISCONTINUED | OUTPATIENT
Start: 2022-05-15 | End: 2022-05-19

## 2022-05-15 RX ORDER — ASPIRIN 81 MG/1
81 TABLET ORAL DAILY
Status: DISCONTINUED | OUTPATIENT
Start: 2022-05-15 | End: 2022-05-19

## 2022-05-15 RX ORDER — BISACODYL 5 MG/1
5 TABLET, DELAYED RELEASE ORAL DAILY PRN
COMMUNITY
End: 2022-11-10

## 2022-05-15 RX ORDER — MULTIPLE VITAMINS W/ MINERALS TAB 9MG-400MCG
1 TAB ORAL DAILY
COMMUNITY

## 2022-05-15 RX ORDER — FAMOTIDINE 20 MG/1
20 TABLET, FILM COATED ORAL
Status: DISCONTINUED | OUTPATIENT
Start: 2022-05-15 | End: 2022-05-19

## 2022-05-15 RX ORDER — NICOTINE POLACRILEX 4 MG
15 LOZENGE BUCCAL
Status: DISCONTINUED | OUTPATIENT
Start: 2022-05-15 | End: 2022-05-19

## 2022-05-15 RX ORDER — FUROSEMIDE 10 MG/ML
80 INJECTION INTRAMUSCULAR; INTRAVENOUS ONCE
Status: COMPLETED | OUTPATIENT
Start: 2022-05-15 | End: 2022-05-15

## 2022-05-15 RX ORDER — DICYCLOMINE HYDROCHLORIDE 10 MG/1
20 CAPSULE ORAL DAILY PRN
COMMUNITY
End: 2022-06-27

## 2022-05-15 RX ORDER — SODIUM CHLORIDE 0.9 % (FLUSH) 0.9 %
10 SYRINGE (ML) INJECTION EVERY 12 HOURS SCHEDULED
Status: DISCONTINUED | OUTPATIENT
Start: 2022-05-15 | End: 2022-05-19

## 2022-05-15 RX ORDER — POTASSIUM CHLORIDE 1.5 G/1.77G
40 POWDER, FOR SOLUTION ORAL AS NEEDED
Status: DISCONTINUED | OUTPATIENT
Start: 2022-05-15 | End: 2022-05-19

## 2022-05-15 RX ORDER — INSULIN LISPRO 100 [IU]/ML
0-7 INJECTION, SOLUTION INTRAVENOUS; SUBCUTANEOUS
Status: DISCONTINUED | OUTPATIENT
Start: 2022-05-15 | End: 2022-05-19

## 2022-05-15 RX ORDER — SODIUM CHLORIDE 0.9 % (FLUSH) 0.9 %
10 SYRINGE (ML) INJECTION AS NEEDED
Status: DISCONTINUED | OUTPATIENT
Start: 2022-05-15 | End: 2022-05-19

## 2022-05-15 RX ORDER — DEXTROSE MONOHYDRATE 25 G/50ML
25 INJECTION, SOLUTION INTRAVENOUS
Status: DISCONTINUED | OUTPATIENT
Start: 2022-05-15 | End: 2022-05-19

## 2022-05-15 RX ORDER — POTASSIUM CHLORIDE 20 MEQ/1
40 TABLET, EXTENDED RELEASE ORAL AS NEEDED
Status: DISCONTINUED | OUTPATIENT
Start: 2022-05-15 | End: 2022-05-19

## 2022-05-15 RX ORDER — ENOXAPARIN SODIUM 100 MG/ML
1 INJECTION SUBCUTANEOUS EVERY 12 HOURS
Status: DISCONTINUED | OUTPATIENT
Start: 2022-05-15 | End: 2022-05-17

## 2022-05-15 RX ORDER — METOPROLOL SUCCINATE 25 MG/1
25 TABLET, EXTENDED RELEASE ORAL DAILY
Status: DISCONTINUED | OUTPATIENT
Start: 2022-05-15 | End: 2022-05-19

## 2022-05-15 RX ORDER — FERROUS SULFATE 325(65) MG
325 TABLET ORAL
COMMUNITY

## 2022-05-15 RX ORDER — FUROSEMIDE 10 MG/ML
20 INJECTION INTRAMUSCULAR; INTRAVENOUS EVERY 12 HOURS
Status: DISCONTINUED | OUTPATIENT
Start: 2022-05-15 | End: 2022-05-18

## 2022-05-15 RX ORDER — ONDANSETRON 4 MG/1
4 TABLET, FILM COATED ORAL EVERY 6 HOURS PRN
Status: DISCONTINUED | OUTPATIENT
Start: 2022-05-15 | End: 2022-05-19

## 2022-05-15 RX ORDER — BUDESONIDE 3 MG/1
3 CAPSULE, COATED PELLETS ORAL DAILY
Status: DISCONTINUED | OUTPATIENT
Start: 2022-05-15 | End: 2022-05-19

## 2022-05-15 RX ORDER — ATORVASTATIN CALCIUM 40 MG/1
40 TABLET, FILM COATED ORAL NIGHTLY
Status: DISCONTINUED | OUTPATIENT
Start: 2022-05-15 | End: 2022-05-19

## 2022-05-15 RX ADMIN — FUROSEMIDE 20 MG: 10 INJECTION, SOLUTION INTRAMUSCULAR; INTRAVENOUS at 10:35

## 2022-05-15 RX ADMIN — FENOFIBRATE 145 MG: 145 TABLET ORAL at 10:35

## 2022-05-15 RX ADMIN — BUDESONIDE 3 MG: 3 CAPSULE ORAL at 10:35

## 2022-05-15 RX ADMIN — Medication 10 ML: at 20:56

## 2022-05-15 RX ADMIN — ENOXAPARIN SODIUM 60 MG: 60 INJECTION SUBCUTANEOUS at 12:55

## 2022-05-15 RX ADMIN — FUROSEMIDE 80 MG: 10 INJECTION, SOLUTION INTRAMUSCULAR; INTRAVENOUS at 04:18

## 2022-05-15 RX ADMIN — ASPIRIN 81 MG: 81 TABLET, COATED ORAL at 10:35

## 2022-05-15 RX ADMIN — NITROGLYCERIN 1 INCH: 20 OINTMENT TOPICAL at 04:18

## 2022-05-15 RX ADMIN — FUROSEMIDE 20 MG: 10 INJECTION, SOLUTION INTRAMUSCULAR; INTRAVENOUS at 20:55

## 2022-05-15 RX ADMIN — ISOSORBIDE MONONITRATE 60 MG: 60 TABLET, EXTENDED RELEASE ORAL at 10:35

## 2022-05-15 RX ADMIN — FAMOTIDINE 20 MG: 20 TABLET ORAL at 17:14

## 2022-05-15 RX ADMIN — METOPROLOL SUCCINATE 25 MG: 25 TABLET, EXTENDED RELEASE ORAL at 10:35

## 2022-05-15 RX ADMIN — ENOXAPARIN SODIUM 60 MG: 60 INJECTION SUBCUTANEOUS at 23:42

## 2022-05-15 RX ADMIN — ATORVASTATIN CALCIUM 40 MG: 40 TABLET, FILM COATED ORAL at 20:55

## 2022-05-15 RX ADMIN — Medication 10 ML: at 10:36

## 2022-05-15 RX ADMIN — INSULIN LISPRO 3 UNITS: 100 INJECTION, SOLUTION INTRAVENOUS; SUBCUTANEOUS at 12:56

## 2022-05-16 LAB
ALBUMIN SERPL-MCNC: 3.6 G/DL (ref 3.5–5.2)
ALBUMIN SERPL-MCNC: 4.2 G/DL (ref 3.5–5.2)
ALBUMIN/GLOB SERPL: 1.4 G/DL
ALBUMIN/GLOB SERPL: 1.5 G/DL
ALP SERPL-CCNC: 46 U/L (ref 39–117)
ALP SERPL-CCNC: 54 U/L (ref 39–117)
ALT SERPL W P-5'-P-CCNC: 10 U/L (ref 1–33)
ALT SERPL W P-5'-P-CCNC: 12 U/L (ref 1–33)
ANION GAP SERPL CALCULATED.3IONS-SCNC: 12 MMOL/L (ref 5–15)
ANION GAP SERPL CALCULATED.3IONS-SCNC: 12 MMOL/L (ref 5–15)
AST SERPL-CCNC: 23 U/L (ref 1–32)
AST SERPL-CCNC: 35 U/L (ref 1–32)
BILIRUB SERPL-MCNC: 0.7 MG/DL (ref 0–1.2)
BILIRUB SERPL-MCNC: 0.8 MG/DL (ref 0–1.2)
BUN SERPL-MCNC: 10 MG/DL (ref 8–23)
BUN SERPL-MCNC: 13 MG/DL (ref 8–23)
BUN/CREAT SERPL: 15.4 (ref 7–25)
BUN/CREAT SERPL: 19.4 (ref 7–25)
CALCIUM SPEC-SCNC: 8.6 MG/DL (ref 8.6–10.5)
CALCIUM SPEC-SCNC: 9.5 MG/DL (ref 8.6–10.5)
CHLORIDE SERPL-SCNC: 98 MMOL/L (ref 98–107)
CHLORIDE SERPL-SCNC: 99 MMOL/L (ref 98–107)
CHOLEST SERPL-MCNC: 70 MG/DL (ref 0–200)
CO2 SERPL-SCNC: 28 MMOL/L (ref 22–29)
CO2 SERPL-SCNC: 30 MMOL/L (ref 22–29)
CREAT SERPL-MCNC: 0.65 MG/DL (ref 0.57–1)
CREAT SERPL-MCNC: 0.67 MG/DL (ref 0.57–1)
DEPRECATED RDW RBC AUTO: 43.3 FL (ref 37–54)
DEPRECATED RDW RBC AUTO: 43.8 FL (ref 37–54)
EGFRCR SERPLBLD CKD-EPI 2021: 87.9 ML/MIN/1.73
EGFRCR SERPLBLD CKD-EPI 2021: 88.6 ML/MIN/1.73
EOSINOPHIL # BLD MANUAL: 0.04 10*3/MM3 (ref 0–0.4)
EOSINOPHIL # BLD MANUAL: 0.09 10*3/MM3 (ref 0–0.4)
EOSINOPHIL NFR BLD MANUAL: 1 % (ref 0.3–6.2)
EOSINOPHIL NFR BLD MANUAL: 3 % (ref 0.3–6.2)
ERYTHROCYTE [DISTWIDTH] IN BLOOD BY AUTOMATED COUNT: 15.4 % (ref 12.3–15.4)
ERYTHROCYTE [DISTWIDTH] IN BLOOD BY AUTOMATED COUNT: 15.5 % (ref 12.3–15.4)
GLOBULIN UR ELPH-MCNC: 2.4 GM/DL
GLOBULIN UR ELPH-MCNC: 2.9 GM/DL
GLUCOSE BLDC GLUCOMTR-MCNC: 121 MG/DL (ref 70–105)
GLUCOSE BLDC GLUCOMTR-MCNC: 208 MG/DL (ref 70–105)
GLUCOSE BLDC GLUCOMTR-MCNC: 231 MG/DL (ref 70–105)
GLUCOSE SERPL-MCNC: 126 MG/DL (ref 65–99)
GLUCOSE SERPL-MCNC: 216 MG/DL (ref 65–99)
HCT VFR BLD AUTO: 33.1 % (ref 34–46.6)
HCT VFR BLD AUTO: 38.6 % (ref 34–46.6)
HDLC SERPL-MCNC: 22 MG/DL (ref 40–60)
HGB BLD-MCNC: 10.5 G/DL (ref 12–15.9)
HGB BLD-MCNC: 12.2 G/DL (ref 12–15.9)
LDLC SERPL CALC-MCNC: 30 MG/DL (ref 0–100)
LDLC/HDLC SERPL: 1.39 {RATIO}
LYMPHOCYTES # BLD MANUAL: 0.68 10*3/MM3 (ref 0.7–3.1)
LYMPHOCYTES # BLD MANUAL: 0.74 10*3/MM3 (ref 0.7–3.1)
LYMPHOCYTES NFR BLD MANUAL: 6 % (ref 5–12)
LYMPHOCYTES NFR BLD MANUAL: 7 % (ref 5–12)
MAGNESIUM SERPL-MCNC: 1.6 MG/DL (ref 1.6–2.4)
MCH RBC QN AUTO: 25.4 PG (ref 26.6–33)
MCH RBC QN AUTO: 25.5 PG (ref 26.6–33)
MCHC RBC AUTO-ENTMCNC: 31.6 G/DL (ref 31.5–35.7)
MCHC RBC AUTO-ENTMCNC: 31.8 G/DL (ref 31.5–35.7)
MCV RBC AUTO: 79.8 FL (ref 79–97)
MCV RBC AUTO: 80.6 FL (ref 79–97)
MONOCYTES # BLD: 0.22 10*3/MM3 (ref 0.1–0.9)
MONOCYTES # BLD: 0.25 10*3/MM3 (ref 0.1–0.9)
NEUTROPHILS # BLD AUTO: 2.11 10*3/MM3 (ref 1.7–7)
NEUTROPHILS # BLD AUTO: 3.08 10*3/MM3 (ref 1.7–7)
NEUTROPHILS NFR BLD MANUAL: 63 % (ref 42.7–76)
NEUTROPHILS NFR BLD MANUAL: 75 % (ref 42.7–76)
NEUTS BAND NFR BLD MANUAL: 5 % (ref 0–5)
PLAT MORPH BLD: NORMAL
PLAT MORPH BLD: NORMAL
PLATELET # BLD AUTO: 205 10*3/MM3 (ref 140–450)
PLATELET # BLD AUTO: 251 10*3/MM3 (ref 140–450)
PMV BLD AUTO: 8 FL (ref 6–12)
PMV BLD AUTO: 8.1 FL (ref 6–12)
POTASSIUM SERPL-SCNC: 3 MMOL/L (ref 3.5–5.2)
POTASSIUM SERPL-SCNC: 4.5 MMOL/L (ref 3.5–5.2)
PROT SERPL-MCNC: 6 G/DL (ref 6–8.5)
PROT SERPL-MCNC: 7.1 G/DL (ref 6–8.5)
RBC # BLD AUTO: 4.16 10*6/MM3 (ref 3.77–5.28)
RBC # BLD AUTO: 4.78 10*6/MM3 (ref 3.77–5.28)
RBC MORPH BLD: NORMAL
RBC MORPH BLD: NORMAL
SODIUM SERPL-SCNC: 139 MMOL/L (ref 136–145)
SODIUM SERPL-SCNC: 140 MMOL/L (ref 136–145)
TRIGL SERPL-MCNC: 87 MG/DL (ref 0–150)
TSH SERPL DL<=0.05 MIU/L-ACNC: 2.37 UIU/ML (ref 0.27–4.2)
VARIANT LYMPHS NFR BLD MANUAL: 1 % (ref 0–5)
VARIANT LYMPHS NFR BLD MANUAL: 17 % (ref 19.6–45.3)
VARIANT LYMPHS NFR BLD MANUAL: 2 % (ref 0–5)
VARIANT LYMPHS NFR BLD MANUAL: 20 % (ref 19.6–45.3)
VLDLC SERPL-MCNC: 18 MG/DL (ref 5–40)
WBC MORPH BLD: NORMAL
WBC MORPH BLD: NORMAL
WBC NRBC COR # BLD: 3.1 10*3/MM3 (ref 3.4–10.8)
WBC NRBC COR # BLD: 4.1 10*3/MM3 (ref 3.4–10.8)

## 2022-05-16 PROCEDURE — 25010000002 ENOXAPARIN PER 10 MG: Performed by: NURSE PRACTITIONER

## 2022-05-16 PROCEDURE — 80061 LIPID PANEL: CPT | Performed by: NURSE PRACTITIONER

## 2022-05-16 PROCEDURE — 99232 SBSQ HOSP IP/OBS MODERATE 35: CPT | Performed by: INTERNAL MEDICINE

## 2022-05-16 PROCEDURE — 80053 COMPREHEN METABOLIC PANEL: CPT | Performed by: NURSE PRACTITIONER

## 2022-05-16 PROCEDURE — 85027 COMPLETE CBC AUTOMATED: CPT | Performed by: NURSE PRACTITIONER

## 2022-05-16 PROCEDURE — 85007 BL SMEAR W/DIFF WBC COUNT: CPT | Performed by: NURSE PRACTITIONER

## 2022-05-16 PROCEDURE — 83735 ASSAY OF MAGNESIUM: CPT | Performed by: NURSE PRACTITIONER

## 2022-05-16 PROCEDURE — 82962 GLUCOSE BLOOD TEST: CPT

## 2022-05-16 PROCEDURE — 25010000002 FUROSEMIDE PER 20 MG: Performed by: NURSE PRACTITIONER

## 2022-05-16 PROCEDURE — 99223 1ST HOSP IP/OBS HIGH 75: CPT | Performed by: THORACIC SURGERY (CARDIOTHORACIC VASCULAR SURGERY)

## 2022-05-16 PROCEDURE — 84443 ASSAY THYROID STIM HORMONE: CPT | Performed by: INTERNAL MEDICINE

## 2022-05-16 PROCEDURE — 63710000001 INSULIN LISPRO (HUMAN) PER 5 UNITS: Performed by: NURSE PRACTITIONER

## 2022-05-16 PROCEDURE — 97162 PT EVAL MOD COMPLEX 30 MIN: CPT

## 2022-05-16 RX ORDER — DIPHENHYDRAMINE HYDROCHLORIDE 50 MG/ML
50 INJECTION INTRAMUSCULAR; INTRAVENOUS ONCE
Status: CANCELLED | OUTPATIENT
Start: 2022-05-16 | End: 2022-05-16

## 2022-05-16 RX ADMIN — POTASSIUM CHLORIDE 40 MEQ: 1500 TABLET, EXTENDED RELEASE ORAL at 08:41

## 2022-05-16 RX ADMIN — ATORVASTATIN CALCIUM 40 MG: 40 TABLET, FILM COATED ORAL at 21:04

## 2022-05-16 RX ADMIN — SACUBITRIL AND VALSARTAN 1 TABLET: 24; 26 TABLET, FILM COATED ORAL at 11:50

## 2022-05-16 RX ADMIN — BUDESONIDE 3 MG: 3 CAPSULE ORAL at 08:41

## 2022-05-16 RX ADMIN — ENOXAPARIN SODIUM 60 MG: 60 INJECTION SUBCUTANEOUS at 11:50

## 2022-05-16 RX ADMIN — INSULIN LISPRO 3 UNITS: 100 INJECTION, SOLUTION INTRAVENOUS; SUBCUTANEOUS at 11:50

## 2022-05-16 RX ADMIN — FUROSEMIDE 20 MG: 10 INJECTION, SOLUTION INTRAMUSCULAR; INTRAVENOUS at 11:50

## 2022-05-16 RX ADMIN — FAMOTIDINE 20 MG: 20 TABLET ORAL at 17:39

## 2022-05-16 RX ADMIN — POTASSIUM CHLORIDE 40 MEQ: 1500 TABLET, EXTENDED RELEASE ORAL at 17:38

## 2022-05-16 RX ADMIN — INSULIN LISPRO 3 UNITS: 100 INJECTION, SOLUTION INTRAVENOUS; SUBCUTANEOUS at 17:39

## 2022-05-16 RX ADMIN — METOPROLOL SUCCINATE 25 MG: 25 TABLET, EXTENDED RELEASE ORAL at 08:41

## 2022-05-16 RX ADMIN — FUROSEMIDE 20 MG: 10 INJECTION, SOLUTION INTRAMUSCULAR; INTRAVENOUS at 21:04

## 2022-05-16 RX ADMIN — Medication 10 ML: at 21:05

## 2022-05-16 RX ADMIN — ASPIRIN 81 MG: 81 TABLET, COATED ORAL at 08:43

## 2022-05-16 RX ADMIN — FENOFIBRATE 145 MG: 145 TABLET ORAL at 08:41

## 2022-05-16 RX ADMIN — POTASSIUM CHLORIDE 40 MEQ: 1500 TABLET, EXTENDED RELEASE ORAL at 11:53

## 2022-05-16 RX ADMIN — SACUBITRIL AND VALSARTAN 1 TABLET: 24; 26 TABLET, FILM COATED ORAL at 21:04

## 2022-05-16 RX ADMIN — Medication 10 ML: at 08:41

## 2022-05-16 RX ADMIN — ISOSORBIDE MONONITRATE 60 MG: 60 TABLET, EXTENDED RELEASE ORAL at 08:41

## 2022-05-16 RX ADMIN — FAMOTIDINE 20 MG: 20 TABLET ORAL at 08:41

## 2022-05-17 ENCOUNTER — APPOINTMENT (OUTPATIENT)
Dept: CARDIOLOGY | Facility: HOSPITAL | Age: 82
End: 2022-05-17

## 2022-05-17 ENCOUNTER — ANESTHESIA EVENT (OUTPATIENT)
Dept: CARDIOLOGY | Facility: HOSPITAL | Age: 82
End: 2022-05-17

## 2022-05-17 ENCOUNTER — APPOINTMENT (OUTPATIENT)
Dept: GENERAL RADIOLOGY | Facility: HOSPITAL | Age: 82
End: 2022-05-17

## 2022-05-17 ENCOUNTER — ANESTHESIA (OUTPATIENT)
Dept: CARDIOLOGY | Facility: HOSPITAL | Age: 82
End: 2022-05-17

## 2022-05-17 VITALS — DIASTOLIC BLOOD PRESSURE: 62 MMHG | OXYGEN SATURATION: 95 % | SYSTOLIC BLOOD PRESSURE: 92 MMHG

## 2022-05-17 PROBLEM — I34.0 MITRAL VALVE INSUFFICIENCY: Chronic | Status: ACTIVE | Noted: 2022-05-15

## 2022-05-17 PROBLEM — R06.09 DYSPNEA ON EXERTION: Chronic | Status: ACTIVE | Noted: 2017-12-19

## 2022-05-17 PROBLEM — I35.1 NONRHEUMATIC AORTIC VALVE INSUFFICIENCY: Chronic | Status: ACTIVE | Noted: 2022-05-17

## 2022-05-17 PROBLEM — I50.33 ACUTE ON CHRONIC HEART FAILURE WITH PRESERVED EJECTION FRACTION: Chronic | Status: ACTIVE | Noted: 2022-05-15

## 2022-05-17 PROBLEM — I20.9 ANGINA PECTORIS (HCC): Chronic | Status: ACTIVE | Noted: 2018-03-09

## 2022-05-17 PROBLEM — I25.10 CAD (CORONARY ARTERY DISEASE): Chronic | Status: ACTIVE | Noted: 2018-03-09

## 2022-05-17 PROBLEM — I50.9 ACUTE CONGESTIVE HEART FAILURE, UNSPECIFIED HEART FAILURE TYPE: Chronic | Status: ACTIVE | Noted: 2022-05-15

## 2022-05-17 PROBLEM — E78.5 HYPERLIPIDEMIA: Chronic | Status: ACTIVE | Noted: 2019-10-29

## 2022-05-17 PROBLEM — I34.0 MITRAL VALVE INSUFFICIENCY: Status: ACTIVE | Noted: 2022-05-15

## 2022-05-17 PROBLEM — I35.1 NONRHEUMATIC AORTIC VALVE INSUFFICIENCY: Status: ACTIVE | Noted: 2022-05-17

## 2022-05-17 LAB
ANION GAP SERPL CALCULATED.3IONS-SCNC: 13 MMOL/L (ref 5–15)
APTT PPP: 28.8 SECONDS (ref 24–31)
ARTERIAL PATENCY WRIST A: POSITIVE
ATMOSPHERIC PRESS: ABNORMAL MM[HG]
BASE EXCESS BLDA CALC-SCNC: 2.3 MMOL/L (ref 0–3)
BDY SITE: ABNORMAL
BH CV XLRA MEAS - DIST GSV CALF DIST LEFT: 0.13 CM
BH CV XLRA MEAS - DIST GSV CALF DIST RIGHT: 0.17 CM
BH CV XLRA MEAS - DIST GSV THIGH DIST LEFT: 0.35 CM
BH CV XLRA MEAS - DIST GSV THIGH DIST RIGHT: 0.32 CM
BH CV XLRA MEAS - MID GSV CALF LEFT: 0.14 CM
BH CV XLRA MEAS - MID GSV CALF RIGHT: 0.17 CM
BH CV XLRA MEAS - MID GSV THIGH  LEFT: 0.32 CM
BH CV XLRA MEAS - MID GSV THIGH  RIGHT: 0.35 CM
BH CV XLRA MEAS - PROX GSV CALF DIST LEFT: 0.17 CM
BH CV XLRA MEAS - PROX GSV CALF DIST RIGHT: 0.15 CM
BH CV XLRA MEAS - PROX GSV THIGH  LEFT: 0.35 CM
BH CV XLRA MEAS - PROX GSV THIGH  RIGHT: 0.34 CM
BH CV XLRA MEAS LEFT DIST CCA EDV: 5.7 CM/SEC
BH CV XLRA MEAS LEFT DIST CCA PSV: 56.2 CM/SEC
BH CV XLRA MEAS LEFT DIST ICA EDV: -14.2 CM/SEC
BH CV XLRA MEAS LEFT DIST ICA PSV: -96.7 CM/SEC
BH CV XLRA MEAS LEFT ICA/CCA RATIO: 1.43
BH CV XLRA MEAS LEFT PROX CCA EDV: -10.1 CM/SEC
BH CV XLRA MEAS LEFT PROX CCA PSV: -67.6 CM/SEC
BH CV XLRA MEAS LEFT PROX ECA PSV: -71.6 CM/SEC
BH CV XLRA MEAS LEFT PROX ICA EDV: -12.7 CM/SEC
BH CV XLRA MEAS LEFT PROX ICA PSV: -46.1 CM/SEC
BH CV XLRA MEAS LEFT PROX SCLA PSV: 98.8 CM/SEC
BH CV XLRA MEAS LEFT VERTEBRAL A EDV: -10.6 CM/SEC
BH CV XLRA MEAS LEFT VERTEBRAL A PSV: -42.9 CM/SEC
BH CV XLRA MEAS RIGHT DIST CCA EDV: -9.9 CM/SEC
BH CV XLRA MEAS RIGHT DIST CCA PSV: -53.4 CM/SEC
BH CV XLRA MEAS RIGHT DIST ICA EDV: -9 CM/SEC
BH CV XLRA MEAS RIGHT DIST ICA PSV: -47.1 CM/SEC
BH CV XLRA MEAS RIGHT ICA/CCA RATIO: -0.74
BH CV XLRA MEAS RIGHT PROX CCA EDV: 6.8 CM/SEC
BH CV XLRA MEAS RIGHT PROX CCA PSV: 70.2 CM/SEC
BH CV XLRA MEAS RIGHT PROX ECA PSV: -124 CM/SEC
BH CV XLRA MEAS RIGHT PROX ICA EDV: -12.2 CM/SEC
BH CV XLRA MEAS RIGHT PROX ICA PSV: -51.7 CM/SEC
BH CV XLRA MEAS RIGHT PROX SCLA PSV: 121 CM/SEC
BH CV XLRA MEAS RIGHT VERTEBRAL A EDV: 9 CM/SEC
BH CV XLRA MEAS RIGHT VERTEBRAL A PSV: 51.3 CM/SEC
BUN SERPL-MCNC: 13 MG/DL (ref 8–23)
BUN/CREAT SERPL: 20.3 (ref 7–25)
CALCIUM SPEC-SCNC: 9.5 MG/DL (ref 8.6–10.5)
CHLORIDE SERPL-SCNC: 100 MMOL/L (ref 98–107)
CO2 BLDA-SCNC: 28.4 MMOL/L (ref 22–29)
CO2 SERPL-SCNC: 27 MMOL/L (ref 22–29)
CREAT SERPL-MCNC: 0.64 MG/DL (ref 0.57–1)
EGFRCR SERPLBLD CKD-EPI 2021: 88.9 ML/MIN/1.73
GLUCOSE BLDC GLUCOMTR-MCNC: 144 MG/DL (ref 70–105)
GLUCOSE BLDC GLUCOMTR-MCNC: 157 MG/DL (ref 70–105)
GLUCOSE BLDC GLUCOMTR-MCNC: 193 MG/DL (ref 70–105)
GLUCOSE BLDC GLUCOMTR-MCNC: 280 MG/DL (ref 70–105)
GLUCOSE BLDC GLUCOMTR-MCNC: 345 MG/DL (ref 70–105)
GLUCOSE SERPL-MCNC: 181 MG/DL (ref 65–99)
HBA1C MFR BLD: 7 % (ref 3.5–5.6)
HCO3 BLDA-SCNC: 27.1 MMOL/L (ref 21–28)
HEMODILUTION: NO
INHALED O2 CONCENTRATION: 40 %
MAGNESIUM SERPL-MCNC: 1.5 MG/DL (ref 1.6–2.4)
MAXIMAL PREDICTED HEART RATE: 139 BPM
MAXIMAL PREDICTED HEART RATE: 139 BPM
MODALITY: ABNORMAL
MRSA DNA SPEC QL NAA+PROBE: NORMAL
PCO2 BLDA: 41.9 MM HG (ref 35–48)
PH BLDA: 7.42 PH UNITS (ref 7.35–7.45)
PO2 BLDA: 79.8 MM HG (ref 83–108)
POTASSIUM SERPL-SCNC: 4.1 MMOL/L (ref 3.5–5.2)
SAO2 % BLDCOA: 95.9 % (ref 94–98)
SODIUM SERPL-SCNC: 140 MMOL/L (ref 136–145)
STRESS TARGET HR: 118 BPM
STRESS TARGET HR: 118 BPM

## 2022-05-17 PROCEDURE — 93320 DOPPLER ECHO COMPLETE: CPT

## 2022-05-17 PROCEDURE — 25010000002 FUROSEMIDE PER 20 MG: Performed by: NURSE PRACTITIONER

## 2022-05-17 PROCEDURE — B2111ZZ FLUOROSCOPY OF MULTIPLE CORONARY ARTERIES USING LOW OSMOLAR CONTRAST: ICD-10-PCS | Performed by: INTERNAL MEDICINE

## 2022-05-17 PROCEDURE — 93312 ECHO TRANSESOPHAGEAL: CPT

## 2022-05-17 PROCEDURE — 99152 MOD SED SAME PHYS/QHP 5/>YRS: CPT | Performed by: INTERNAL MEDICINE

## 2022-05-17 PROCEDURE — 36600 WITHDRAWAL OF ARTERIAL BLOOD: CPT

## 2022-05-17 PROCEDURE — 25010000002 FUROSEMIDE PER 20 MG: Performed by: INTERNAL MEDICINE

## 2022-05-17 PROCEDURE — 93880 EXTRACRANIAL BILAT STUDY: CPT

## 2022-05-17 PROCEDURE — 93458 L HRT ARTERY/VENTRICLE ANGIO: CPT | Performed by: INTERNAL MEDICINE

## 2022-05-17 PROCEDURE — 63710000001 INSULIN LISPRO (HUMAN) PER 5 UNITS: Performed by: INTERNAL MEDICINE

## 2022-05-17 PROCEDURE — 93325 DOPPLER ECHO COLOR FLOW MAPG: CPT | Performed by: INTERNAL MEDICINE

## 2022-05-17 PROCEDURE — 25010000002 FENTANYL CITRATE (PF) 100 MCG/2ML SOLUTION: Performed by: INTERNAL MEDICINE

## 2022-05-17 PROCEDURE — 93325 DOPPLER ECHO COLOR FLOW MAPG: CPT

## 2022-05-17 PROCEDURE — 25010000002 PROPOFOL 200 MG/20ML EMULSION: Performed by: ANESTHESIOLOGY

## 2022-05-17 PROCEDURE — 0 IOPAMIDOL PER 1 ML: Performed by: INTERNAL MEDICINE

## 2022-05-17 PROCEDURE — 93320 DOPPLER ECHO COMPLETE: CPT | Performed by: INTERNAL MEDICINE

## 2022-05-17 PROCEDURE — 99233 SBSQ HOSP IP/OBS HIGH 50: CPT | Performed by: INTERNAL MEDICINE

## 2022-05-17 PROCEDURE — 25010000002 ENOXAPARIN PER 10 MG: Performed by: NURSE PRACTITIONER

## 2022-05-17 PROCEDURE — 4A023N7 MEASUREMENT OF CARDIAC SAMPLING AND PRESSURE, LEFT HEART, PERCUTANEOUS APPROACH: ICD-10-PCS | Performed by: INTERNAL MEDICINE

## 2022-05-17 PROCEDURE — 80048 BASIC METABOLIC PNL TOTAL CA: CPT | Performed by: INTERNAL MEDICINE

## 2022-05-17 PROCEDURE — 82803 BLOOD GASES ANY COMBINATION: CPT

## 2022-05-17 PROCEDURE — 93970 EXTREMITY STUDY: CPT

## 2022-05-17 PROCEDURE — 85730 THROMBOPLASTIN TIME PARTIAL: CPT | Performed by: NURSE PRACTITIONER

## 2022-05-17 PROCEDURE — 93312 ECHO TRANSESOPHAGEAL: CPT | Performed by: INTERNAL MEDICINE

## 2022-05-17 PROCEDURE — 82962 GLUCOSE BLOOD TEST: CPT

## 2022-05-17 PROCEDURE — 71046 X-RAY EXAM CHEST 2 VIEWS: CPT

## 2022-05-17 PROCEDURE — C1769 GUIDE WIRE: HCPCS | Performed by: INTERNAL MEDICINE

## 2022-05-17 PROCEDURE — C1894 INTRO/SHEATH, NON-LASER: HCPCS | Performed by: INTERNAL MEDICINE

## 2022-05-17 PROCEDURE — 87641 MR-STAPH DNA AMP PROBE: CPT | Performed by: NURSE PRACTITIONER

## 2022-05-17 PROCEDURE — 83735 ASSAY OF MAGNESIUM: CPT | Performed by: INTERNAL MEDICINE

## 2022-05-17 PROCEDURE — 25010000002 MIDAZOLAM PER 1 MG: Performed by: INTERNAL MEDICINE

## 2022-05-17 PROCEDURE — B2151ZZ FLUOROSCOPY OF LEFT HEART USING LOW OSMOLAR CONTRAST: ICD-10-PCS | Performed by: INTERNAL MEDICINE

## 2022-05-17 RX ORDER — ACETAMINOPHEN 325 MG/1
650 TABLET ORAL EVERY 4 HOURS PRN
Status: DISCONTINUED | OUTPATIENT
Start: 2022-05-17 | End: 2022-05-19

## 2022-05-17 RX ORDER — MAGNESIUM SULFATE HEPTAHYDRATE 40 MG/ML
2 INJECTION, SOLUTION INTRAVENOUS AS NEEDED
Status: DISCONTINUED | OUTPATIENT
Start: 2022-05-17 | End: 2022-05-19

## 2022-05-17 RX ORDER — LIDOCAINE HYDROCHLORIDE 20 MG/ML
INJECTION, SOLUTION INFILTRATION; PERINEURAL AS NEEDED
Status: DISCONTINUED | OUTPATIENT
Start: 2022-05-17 | End: 2022-05-17 | Stop reason: HOSPADM

## 2022-05-17 RX ORDER — DIPHENHYDRAMINE HCL 25 MG
25 CAPSULE ORAL NIGHTLY PRN
Status: DISCONTINUED | OUTPATIENT
Start: 2022-05-17 | End: 2022-05-19

## 2022-05-17 RX ORDER — ALPRAZOLAM 0.25 MG/1
0.25 TABLET ORAL EVERY 8 HOURS PRN
Status: DISCONTINUED | OUTPATIENT
Start: 2022-05-17 | End: 2022-05-19

## 2022-05-17 RX ORDER — CHLORHEXIDINE GLUCONATE 500 MG/1
1 CLOTH TOPICAL EVERY 12 HOURS PRN
Status: DISCONTINUED | OUTPATIENT
Start: 2022-05-18 | End: 2022-05-19

## 2022-05-17 RX ORDER — CHLORHEXIDINE GLUCONATE 0.12 MG/ML
15 RINSE ORAL ONCE
Status: COMPLETED | OUTPATIENT
Start: 2022-05-19 | End: 2022-05-19

## 2022-05-17 RX ORDER — MIDAZOLAM HYDROCHLORIDE 1 MG/ML
INJECTION INTRAMUSCULAR; INTRAVENOUS AS NEEDED
Status: DISCONTINUED | OUTPATIENT
Start: 2022-05-17 | End: 2022-05-17 | Stop reason: HOSPADM

## 2022-05-17 RX ORDER — FENTANYL CITRATE 50 UG/ML
INJECTION, SOLUTION INTRAMUSCULAR; INTRAVENOUS AS NEEDED
Status: DISCONTINUED | OUTPATIENT
Start: 2022-05-17 | End: 2022-05-17 | Stop reason: HOSPADM

## 2022-05-17 RX ORDER — EPHEDRINE SULFATE 5 MG/ML
INJECTION INTRAVENOUS AS NEEDED
Status: DISCONTINUED | OUTPATIENT
Start: 2022-05-17 | End: 2022-05-17 | Stop reason: SURG

## 2022-05-17 RX ORDER — SODIUM CHLORIDE 9 MG/ML
100 INJECTION, SOLUTION INTRAVENOUS CONTINUOUS
Status: DISCONTINUED | OUTPATIENT
Start: 2022-05-17 | End: 2022-05-18

## 2022-05-17 RX ORDER — PROPOFOL 10 MG/ML
INJECTION, EMULSION INTRAVENOUS AS NEEDED
Status: DISCONTINUED | OUTPATIENT
Start: 2022-05-17 | End: 2022-05-17 | Stop reason: SURG

## 2022-05-17 RX ORDER — ENOXAPARIN SODIUM 100 MG/ML
1 INJECTION SUBCUTANEOUS EVERY 12 HOURS
Status: DISCONTINUED | OUTPATIENT
Start: 2022-05-17 | End: 2022-05-17

## 2022-05-17 RX ORDER — MAGNESIUM SULFATE HEPTAHYDRATE 40 MG/ML
4 INJECTION, SOLUTION INTRAVENOUS AS NEEDED
Status: DISCONTINUED | OUTPATIENT
Start: 2022-05-17 | End: 2022-05-19

## 2022-05-17 RX ORDER — ENOXAPARIN SODIUM 100 MG/ML
1 INJECTION SUBCUTANEOUS ONCE
Status: COMPLETED | OUTPATIENT
Start: 2022-05-17 | End: 2022-05-17

## 2022-05-17 RX ADMIN — EPHEDRINE SULFATE 10 MG: 5 INJECTION INTRAVENOUS at 15:00

## 2022-05-17 RX ADMIN — Medication 10 ML: at 20:28

## 2022-05-17 RX ADMIN — PROPOFOL 10 MG: 10 INJECTION, EMULSION INTRAVENOUS at 14:52

## 2022-05-17 RX ADMIN — SACUBITRIL AND VALSARTAN 1 TABLET: 24; 26 TABLET, FILM COATED ORAL at 08:50

## 2022-05-17 RX ADMIN — SACUBITRIL AND VALSARTAN 1 TABLET: 24; 26 TABLET, FILM COATED ORAL at 20:57

## 2022-05-17 RX ADMIN — SODIUM CHLORIDE: 0.9 INJECTION, SOLUTION INTRAVENOUS at 14:46

## 2022-05-17 RX ADMIN — Medication 10 ML: at 08:50

## 2022-05-17 RX ADMIN — ENOXAPARIN SODIUM 60 MG: 60 INJECTION SUBCUTANEOUS at 22:06

## 2022-05-17 RX ADMIN — FENOFIBRATE 145 MG: 145 TABLET ORAL at 08:50

## 2022-05-17 RX ADMIN — METOPROLOL SUCCINATE 25 MG: 25 TABLET, EXTENDED RELEASE ORAL at 08:50

## 2022-05-17 RX ADMIN — FAMOTIDINE 20 MG: 20 TABLET ORAL at 16:55

## 2022-05-17 RX ADMIN — BUDESONIDE 3 MG: 3 CAPSULE ORAL at 08:50

## 2022-05-17 RX ADMIN — PROPOFOL 50 MG: 10 INJECTION, EMULSION INTRAVENOUS at 14:48

## 2022-05-17 RX ADMIN — FUROSEMIDE 20 MG: 10 INJECTION, SOLUTION INTRAMUSCULAR; INTRAVENOUS at 10:24

## 2022-05-17 RX ADMIN — ATORVASTATIN CALCIUM 40 MG: 40 TABLET, FILM COATED ORAL at 20:26

## 2022-05-17 RX ADMIN — PROPOFOL 10 MG: 10 INJECTION, EMULSION INTRAVENOUS at 15:03

## 2022-05-17 RX ADMIN — PROPOFOL 10 MG: 10 INJECTION, EMULSION INTRAVENOUS at 14:56

## 2022-05-17 RX ADMIN — ENOXAPARIN SODIUM 60 MG: 60 INJECTION SUBCUTANEOUS at 00:01

## 2022-05-17 RX ADMIN — ISOSORBIDE MONONITRATE 60 MG: 60 TABLET, EXTENDED RELEASE ORAL at 08:50

## 2022-05-17 RX ADMIN — FUROSEMIDE 20 MG: 10 INJECTION, SOLUTION INTRAMUSCULAR; INTRAVENOUS at 21:01

## 2022-05-17 RX ADMIN — PROPOFOL 10 MG: 10 INJECTION, EMULSION INTRAVENOUS at 15:05

## 2022-05-17 RX ADMIN — PROPOFOL 10 MG: 10 INJECTION, EMULSION INTRAVENOUS at 14:59

## 2022-05-17 RX ADMIN — INSULIN LISPRO 2 UNITS: 100 INJECTION, SOLUTION INTRAVENOUS; SUBCUTANEOUS at 16:55

## 2022-05-17 RX ADMIN — EPHEDRINE SULFATE 10 MG: 5 INJECTION INTRAVENOUS at 14:58

## 2022-05-17 RX ADMIN — FAMOTIDINE 20 MG: 20 TABLET ORAL at 08:50

## 2022-05-17 NOTE — ANESTHESIA PREPROCEDURE EVALUATION
Anesthesia Evaluation     Patient summary reviewed and Nursing notes reviewed   no history of anesthetic complications:  NPO Solid Status: > 8 hours  NPO Liquid Status: > 8 hours           Airway   Mallampati: II  TM distance: >3 FB  No difficulty expected  Dental - normal exam     Pulmonary - normal exam   (+) shortness of breath,   (-) rhonchi, not a smoker  Cardiovascular   Exercise tolerance: poor (<4 METS)    Rhythm: irregular  Rate: normal    (+) hypertension, valvular problems/murmurs AI and MR, CAD, dysrhythmias Atrial Fib, angina, CHF Systolic <55%, hyperlipidemia,     ROS comment: TTE 5/15/22·     There is mild, bileaflet mitral valve thickening present.  · There is moderate calcification of the aortic valve mainly affecting the non-coronary, left coronary and right coronary cusp(s).  · Estimated right ventricular systolic pressure from tricuspid regurgitation is normal (<35 mmHg).  · Left ventricular ejection fraction appears to be 46 - 50%.  · Left atrial volume is moderately increased.  · The right atrial cavity is moderate to severely dilated.  · Moderate to severe mitral valve regurgitation is present with a posteriorly-directed jet noted.  · Left ventricular diastolic function is consistent with (grade II w/high LAP) pseudonormalization.  · Moderate to severe aortic valve regurgitation is present.  · There is a moderate sized left pleural effusion.  · The right ventricular cavity is mildly dilated.        Neuro/Psych  (+) psychiatric history Anxiety and Depression,    GI/Hepatic/Renal/Endo    (+)   diabetes mellitus well controlled,     Musculoskeletal     Abdominal    Substance History      OB/GYN          Other   arthritis,      ROS/Med Hx Other: History of Present Illness:   Ms. Quinteros is a 81 y.o. female with PMH of CAD, hypertension, hyperlipidemia, acute congestive heart failure, afib with CVR, and T2DM. She presented to [facility] ED on 5/15/22 with complants of SOA, BLE edema,  PND/orthopnea, fatigue, and cough. She stated that her edema had become progressively worse the past couple of weeks but the SOA started occurring 2 days before arrival.  There were no alleviating or exacerbating factors.  In the ED her pro-BNP was 2099. At this time she denied chest pain, dizziness, or light-headedness.  She was admitted and diuresed.  She reports she is feeling better and her edema is improved.  Echo showed mod to severe AI/MR, diastolic dysfunction grade 2, mod to severe RA cavity dilatation, and moderate left pleural effusion, and mild RV dilatation.  She has not had cardiac cath.  In 2018, she had LAD/LCx disease with normal EF.  Dr. Okeefe was consulted for surgical revascularization.                  Anesthesia Plan    ASA 4     MAC     intravenous induction     Anesthetic plan, all risks, benefits, and alternatives have been provided, discussed and informed consent has been obtained with: patient and child.    Plan discussed with CAA.        CODE STATUS:    Code Status (Patient has no pulse and is not breathing): CPR (Attempt to Resuscitate)  Medical Interventions (Patient has pulse or is breathing): Full Support

## 2022-05-18 ENCOUNTER — ANESTHESIA EVENT (OUTPATIENT)
Dept: PERIOP | Facility: HOSPITAL | Age: 82
End: 2022-05-18

## 2022-05-18 PROBLEM — E83.42 HYPOMAGNESEMIA: Status: ACTIVE | Noted: 2022-05-18

## 2022-05-18 LAB
ABO GROUP BLD: NORMAL
ALBUMIN SERPL-MCNC: 3.9 G/DL (ref 3.5–5.2)
ALBUMIN/GLOB SERPL: 1.4 G/DL
ALP SERPL-CCNC: 51 U/L (ref 39–117)
ALT SERPL W P-5'-P-CCNC: 14 U/L (ref 1–33)
ANION GAP SERPL CALCULATED.3IONS-SCNC: 14 MMOL/L (ref 5–15)
AST SERPL-CCNC: 35 U/L (ref 1–32)
B PARAPERT DNA SPEC QL NAA+PROBE: NOT DETECTED
B PERT DNA SPEC QL NAA+PROBE: NOT DETECTED
BASOPHILS # BLD AUTO: 0.1 10*3/MM3 (ref 0–0.2)
BASOPHILS NFR BLD AUTO: 0.9 % (ref 0–1.5)
BILIRUB SERPL-MCNC: 1 MG/DL (ref 0–1.2)
BILIRUB UR QL STRIP: NEGATIVE
BLD GP AB SCN SERPL QL: NEGATIVE
BUN SERPL-MCNC: 15 MG/DL (ref 8–23)
BUN/CREAT SERPL: 18.8 (ref 7–25)
C PNEUM DNA NPH QL NAA+NON-PROBE: NOT DETECTED
CALCIUM SPEC-SCNC: 9.2 MG/DL (ref 8.6–10.5)
CHLORIDE SERPL-SCNC: 96 MMOL/L (ref 98–107)
CLARITY UR: CLEAR
CLOSE TME COLL+ADP + EPINEP PNL BLD: 93 % (ref 86–100)
CO2 SERPL-SCNC: 25 MMOL/L (ref 22–29)
COLOR UR: YELLOW
CREAT SERPL-MCNC: 0.8 MG/DL (ref 0.57–1)
DEPRECATED RDW RBC AUTO: 45.5 FL (ref 37–54)
EGFRCR SERPLBLD CKD-EPI 2021: 74.1 ML/MIN/1.73
EOSINOPHIL # BLD AUTO: 0 10*3/MM3 (ref 0–0.4)
EOSINOPHIL NFR BLD AUTO: 0.6 % (ref 0.3–6.2)
ERYTHROCYTE [DISTWIDTH] IN BLOOD BY AUTOMATED COUNT: 15.9 % (ref 12.3–15.4)
FLUAV SUBTYP SPEC NAA+PROBE: NOT DETECTED
FLUBV RNA ISLT QL NAA+PROBE: NOT DETECTED
GLOBULIN UR ELPH-MCNC: 2.8 GM/DL
GLUCOSE BLDC GLUCOMTR-MCNC: 187 MG/DL (ref 70–105)
GLUCOSE BLDC GLUCOMTR-MCNC: 268 MG/DL (ref 70–105)
GLUCOSE BLDC GLUCOMTR-MCNC: 296 MG/DL (ref 70–105)
GLUCOSE SERPL-MCNC: 240 MG/DL (ref 65–99)
GLUCOSE UR STRIP-MCNC: NEGATIVE MG/DL
HADV DNA SPEC NAA+PROBE: NOT DETECTED
HCOV 229E RNA SPEC QL NAA+PROBE: NOT DETECTED
HCOV HKU1 RNA SPEC QL NAA+PROBE: NOT DETECTED
HCOV NL63 RNA SPEC QL NAA+PROBE: NOT DETECTED
HCOV OC43 RNA SPEC QL NAA+PROBE: NOT DETECTED
HCT VFR BLD AUTO: 38.9 % (ref 34–46.6)
HGB BLD-MCNC: 12.4 G/DL (ref 12–15.9)
HGB UR QL STRIP.AUTO: NEGATIVE
HMPV RNA NPH QL NAA+NON-PROBE: NOT DETECTED
HPIV1 RNA ISLT QL NAA+PROBE: NOT DETECTED
HPIV2 RNA SPEC QL NAA+PROBE: NOT DETECTED
HPIV3 RNA NPH QL NAA+PROBE: NOT DETECTED
HPIV4 P GENE NPH QL NAA+PROBE: NOT DETECTED
INR PPP: 1.27 (ref 0.93–1.1)
KETONES UR QL STRIP: NEGATIVE
LEUKOCYTE ESTERASE UR QL STRIP.AUTO: NEGATIVE
LYMPHOCYTES # BLD AUTO: 1.1 10*3/MM3 (ref 0.7–3.1)
LYMPHOCYTES NFR BLD AUTO: 17.5 % (ref 19.6–45.3)
M PNEUMO IGG SER IA-ACNC: NOT DETECTED
MAGNESIUM SERPL-MCNC: 1.5 MG/DL (ref 1.6–2.4)
MCH RBC QN AUTO: 25.5 PG (ref 26.6–33)
MCHC RBC AUTO-ENTMCNC: 31.8 G/DL (ref 31.5–35.7)
MCV RBC AUTO: 80.1 FL (ref 79–97)
MONOCYTES # BLD AUTO: 0.6 10*3/MM3 (ref 0.1–0.9)
MONOCYTES NFR BLD AUTO: 8.8 % (ref 5–12)
NEUTROPHILS NFR BLD AUTO: 4.5 10*3/MM3 (ref 1.7–7)
NEUTROPHILS NFR BLD AUTO: 72.2 % (ref 42.7–76)
NITRITE UR QL STRIP: NEGATIVE
NRBC BLD AUTO-RTO: 0.1 /100 WBC (ref 0–0.2)
PH UR STRIP.AUTO: 7.5 [PH] (ref 5–8)
PLATELET # BLD AUTO: 262 10*3/MM3 (ref 140–450)
PMV BLD AUTO: 8.1 FL (ref 6–12)
POTASSIUM SERPL-SCNC: 3.5 MMOL/L (ref 3.5–5.2)
PROT SERPL-MCNC: 6.7 G/DL (ref 6–8.5)
PROT UR QL STRIP: NEGATIVE
PROTHROMBIN TIME: 12.9 SECONDS (ref 9.6–11.7)
QT INTERVAL: 401 MS
RBC # BLD AUTO: 4.86 10*6/MM3 (ref 3.77–5.28)
RH BLD: POSITIVE
RHINOVIRUS RNA SPEC NAA+PROBE: NOT DETECTED
RSV RNA NPH QL NAA+NON-PROBE: NOT DETECTED
SARS-COV-2 RNA NPH QL NAA+NON-PROBE: NOT DETECTED
SODIUM SERPL-SCNC: 135 MMOL/L (ref 136–145)
SP GR UR STRIP: <=1.005 (ref 1–1.03)
UROBILINOGEN UR QL STRIP: NORMAL
WBC NRBC COR # BLD: 6.3 10*3/MM3 (ref 3.4–10.8)

## 2022-05-18 PROCEDURE — 93010 ELECTROCARDIOGRAM REPORT: CPT | Performed by: INTERNAL MEDICINE

## 2022-05-18 PROCEDURE — 81003 URINALYSIS AUTO W/O SCOPE: CPT | Performed by: NURSE PRACTITIONER

## 2022-05-18 PROCEDURE — 85025 COMPLETE CBC W/AUTO DIFF WBC: CPT | Performed by: NURSE PRACTITIONER

## 2022-05-18 PROCEDURE — 0202U NFCT DS 22 TRGT SARS-COV-2: CPT | Performed by: INTERNAL MEDICINE

## 2022-05-18 PROCEDURE — 86850 RBC ANTIBODY SCREEN: CPT | Performed by: NURSE PRACTITIONER

## 2022-05-18 PROCEDURE — 85610 PROTHROMBIN TIME: CPT | Performed by: NURSE PRACTITIONER

## 2022-05-18 PROCEDURE — 85576 BLOOD PLATELET AGGREGATION: CPT | Performed by: NURSE PRACTITIONER

## 2022-05-18 PROCEDURE — 99232 SBSQ HOSP IP/OBS MODERATE 35: CPT | Performed by: INTERNAL MEDICINE

## 2022-05-18 PROCEDURE — 87086 URINE CULTURE/COLONY COUNT: CPT | Performed by: NURSE PRACTITIONER

## 2022-05-18 PROCEDURE — 86901 BLOOD TYPING SEROLOGIC RH(D): CPT | Performed by: NURSE PRACTITIONER

## 2022-05-18 PROCEDURE — 86900 BLOOD TYPING SEROLOGIC ABO: CPT | Performed by: NURSE PRACTITIONER

## 2022-05-18 PROCEDURE — 25010000002 FUROSEMIDE PER 20 MG: Performed by: INTERNAL MEDICINE

## 2022-05-18 PROCEDURE — 86923 COMPATIBILITY TEST ELECTRIC: CPT

## 2022-05-18 PROCEDURE — 25010000002 MAGNESIUM SULFATE 2 GM/50ML SOLUTION: Performed by: INTERNAL MEDICINE

## 2022-05-18 PROCEDURE — 99024 POSTOP FOLLOW-UP VISIT: CPT | Performed by: THORACIC SURGERY (CARDIOTHORACIC VASCULAR SURGERY)

## 2022-05-18 PROCEDURE — 93005 ELECTROCARDIOGRAM TRACING: CPT | Performed by: INTERNAL MEDICINE

## 2022-05-18 PROCEDURE — 82962 GLUCOSE BLOOD TEST: CPT

## 2022-05-18 PROCEDURE — 80053 COMPREHEN METABOLIC PANEL: CPT | Performed by: NURSE PRACTITIONER

## 2022-05-18 PROCEDURE — 83735 ASSAY OF MAGNESIUM: CPT | Performed by: NURSE PRACTITIONER

## 2022-05-18 PROCEDURE — 63710000001 INSULIN LISPRO (HUMAN) PER 5 UNITS: Performed by: INTERNAL MEDICINE

## 2022-05-18 RX ORDER — AMIODARONE HYDROCHLORIDE 200 MG/1
200 TABLET ORAL 2 TIMES DAILY WITH MEALS
Status: DISCONTINUED | OUTPATIENT
Start: 2022-05-18 | End: 2022-05-19

## 2022-05-18 RX ORDER — MIDAZOLAM HYDROCHLORIDE 1 MG/ML
0.5 INJECTION INTRAMUSCULAR; INTRAVENOUS
Status: CANCELLED | OUTPATIENT
Start: 2022-05-18

## 2022-05-18 RX ORDER — SODIUM CHLORIDE 0.9 % (FLUSH) 0.9 %
10 SYRINGE (ML) INJECTION AS NEEDED
Status: CANCELLED | OUTPATIENT
Start: 2022-05-18

## 2022-05-18 RX ORDER — SODIUM CHLORIDE 0.9 % (FLUSH) 0.9 %
10 SYRINGE (ML) INJECTION EVERY 12 HOURS SCHEDULED
Status: CANCELLED | OUTPATIENT
Start: 2022-05-18

## 2022-05-18 RX ORDER — SODIUM CHLORIDE 9 MG/ML
9 INJECTION, SOLUTION INTRAVENOUS CONTINUOUS PRN
Status: CANCELLED | OUTPATIENT
Start: 2022-05-18

## 2022-05-18 RX ORDER — MAGNESIUM SULFATE HEPTAHYDRATE 40 MG/ML
2 INJECTION, SOLUTION INTRAVENOUS ONCE
Status: DISCONTINUED | OUTPATIENT
Start: 2022-05-18 | End: 2022-05-18 | Stop reason: SDUPTHER

## 2022-05-18 RX ADMIN — FAMOTIDINE 20 MG: 20 TABLET ORAL at 17:17

## 2022-05-18 RX ADMIN — METOPROLOL SUCCINATE 25 MG: 25 TABLET, EXTENDED RELEASE ORAL at 08:55

## 2022-05-18 RX ADMIN — INSULIN LISPRO 4 UNITS: 100 INJECTION, SOLUTION INTRAVENOUS; SUBCUTANEOUS at 17:17

## 2022-05-18 RX ADMIN — FUROSEMIDE 20 MG: 10 INJECTION, SOLUTION INTRAMUSCULAR; INTRAVENOUS at 10:37

## 2022-05-18 RX ADMIN — AMIODARONE HYDROCHLORIDE 200 MG: 200 TABLET ORAL at 17:17

## 2022-05-18 RX ADMIN — ATORVASTATIN CALCIUM 40 MG: 40 TABLET, FILM COATED ORAL at 20:21

## 2022-05-18 RX ADMIN — MAGNESIUM SULFATE HEPTAHYDRATE 2 G: 2 INJECTION, SOLUTION INTRAVENOUS at 09:02

## 2022-05-18 RX ADMIN — ISOSORBIDE MONONITRATE 60 MG: 60 TABLET, EXTENDED RELEASE ORAL at 08:55

## 2022-05-18 RX ADMIN — MUPIROCIN 1 APPLICATION: 20 OINTMENT TOPICAL at 20:21

## 2022-05-18 RX ADMIN — INSULIN LISPRO 4 UNITS: 100 INJECTION, SOLUTION INTRAVENOUS; SUBCUTANEOUS at 11:55

## 2022-05-18 RX ADMIN — SACUBITRIL AND VALSARTAN 1 TABLET: 24; 26 TABLET, FILM COATED ORAL at 20:21

## 2022-05-18 RX ADMIN — FAMOTIDINE 20 MG: 20 TABLET ORAL at 08:55

## 2022-05-18 RX ADMIN — ASPIRIN 81 MG: 81 TABLET, COATED ORAL at 08:55

## 2022-05-18 RX ADMIN — Medication 10 ML: at 20:21

## 2022-05-18 RX ADMIN — MAGNESIUM SULFATE HEPTAHYDRATE 2 G: 2 INJECTION, SOLUTION INTRAVENOUS at 06:09

## 2022-05-18 RX ADMIN — BUDESONIDE 3 MG: 3 CAPSULE ORAL at 08:55

## 2022-05-18 RX ADMIN — POTASSIUM CHLORIDE 40 MEQ: 1500 TABLET, EXTENDED RELEASE ORAL at 21:52

## 2022-05-18 RX ADMIN — INSULIN LISPRO 2 UNITS: 100 INJECTION, SOLUTION INTRAVENOUS; SUBCUTANEOUS at 08:55

## 2022-05-18 RX ADMIN — SACUBITRIL AND VALSARTAN 1 TABLET: 24; 26 TABLET, FILM COATED ORAL at 08:55

## 2022-05-18 RX ADMIN — FENOFIBRATE 145 MG: 145 TABLET ORAL at 08:55

## 2022-05-18 RX ADMIN — AMIODARONE HYDROCHLORIDE 200 MG: 200 TABLET ORAL at 11:01

## 2022-05-18 RX ADMIN — Medication 10 ML: at 09:02

## 2022-05-18 RX ADMIN — MAGNESIUM SULFATE HEPTAHYDRATE 2 G: 2 INJECTION, SOLUTION INTRAVENOUS at 10:59

## 2022-05-18 NOTE — ANESTHESIA POSTPROCEDURE EVALUATION
Patient: Mindi Quinteros    Procedure Summary     Date: 05/17/22 Room / Location: [facility]    Anesthesia Start: 1446 Anesthesia Stop: 1512    Procedure: ADULT TRANSESOPHAGEAL ECHO (BEST) W/ CONT IF NECESSARY PER PROTOCOL Diagnosis:       (Valvular Disease)      (Mitral Valve Assessment)      (Aortic Valve Assessment)    Scheduled Providers: Natan Terrazas MD Provider: Lorene Stratton MD    Anesthesia Type: MAC ASA Status: 4          Anesthesia Type: MAC    Vitals  Vitals Value Taken Time   /67 05/18/22 0855   Temp 97.4 °F (36.3 °C) 05/18/22 0328   Pulse 68 05/18/22 0855   Resp 16 05/18/22 0328   SpO2 92 % 05/18/22 0328           Post Anesthesia Care and Evaluation    Patient location during evaluation: PACU  Patient participation: complete - patient participated  Level of consciousness: awake and alert  Pain management: satisfactory to patient  Airway patency: patent  Anesthetic complications: No anesthetic complications  PONV Status: none  Cardiovascular status: acceptable  Respiratory status: acceptable  Hydration status: acceptable

## 2022-05-19 ENCOUNTER — ANESTHESIA (OUTPATIENT)
Dept: PERIOP | Facility: HOSPITAL | Age: 82
End: 2022-05-19

## 2022-05-19 ENCOUNTER — APPOINTMENT (OUTPATIENT)
Dept: CARDIOLOGY | Facility: HOSPITAL | Age: 82
End: 2022-05-19

## 2022-05-19 ENCOUNTER — APPOINTMENT (OUTPATIENT)
Dept: GENERAL RADIOLOGY | Facility: HOSPITAL | Age: 82
End: 2022-05-19

## 2022-05-19 LAB
ACT BLD: 115 SECONDS (ref 89–137)
ACT BLD: 126 SECONDS (ref 89–137)
ACT BLD: 335 SECONDS (ref 89–137)
ACT BLD: 393 SECONDS (ref 89–137)
ACT BLD: 404 SECONDS (ref 89–137)
ACT BLD: 462 SECONDS (ref 89–137)
ACT BLD: 486 SECONDS (ref 89–137)
ALBUMIN SERPL-MCNC: 3.6 G/DL (ref 3.5–5.2)
ANION GAP SERPL CALCULATED.3IONS-SCNC: 12 MMOL/L (ref 5–15)
APTT PPP: 32.9 SECONDS (ref 24–31)
APTT PPP: 34.6 SECONDS (ref 24–31)
ARTERIAL PATENCY WRIST A: ABNORMAL
ATMOSPHERIC PRESS: ABNORMAL MM[HG]
BACTERIA SPEC AEROBE CULT: NORMAL
BASE DEFICIT: -0.4 MEQ/LITER
BASE DEFICIT: -2.7 MEQ/LITER
BASE DEFICIT: -6.3 MEQ/LITER
BASE DEFICIT: ABNORMAL
BASE EXCESS BLDA CALC-SCNC: -0.3 MMOL/L (ref 0–3)
BASE EXCESS BLDA CALC-SCNC: -0.5 MMOL/L (ref 0–3)
BASE EXCESS BLDA CALC-SCNC: -2.8 MMOL/L (ref 0–3)
BASE EXCESS BLDA CALC-SCNC: -3.5 MMOL/L (ref 0–3)
BASE EXCESS BLDA CALC-SCNC: -3.8 MMOL/L (ref 0–3)
BASE EXCESS BLDA CALC-SCNC: -4.3 MMOL/L (ref 0–3)
BASE EXCESS BLDA CALC-SCNC: -4.8 MMOL/L (ref 0–3)
BASE EXCESS BLDA CALC-SCNC: -4.8 MMOL/L (ref 0–3)
BASE EXCESS BLDA CALC-SCNC: -4.9 MMOL/L (ref 0–3)
BASE EXCESS BLDA CALC-SCNC: -5.8 MMOL/L (ref 0–3)
BASE EXCESS BLDA CALC-SCNC: -6 MMOL/L (ref 0–3)
BASE EXCESS BLDA CALC-SCNC: 0.4 MMOL/L (ref 0–3)
BASE EXCESS BLDA CALC-SCNC: 1 MMOL/L (ref 0–3)
BASE EXCESS BLDA CALC-SCNC: 2 MMOL/L (ref 0–3)
BASE EXCESS BLDA CALC-SCNC: 3 MMOL/L (ref 0–3)
BASE EXCESS BLDA CALC-SCNC: 5 MMOL/L (ref 0–3)
BASE EXCESS BLDA CALC-SCNC: 5 MMOL/L (ref 0–3)
BASE EXCESS BLDA CALC-SCNC: 6 MMOL/L (ref 0–3)
BASE EXCESS BLDV CALC-SCNC: ABNORMAL MMOL/L
BASOPHILS # BLD AUTO: 0 10*3/MM3 (ref 0–0.2)
BASOPHILS NFR BLD AUTO: 0.3 % (ref 0–1.5)
BDY SITE: ABNORMAL
BH BB BLOOD EXPIRATION DATE: NORMAL
BH BB BLOOD EXPIRATION DATE: NORMAL
BH BB BLOOD TYPE BARCODE: 5100
BH BB BLOOD TYPE BARCODE: 5100
BH BB DISPENSE STATUS: NORMAL
BH BB DISPENSE STATUS: NORMAL
BH BB PRODUCT CODE: NORMAL
BH BB PRODUCT CODE: NORMAL
BH BB UNIT NUMBER: NORMAL
BH BB UNIT NUMBER: NORMAL
BH CV ECHO MEAS - AO MAX PG: 8 MMHG
BH CV ECHO MEAS - AO MEAN PG: 4.4 MMHG
BH CV ECHO MEAS - AO ROOT DIAM: 3.3 CM
BH CV ECHO MEAS - AO V2 MAX: 141.3 CM/SEC
BH CV ECHO MEAS - AO V2 VTI: 18.5 CM
BH CV ECHO MEAS - AVA(I,D): 1.95 CM2
BH CV ECHO MEAS - EDV(CUBED): 39.6 ML
BH CV ECHO MEAS - EDV(MOD-SP4): 29.2 ML
BH CV ECHO MEAS - EF(MOD-BP): 32 %
BH CV ECHO MEAS - EF(MOD-SP4): 32.4 %
BH CV ECHO MEAS - ESV(CUBED): 17 ML
BH CV ECHO MEAS - ESV(MOD-SP4): 19.7 ML
BH CV ECHO MEAS - FS: 24.5 %
BH CV ECHO MEAS - IVS/LVPW: 0.74 CM
BH CV ECHO MEAS - IVSD: 0.94 CM
BH CV ECHO MEAS - LA DIMENSION(2D): 4.9 CM
BH CV ECHO MEAS - LV MASS(C)D: 115.1 GRAMS
BH CV ECHO MEAS - LV MAX PG: 1.84 MMHG
BH CV ECHO MEAS - LV MEAN PG: 0.83 MMHG
BH CV ECHO MEAS - LV V1 MAX: 67.9 CM/SEC
BH CV ECHO MEAS - LV V1 VTI: 9.9 CM
BH CV ECHO MEAS - LVIDD: 3.4 CM
BH CV ECHO MEAS - LVIDS: 2.6 CM
BH CV ECHO MEAS - LVOT AREA: 3.6 CM2
BH CV ECHO MEAS - LVOT DIAM: 2.15 CM
BH CV ECHO MEAS - LVPWD: 1.27 CM
BH CV ECHO MEAS - MV E MAX VEL: 114.7 CM/SEC
BH CV ECHO MEAS - MV MAX PG: 4.5 MMHG
BH CV ECHO MEAS - MV MEAN PG: 1.66 MMHG
BH CV ECHO MEAS - MV V2 VTI: 17.3 CM
BH CV ECHO MEAS - MVA(VTI): 2.08 CM2
BH CV ECHO MEAS - PA ACC TIME: 0.09 SEC
BH CV ECHO MEAS - PA PR(ACCEL): 39.6 MMHG
BH CV ECHO MEAS - PA V2 MAX: 78.3 CM/SEC
BH CV ECHO MEAS - RAP SYSTOLE: 3 MMHG
BH CV ECHO MEAS - RV MAX PG: 2.4 MMHG
BH CV ECHO MEAS - RV V1 MAX: 77.5 CM/SEC
BH CV ECHO MEAS - RV V1 VTI: 12 CM
BH CV ECHO MEAS - RVDD: 3.7 CM
BH CV ECHO MEAS - RVSP: 32.1 MMHG
BH CV ECHO MEAS - SV(LVOT): 36 ML
BH CV ECHO MEAS - SV(MOD-SP4): 9.5 ML
BH CV ECHO MEAS - TR MAX PG: 29.1 MMHG
BH CV ECHO MEAS - TR MAX VEL: 269.9 CM/SEC
BUN SERPL-MCNC: 14 MG/DL (ref 8–23)
BUN/CREAT SERPL: 18.4 (ref 7–25)
CA-I BLDA-SCNC: 0.99 MMOL/L (ref 1.12–1.32)
CA-I BLDA-SCNC: 1.03 MMOL/L (ref 1.12–1.32)
CA-I BLDA-SCNC: 1.06 MMOL/L (ref 1.12–1.32)
CA-I BLDA-SCNC: 1.06 MMOL/L (ref 1.12–1.32)
CA-I BLDA-SCNC: 1.08 MMOL/L (ref 1.12–1.32)
CA-I BLDA-SCNC: 1.11 MMOL/L (ref 1.15–1.33)
CA-I BLDA-SCNC: 1.14 MMOL/L (ref 1.12–1.32)
CA-I BLDA-SCNC: 1.15 MMOL/L (ref 1.15–1.33)
CA-I BLDA-SCNC: 1.15 MMOL/L (ref 1.15–1.33)
CA-I BLDA-SCNC: 1.18 MMOL/L (ref 1.15–1.33)
CA-I BLDA-SCNC: 1.21 MMOL/L (ref 1.15–1.33)
CA-I BLDA-SCNC: 1.22 MMOL/L (ref 1.15–1.33)
CA-I BLDA-SCNC: 1.25 MMOL/L (ref 1.15–1.33)
CA-I BLDA-SCNC: 1.26 MMOL/L (ref 1.15–1.33)
CA-I BLDA-SCNC: 1.29 MMOL/L (ref 1.12–1.32)
CA-I BLDA-SCNC: 1.29 MMOL/L (ref 1.15–1.33)
CA-I SERPL ISE-MCNC: 1.01 MMOL/L (ref 1.2–1.3)
CALCIUM SPEC-SCNC: 8.2 MG/DL (ref 8.6–10.5)
CHLORIDE SERPL-SCNC: 109 MMOL/L (ref 98–107)
CLOSE TME COLL+ADP + EPINEP PNL BLD: 92 % (ref 86–100)
CO2 BLDA-SCNC: 21.2 MMOL/L (ref 22–29)
CO2 BLDA-SCNC: 21.5 MMOL/L (ref 22–29)
CO2 BLDA-SCNC: 21.9 MMOL/L (ref 22–29)
CO2 BLDA-SCNC: 22.4 MMOL/L (ref 22–29)
CO2 BLDA-SCNC: 22.4 MMOL/L (ref 22–29)
CO2 BLDA-SCNC: 22.5 MMOL/L (ref 22–29)
CO2 BLDA-SCNC: 23 MMOL/L (ref 22–29)
CO2 BLDA-SCNC: 23.7 MMOL/L (ref 22–29)
CO2 BLDA-SCNC: 25 MMOL/L (ref 22–29)
CO2 BLDA-SCNC: 26.1 MMOL/L (ref 22–29)
CO2 BLDA-SCNC: 26.4 MMOL/L (ref 22–29)
CO2 BLDA-SCNC: 26.7 MMOL/L (ref 22–29)
CO2 BLDA-SCNC: 29 MMOL/L (ref 23–27)
CO2 BLDA-SCNC: 30 MMOL/L (ref 23–27)
CO2 BLDA-SCNC: 30 MMOL/L (ref 23–27)
CO2 BLDA-SCNC: 31 MMOL/L (ref 23–27)
CO2 BLDA-SCNC: 32 MMOL/L (ref 23–27)
CO2 BLDA-SCNC: 32 MMOL/L (ref 23–27)
CO2 CONTENT VENOUS: ABNORMAL
CO2 SERPL-SCNC: 23 MMOL/L (ref 22–29)
CREAT SERPL-MCNC: 0.76 MG/DL (ref 0.57–1)
CROSSMATCH INTERPRETATION: NORMAL
CROSSMATCH INTERPRETATION: NORMAL
D-LACTATE SERPL-SCNC: 2.4 MMOL/L (ref 0.5–2)
DEPRECATED RDW RBC AUTO: 44.6 FL (ref 37–54)
DEPRECATED RDW RBC AUTO: 45.1 FL (ref 37–54)
DEPRECATED RDW RBC AUTO: 45.9 FL (ref 37–54)
EGFRCR SERPLBLD CKD-EPI 2021: 78.8 ML/MIN/1.73
EOSINOPHIL # BLD AUTO: 0 10*3/MM3 (ref 0–0.4)
EOSINOPHIL NFR BLD AUTO: 0.3 % (ref 0.3–6.2)
ERYTHROCYTE [DISTWIDTH] IN BLOOD BY AUTOMATED COUNT: 15.7 % (ref 12.3–15.4)
ERYTHROCYTE [DISTWIDTH] IN BLOOD BY AUTOMATED COUNT: 15.7 % (ref 12.3–15.4)
ERYTHROCYTE [DISTWIDTH] IN BLOOD BY AUTOMATED COUNT: 15.8 % (ref 12.3–15.4)
FIBRINOGEN PPP-MCNC: 127 MG/DL (ref 210–450)
FIBRINOGEN PPP-MCNC: 241 MG/DL (ref 210–450)
GLUCOSE BLDC GLUCOMTR-MCNC: 134 MG/DL (ref 70–105)
GLUCOSE BLDC GLUCOMTR-MCNC: 159 MG/DL (ref 74–100)
GLUCOSE BLDC GLUCOMTR-MCNC: 159 MG/DL (ref 74–100)
GLUCOSE BLDC GLUCOMTR-MCNC: 160 MG/DL (ref 70–105)
GLUCOSE BLDC GLUCOMTR-MCNC: 165 MG/DL (ref 70–105)
GLUCOSE BLDC GLUCOMTR-MCNC: 169 MG/DL (ref 70–105)
GLUCOSE BLDC GLUCOMTR-MCNC: 171 MG/DL (ref 74–100)
GLUCOSE BLDC GLUCOMTR-MCNC: 171 MG/DL (ref 74–100)
GLUCOSE BLDC GLUCOMTR-MCNC: 173 MG/DL (ref 74–100)
GLUCOSE BLDC GLUCOMTR-MCNC: 173 MG/DL (ref 74–100)
GLUCOSE BLDC GLUCOMTR-MCNC: 178 MG/DL (ref 74–100)
GLUCOSE BLDC GLUCOMTR-MCNC: 178 MG/DL (ref 74–100)
GLUCOSE BLDC GLUCOMTR-MCNC: 181 MG/DL (ref 70–105)
GLUCOSE BLDC GLUCOMTR-MCNC: 184 MG/DL (ref 70–105)
GLUCOSE BLDC GLUCOMTR-MCNC: 184 MG/DL (ref 74–100)
GLUCOSE BLDC GLUCOMTR-MCNC: 184 MG/DL (ref 74–100)
GLUCOSE BLDC GLUCOMTR-MCNC: 191 MG/DL (ref 70–105)
GLUCOSE BLDC GLUCOMTR-MCNC: 191 MG/DL (ref 70–105)
GLUCOSE BLDC GLUCOMTR-MCNC: 200 MG/DL (ref 70–105)
GLUCOSE BLDC GLUCOMTR-MCNC: 202 MG/DL (ref 70–105)
GLUCOSE BLDC GLUCOMTR-MCNC: 202 MG/DL (ref 74–100)
GLUCOSE BLDC GLUCOMTR-MCNC: 202 MG/DL (ref 74–100)
GLUCOSE BLDC GLUCOMTR-MCNC: 205 MG/DL (ref 74–100)
GLUCOSE BLDC GLUCOMTR-MCNC: 205 MG/DL (ref 74–100)
GLUCOSE BLDC GLUCOMTR-MCNC: 206 MG/DL (ref 70–105)
GLUCOSE BLDC GLUCOMTR-MCNC: 218 MG/DL (ref 70–105)
GLUCOSE BLDC GLUCOMTR-MCNC: 64 MG/DL (ref 74–100)
GLUCOSE BLDC GLUCOMTR-MCNC: 64 MG/DL (ref 74–100)
GLUCOSE BLDC GLUCOMTR-MCNC: 73 MG/DL (ref 70–105)
GLUCOSE BLDC GLUCOMTR-MCNC: 97 MG/DL (ref 74–100)
GLUCOSE BLDC GLUCOMTR-MCNC: 97 MG/DL (ref 74–100)
GLUCOSE SERPL-MCNC: 91 MG/DL (ref 65–99)
HCO3 BLDA-SCNC: 20 MMOL/L (ref 21–28)
HCO3 BLDA-SCNC: 20.3 MMOL/L (ref 21–28)
HCO3 BLDA-SCNC: 20.7 MMOL/L (ref 21–28)
HCO3 BLDA-SCNC: 21.2 MMOL/L (ref 21–28)
HCO3 BLDA-SCNC: 21.2 MMOL/L (ref 21–28)
HCO3 BLDA-SCNC: 21.8 MMOL/L (ref 21–28)
HCO3 BLDA-SCNC: 22.3 MMOL/L (ref 21–28)
HCO3 BLDA-SCNC: 24.8 MMOL/L (ref 21–28)
HCO3 BLDA-SCNC: 25.2 MMOL/L (ref 21–28)
HCO3 BLDA-SCNC: 27.4 MMOL/L (ref 22–26)
HCO3 BLDA-SCNC: 28.7 MMOL/L (ref 22–26)
HCO3 BLDA-SCNC: 28.8 MMOL/L (ref 22–26)
HCO3 BLDA-SCNC: 28.8 MMOL/L (ref 22–26)
HCO3 BLDA-SCNC: 30.4 MMOL/L (ref 22–26)
HCO3 BLDA-SCNC: 30.5 MMOL/L (ref 22–26)
HCO3 BLDV-SCNC: 26.4 MMOL/L (ref 23–28)
HCO3 MIXED: 21 MMOL/L (ref 21–29)
HCO3 MIXED: 23.6 MMOL/L (ref 21–29)
HCO3 MIXED: 25.3 MMOL/L (ref 21–29)
HCT VFR BLD AUTO: 22.9 % (ref 34–46.6)
HCT VFR BLD AUTO: 24 % (ref 34–46.6)
HCT VFR BLD AUTO: 26.2 % (ref 34–46.6)
HCT VFR BLD AUTO: 31.4 % (ref 34–46.6)
HCT VFR BLDA CALC: 23 % (ref 38–51)
HCT VFR BLDA CALC: 23 % (ref 38–51)
HCT VFR BLDA CALC: 25 % (ref 38–51)
HCT VFR BLDA CALC: 26 % (ref 38–51)
HCT VFR BLDA CALC: 27 % (ref 38–51)
HCT VFR BLDA CALC: 28 % (ref 38–51)
HCT VFR BLDA CALC: 29 % (ref 38–51)
HCT VFR BLDA CALC: 30 % (ref 38–51)
HCT VFR BLDA CALC: 34 % (ref 38–51)
HEMODILUTION: NO
HEMODILUTION: YES
HGB BLD-MCNC: 10.1 G/DL (ref 12–15.9)
HGB BLD-MCNC: 7.5 G/DL (ref 12–15.9)
HGB BLD-MCNC: 7.5 G/DL (ref 12–15.9)
HGB BLD-MCNC: 8.6 G/DL (ref 12–15.9)
HGB BLDA-MCNC: 10.2 G/DL (ref 12–17)
HGB BLDA-MCNC: 11.6 G/DL (ref 12–17)
HGB BLDA-MCNC: 7.8 G/DL (ref 12–17)
HGB BLDA-MCNC: 7.8 G/DL (ref 12–17)
HGB BLDA-MCNC: 8.3 G/DL (ref 12–17)
HGB BLDA-MCNC: 8.4 G/DL (ref 12–17)
HGB BLDA-MCNC: 8.5 G/DL (ref 12–17)
HGB BLDA-MCNC: 8.7 G/DL (ref 12–17)
HGB BLDA-MCNC: 8.8 G/DL (ref 12–17)
HGB BLDA-MCNC: 8.8 G/DL (ref 12–17)
HGB BLDA-MCNC: 8.9 G/DL (ref 12–17)
HGB BLDA-MCNC: 9.3 G/DL (ref 12–17)
HGB BLDA-MCNC: 9.4 G/DL (ref 12–17)
HGB BLDA-MCNC: 9.8 G/DL (ref 12–17)
INHALED O2 CONCENTRATION: 40 %
INHALED O2 CONCENTRATION: 50 %
INHALED O2 CONCENTRATION: 70 %
INR PPP: 1.31 (ref 0.93–1.1)
INR PPP: 2.01 (ref 0.93–1.1)
LYMPHOCYTES # BLD AUTO: 0.6 10*3/MM3 (ref 0.7–3.1)
LYMPHOCYTES NFR BLD AUTO: 7.4 % (ref 19.6–45.3)
MAGNESIUM SERPL-MCNC: 2.8 MG/DL (ref 1.6–2.4)
MAXIMAL PREDICTED HEART RATE: 139 BPM
MCH RBC QN AUTO: 25.6 PG (ref 26.6–33)
MCH RBC QN AUTO: 26.5 PG (ref 26.6–33)
MCH RBC QN AUTO: 26.6 PG (ref 26.6–33)
MCHC RBC AUTO-ENTMCNC: 31.3 G/DL (ref 31.5–35.7)
MCHC RBC AUTO-ENTMCNC: 32.6 G/DL (ref 31.5–35.7)
MCHC RBC AUTO-ENTMCNC: 32.7 G/DL (ref 31.5–35.7)
MCV RBC AUTO: 81.2 FL (ref 79–97)
MCV RBC AUTO: 81.3 FL (ref 79–97)
MCV RBC AUTO: 81.5 FL (ref 79–97)
MODALITY: ABNORMAL
MONOCYTES # BLD AUTO: 1 10*3/MM3 (ref 0.1–0.9)
MONOCYTES NFR BLD AUTO: 12 % (ref 5–12)
NEUTROPHILS NFR BLD AUTO: 7 10*3/MM3 (ref 1.7–7)
NEUTROPHILS NFR BLD AUTO: 80 % (ref 42.7–76)
NRBC BLD AUTO-RTO: 0 /100 WBC (ref 0–0.2)
O2 SATURATION MIXED: 62 %
O2 SATURATION MIXED: 63.3 %
O2 SATURATION MIXED: 73.9 %
PCO2 BLDA: 37.2 MM HG (ref 35–48)
PCO2 BLDA: 39.2 MM HG (ref 35–48)
PCO2 BLDA: 39.2 MM HG (ref 35–48)
PCO2 BLDA: 39.9 MM HG (ref 35–48)
PCO2 BLDA: 39.9 MM HG (ref 35–48)
PCO2 BLDA: 40.3 MM HG (ref 35–48)
PCO2 BLDA: 40.8 MM HG (ref 35–45)
PCO2 BLDA: 41.7 MM HG (ref 35–48)
PCO2 BLDA: 42.5 MM HG (ref 35–48)
PCO2 BLDA: 44.6 MM HG (ref 35–48)
PCO2 BLDA: 46.5 MM HG (ref 35–45)
PCO2 BLDA: 49.9 MM HG (ref 35–45)
PCO2 BLDA: 52.3 MM HG (ref 35–45)
PCO2 BLDA: 57 MM HG (ref 35–45)
PCO2 BLDA: 69 MM HG (ref 35–45)
PCO2 BLDV: 47.1 MM HG (ref 41–51)
PCO2 MIXED: 46.3 MMHG (ref 35–51)
PCO2 MIXED: 46.9 MMHG (ref 35–51)
PCO2 MIXED: 47.8 MMHG (ref 35–51)
PEEP RESPIRATORY: 5 CM[H2O]
PEEP RESPIRATORY: 5 CM[H2O]
PEEP RESPIRATORY: 8 CM[H2O]
PH BLDA: 7.23 PH UNITS (ref 7.35–7.45)
PH BLDA: 7.31 PH UNITS (ref 7.35–7.45)
PH BLDA: 7.33 PH UNITS (ref 7.35–7.45)
PH BLDA: 7.33 PH UNITS (ref 7.35–7.45)
PH BLDA: 7.34 PH UNITS (ref 7.35–7.45)
PH BLDA: 7.34 PH UNITS (ref 7.35–7.45)
PH BLDA: 7.35 PH UNITS (ref 7.35–7.45)
PH BLDA: 7.35 PH UNITS (ref 7.35–7.45)
PH BLDA: 7.38 PH UNITS (ref 7.35–7.45)
PH BLDA: 7.38 PH UNITS (ref 7.35–7.45)
PH BLDA: 7.39 PH UNITS (ref 7.35–7.45)
PH BLDA: 7.4 PH UNITS (ref 7.35–7.45)
PH BLDA: 7.45 PH UNITS (ref 7.35–7.45)
PH BLDV: 7.36 PH UNITS (ref 7.31–7.41)
PH MIXED: 7.25 PH UNITS (ref 7.32–7.45)
PH MIXED: 7.31 PH UNITS (ref 7.32–7.45)
PH MIXED: 7.35 PH UNITS (ref 7.32–7.45)
PHOSPHATE SERPL-MCNC: 1.7 MG/DL (ref 2.5–4.5)
PLATELET # BLD AUTO: 144 10*3/MM3 (ref 140–450)
PLATELET # BLD AUTO: 147 10*3/MM3 (ref 140–450)
PLATELET # BLD AUTO: 179 10*3/MM3 (ref 140–450)
PMV BLD AUTO: 7.6 FL (ref 6–12)
PMV BLD AUTO: 7.6 FL (ref 6–12)
PMV BLD AUTO: 7.8 FL (ref 6–12)
PO2 BLDA: 102.2 MM HG (ref 83–108)
PO2 BLDA: 133.8 MM HG (ref 83–108)
PO2 BLDA: 153.1 MM HG (ref 83–108)
PO2 BLDA: 167.1 MM HG (ref 83–108)
PO2 BLDA: 170.7 MM HG (ref 83–108)
PO2 BLDA: 175.8 MM HG (ref 83–108)
PO2 BLDA: 184.9 MM HG (ref 83–108)
PO2 BLDA: 211.1 MM HG (ref 83–108)
PO2 BLDA: 363 MM HG (ref 80–105)
PO2 BLDA: 370 MM HG (ref 80–105)
PO2 BLDA: 382 MM HG (ref 80–105)
PO2 BLDA: 397 MM HG (ref 80–105)
PO2 BLDA: 413 MM HG (ref 80–105)
PO2 BLDA: 505 MM HG (ref 80–105)
PO2 BLDA: 98.8 MM HG (ref 83–108)
PO2 BLDV: 52 MM HG (ref 35–42)
PO2 MIXED: 35.4 MMHG
PO2 MIXED: 38.3 MMHG
PO2 MIXED: 41.7 MMHG
POTASSIUM BLDA-SCNC: 2.9 MMOL/L (ref 3.5–4.5)
POTASSIUM BLDA-SCNC: 3.3 MMOL/L (ref 3.5–4.9)
POTASSIUM BLDA-SCNC: 3.4 MMOL/L (ref 3.5–4.5)
POTASSIUM BLDA-SCNC: 3.7 MMOL/L (ref 3.5–4.9)
POTASSIUM BLDA-SCNC: 3.8 MMOL/L (ref 3.5–4.5)
POTASSIUM BLDA-SCNC: 3.8 MMOL/L (ref 3.5–4.5)
POTASSIUM BLDA-SCNC: 3.8 MMOL/L (ref 3.5–4.9)
POTASSIUM BLDA-SCNC: 4 MMOL/L (ref 3.5–4.5)
POTASSIUM BLDA-SCNC: 4 MMOL/L (ref 3.5–4.9)
POTASSIUM BLDA-SCNC: 4 MMOL/L (ref 3.5–4.9)
POTASSIUM BLDA-SCNC: 4.1 MMOL/L (ref 3.5–4.5)
POTASSIUM BLDA-SCNC: 4.1 MMOL/L (ref 3.5–4.5)
POTASSIUM BLDA-SCNC: 4.2 MMOL/L (ref 3.5–4.5)
POTASSIUM BLDA-SCNC: 4.2 MMOL/L (ref 3.5–4.9)
POTASSIUM BLDA-SCNC: 4.2 MMOL/L (ref 3.5–4.9)
POTASSIUM BLDA-SCNC: 4.5 MMOL/L (ref 3.5–4.5)
POTASSIUM SERPL-SCNC: 3.3 MMOL/L (ref 3.5–5.2)
PROTHROMBIN TIME: 13.3 SECONDS (ref 9.6–11.7)
PROTHROMBIN TIME: 19.9 SECONDS (ref 9.6–11.7)
QT INTERVAL: 480 MS
RBC # BLD AUTO: 2.81 10*6/MM3 (ref 3.77–5.28)
RBC # BLD AUTO: 2.95 10*6/MM3 (ref 3.77–5.28)
RBC # BLD AUTO: 3.23 10*6/MM3 (ref 3.77–5.28)
RESPIRATORY RATE: 12
RESPIRATORY RATE: 12
RESPIRATORY RATE: 14
SAO2 % BLDCOA: 100 % (ref 95–98)
SAO2 % BLDCOA: 97.4 % (ref 94–98)
SAO2 % BLDCOA: 97.5 % (ref 94–98)
SAO2 % BLDCOA: 98.9 % (ref 94–98)
SAO2 % BLDCOA: 99.3 % (ref 94–98)
SAO2 % BLDCOA: 99.3 % (ref 94–98)
SAO2 % BLDCOA: 99.4 % (ref 94–98)
SAO2 % BLDCOA: 99.4 % (ref 94–98)
SAO2 % BLDCOA: 99.5 % (ref 94–98)
SAO2 % BLDCOA: 99.8 % (ref 94–98)
SAO2 % BLDCOV: 28 % (ref 45–75)
SET MECH RESP RATE: 14
SODIUM BLD-SCNC: 137 MMOL/L (ref 138–146)
SODIUM BLD-SCNC: 138 MMOL/L (ref 138–146)
SODIUM BLD-SCNC: 139 MMOL/L (ref 138–146)
SODIUM BLD-SCNC: 139 MMOL/L (ref 138–146)
SODIUM BLD-SCNC: 141 MMOL/L (ref 138–146)
SODIUM BLD-SCNC: 142 MMOL/L (ref 138–146)
SODIUM BLD-SCNC: 142 MMOL/L (ref 138–146)
SODIUM BLD-SCNC: 143 MMOL/L (ref 138–146)
SODIUM BLD-SCNC: 144 MMOL/L (ref 138–146)
SODIUM BLD-SCNC: 144 MMOL/L (ref 138–146)
SODIUM BLD-SCNC: 145 MMOL/L (ref 138–146)
SODIUM BLD-SCNC: 146 MMOL/L (ref 138–146)
SODIUM SERPL-SCNC: 144 MMOL/L (ref 136–145)
STRESS TARGET HR: 118 BPM
UNIT  ABO: NORMAL
UNIT  ABO: NORMAL
UNIT  RH: NORMAL
UNIT  RH: NORMAL
VENTILATOR MODE: ABNORMAL
VT ON VENT VENT: 500 ML
WBC NRBC COR # BLD: 6.1 10*3/MM3 (ref 3.4–10.8)
WBC NRBC COR # BLD: 8.6 10*3/MM3 (ref 3.4–10.8)
WBC NRBC COR # BLD: 8.7 10*3/MM3 (ref 3.4–10.8)

## 2022-05-19 PROCEDURE — 25010000002 CALCIUM GLUCONATE-NACL 1-0.675 GM/50ML-% SOLUTION: Performed by: PHYSICIAN ASSISTANT

## 2022-05-19 PROCEDURE — 99233 SBSQ HOSP IP/OBS HIGH 50: CPT | Performed by: INTERNAL MEDICINE

## 2022-05-19 PROCEDURE — C1751 CATH, INF, PER/CENT/MIDLINE: HCPCS | Performed by: ANESTHESIOLOGY

## 2022-05-19 PROCEDURE — 02100Z9 BYPASS CORONARY ARTERY, ONE ARTERY FROM LEFT INTERNAL MAMMARY, OPEN APPROACH: ICD-10-PCS | Performed by: THORACIC SURGERY (CARDIOTHORACIC VASCULAR SURGERY)

## 2022-05-19 PROCEDURE — A4648 IMPLANTABLE TISSUE MARKER: HCPCS | Performed by: THORACIC SURGERY (CARDIOTHORACIC VASCULAR SURGERY)

## 2022-05-19 PROCEDURE — 80051 ELECTROLYTE PANEL: CPT

## 2022-05-19 PROCEDURE — 88305 TISSUE EXAM BY PATHOLOGIST: CPT | Performed by: THORACIC SURGERY (CARDIOTHORACIC VASCULAR SURGERY)

## 2022-05-19 PROCEDURE — 33508 ENDOSCOPIC VEIN HARVEST: CPT | Performed by: THORACIC SURGERY (CARDIOTHORACIC VASCULAR SURGERY)

## 2022-05-19 PROCEDURE — 82947 ASSAY GLUCOSE BLOOD QUANT: CPT

## 2022-05-19 PROCEDURE — 33405 REPLACEMENT AORTIC VALVE OPN: CPT | Performed by: THORACIC SURGERY (CARDIOTHORACIC VASCULAR SURGERY)

## 2022-05-19 PROCEDURE — 85014 HEMATOCRIT: CPT

## 2022-05-19 PROCEDURE — 82962 GLUCOSE BLOOD TEST: CPT

## 2022-05-19 PROCEDURE — 85610 PROTHROMBIN TIME: CPT | Performed by: THORACIC SURGERY (CARDIOTHORACIC VASCULAR SURGERY)

## 2022-05-19 PROCEDURE — 63710000001 INSULIN REGULAR HUMAN PER 5 UNITS: Performed by: ANESTHESIOLOGY

## 2022-05-19 PROCEDURE — 0 MILRINONE LACTATE IN DEXTROSE 20-5 MG/100ML-% SOLUTION: Performed by: THORACIC SURGERY (CARDIOTHORACIC VASCULAR SURGERY)

## 2022-05-19 PROCEDURE — 25010000002 CEFAZOLIN PER 500 MG: Performed by: NURSE PRACTITIONER

## 2022-05-19 PROCEDURE — P9012 CRYOPRECIPITATE EACH UNIT: HCPCS

## 2022-05-19 PROCEDURE — 25010000002 CALCIUM GLUCONATE-NACL 1-0.675 GM/50ML-% SOLUTION: Performed by: THORACIC SURGERY (CARDIOTHORACIC VASCULAR SURGERY)

## 2022-05-19 PROCEDURE — 25010000002 AMIODARONE IN DEXTROSE 5% 360-4.14 MG/200ML-% SOLUTION: Performed by: THORACIC SURGERY (CARDIOTHORACIC VASCULAR SURGERY)

## 2022-05-19 PROCEDURE — 85610 PROTHROMBIN TIME: CPT | Performed by: NURSE PRACTITIONER

## 2022-05-19 PROCEDURE — 0 MILRINONE LACTATE IN DEXTROSE 20-5 MG/100ML-% SOLUTION: Performed by: ANESTHESIOLOGY

## 2022-05-19 PROCEDURE — 33426 REPAIR OF MITRAL VALVE: CPT | Performed by: THORACIC SURGERY (CARDIOTHORACIC VASCULAR SURGERY)

## 2022-05-19 PROCEDURE — 85576 BLOOD PLATELET AGGREGATION: CPT | Performed by: THORACIC SURGERY (CARDIOTHORACIC VASCULAR SURGERY)

## 2022-05-19 PROCEDURE — P9017 PLASMA 1 DONOR FRZ W/IN 8 HR: HCPCS

## 2022-05-19 PROCEDURE — 33533 CABG ARTERIAL SINGLE: CPT | Performed by: THORACIC SURGERY (CARDIOTHORACIC VASCULAR SURGERY)

## 2022-05-19 PROCEDURE — 93010 ELECTROCARDIOGRAM REPORT: CPT | Performed by: INTERNAL MEDICINE

## 2022-05-19 PROCEDURE — 82803 BLOOD GASES ANY COMBINATION: CPT

## 2022-05-19 PROCEDURE — 94761 N-INVAS EAR/PLS OXIMETRY MLT: CPT

## 2022-05-19 PROCEDURE — 02L70CK OCCLUSION OF LEFT ATRIAL APPENDAGE WITH EXTRALUMINAL DEVICE, OPEN APPROACH: ICD-10-PCS | Performed by: THORACIC SURGERY (CARDIOTHORACIC VASCULAR SURGERY)

## 2022-05-19 PROCEDURE — C1713 ANCHOR/SCREW BN/BN,TIS/BN: HCPCS | Performed by: THORACIC SURGERY (CARDIOTHORACIC VASCULAR SURGERY)

## 2022-05-19 PROCEDURE — 85730 THROMBOPLASTIN TIME PARTIAL: CPT | Performed by: THORACIC SURGERY (CARDIOTHORACIC VASCULAR SURGERY)

## 2022-05-19 PROCEDURE — 82330 ASSAY OF CALCIUM: CPT | Performed by: NURSE PRACTITIONER

## 2022-05-19 PROCEDURE — 25010000002 MIDAZOLAM PER 1 MG: Performed by: ANESTHESIOLOGY

## 2022-05-19 PROCEDURE — C1769 GUIDE WIRE: HCPCS | Performed by: THORACIC SURGERY (CARDIOTHORACIC VASCULAR SURGERY)

## 2022-05-19 PROCEDURE — 25010000002 FUROSEMIDE PER 20 MG: Performed by: THORACIC SURGERY (CARDIOTHORACIC VASCULAR SURGERY)

## 2022-05-19 PROCEDURE — 94002 VENT MGMT INPAT INIT DAY: CPT

## 2022-05-19 PROCEDURE — 85384 FIBRINOGEN ACTIVITY: CPT | Performed by: NURSE PRACTITIONER

## 2022-05-19 PROCEDURE — 85014 HEMATOCRIT: CPT | Performed by: THORACIC SURGERY (CARDIOTHORACIC VASCULAR SURGERY)

## 2022-05-19 PROCEDURE — P9045 ALBUMIN (HUMAN), 5%, 250 ML: HCPCS | Performed by: NURSE PRACTITIONER

## 2022-05-19 PROCEDURE — 06BP4ZZ EXCISION OF RIGHT SAPHENOUS VEIN, PERCUTANEOUS ENDOSCOPIC APPROACH: ICD-10-PCS | Performed by: THORACIC SURGERY (CARDIOTHORACIC VASCULAR SURGERY)

## 2022-05-19 PROCEDURE — 5A1221Z PERFORMANCE OF CARDIAC OUTPUT, CONTINUOUS: ICD-10-PCS | Performed by: THORACIC SURGERY (CARDIOTHORACIC VASCULAR SURGERY)

## 2022-05-19 PROCEDURE — 33259 ABLATE ATRIA W/BYPASS ADD-ON: CPT | Performed by: THORACIC SURGERY (CARDIOTHORACIC VASCULAR SURGERY)

## 2022-05-19 PROCEDURE — P9016 RBC LEUKOCYTES REDUCED: HCPCS

## 2022-05-19 PROCEDURE — 86900 BLOOD TYPING SEROLOGIC ABO: CPT

## 2022-05-19 PROCEDURE — 82330 ASSAY OF CALCIUM: CPT

## 2022-05-19 PROCEDURE — 93306 TTE W/DOPPLER COMPLETE: CPT

## 2022-05-19 PROCEDURE — 85025 COMPLETE CBC W/AUTO DIFF WBC: CPT | Performed by: PHYSICIAN ASSISTANT

## 2022-05-19 PROCEDURE — 94799 UNLISTED PULMONARY SVC/PX: CPT

## 2022-05-19 PROCEDURE — 25010000002 MAGNESIUM SULFATE 2 GM/50ML SOLUTION: Performed by: ANESTHESIOLOGY

## 2022-05-19 PROCEDURE — 85027 COMPLETE CBC AUTOMATED: CPT | Performed by: THORACIC SURGERY (CARDIOTHORACIC VASCULAR SURGERY)

## 2022-05-19 PROCEDURE — B24BZZ4 ULTRASONOGRAPHY OF HEART WITH AORTA, TRANSESOPHAGEAL: ICD-10-PCS | Performed by: ANESTHESIOLOGY

## 2022-05-19 PROCEDURE — 0 POTASSIUM CHLORIDE 10 MEQ/100ML SOLUTION: Performed by: NURSE PRACTITIONER

## 2022-05-19 PROCEDURE — 85018 HEMOGLOBIN: CPT | Performed by: THORACIC SURGERY (CARDIOTHORACIC VASCULAR SURGERY)

## 2022-05-19 PROCEDURE — 25010000002 HEPARIN (PORCINE) PER 1000 UNITS: Performed by: THORACIC SURGERY (CARDIOTHORACIC VASCULAR SURGERY)

## 2022-05-19 PROCEDURE — 85384 FIBRINOGEN ACTIVITY: CPT | Performed by: THORACIC SURGERY (CARDIOTHORACIC VASCULAR SURGERY)

## 2022-05-19 PROCEDURE — 83605 ASSAY OF LACTIC ACID: CPT | Performed by: THORACIC SURGERY (CARDIOTHORACIC VASCULAR SURGERY)

## 2022-05-19 PROCEDURE — 02UG0JZ SUPPLEMENT MITRAL VALVE WITH SYNTHETIC SUBSTITUTE, OPEN APPROACH: ICD-10-PCS | Performed by: THORACIC SURGERY (CARDIOTHORACIC VASCULAR SURGERY)

## 2022-05-19 PROCEDURE — 25010000002 PROTAMINE SULFATE PER 10 MG: Performed by: ANESTHESIOLOGY

## 2022-05-19 PROCEDURE — 85347 COAGULATION TIME ACTIVATED: CPT

## 2022-05-19 PROCEDURE — 86927 PLASMA FRESH FROZEN: CPT

## 2022-05-19 PROCEDURE — 36430 TRANSFUSION BLD/BLD COMPNT: CPT

## 2022-05-19 PROCEDURE — 71045 X-RAY EXAM CHEST 1 VIEW: CPT

## 2022-05-19 PROCEDURE — 93306 TTE W/DOPPLER COMPLETE: CPT | Performed by: INTERNAL MEDICINE

## 2022-05-19 PROCEDURE — 33517 CABG ARTERY-VEIN SINGLE: CPT | Performed by: THORACIC SURGERY (CARDIOTHORACIC VASCULAR SURGERY)

## 2022-05-19 PROCEDURE — 85027 COMPLETE CBC AUTOMATED: CPT | Performed by: NURSE PRACTITIONER

## 2022-05-19 PROCEDURE — 25010000002 MAGNESIUM SULFATE IN D5W 1G/100ML (PREMIX) 1-5 GM/100ML-% SOLUTION: Performed by: NURSE PRACTITIONER

## 2022-05-19 PROCEDURE — C2618 PROBE/NEEDLE, CRYO: HCPCS | Performed by: THORACIC SURGERY (CARDIOTHORACIC VASCULAR SURGERY)

## 2022-05-19 PROCEDURE — 85018 HEMOGLOBIN: CPT

## 2022-05-19 PROCEDURE — 93005 ELECTROCARDIOGRAM TRACING: CPT | Performed by: NURSE PRACTITIONER

## 2022-05-19 PROCEDURE — 84295 ASSAY OF SERUM SODIUM: CPT

## 2022-05-19 PROCEDURE — 0BH18EZ INSERTION OF ENDOTRACHEAL AIRWAY INTO TRACHEA, VIA NATURAL OR ARTIFICIAL OPENING ENDOSCOPIC: ICD-10-PCS | Performed by: ANESTHESIOLOGY

## 2022-05-19 PROCEDURE — 80069 RENAL FUNCTION PANEL: CPT | Performed by: NURSE PRACTITIONER

## 2022-05-19 PROCEDURE — 25010000002 PAPAVERINE PER 60 MG: Performed by: THORACIC SURGERY (CARDIOTHORACIC VASCULAR SURGERY)

## 2022-05-19 PROCEDURE — 25010000002 ALBUMIN HUMAN 5% PER 50 ML: Performed by: NURSE PRACTITIONER

## 2022-05-19 PROCEDURE — 5A1945Z RESPIRATORY VENTILATION, 24-96 CONSECUTIVE HOURS: ICD-10-PCS | Performed by: ANESTHESIOLOGY

## 2022-05-19 PROCEDURE — 25010000002 HEPARIN (PORCINE) PER 1000 UNITS: Performed by: ANESTHESIOLOGY

## 2022-05-19 PROCEDURE — 93005 ELECTROCARDIOGRAM TRACING: CPT | Performed by: THORACIC SURGERY (CARDIOTHORACIC VASCULAR SURGERY)

## 2022-05-19 PROCEDURE — C1889 IMPLANT/INSERT DEVICE, NOC: HCPCS | Performed by: THORACIC SURGERY (CARDIOTHORACIC VASCULAR SURGERY)

## 2022-05-19 PROCEDURE — 84132 ASSAY OF SERUM POTASSIUM: CPT

## 2022-05-19 PROCEDURE — 83735 ASSAY OF MAGNESIUM: CPT | Performed by: THORACIC SURGERY (CARDIOTHORACIC VASCULAR SURGERY)

## 2022-05-19 PROCEDURE — 93318 ECHO TRANSESOPHAGEAL INTRAOP: CPT | Performed by: ANESTHESIOLOGY

## 2022-05-19 PROCEDURE — 02580ZZ DESTRUCTION OF CONDUCTION MECHANISM, OPEN APPROACH: ICD-10-PCS | Performed by: THORACIC SURGERY (CARDIOTHORACIC VASCULAR SURGERY)

## 2022-05-19 PROCEDURE — 85730 THROMBOPLASTIN TIME PARTIAL: CPT | Performed by: NURSE PRACTITIONER

## 2022-05-19 PROCEDURE — 25010000002 AMIODARONE IN DEXTROSE 5% 150-4.21 MG/100ML-% SOLUTION: Performed by: THORACIC SURGERY (CARDIOTHORACIC VASCULAR SURGERY)

## 2022-05-19 PROCEDURE — 02RF08Z REPLACEMENT OF AORTIC VALVE WITH ZOOPLASTIC TISSUE, OPEN APPROACH: ICD-10-PCS | Performed by: THORACIC SURGERY (CARDIOTHORACIC VASCULAR SURGERY)

## 2022-05-19 PROCEDURE — 25010000002 FENTANYL CITRATE (PF) 250 MCG/5ML SOLUTION: Performed by: ANESTHESIOLOGY

## 2022-05-19 PROCEDURE — 021009W BYPASS CORONARY ARTERY, ONE ARTERY FROM AORTA WITH AUTOLOGOUS VENOUS TISSUE, OPEN APPROACH: ICD-10-PCS | Performed by: THORACIC SURGERY (CARDIOTHORACIC VASCULAR SURGERY)

## 2022-05-19 DEVICE — SS SUTURE, 6 PER SLEEVE
Type: IMPLANTABLE DEVICE | Site: STERNUM | Status: FUNCTIONAL
Brand: MYO/WIRE II

## 2022-05-19 DEVICE — CLIP LIGAT VASC HORIZON TI SM/WD RED 24CT: Type: IMPLANTABLE DEVICE | Site: HEART | Status: FUNCTIONAL

## 2022-05-19 DEVICE — SS SUTURE, 3 PER SLEEVE
Type: IMPLANTABLE DEVICE | Site: STERNUM | Status: FUNCTIONAL
Brand: MYO/WIRE II

## 2022-05-19 DEVICE — ABSORBABLE HEMOSTAT (OXIDIZED REGENERATED CELLULOSE, U.S.P.)
Type: IMPLANTABLE DEVICE | Site: HEART | Status: FUNCTIONAL
Brand: SURGICEL

## 2022-05-19 DEVICE — VLV PERICARD PERIMOUNT MAGNA EASE 21: Type: IMPLANTABLE DEVICE | Site: HEART | Status: FUNCTIONAL

## 2022-05-19 DEVICE — DEV CONTRL TISS STRATAFIXSPIRALMNCRYL PLSPS2 REV3/0 15CM: Type: IMPLANTABLE DEVICE | Site: LEG | Status: FUNCTIONAL

## 2022-05-19 DEVICE — DEV CONTRL TISS STRATAFIX SPIRAL MNCRYL UD 3/0 PLS 30CM: Type: IMPLANTABLE DEVICE | Site: CHEST | Status: FUNCTIONAL

## 2022-05-19 DEVICE — COR-KNOT MINI® COMBO KITBASE PACKAGE TYPE - KITEACH STERILE PACKAGE KIT CONTAINS (2) SINGLE PATIENT USE COR-KNOT MINI® DEVICES AND (12) COR-KNOT® QUICK LOADS®.
Type: IMPLANTABLE DEVICE | Site: HEART | Status: FUNCTIONAL
Brand: COR-KNOT MINI®

## 2022-05-19 DEVICE — RNG MITRAL PHYSIOII MDL 5200 26MM: Type: IMPLANTABLE DEVICE | Site: HEART | Status: FUNCTIONAL

## 2022-05-19 DEVICE — WAX,BONE,NATURAL
Type: IMPLANTABLE DEVICE | Site: STERNUM | Status: FUNCTIONAL
Brand: MEDLINE INDUSTRIES

## 2022-05-19 DEVICE — INCISIONLINE PLEDGET TFLN SFT LG: Type: IMPLANTABLE DEVICE | Site: HEART | Status: FUNCTIONAL

## 2022-05-19 RX ORDER — ACETAMINOPHEN 325 MG/1
650 TABLET ORAL EVERY 4 HOURS PRN
Status: DISCONTINUED | OUTPATIENT
Start: 2022-05-20 | End: 2022-05-22

## 2022-05-19 RX ORDER — DEXMEDETOMIDINE HYDROCHLORIDE 4 UG/ML
.2-1.5 INJECTION, SOLUTION INTRAVENOUS
Status: DISCONTINUED | OUTPATIENT
Start: 2022-05-19 | End: 2022-05-23

## 2022-05-19 RX ORDER — MILRINONE LACTATE 0.2 MG/ML
INJECTION, SOLUTION INTRAVENOUS AS NEEDED
Status: DISCONTINUED | OUTPATIENT
Start: 2022-05-19 | End: 2022-05-19 | Stop reason: SURG

## 2022-05-19 RX ORDER — HEPARIN SODIUM 1000 [USP'U]/ML
INJECTION, SOLUTION INTRAVENOUS; SUBCUTANEOUS AS NEEDED
Status: DISCONTINUED | OUTPATIENT
Start: 2022-05-19 | End: 2022-05-19 | Stop reason: SURG

## 2022-05-19 RX ORDER — CALCIUM GLUCONATE 20 MG/ML
1 INJECTION, SOLUTION INTRAVENOUS ONCE
Status: COMPLETED | OUTPATIENT
Start: 2022-05-19 | End: 2022-05-19

## 2022-05-19 RX ORDER — FUROSEMIDE 10 MG/ML
40 INJECTION INTRAMUSCULAR; INTRAVENOUS ONCE
Status: COMPLETED | OUTPATIENT
Start: 2022-05-19 | End: 2022-05-19

## 2022-05-19 RX ORDER — PANTOPRAZOLE SODIUM 40 MG/1
40 TABLET, DELAYED RELEASE ORAL EVERY MORNING
Status: DISCONTINUED | OUTPATIENT
Start: 2022-05-20 | End: 2022-05-20

## 2022-05-19 RX ORDER — ALBUMIN, HUMAN INJ 5% 5 %
1000 SOLUTION INTRAVENOUS AS NEEDED
Status: ACTIVE | OUTPATIENT
Start: 2022-05-19 | End: 2022-05-20

## 2022-05-19 RX ORDER — POTASSIUM CHLORIDE 7.45 MG/ML
10 INJECTION INTRAVENOUS
Status: DISCONTINUED | OUTPATIENT
Start: 2022-05-19 | End: 2022-06-03 | Stop reason: HOSPADM

## 2022-05-19 RX ORDER — ACETAMINOPHEN 650 MG/1
650 SUPPOSITORY RECTAL EVERY 4 HOURS PRN
Status: DISCONTINUED | OUTPATIENT
Start: 2022-05-20 | End: 2022-05-22

## 2022-05-19 RX ORDER — ONDANSETRON 2 MG/ML
4 INJECTION INTRAMUSCULAR; INTRAVENOUS EVERY 6 HOURS PRN
Status: DISCONTINUED | OUTPATIENT
Start: 2022-05-19 | End: 2022-06-03 | Stop reason: HOSPADM

## 2022-05-19 RX ORDER — MIDAZOLAM HYDROCHLORIDE 1 MG/ML
INJECTION INTRAMUSCULAR; INTRAVENOUS AS NEEDED
Status: DISCONTINUED | OUTPATIENT
Start: 2022-05-19 | End: 2022-05-19 | Stop reason: SURG

## 2022-05-19 RX ORDER — NALOXONE HCL 0.4 MG/ML
0.4 VIAL (ML) INJECTION
Status: DISCONTINUED | OUTPATIENT
Start: 2022-05-19 | End: 2022-06-03 | Stop reason: HOSPADM

## 2022-05-19 RX ORDER — ALBUMIN, HUMAN INJ 5% 5 %
250 SOLUTION INTRAVENOUS ONCE
Status: COMPLETED | OUTPATIENT
Start: 2022-05-20 | End: 2022-05-19

## 2022-05-19 RX ORDER — NICOTINE POLACRILEX 4 MG
15 LOZENGE BUCCAL
Status: DISCONTINUED | OUTPATIENT
Start: 2022-05-19 | End: 2022-06-03 | Stop reason: HOSPADM

## 2022-05-19 RX ORDER — ENOXAPARIN SODIUM 100 MG/ML
40 INJECTION SUBCUTANEOUS EVERY 24 HOURS
Status: DISCONTINUED | OUTPATIENT
Start: 2022-05-21 | End: 2022-05-21

## 2022-05-19 RX ORDER — ACETAMINOPHEN 160 MG/5ML
650 SOLUTION ORAL EVERY 4 HOURS PRN
Status: DISCONTINUED | OUTPATIENT
Start: 2022-05-20 | End: 2022-05-22

## 2022-05-19 RX ORDER — MAGNESIUM HYDROXIDE 1200 MG/15ML
LIQUID ORAL AS NEEDED
Status: DISCONTINUED | OUTPATIENT
Start: 2022-05-19 | End: 2022-05-19 | Stop reason: HOSPADM

## 2022-05-19 RX ORDER — BISACODYL 10 MG
10 SUPPOSITORY, RECTAL RECTAL DAILY PRN
Status: DISCONTINUED | OUTPATIENT
Start: 2022-05-20 | End: 2022-06-03 | Stop reason: HOSPADM

## 2022-05-19 RX ORDER — MAGNESIUM SULFATE HEPTAHYDRATE 40 MG/ML
INJECTION, SOLUTION INTRAVENOUS AS NEEDED
Status: DISCONTINUED | OUTPATIENT
Start: 2022-05-19 | End: 2022-05-19 | Stop reason: SURG

## 2022-05-19 RX ORDER — NITROGLYCERIN 20 MG/100ML
5-50 INJECTION INTRAVENOUS CONTINUOUS PRN
Status: DISCONTINUED | OUTPATIENT
Start: 2022-05-19 | End: 2022-05-20

## 2022-05-19 RX ORDER — ACETAMINOPHEN 160 MG/5ML
650 SOLUTION ORAL EVERY 4 HOURS
Status: COMPLETED | OUTPATIENT
Start: 2022-05-19 | End: 2022-05-20

## 2022-05-19 RX ORDER — DEXTROSE MONOHYDRATE 25 G/50ML
10-50 INJECTION, SOLUTION INTRAVENOUS
Status: DISCONTINUED | OUTPATIENT
Start: 2022-05-19 | End: 2022-06-03 | Stop reason: HOSPADM

## 2022-05-19 RX ORDER — NICARDIPINE HYDROCHLORIDE 2.5 MG/ML
INJECTION INTRAVENOUS AS NEEDED
Status: DISCONTINUED | OUTPATIENT
Start: 2022-05-19 | End: 2022-05-19 | Stop reason: SURG

## 2022-05-19 RX ORDER — POLYETHYLENE GLYCOL 3350 17 G/17G
17 POWDER, FOR SOLUTION ORAL DAILY PRN
Status: DISCONTINUED | OUTPATIENT
Start: 2022-05-19 | End: 2022-06-03 | Stop reason: HOSPADM

## 2022-05-19 RX ORDER — MEPERIDINE HYDROCHLORIDE 25 MG/ML
12.5 INJECTION INTRAMUSCULAR; INTRAVENOUS; SUBCUTANEOUS ONCE AS NEEDED
Status: DISCONTINUED | OUTPATIENT
Start: 2022-05-19 | End: 2022-05-20

## 2022-05-19 RX ORDER — FENTANYL CITRATE 50 UG/ML
INJECTION, SOLUTION INTRAMUSCULAR; INTRAVENOUS AS NEEDED
Status: DISCONTINUED | OUTPATIENT
Start: 2022-05-19 | End: 2022-05-19 | Stop reason: SURG

## 2022-05-19 RX ORDER — ASPIRIN 325 MG
325 TABLET ORAL ONCE
Status: COMPLETED | OUTPATIENT
Start: 2022-05-19 | End: 2022-05-19

## 2022-05-19 RX ORDER — HYDROCODONE BITARTRATE AND ACETAMINOPHEN 5; 325 MG/1; MG/1
1 TABLET ORAL EVERY 4 HOURS PRN
Status: DISCONTINUED | OUTPATIENT
Start: 2022-05-19 | End: 2022-05-22

## 2022-05-19 RX ORDER — ASPIRIN 81 MG/1
81 TABLET ORAL DAILY
Status: DISCONTINUED | OUTPATIENT
Start: 2022-05-20 | End: 2022-06-03 | Stop reason: HOSPADM

## 2022-05-19 RX ORDER — ATORVASTATIN CALCIUM 40 MG/1
40 TABLET, FILM COATED ORAL NIGHTLY
Status: DISCONTINUED | OUTPATIENT
Start: 2022-05-20 | End: 2022-05-20

## 2022-05-19 RX ORDER — MORPHINE SULFATE 4 MG/ML
4 INJECTION, SOLUTION INTRAMUSCULAR; INTRAVENOUS
Status: DISPENSED | OUTPATIENT
Start: 2022-05-19 | End: 2022-05-20

## 2022-05-19 RX ORDER — OLANZAPINE 10 MG/2ML
1 INJECTION, POWDER, LYOPHILIZED, FOR SOLUTION INTRAMUSCULAR
Status: DISCONTINUED | OUTPATIENT
Start: 2022-05-19 | End: 2022-06-03 | Stop reason: HOSPADM

## 2022-05-19 RX ORDER — MORPHINE SULFATE 4 MG/ML
2 INJECTION, SOLUTION INTRAMUSCULAR; INTRAVENOUS
Status: DISPENSED | OUTPATIENT
Start: 2022-05-19 | End: 2022-05-21

## 2022-05-19 RX ORDER — SODIUM CHLORIDE 9 MG/ML
INJECTION, SOLUTION INTRAVENOUS CONTINUOUS PRN
Status: DISCONTINUED | OUTPATIENT
Start: 2022-05-19 | End: 2022-05-19 | Stop reason: SURG

## 2022-05-19 RX ORDER — ACETAMINOPHEN 650 MG/1
650 SUPPOSITORY RECTAL EVERY 4 HOURS
Status: COMPLETED | OUTPATIENT
Start: 2022-05-19 | End: 2022-05-20

## 2022-05-19 RX ORDER — AMOXICILLIN 250 MG
2 CAPSULE ORAL NIGHTLY
Status: DISCONTINUED | OUTPATIENT
Start: 2022-05-20 | End: 2022-06-03 | Stop reason: HOSPADM

## 2022-05-19 RX ORDER — CHLORHEXIDINE GLUCONATE 0.12 MG/ML
15 RINSE ORAL EVERY 12 HOURS
Status: DISCONTINUED | OUTPATIENT
Start: 2022-05-19 | End: 2022-05-27

## 2022-05-19 RX ORDER — VECURONIUM BROMIDE 1 MG/ML
INJECTION, POWDER, LYOPHILIZED, FOR SOLUTION INTRAVENOUS AS NEEDED
Status: DISCONTINUED | OUTPATIENT
Start: 2022-05-19 | End: 2022-05-19 | Stop reason: SURG

## 2022-05-19 RX ORDER — ACETAMINOPHEN 325 MG/1
650 TABLET ORAL EVERY 4 HOURS
Status: COMPLETED | OUTPATIENT
Start: 2022-05-19 | End: 2022-05-20

## 2022-05-19 RX ORDER — NITROGLYCERIN 20 MG/100ML
INJECTION INTRAVENOUS CONTINUOUS PRN
Status: DISCONTINUED | OUTPATIENT
Start: 2022-05-19 | End: 2022-05-19 | Stop reason: SURG

## 2022-05-19 RX ORDER — POTASSIUM CHLORIDE 1.5 G/1.77G
20 POWDER, FOR SOLUTION ORAL AS NEEDED
Status: DISCONTINUED | OUTPATIENT
Start: 2022-05-20 | End: 2022-06-03 | Stop reason: HOSPADM

## 2022-05-19 RX ORDER — AMINOCAPROIC ACID 250 MG/ML
INJECTION, SOLUTION INTRAVENOUS AS NEEDED
Status: DISCONTINUED | OUTPATIENT
Start: 2022-05-19 | End: 2022-05-19 | Stop reason: SURG

## 2022-05-19 RX ORDER — PANTOPRAZOLE SODIUM 40 MG/10ML
40 INJECTION, POWDER, LYOPHILIZED, FOR SOLUTION INTRAVENOUS ONCE
Status: COMPLETED | OUTPATIENT
Start: 2022-05-19 | End: 2022-05-19

## 2022-05-19 RX ORDER — MAGNESIUM SULFATE 1 G/100ML
1 INJECTION INTRAVENOUS EVERY 8 HOURS
Status: COMPLETED | OUTPATIENT
Start: 2022-05-19 | End: 2022-05-20

## 2022-05-19 RX ORDER — POTASSIUM CHLORIDE 20 MEQ/1
20 TABLET, EXTENDED RELEASE ORAL AS NEEDED
Status: DISCONTINUED | OUTPATIENT
Start: 2022-05-20 | End: 2022-06-03 | Stop reason: HOSPADM

## 2022-05-19 RX ORDER — MILRINONE LACTATE 0.2 MG/ML
0.38 INJECTION, SOLUTION INTRAVENOUS
Status: DISCONTINUED | OUTPATIENT
Start: 2022-05-19 | End: 2022-05-20

## 2022-05-19 RX ORDER — NOREPINEPHRINE BIT/0.9 % NACL 8 MG/250ML
.02-.06 INFUSION BOTTLE (ML) INTRAVENOUS CONTINUOUS PRN
Status: DISCONTINUED | OUTPATIENT
Start: 2022-05-19 | End: 2022-05-26

## 2022-05-19 RX ORDER — NOREPINEPHRINE BIT/0.9 % NACL 8 MG/250ML
INFUSION BOTTLE (ML) INTRAVENOUS CONTINUOUS PRN
Status: DISCONTINUED | OUTPATIENT
Start: 2022-05-19 | End: 2022-05-19 | Stop reason: SURG

## 2022-05-19 RX ORDER — ACETAMINOPHEN 650 MG
TABLET, EXTENDED RELEASE ORAL AS NEEDED
Status: DISCONTINUED | OUTPATIENT
Start: 2022-05-19 | End: 2022-05-19 | Stop reason: HOSPADM

## 2022-05-19 RX ORDER — MILRINONE LACTATE 1 MG/ML
8 VIAL (ML) INTRAVENOUS
Status: DISCONTINUED | OUTPATIENT
Start: 2022-05-20 | End: 2022-05-20

## 2022-05-19 RX ADMIN — ACETAMINOPHEN ORAL SOLUTION 649.6 MG: 650 SOLUTION ORAL at 21:08

## 2022-05-19 RX ADMIN — VECURONIUM BROMIDE 10 MG: 1 INJECTION, POWDER, LYOPHILIZED, FOR SOLUTION INTRAVENOUS at 07:10

## 2022-05-19 RX ADMIN — MUPIROCIN 1 APPLICATION: 20 OINTMENT TOPICAL at 05:16

## 2022-05-19 RX ADMIN — FENTANYL CITRATE 500 MCG: 0.05 INJECTION, SOLUTION INTRAMUSCULAR; INTRAVENOUS at 06:55

## 2022-05-19 RX ADMIN — AMINOCAPROIC ACID 10 G: 250 INJECTION, SOLUTION INTRAVENOUS at 07:41

## 2022-05-19 RX ADMIN — VECURONIUM BROMIDE 4 MG: 1 INJECTION, POWDER, LYOPHILIZED, FOR SOLUTION INTRAVENOUS at 08:37

## 2022-05-19 RX ADMIN — MAGNESIUM SULFATE 1 G: 1 INJECTION INTRAVENOUS at 19:37

## 2022-05-19 RX ADMIN — PROTAMINE SULFATE 250 MG: 10 INJECTION, SOLUTION INTRAVENOUS at 11:18

## 2022-05-19 RX ADMIN — AMIODARONE HYDROCHLORIDE 1 MG/MIN: 1.8 INJECTION, SOLUTION INTRAVENOUS at 14:32

## 2022-05-19 RX ADMIN — ALBUMIN (HUMAN) 250 ML: 12.5 INJECTION, SOLUTION INTRAVENOUS at 23:37

## 2022-05-19 RX ADMIN — CHLORHEXIDINE GLUCONATE 15 ML: 1.2 SOLUTION ORAL at 05:16

## 2022-05-19 RX ADMIN — CEFAZOLIN 2 G: 2 INJECTION, POWDER, FOR SOLUTION INTRAMUSCULAR; INTRAVENOUS at 11:20

## 2022-05-19 RX ADMIN — ASPIRIN 325 MG ORAL TABLET 325 MG: 325 PILL ORAL at 18:01

## 2022-05-19 RX ADMIN — POTASSIUM CHLORIDE 10 MEQ: 7.46 INJECTION, SOLUTION INTRAVENOUS at 13:56

## 2022-05-19 RX ADMIN — FENTANYL CITRATE 250 MCG: 0.05 INJECTION, SOLUTION INTRAMUSCULAR; INTRAVENOUS at 08:32

## 2022-05-19 RX ADMIN — ACETAMINOPHEN 650 MG: 325 TABLET ORAL at 18:01

## 2022-05-19 RX ADMIN — CALCIUM GLUCONATE 1 G: 20 INJECTION, SOLUTION INTRAVENOUS at 15:09

## 2022-05-19 RX ADMIN — AMINOCAPROIC ACID 10 G: 250 INJECTION, SOLUTION INTRAVENOUS at 11:14

## 2022-05-19 RX ADMIN — AMIODARONE HYDROCHLORIDE 150 MG: 1.5 INJECTION, SOLUTION INTRAVENOUS at 14:31

## 2022-05-19 RX ADMIN — NICARDIPINE HYDROCHLORIDE 1 MG: 25 INJECTION, SOLUTION INTRAVENOUS at 07:49

## 2022-05-19 RX ADMIN — MUPIROCIN 1 APPLICATION: 20 OINTMENT TOPICAL at 20:29

## 2022-05-19 RX ADMIN — HYDROCODONE BITARTRATE AND ACETAMINOPHEN 1 TABLET: 5; 325 TABLET ORAL at 14:12

## 2022-05-19 RX ADMIN — Medication 0.01 MCG/KG/MIN: at 07:45

## 2022-05-19 RX ADMIN — FENTANYL CITRATE 250 MCG: 0.05 INJECTION, SOLUTION INTRAMUSCULAR; INTRAVENOUS at 11:03

## 2022-05-19 RX ADMIN — NICARDIPINE HYDROCHLORIDE 1 MG: 25 INJECTION, SOLUTION INTRAVENOUS at 07:55

## 2022-05-19 RX ADMIN — MIDAZOLAM 6 MG: 1 INJECTION, SOLUTION INTRAMUSCULAR; INTRAVENOUS at 06:55

## 2022-05-19 RX ADMIN — CALCIUM GLUCONATE 1 G: 20 INJECTION, SOLUTION INTRAVENOUS at 15:40

## 2022-05-19 RX ADMIN — ACETAMINOPHEN 325 MG: 325 TABLET ORAL at 14:12

## 2022-05-19 RX ADMIN — FENTANYL CITRATE 250 MCG: 0.05 INJECTION, SOLUTION INTRAMUSCULAR; INTRAVENOUS at 12:01

## 2022-05-19 RX ADMIN — POTASSIUM PHOSPHATE, MONOBASIC AND POTASSIUM PHOSPHATE, DIBASIC 9 MMOL: 224; 236 INJECTION, SOLUTION, CONCENTRATE INTRAVENOUS at 16:50

## 2022-05-19 RX ADMIN — METOPROLOL TARTRATE 12.5 MG: 25 TABLET, FILM COATED ORAL at 06:29

## 2022-05-19 RX ADMIN — PANTOPRAZOLE SODIUM 40 MG: 40 INJECTION, POWDER, FOR SOLUTION INTRAVENOUS at 18:00

## 2022-05-19 RX ADMIN — POTASSIUM CHLORIDE 10 MEQ: 7.46 INJECTION, SOLUTION INTRAVENOUS at 22:34

## 2022-05-19 RX ADMIN — DEXMEDETOMIDINE HYDROCHLORIDE 0.2 MCG/KG/HR: 4 INJECTION, SOLUTION INTRAVENOUS at 23:44

## 2022-05-19 RX ADMIN — ALBUMIN (HUMAN) 250 ML: 2.5 SOLUTION INTRAVENOUS at 15:37

## 2022-05-19 RX ADMIN — MAGNESIUM SULFATE HEPTAHYDRATE 2 G: 2 INJECTION, SOLUTION INTRAVENOUS at 10:40

## 2022-05-19 RX ADMIN — MILRINONE LACTATE 25 MCG: 0.2 INJECTION, SOLUTION INTRAVENOUS at 11:06

## 2022-05-19 RX ADMIN — DEXMEDETOMIDINE HYDROCHLORIDE 0.4 MCG/KG/HR: 100 INJECTION, SOLUTION INTRAVENOUS at 08:33

## 2022-05-19 RX ADMIN — CEFAZOLIN 2 G: 2 INJECTION, POWDER, FOR SOLUTION INTRAMUSCULAR; INTRAVENOUS at 07:10

## 2022-05-19 RX ADMIN — ALBUMIN (HUMAN) 250 ML: 2.5 SOLUTION INTRAVENOUS at 15:16

## 2022-05-19 RX ADMIN — NITROGLYCERIN 10 MCG/MIN: 20 INJECTION INTRAVENOUS at 07:50

## 2022-05-19 RX ADMIN — SODIUM CHLORIDE 2 UNITS/HR: 900 INJECTION INTRAVENOUS at 08:33

## 2022-05-19 RX ADMIN — FUROSEMIDE 40 MG: 10 INJECTION, SOLUTION INTRAMUSCULAR; INTRAVENOUS at 20:58

## 2022-05-19 RX ADMIN — ALBUMIN (HUMAN) 250 ML: 2.5 SOLUTION INTRAVENOUS at 14:57

## 2022-05-19 RX ADMIN — HEPARIN SODIUM 15000 UNITS: 1000 INJECTION INTRAVENOUS; SUBCUTANEOUS at 08:31

## 2022-05-19 RX ADMIN — SODIUM CHLORIDE: 0.9 INJECTION, SOLUTION INTRAVENOUS at 06:55

## 2022-05-19 RX ADMIN — CEFAZOLIN 2 G: 2 INJECTION, POWDER, FOR SOLUTION INTRAMUSCULAR; INTRAVENOUS at 20:47

## 2022-05-19 RX ADMIN — VASOPRESSIN 0.03 UNITS/MIN: 0.2 INJECTION INTRAVENOUS at 21:44

## 2022-05-19 RX ADMIN — CHLORHEXIDINE GLUCONATE 15 ML: 1.2 RINSE ORAL at 20:29

## 2022-05-19 RX ADMIN — DEXTROSE MONOHYDRATE 14 ML: 25 INJECTION, SOLUTION INTRAVENOUS at 13:50

## 2022-05-19 RX ADMIN — VECURONIUM BROMIDE 2 MG: 1 INJECTION, POWDER, LYOPHILIZED, FOR SOLUTION INTRAVENOUS at 12:29

## 2022-05-19 RX ADMIN — MILRINONE LACTATE 0.25 MCG/KG/MIN: 0.2 INJECTION, SOLUTION INTRAVENOUS at 20:58

## 2022-05-19 RX ADMIN — FENTANYL CITRATE 250 MCG: 0.05 INJECTION, SOLUTION INTRAMUSCULAR; INTRAVENOUS at 07:41

## 2022-05-19 RX ADMIN — AMIODARONE HYDROCHLORIDE 0.5 MG/MIN: 1.8 INJECTION, SOLUTION INTRAVENOUS at 23:44

## 2022-05-19 RX ADMIN — SODIUM CHLORIDE: 0.9 INJECTION, SOLUTION INTRAVENOUS at 08:02

## 2022-05-19 NOTE — ANESTHESIA PROCEDURE NOTES
Emergent/Open-Heart Anesthesia BEST    Procedure Performed: Emergent/Open-Heart Anesthesia BEST     Start Time:        End Time:        General Procedure Information  BEST Placed for monitoring purposes only -- This is not a diagnostic BEST

## 2022-05-19 NOTE — ANESTHESIA PROCEDURE NOTES
Airway  Urgency: elective    Date/Time: 5/19/2022 7:11 AM  Airway not difficult    General Information and Staff    Patient location during procedure: OR  Anesthesiologist: Moses Galan MD    Indications and Patient Condition  Indications for airway management: airway protection    Preoxygenated: yes  MILS maintained throughout  Mask difficulty assessment: 1 - vent by mask    Final Airway Details  Final airway type: endotracheal airway      Successful airway: ETT  Cuffed: yes   Successful intubation technique: direct laryngoscopy  Endotracheal tube insertion site: oral  Blade: Adalid  Blade size: 4  ETT size (mm): 7.0  Cormack-Lehane Classification: grade IIa - partial view of glottis  Placement verified by: chest auscultation and capnometry   Measured from: gums  ETT/EBT to gums (cm): 23  Number of attempts at approach: 1  Assessment: lips, teeth, and gum same as pre-op and atraumatic intubation    Additional Comments  Laryngeal suctioning ETT used at request of [facility] staff

## 2022-05-19 NOTE — ANESTHESIA PROCEDURE NOTES
Central Line      Patient reassessed immediately prior to procedure    Patient location during procedure: OR  Start time: 5/19/2022 7:14 AM  Stop Time:5/19/2022 7:28 AM  Indications: vascular access and MD/Surgeon request  Staff  Anesthesiologist: Moses Galan MD  Preanesthetic Checklist  Completed: patient identified, IV checked, site marked, risks and benefits discussed, surgical consent, monitors and equipment checked, pre-op evaluation and timeout performed  Central Line Prep  Sterile Tech:cap, gloves, gown, mask and sterile barriers  Prep: chloraprep  Patient monitoring: blood pressure monitoring, continuous pulse oximetry and EKG  Central Line Procedure  Laterality:right  Location:internal jugular  Catheter Type:triple lumen and Cordis  Catheter Size:8.5 Fr  Guidance:ultrasound guided  PROCEDURE NOTE/ULTRASOUND INTERPRETATION.  Using ultrasound guidance the potential vascular sites for insertion of the catheter were visualized to determine the patency of the vessel to be used for vascular access.  After selecting the appropriate site for insertion, the needle was visualized under ultrasound being inserted into the internal jugular vein, followed by ultrasound confirmation of wire and catheter placement. There were no abnormalities seen on ultrasound; an image was taken; and the patient tolerated the procedure with no complications. Images: still images obtained, printed/placed on chart  Assessment  Post procedure:biopatch applied, line sutured and occlusive dressing applied  Assessement:blood return through all ports, free fluid flow and Gilmer Test  Complications:no  Patient Tolerance:patient tolerated the procedure well with no apparent complications

## 2022-05-19 NOTE — ANESTHESIA PREPROCEDURE EVALUATION
Anesthesia Evaluation     Patient summary reviewed and Nursing notes reviewed   NPO Solid Status: > 6 hours  NPO Liquid Status: > 6 hours           Airway   Mallampati: II  TM distance: >3 FB  Neck ROM: full  No difficulty expected  Dental - normal exam     Pulmonary - normal exam    breath sounds clear to auscultation  (+) shortness of breath,   Cardiovascular     ECG reviewed  Rhythm: regular  Rate: normal    (+) hypertension, valvular problems/murmurs, CAD, angina, CHF , murmur, hyperlipidemia,       Neuro/Psych- negative ROS  GI/Hepatic/Renal/Endo    (+)   diabetes mellitus,     Musculoskeletal     Abdominal  - normal exam    Abdomen: soft.  Bowel sounds: normal.   Substance History - negative use     OB/GYN negative ob/gyn ROS         Other   arthritis,                      Anesthesia Plan    ASA 4     general     intravenous induction   Postoperative Plan: Expected vent after surgery  Anesthetic plan, all risks, benefits, and alternatives have been provided, discussed and informed consent has been obtained with: patient.  Use of blood products discussed with patient .       CODE STATUS:    Code Status (Patient has no pulse and is not breathing): CPR (Attempt to Resuscitate)  Medical Interventions (Patient has pulse or is breathing): Full Support

## 2022-05-19 NOTE — ANESTHESIA PROCEDURE NOTES
Central Line      Patient reassessed immediately prior to procedure    Patient location during procedure: OR  Start time: 5/19/2022 7:28 AM  Stop Time:5/19/2022 7:35 AM  Indications: vascular access and MD/Surgeon request  Staff  Anesthesiologist: Moses Galan MD  Preanesthetic Checklist  Completed: patient identified, IV checked, site marked, risks and benefits discussed, surgical consent, monitors and equipment checked, pre-op evaluation and timeout performed  Central Line Prep  Sterile Tech:cap, gloves, gown, mask and sterile barriers  Prep: chloraprep  Patient monitoring: blood pressure monitoring, continuous pulse oximetry and EKG  Central Line Procedure  Laterality:right  Location:internal jugular  Catheter Type:[place]  Catheter Size:8.5 Fr  Assessment  Post procedure:biopatch applied, line sutured and occlusive dressing applied  Assessement:blood return through all ports, free fluid flow and Gilmer Test  Complications:no  Patient Tolerance:patient tolerated the procedure well with no apparent complications

## 2022-05-19 NOTE — ANESTHESIA PROCEDURE NOTES
Arterial Line      Patient reassessed immediately prior to procedure    Patient location during procedure: OR  Start time: 5/19/2022 6:57 AM  Stop Time:5/19/2022 7:09 AM       Line placed for hemodynamic monitoring.  Performed By   Anesthesiologist: Moses Galan MD  Preanesthetic Checklist  Completed: patient identified, IV checked, site marked, risks and benefits discussed, surgical consent, monitors and equipment checked, pre-op evaluation and timeout performed  Arterial Line Prep   Sterile Tech: cap, gloves, gown, mask and sterile barriers  Prep: ChloraPrep  Patient monitoring: blood pressure monitoring, continuous pulse oximetry and EKG  Arterial Line Procedure   Laterality:left  Location:  radial artery  Catheter size: 20 G   Guidance: landmark technique and palpation technique  Number of attempts: 1  Successful placement: yes  Post Assessment   Dressing Type: secured with tape and wrist guard applied.   Complications no  Circ/Move/Sens Assessment: normal.   Patient Tolerance: patient tolerated the procedure well with no apparent complications

## 2022-05-19 NOTE — ANESTHESIA POSTPROCEDURE EVALUATION
Patient: Mindi Quinteros    Procedure Summary     Date: 05/19/22 Room / Location: [facility] 01 / [facility]    Anesthesia Start: 0655 Anesthesia Stop: 1311    Procedures:       AORTIC VALVE REPAIR/REPLACEMENT MITRAL VALVE REPAIR/REPLACEMENT (N/A Chest)      MAZE PROCEDURE (N/A )      CORONARY ARTERY BYPASS GRAFTING (N/A Chest) Diagnosis:       Mitral valve insufficiency, unspecified etiology      (Mitral valve insufficiency, unspecified etiology [I34.0])    Surgeons: Lane Okeefe MD Provider: Moses Galan MD    Anesthesia Type: general ASA Status: 4          Anesthesia Type: general    Vitals  Vitals Value Taken Time   BP     Temp 95.9 °F (35.5 °C) 05/19/22 1312   Pulse 93 05/19/22 1312   Resp     SpO2     Vitals shown include unvalidated device data.        Post Anesthesia Care and Evaluation    Patient location during evaluation: ICU  Patient participation: complete - patient cannot participate  Level of consciousness: obtunded/minimal responses  Pain scale: See nurse's notes for pain score.  Pain management: adequate  Airway patency: patent  Anesthetic complications: No anesthetic complications  PONV Status: none  Cardiovascular status: acceptable  Respiratory status: acceptable  Hydration status: acceptable    Comments: Patient seen and examined postoperatively; vital signs stable; SpO2 greater than or equal to 90%; cardiopulmonary status stable; nausea/vomiting adequately controlled; pain adequately controlled; no apparent anesthesia complications; patient discharged from anesthesia care when discharge criteria were met

## 2022-05-20 ENCOUNTER — APPOINTMENT (OUTPATIENT)
Dept: CARDIOLOGY | Facility: HOSPITAL | Age: 82
End: 2022-05-20

## 2022-05-20 ENCOUNTER — APPOINTMENT (OUTPATIENT)
Dept: GENERAL RADIOLOGY | Facility: HOSPITAL | Age: 82
End: 2022-05-20

## 2022-05-20 LAB
ALBUMIN SERPL-MCNC: 4.1 G/DL (ref 3.5–5.2)
ALBUMIN/GLOB SERPL: 3.2 G/DL
ALP SERPL-CCNC: 41 U/L (ref 39–117)
ALT SERPL W P-5'-P-CCNC: 323 U/L (ref 1–33)
ANION GAP SERPL CALCULATED.3IONS-SCNC: 15 MMOL/L (ref 5–15)
ARTERIAL PATENCY WRIST A: ABNORMAL
ARTERIAL PATENCY WRIST A: POSITIVE
ARTERIAL PATENCY WRIST A: POSITIVE
AST SERPL-CCNC: 1557 U/L (ref 1–32)
ATMOSPHERIC PRESS: ABNORMAL MM[HG]
BASE DEFICIT: -0.6 MEQ/LITER
BASE DEFICIT: -1.8 MEQ/LITER
BASE EXCESS BLDA CALC-SCNC: -0.6 MMOL/L (ref 0–3)
BASE EXCESS BLDA CALC-SCNC: -1.6 MMOL/L (ref 0–3)
BASE EXCESS BLDA CALC-SCNC: -2.9 MMOL/L (ref 0–3)
BASE EXCESS BLDA CALC-SCNC: -3 MMOL/L (ref 0–3)
BASE EXCESS BLDA CALC-SCNC: -8 MMOL/L (ref 0–3)
BASOPHILS # BLD AUTO: 0 10*3/MM3 (ref 0–0.2)
BASOPHILS NFR BLD AUTO: 0.2 % (ref 0–1.5)
BDY SITE: ABNORMAL
BH BB BLOOD EXPIRATION DATE: NORMAL
BH BB BLOOD TYPE BARCODE: 5100
BH BB DISPENSE STATUS: NORMAL
BH BB PRODUCT CODE: NORMAL
BH BB UNIT NUMBER: NORMAL
BH CV ECHO MEAS - AO ROOT DIAM: 3.2 CM
BH CV ECHO MEAS - EDV(CUBED): 40.8 ML
BH CV ECHO MEAS - ESV(CUBED): 23.2 ML
BH CV ECHO MEAS - FS: 17.1 %
BH CV ECHO MEAS - IVS/LVPW: 1.11 CM
BH CV ECHO MEAS - IVSD: 1 CM
BH CV ECHO MEAS - LV MASS(C)D: 93.3 GRAMS
BH CV ECHO MEAS - LVIDD: 3.4 CM
BH CV ECHO MEAS - LVIDS: 2.9 CM
BH CV ECHO MEAS - LVOT AREA: 3.7 CM2
BH CV ECHO MEAS - LVOT DIAM: 2.18 CM
BH CV ECHO MEAS - LVPWD: 0.9 CM
BH CV ECHO MEAS - RAP SYSTOLE: 3 MMHG
BH CV ECHO MEAS - RVDD: 3.6 CM
BH CV ECHO MEAS - RVSP: 35.7 MMHG
BH CV ECHO MEAS - TR MAX PG: 32.7 MMHG
BH CV ECHO MEAS - TR MAX VEL: 286.1 CM/SEC
BILIRUB SERPL-MCNC: 1.4 MG/DL (ref 0–1.2)
BUN SERPL-MCNC: 14 MG/DL (ref 8–23)
BUN/CREAT SERPL: 16.1 (ref 7–25)
CA-I BLDA-SCNC: 1.13 MMOL/L (ref 1.15–1.33)
CA-I SERPL ISE-MCNC: 1.14 MMOL/L (ref 1.2–1.3)
CA-I SERPL ISE-MCNC: 1.14 MMOL/L (ref 1.2–1.3)
CALCIUM SPEC-SCNC: 8.3 MG/DL (ref 8.6–10.5)
CHLORIDE SERPL-SCNC: 108 MMOL/L (ref 98–107)
CO2 BLDA-SCNC: 17.1 MMOL/L (ref 22–29)
CO2 BLDA-SCNC: 21.9 MMOL/L (ref 22–29)
CO2 BLDA-SCNC: 23.3 MMOL/L (ref 22–29)
CO2 BLDA-SCNC: 24 MMOL/L (ref 22–29)
CO2 BLDA-SCNC: 25.5 MMOL/L (ref 22–29)
CO2 SERPL-SCNC: 21 MMOL/L (ref 22–29)
CREAT SERPL-MCNC: 0.87 MG/DL (ref 0.57–1)
D-LACTATE SERPL-SCNC: 0.7 MMOL/L (ref 0.5–2)
D-LACTATE SERPL-SCNC: 0.8 MMOL/L (ref 0.5–2)
DEPRECATED RDW RBC AUTO: 44.2 FL (ref 37–54)
EGFRCR SERPLBLD CKD-EPI 2021: 67 ML/MIN/1.73
EOSINOPHIL # BLD AUTO: 0 10*3/MM3 (ref 0–0.4)
EOSINOPHIL NFR BLD AUTO: 0.1 % (ref 0.3–6.2)
ERYTHROCYTE [DISTWIDTH] IN BLOOD BY AUTOMATED COUNT: 15.4 % (ref 12.3–15.4)
GLOBULIN UR ELPH-MCNC: 1.3 GM/DL
GLUCOSE BLDC GLUCOMTR-MCNC: 114 MG/DL (ref 70–105)
GLUCOSE BLDC GLUCOMTR-MCNC: 116 MG/DL (ref 70–105)
GLUCOSE BLDC GLUCOMTR-MCNC: 116 MG/DL (ref 70–105)
GLUCOSE BLDC GLUCOMTR-MCNC: 117 MG/DL (ref 70–105)
GLUCOSE BLDC GLUCOMTR-MCNC: 123 MG/DL (ref 70–105)
GLUCOSE BLDC GLUCOMTR-MCNC: 126 MG/DL (ref 70–105)
GLUCOSE BLDC GLUCOMTR-MCNC: 131 MG/DL (ref 70–105)
GLUCOSE BLDC GLUCOMTR-MCNC: 132 MG/DL (ref 70–105)
GLUCOSE BLDC GLUCOMTR-MCNC: 154 MG/DL (ref 70–105)
GLUCOSE BLDC GLUCOMTR-MCNC: 154 MG/DL (ref 70–105)
GLUCOSE BLDC GLUCOMTR-MCNC: 157 MG/DL (ref 70–105)
GLUCOSE BLDC GLUCOMTR-MCNC: 166 MG/DL (ref 70–105)
GLUCOSE BLDC GLUCOMTR-MCNC: 166 MG/DL (ref 74–100)
GLUCOSE BLDC GLUCOMTR-MCNC: 166 MG/DL (ref 74–100)
GLUCOSE BLDC GLUCOMTR-MCNC: 169 MG/DL (ref 70–105)
GLUCOSE BLDC GLUCOMTR-MCNC: 172 MG/DL (ref 70–105)
GLUCOSE BLDC GLUCOMTR-MCNC: 186 MG/DL (ref 70–105)
GLUCOSE SERPL-MCNC: 183 MG/DL (ref 65–99)
HCO3 BLDA-SCNC: 16.3 MMOL/L (ref 21–28)
HCO3 BLDA-SCNC: 20.9 MMOL/L (ref 21–28)
HCO3 BLDA-SCNC: 22.1 MMOL/L (ref 21–28)
HCO3 MIXED: 22.9 MMOL/L (ref 21–29)
HCO3 MIXED: 24.3 MMOL/L (ref 21–29)
HCT VFR BLD AUTO: 28.7 % (ref 34–46.6)
HCT VFR BLDA CALC: 26 % (ref 38–51)
HEMODILUTION: NO
HEMODILUTION: YES
HEMODILUTION: YES
HGB BLD-MCNC: 9.4 G/DL (ref 12–15.9)
HGB BLDA-MCNC: 8.7 G/DL (ref 12–17)
INHALED O2 CONCENTRATION: 40 %
INR PPP: 1.58 (ref 0.93–1.1)
LAB AP CASE REPORT: NORMAL
LYMPHOCYTES # BLD AUTO: 0.4 10*3/MM3 (ref 0.7–3.1)
LYMPHOCYTES NFR BLD AUTO: 5.9 % (ref 19.6–45.3)
MAGNESIUM SERPL-MCNC: 2.6 MG/DL (ref 1.6–2.4)
MAXIMAL PREDICTED HEART RATE: 139 BPM
MAXIMAL PREDICTED HEART RATE: 139 BPM
MCH RBC QN AUTO: 26.4 PG (ref 26.6–33)
MCHC RBC AUTO-ENTMCNC: 32.7 G/DL (ref 31.5–35.7)
MCV RBC AUTO: 80.8 FL (ref 79–97)
MODALITY: ABNORMAL
MONOCYTES # BLD AUTO: 0.7 10*3/MM3 (ref 0.1–0.9)
MONOCYTES NFR BLD AUTO: 11.1 % (ref 5–12)
NEUTROPHILS NFR BLD AUTO: 5.6 10*3/MM3 (ref 1.7–7)
NEUTROPHILS NFR BLD AUTO: 82.7 % (ref 42.7–76)
NRBC BLD AUTO-RTO: 0 /100 WBC (ref 0–0.2)
O2 SATURATION MIXED: 65.7 %
O2 SATURATION MIXED: 69.9 %
PATH REPORT.FINAL DX SPEC: NORMAL
PATH REPORT.GROSS SPEC: NORMAL
PCO2 BLDA: 26.6 MM HG (ref 35–48)
PCO2 BLDA: 31.1 MM HG (ref 35–48)
PCO2 BLDA: 38 MM HG (ref 35–48)
PCO2 MIXED: 36.6 MMHG (ref 35–51)
PCO2 MIXED: 40 MMHG (ref 35–51)
PEEP RESPIRATORY: 5 CM[H2O]
PH BLDA: 7.37 PH UNITS (ref 7.35–7.45)
PH BLDA: 7.39 PH UNITS (ref 7.35–7.45)
PH BLDA: 7.44 PH UNITS (ref 7.35–7.45)
PH MIXED: 7.39 PH UNITS (ref 7.32–7.45)
PH MIXED: 7.4 PH UNITS (ref 7.32–7.45)
PHOSPHATE SERPL-MCNC: 4.2 MG/DL (ref 2.5–4.5)
PLATELET # BLD AUTO: 117 10*3/MM3 (ref 140–450)
PMV BLD AUTO: 8.4 FL (ref 6–12)
PO2 BLDA: 113.6 MM HG (ref 83–108)
PO2 BLDA: 115.8 MM HG (ref 83–108)
PO2 BLDA: 121.8 MM HG (ref 83–108)
PO2 MIXED: 33.8 MMHG
PO2 MIXED: 36.8 MMHG
POTASSIUM BLDA-SCNC: 3.1 MMOL/L (ref 3.5–4.5)
POTASSIUM SERPL-SCNC: 3.4 MMOL/L (ref 3.5–5.2)
POTASSIUM SERPL-SCNC: 3.7 MMOL/L (ref 3.5–5.2)
POTASSIUM SERPL-SCNC: 3.8 MMOL/L (ref 3.5–5.2)
PROT SERPL-MCNC: 5.4 G/DL (ref 6–8.5)
PROTHROMBIN TIME: 15.9 SECONDS (ref 9.6–11.7)
QT INTERVAL: 389 MS
QT INTERVAL: 512 MS
RBC # BLD AUTO: 3.56 10*6/MM3 (ref 3.77–5.28)
RESPIRATORY RATE: 14
SAO2 % BLDCOA: 98.4 % (ref 94–98)
SAO2 % BLDCOA: 98.7 % (ref 94–98)
SAO2 % BLDCOA: 98.8 % (ref 94–98)
SET MECH RESP RATE: 14
SET MECH RESP RATE: 14
SODIUM BLD-SCNC: 145 MMOL/L (ref 138–146)
SODIUM SERPL-SCNC: 144 MMOL/L (ref 136–145)
STRESS TARGET HR: 118 BPM
STRESS TARGET HR: 118 BPM
UNIT  ABO: NORMAL
UNIT  RH: NORMAL
VENTILATOR MODE: ABNORMAL
VT ON VENT VENT: 500 ML
WBC NRBC COR # BLD: 6.7 10*3/MM3 (ref 3.4–10.8)

## 2022-05-20 PROCEDURE — 25010000002 CEFAZOLIN PER 500 MG: Performed by: NURSE PRACTITIONER

## 2022-05-20 PROCEDURE — 63710000001 INSULIN REGULAR HUMAN PER 5 UNITS: Performed by: NURSE PRACTITIONER

## 2022-05-20 PROCEDURE — 99233 SBSQ HOSP IP/OBS HIGH 50: CPT | Performed by: INTERNAL MEDICINE

## 2022-05-20 PROCEDURE — 25010000002 CALCIUM GLUCONATE 2-0.675 GM/100ML-% SOLUTION: Performed by: NURSE PRACTITIONER

## 2022-05-20 PROCEDURE — 85025 COMPLETE CBC W/AUTO DIFF WBC: CPT | Performed by: NURSE PRACTITIONER

## 2022-05-20 PROCEDURE — 93321 DOPPLER ECHO F-UP/LMTD STD: CPT | Performed by: INTERNAL MEDICINE

## 2022-05-20 PROCEDURE — 93308 TTE F-UP OR LMTD: CPT

## 2022-05-20 PROCEDURE — 25010000002 LIDOCAINE PER 10 MG: Performed by: THORACIC SURGERY (CARDIOTHORACIC VASCULAR SURGERY)

## 2022-05-20 PROCEDURE — 93308 TTE F-UP OR LMTD: CPT | Performed by: INTERNAL MEDICINE

## 2022-05-20 PROCEDURE — 93325 DOPPLER ECHO COLOR FLOW MAPG: CPT | Performed by: INTERNAL MEDICINE

## 2022-05-20 PROCEDURE — 25010000002 MAGNESIUM SULFATE IN D5W 1G/100ML (PREMIX) 1-5 GM/100ML-% SOLUTION: Performed by: NURSE PRACTITIONER

## 2022-05-20 PROCEDURE — 82962 GLUCOSE BLOOD TEST: CPT

## 2022-05-20 PROCEDURE — 71045 X-RAY EXAM CHEST 1 VIEW: CPT

## 2022-05-20 PROCEDURE — 94799 UNLISTED PULMONARY SVC/PX: CPT

## 2022-05-20 PROCEDURE — 80051 ELECTROLYTE PANEL: CPT

## 2022-05-20 PROCEDURE — P9045 ALBUMIN (HUMAN), 5%, 250 ML: HCPCS

## 2022-05-20 PROCEDURE — 93005 ELECTROCARDIOGRAM TRACING: CPT | Performed by: NURSE PRACTITIONER

## 2022-05-20 PROCEDURE — 82803 BLOOD GASES ANY COMBINATION: CPT

## 2022-05-20 PROCEDURE — 25010000002 ONDANSETRON PER 1 MG: Performed by: NURSE PRACTITIONER

## 2022-05-20 PROCEDURE — 99024 POSTOP FOLLOW-UP VISIT: CPT | Performed by: NURSE PRACTITIONER

## 2022-05-20 PROCEDURE — 82330 ASSAY OF CALCIUM: CPT | Performed by: NURSE PRACTITIONER

## 2022-05-20 PROCEDURE — 94003 VENT MGMT INPAT SUBQ DAY: CPT

## 2022-05-20 PROCEDURE — 84132 ASSAY OF SERUM POTASSIUM: CPT | Performed by: THORACIC SURGERY (CARDIOTHORACIC VASCULAR SURGERY)

## 2022-05-20 PROCEDURE — P9041 ALBUMIN (HUMAN),5%, 50ML: HCPCS | Performed by: THORACIC SURGERY (CARDIOTHORACIC VASCULAR SURGERY)

## 2022-05-20 PROCEDURE — 93321 DOPPLER ECHO F-UP/LMTD STD: CPT

## 2022-05-20 PROCEDURE — 25010000002 CALCIUM GLUCONATE-NACL 1-0.675 GM/50ML-% SOLUTION: Performed by: THORACIC SURGERY (CARDIOTHORACIC VASCULAR SURGERY)

## 2022-05-20 PROCEDURE — 93010 ELECTROCARDIOGRAM REPORT: CPT | Performed by: INTERNAL MEDICINE

## 2022-05-20 PROCEDURE — 94645 CONT INHLJ TX EACH ADDL HOUR: CPT

## 2022-05-20 PROCEDURE — 25010000002 FUROSEMIDE PER 20 MG: Performed by: THORACIC SURGERY (CARDIOTHORACIC VASCULAR SURGERY)

## 2022-05-20 PROCEDURE — 94664 DEMO&/EVAL PT USE INHALER: CPT

## 2022-05-20 PROCEDURE — 83605 ASSAY OF LACTIC ACID: CPT

## 2022-05-20 PROCEDURE — 85610 PROTHROMBIN TIME: CPT | Performed by: NURSE PRACTITIONER

## 2022-05-20 PROCEDURE — 82330 ASSAY OF CALCIUM: CPT | Performed by: THORACIC SURGERY (CARDIOTHORACIC VASCULAR SURGERY)

## 2022-05-20 PROCEDURE — 83605 ASSAY OF LACTIC ACID: CPT | Performed by: NURSE PRACTITIONER

## 2022-05-20 PROCEDURE — 25010000002 ALBUMIN HUMAN 5% PER 50 ML

## 2022-05-20 PROCEDURE — 80053 COMPREHEN METABOLIC PANEL: CPT | Performed by: NURSE PRACTITIONER

## 2022-05-20 PROCEDURE — 0 MILRINONE 10 MG/10ML SOLUTION: Performed by: THORACIC SURGERY (CARDIOTHORACIC VASCULAR SURGERY)

## 2022-05-20 PROCEDURE — 85018 HEMOGLOBIN: CPT

## 2022-05-20 PROCEDURE — 82330 ASSAY OF CALCIUM: CPT

## 2022-05-20 PROCEDURE — 84100 ASSAY OF PHOSPHORUS: CPT | Performed by: NURSE PRACTITIONER

## 2022-05-20 PROCEDURE — 93005 ELECTROCARDIOGRAM TRACING: CPT | Performed by: THORACIC SURGERY (CARDIOTHORACIC VASCULAR SURGERY)

## 2022-05-20 PROCEDURE — 0 MORPHINE SULFATE 4 MG/ML SOLUTION: Performed by: NURSE PRACTITIONER

## 2022-05-20 PROCEDURE — 25010000002 ALBUMIN HUMAN 5% PER 50 ML: Performed by: THORACIC SURGERY (CARDIOTHORACIC VASCULAR SURGERY)

## 2022-05-20 PROCEDURE — 83735 ASSAY OF MAGNESIUM: CPT | Performed by: NURSE PRACTITIONER

## 2022-05-20 PROCEDURE — 0 MILRINONE LACTATE IN DEXTROSE 20-5 MG/100ML-% SOLUTION: Performed by: NURSE PRACTITIONER

## 2022-05-20 PROCEDURE — 93325 DOPPLER ECHO COLOR FLOW MAPG: CPT

## 2022-05-20 RX ORDER — PANTOPRAZOLE SODIUM 40 MG/1
40 TABLET, DELAYED RELEASE ORAL EVERY MORNING
Status: DISCONTINUED | OUTPATIENT
Start: 2022-05-21 | End: 2022-05-22

## 2022-05-20 RX ORDER — CALCIUM GLUCONATE 20 MG/ML
2 INJECTION, SOLUTION INTRAVENOUS ONCE
Status: COMPLETED | OUTPATIENT
Start: 2022-05-20 | End: 2022-05-20

## 2022-05-20 RX ORDER — INSULIN LISPRO 100 [IU]/ML
0-9 INJECTION, SOLUTION INTRAVENOUS; SUBCUTANEOUS
Status: DISCONTINUED | OUTPATIENT
Start: 2022-05-21 | End: 2022-05-20

## 2022-05-20 RX ORDER — ALBUMIN, HUMAN INJ 5% 5 %
250 SOLUTION INTRAVENOUS ONCE
Status: COMPLETED | OUTPATIENT
Start: 2022-05-20 | End: 2022-05-20

## 2022-05-20 RX ORDER — INSULIN LISPRO 100 [IU]/ML
0-9 INJECTION, SOLUTION INTRAVENOUS; SUBCUTANEOUS AS NEEDED
Status: DISCONTINUED | OUTPATIENT
Start: 2022-05-22 | End: 2022-05-22

## 2022-05-20 RX ORDER — CALCIUM GLUCONATE 20 MG/ML
1 INJECTION, SOLUTION INTRAVENOUS ONCE
Status: COMPLETED | OUTPATIENT
Start: 2022-05-20 | End: 2022-05-20

## 2022-05-20 RX ORDER — MILRINONE LACTATE 0.2 MG/ML
0.25 INJECTION, SOLUTION INTRAVENOUS
Status: DISCONTINUED | OUTPATIENT
Start: 2022-05-20 | End: 2022-05-24

## 2022-05-20 RX ORDER — LIDOCAINE HYDROCHLORIDE ANHYDROUS AND DEXTROSE MONOHYDRATE 5; 400 G/100ML; MG/100ML
2 INJECTION, SOLUTION INTRAVENOUS CONTINUOUS
Status: DISCONTINUED | OUTPATIENT
Start: 2022-05-20 | End: 2022-05-21 | Stop reason: SDUPTHER

## 2022-05-20 RX ORDER — LIDOCAINE HYDROCHLORIDE ANHYDROUS AND DEXTROSE MONOHYDRATE .4; 5 G/100ML; G/100ML
2 INJECTION, SOLUTION INTRAVENOUS CONTINUOUS
Status: DISCONTINUED | OUTPATIENT
Start: 2022-05-20 | End: 2022-05-20

## 2022-05-20 RX ORDER — INSULIN LISPRO 100 [IU]/ML
0-9 INJECTION, SOLUTION INTRAVENOUS; SUBCUTANEOUS AS NEEDED
Status: DISCONTINUED | OUTPATIENT
Start: 2022-05-21 | End: 2022-05-20

## 2022-05-20 RX ORDER — INSULIN LISPRO 100 [IU]/ML
0-9 INJECTION, SOLUTION INTRAVENOUS; SUBCUTANEOUS
Status: DISCONTINUED | OUTPATIENT
Start: 2022-05-22 | End: 2022-05-22

## 2022-05-20 RX ORDER — SODIUM BICARBONATE 650 MG/1
650 TABLET ORAL 3 TIMES DAILY
Status: DISCONTINUED | OUTPATIENT
Start: 2022-05-20 | End: 2022-05-21

## 2022-05-20 RX ORDER — FUROSEMIDE 10 MG/ML
40 INJECTION INTRAMUSCULAR; INTRAVENOUS ONCE
Status: COMPLETED | OUTPATIENT
Start: 2022-05-20 | End: 2022-05-20

## 2022-05-20 RX ADMIN — MUPIROCIN 1 APPLICATION: 20 OINTMENT TOPICAL at 20:10

## 2022-05-20 RX ADMIN — ASPIRIN 81 MG: 81 TABLET, COATED ORAL at 09:59

## 2022-05-20 RX ADMIN — CALCIUM GLUCONATE 2 G: 20 INJECTION, SOLUTION INTRAVENOUS at 09:59

## 2022-05-20 RX ADMIN — SENNOSIDES AND DOCUSATE SODIUM 2 TABLET: 50; 8.6 TABLET ORAL at 20:09

## 2022-05-20 RX ADMIN — LIDOCAINE HYDROCHLORIDE 2 MG/KG/HR: 4 INJECTION, SOLUTION INTRAVENOUS at 05:49

## 2022-05-20 RX ADMIN — MILRINONE LACTATE 8 MG: 1 INJECTION, SOLUTION INTRAVENOUS at 11:00

## 2022-05-20 RX ADMIN — SODIUM BICARBONATE 50 MEQ: 84 INJECTION, SOLUTION INTRAVENOUS at 20:35

## 2022-05-20 RX ADMIN — CEFAZOLIN 2 G: 2 INJECTION, POWDER, FOR SOLUTION INTRAMUSCULAR; INTRAVENOUS at 20:09

## 2022-05-20 RX ADMIN — MILRINONE LACTATE 8 MG: 1 INJECTION, SOLUTION INTRAVENOUS at 03:14

## 2022-05-20 RX ADMIN — LIDOCAINE HYDROCHLORIDE 2 MG/KG/HR: 4 INJECTION, SOLUTION INTRAVENOUS at 21:52

## 2022-05-20 RX ADMIN — MORPHINE SULFATE 2 MG: 4 INJECTION INTRAVENOUS at 16:45

## 2022-05-20 RX ADMIN — MILRINONE LACTATE 0.5 MCG/KG/MIN: 0.2 INJECTION, SOLUTION INTRAVENOUS at 12:45

## 2022-05-20 RX ADMIN — CEFAZOLIN 2 G: 2 INJECTION, POWDER, FOR SOLUTION INTRAMUSCULAR; INTRAVENOUS at 02:50

## 2022-05-20 RX ADMIN — CEFAZOLIN 2 G: 2 INJECTION, POWDER, FOR SOLUTION INTRAMUSCULAR; INTRAVENOUS at 11:22

## 2022-05-20 RX ADMIN — SODIUM CHLORIDE 4.3 UNITS/HR: 9 INJECTION, SOLUTION INTRAVENOUS at 07:01

## 2022-05-20 RX ADMIN — ALBUMIN (HUMAN) 250 ML: 2.5 SOLUTION INTRAVENOUS at 17:58

## 2022-05-20 RX ADMIN — POTASSIUM CHLORIDE 20 MEQ: 1.5 POWDER, FOR SOLUTION ORAL at 03:33

## 2022-05-20 RX ADMIN — SODIUM BICARBONATE 650 MG TABLET 650 MG: at 20:35

## 2022-05-20 RX ADMIN — CALCIUM GLUCONATE 1 G: 20 INJECTION, SOLUTION INTRAVENOUS at 23:14

## 2022-05-20 RX ADMIN — DEXMEDETOMIDINE HYDROCHLORIDE 1.2 MCG/KG/HR: 4 INJECTION, SOLUTION INTRAVENOUS at 09:47

## 2022-05-20 RX ADMIN — CHLORHEXIDINE GLUCONATE 15 ML: 1.2 RINSE ORAL at 09:58

## 2022-05-20 RX ADMIN — FUROSEMIDE 40 MG: 10 INJECTION INTRAMUSCULAR; INTRAVENOUS at 07:01

## 2022-05-20 RX ADMIN — MILRINONE LACTATE 8 MG: 1 INJECTION, SOLUTION INTRAVENOUS at 00:18

## 2022-05-20 RX ADMIN — HYDROCODONE BITARTRATE AND ACETAMINOPHEN 1 TABLET: 5; 325 TABLET ORAL at 03:33

## 2022-05-20 RX ADMIN — MAGNESIUM SULFATE 1 G: 1 INJECTION INTRAVENOUS at 10:01

## 2022-05-20 RX ADMIN — ALBUMIN (HUMAN) 250 ML: 12.5 INJECTION, SOLUTION INTRAVENOUS at 04:28

## 2022-05-20 RX ADMIN — MORPHINE SULFATE 4 MG: 4 INJECTION INTRAVENOUS at 10:41

## 2022-05-20 RX ADMIN — MUPIROCIN 1 APPLICATION: 20 OINTMENT TOPICAL at 09:58

## 2022-05-20 RX ADMIN — MAGNESIUM SULFATE 1 G: 1 INJECTION INTRAVENOUS at 01:36

## 2022-05-20 RX ADMIN — POTASSIUM CHLORIDE 20 MEQ: 1.5 POWDER, FOR SOLUTION ORAL at 05:15

## 2022-05-20 RX ADMIN — MILRINONE LACTATE 8 MG: 1 INJECTION, SOLUTION INTRAVENOUS at 06:58

## 2022-05-20 RX ADMIN — HYDROCODONE BITARTRATE AND ACETAMINOPHEN 1 TABLET: 5; 325 TABLET ORAL at 20:09

## 2022-05-20 RX ADMIN — DEXMEDETOMIDINE HYDROCHLORIDE 0.2 MCG/KG/HR: 4 INJECTION, SOLUTION INTRAVENOUS at 21:51

## 2022-05-20 RX ADMIN — ONDANSETRON 4 MG: 2 INJECTION INTRAMUSCULAR; INTRAVENOUS at 00:43

## 2022-05-20 RX ADMIN — HYDROCODONE BITARTRATE AND ACETAMINOPHEN 1 TABLET: 5; 325 TABLET ORAL at 11:22

## 2022-05-20 RX ADMIN — ACETAMINOPHEN ORAL SOLUTION 649.6 MG: 650 SOLUTION ORAL at 01:36

## 2022-05-20 RX ADMIN — CHLORHEXIDINE GLUCONATE 15 ML: 1.2 RINSE ORAL at 20:09

## 2022-05-20 RX ADMIN — POTASSIUM CHLORIDE 20 MEQ: 1.5 POWDER, FOR SOLUTION ORAL at 22:29

## 2022-05-20 RX ADMIN — ALBUMIN (HUMAN) 250 ML: 12.5 INJECTION, SOLUTION INTRAVENOUS at 21:45

## 2022-05-20 RX ADMIN — MORPHINE SULFATE 2 MG: 4 INJECTION INTRAVENOUS at 00:32

## 2022-05-20 NOTE — ANESTHESIA PROCEDURE NOTES
Arterial Line      Patient reassessed immediately prior to procedure    Patient location during procedure: ICU  Start time: 5/20/2022 1:20 PM  Stop Time:5/20/2022 1:30 PM       Line placed for hemodynamic monitoring and ABGs/Labs/ISTAT.  Performed By   Anesthesiologist: Giorgio Shelton MD  Preanesthetic Checklist  Completed: patient identified, IV checked, site marked, risks and benefits discussed, surgical consent, monitors and equipment checked, pre-op evaluation and timeout performed  Arterial Line Prep   Sterile Tech: cap, gloves, sterile barriers, gown and mask  Prep: ChloraPrep  Patient monitoring: EKG, continuous pulse oximetry and blood pressure monitoring  Arterial Line Procedure   Laterality:right  Location:  brachial artery  Catheter size: 20 G   Guidance: palpation technique  Number of attempts: 1  Successful placement: yes  Post Assessment   Dressing Type: wrist guard applied, secured with tape and occlusive dressing applied.   Complications no  Circ/Move/Sens Assessment: normal and unchanged.   Patient Tolerance: patient tolerated the procedure well with no apparent complications  Additional Notes  Sterile seldinger technique, tolerated well

## 2022-05-20 NOTE — ADDENDUM NOTE
Addendum  created 05/20/22 1340 by Giorgio Shelton MD    Child order released for a procedure order, Clinical Note Signed, Intraprocedure Blocks edited, LDA created via procedure documentation

## 2022-05-21 ENCOUNTER — APPOINTMENT (OUTPATIENT)
Dept: GENERAL RADIOLOGY | Facility: HOSPITAL | Age: 82
End: 2022-05-21

## 2022-05-21 ENCOUNTER — APPOINTMENT (OUTPATIENT)
Dept: CARDIOLOGY | Facility: HOSPITAL | Age: 82
End: 2022-05-21

## 2022-05-21 LAB
ALBUMIN SERPL-MCNC: 3.7 G/DL (ref 3.5–5.2)
ALBUMIN/GLOB SERPL: 2.8 G/DL
ALP SERPL-CCNC: 42 U/L (ref 39–117)
ALT SERPL W P-5'-P-CCNC: 584 U/L (ref 1–33)
ANION GAP SERPL CALCULATED.3IONS-SCNC: 10 MMOL/L (ref 5–15)
ARTERIAL PATENCY WRIST A: ABNORMAL
ARTERIAL PATENCY WRIST A: ABNORMAL
ARTERIAL PATENCY WRIST A: POSITIVE
AST SERPL-CCNC: 3214 U/L (ref 1–32)
ATMOSPHERIC PRESS: ABNORMAL MM[HG]
BASE DEFICIT: -2.2 MEQ/LITER
BASE EXCESS BLDA CALC-SCNC: -1.3 MMOL/L (ref 0–3)
BASE EXCESS BLDA CALC-SCNC: -1.4 MMOL/L (ref 0–3)
BASE EXCESS BLDA CALC-SCNC: -2 MMOL/L (ref 0–3)
BASE EXCESS BLDA CALC-SCNC: 0.7 MMOL/L (ref 0–3)
BASE EXCESS BLDA CALC-SCNC: 0.9 MMOL/L (ref 0–3)
BDY SITE: ABNORMAL
BH BB BLOOD EXPIRATION DATE: NORMAL
BH BB BLOOD TYPE BARCODE: 5100
BH BB DISPENSE STATUS: NORMAL
BH BB PRODUCT CODE: NORMAL
BH BB UNIT NUMBER: NORMAL
BILIRUB SERPL-MCNC: 1.1 MG/DL (ref 0–1.2)
BILIRUB UR QL STRIP: NEGATIVE
BUN SERPL-MCNC: 16 MG/DL (ref 8–23)
BUN/CREAT SERPL: 23.2 (ref 7–25)
CA-I BLDA-SCNC: 1.17 MMOL/L (ref 1.15–1.33)
CA-I SERPL ISE-MCNC: 1.17 MMOL/L (ref 1.2–1.3)
CALCIUM SPEC-SCNC: 8.4 MG/DL (ref 8.6–10.5)
CHLORIDE SERPL-SCNC: 110 MMOL/L (ref 98–107)
CLARITY UR: CLEAR
CO2 BLDA-SCNC: 23.7 MMOL/L (ref 22–29)
CO2 BLDA-SCNC: 23.9 MMOL/L (ref 22–29)
CO2 BLDA-SCNC: 24.2 MMOL/L (ref 22–29)
CO2 BLDA-SCNC: 25.3 MMOL/L (ref 22–29)
CO2 BLDA-SCNC: 25.5 MMOL/L (ref 22–29)
CO2 SERPL-SCNC: 22 MMOL/L (ref 22–29)
COLOR UR: ABNORMAL
CREAT SERPL-MCNC: 0.69 MG/DL (ref 0.57–1)
CROSSMATCH INTERPRETATION: NORMAL
DEPRECATED RDW RBC AUTO: 45.1 FL (ref 37–54)
EGFRCR SERPLBLD CKD-EPI 2021: 87.3 ML/MIN/1.73
ERYTHROCYTE [DISTWIDTH] IN BLOOD BY AUTOMATED COUNT: 15.9 % (ref 12.3–15.4)
GLOBULIN UR ELPH-MCNC: 1.3 GM/DL
GLUCOSE BLDC GLUCOMTR-MCNC: 133 MG/DL (ref 70–105)
GLUCOSE BLDC GLUCOMTR-MCNC: 134 MG/DL (ref 70–105)
GLUCOSE BLDC GLUCOMTR-MCNC: 137 MG/DL (ref 70–105)
GLUCOSE BLDC GLUCOMTR-MCNC: 137 MG/DL (ref 70–105)
GLUCOSE BLDC GLUCOMTR-MCNC: 139 MG/DL (ref 70–105)
GLUCOSE BLDC GLUCOMTR-MCNC: 141 MG/DL (ref 70–105)
GLUCOSE BLDC GLUCOMTR-MCNC: 145 MG/DL (ref 70–105)
GLUCOSE BLDC GLUCOMTR-MCNC: 146 MG/DL (ref 70–105)
GLUCOSE BLDC GLUCOMTR-MCNC: 146 MG/DL (ref 70–105)
GLUCOSE BLDC GLUCOMTR-MCNC: 152 MG/DL (ref 70–105)
GLUCOSE BLDC GLUCOMTR-MCNC: 152 MG/DL (ref 70–105)
GLUCOSE BLDC GLUCOMTR-MCNC: 155 MG/DL (ref 70–105)
GLUCOSE BLDC GLUCOMTR-MCNC: 158 MG/DL (ref 74–100)
GLUCOSE BLDC GLUCOMTR-MCNC: 158 MG/DL (ref 74–100)
GLUCOSE BLDC GLUCOMTR-MCNC: 159 MG/DL (ref 70–105)
GLUCOSE BLDC GLUCOMTR-MCNC: 162 MG/DL (ref 70–105)
GLUCOSE BLDC GLUCOMTR-MCNC: 170 MG/DL (ref 70–105)
GLUCOSE BLDC GLUCOMTR-MCNC: 175 MG/DL (ref 70–105)
GLUCOSE BLDC GLUCOMTR-MCNC: 176 MG/DL (ref 70–105)
GLUCOSE BLDC GLUCOMTR-MCNC: 179 MG/DL (ref 70–105)
GLUCOSE BLDC GLUCOMTR-MCNC: 183 MG/DL (ref 70–105)
GLUCOSE BLDC GLUCOMTR-MCNC: 185 MG/DL (ref 70–105)
GLUCOSE SERPL-MCNC: 173 MG/DL (ref 65–99)
GLUCOSE UR STRIP-MCNC: NEGATIVE MG/DL
HCO3 BLDA-SCNC: 22.7 MMOL/L (ref 21–28)
HCO3 BLDA-SCNC: 23.1 MMOL/L (ref 21–28)
HCO3 BLDA-SCNC: 24.3 MMOL/L (ref 21–28)
HCO3 BLDA-SCNC: 24.4 MMOL/L (ref 21–28)
HCO3 MIXED: 22.7 MMOL/L (ref 21–29)
HCT VFR BLD AUTO: 24 % (ref 34–46.6)
HCT VFR BLDA CALC: 28 % (ref 38–51)
HEMODILUTION: NO
HGB BLD-MCNC: 7.9 G/DL (ref 12–15.9)
HGB BLDA-MCNC: 9.4 G/DL (ref 12–17)
HGB UR QL STRIP.AUTO: NEGATIVE
INHALED O2 CONCENTRATION: 40 %
KETONES UR QL STRIP: NEGATIVE
LEUKOCYTE ESTERASE UR QL STRIP.AUTO: NEGATIVE
MCH RBC QN AUTO: 26.6 PG (ref 26.6–33)
MCHC RBC AUTO-ENTMCNC: 33.1 G/DL (ref 31.5–35.7)
MCV RBC AUTO: 80.4 FL (ref 79–97)
MODALITY: ABNORMAL
NITRITE UR QL STRIP: NEGATIVE
O2 SATURATION MIXED: 71.9 %
PCO2 BLDA: 33.1 MM HG (ref 35–48)
PCO2 BLDA: 34.3 MM HG (ref 35–48)
PCO2 BLDA: 34.7 MM HG (ref 35–48)
PCO2 BLDA: 36.3 MM HG (ref 35–48)
PCO2 MIXED: 37.3 MMHG (ref 35–51)
PEEP RESPIRATORY: 5 CM[H2O]
PH BLDA: 7.41 PH UNITS (ref 7.35–7.45)
PH BLDA: 7.43 PH UNITS (ref 7.35–7.45)
PH BLDA: 7.46 PH UNITS (ref 7.35–7.45)
PH BLDA: 7.47 PH UNITS (ref 7.35–7.45)
PH MIXED: 7.39 PH UNITS (ref 7.32–7.45)
PH UR STRIP.AUTO: <=5 [PH] (ref 5–8)
PLATELET # BLD AUTO: 74 10*3/MM3 (ref 140–450)
PMV BLD AUTO: 8.6 FL (ref 6–12)
PO2 BLDA: 101.1 MM HG (ref 83–108)
PO2 BLDA: 112.9 MM HG (ref 83–108)
PO2 BLDA: 115.1 MM HG (ref 83–108)
PO2 BLDA: 118.9 MM HG (ref 83–108)
PO2 MIXED: 37.9 MMHG
POTASSIUM BLDA-SCNC: 4 MMOL/L (ref 3.5–4.5)
POTASSIUM SERPL-SCNC: 4.1 MMOL/L (ref 3.5–5.2)
PROT SERPL-MCNC: 5 G/DL (ref 6–8.5)
PROT UR QL STRIP: NEGATIVE
QT INTERVAL: 406 MS
RBC # BLD AUTO: 2.98 10*6/MM3 (ref 3.77–5.28)
RESPIRATORY RATE: 12
RESPIRATORY RATE: 14
SAO2 % BLDCOA: 98.3 % (ref 94–98)
SAO2 % BLDCOA: 98.6 % (ref 94–98)
SAO2 % BLDCOA: 98.7 % (ref 94–98)
SAO2 % BLDCOA: 98.8 % (ref 94–98)
SET MECH RESP RATE: 14
SODIUM BLD-SCNC: 144 MMOL/L (ref 138–146)
SODIUM SERPL-SCNC: 142 MMOL/L (ref 136–145)
SP GR UR STRIP: 1.01 (ref 1–1.03)
UNIT  ABO: NORMAL
UNIT  RH: NORMAL
UROBILINOGEN UR QL STRIP: ABNORMAL
VENTILATOR MODE: ABNORMAL
VT ON VENT VENT: 500 ML
WBC NRBC COR # BLD: 2 10*3/MM3 (ref 3.4–10.8)

## 2022-05-21 PROCEDURE — 71045 X-RAY EXAM CHEST 1 VIEW: CPT

## 2022-05-21 PROCEDURE — 93325 DOPPLER ECHO COLOR FLOW MAPG: CPT

## 2022-05-21 PROCEDURE — 99024 POSTOP FOLLOW-UP VISIT: CPT | Performed by: THORACIC SURGERY (CARDIOTHORACIC VASCULAR SURGERY)

## 2022-05-21 PROCEDURE — 82330 ASSAY OF CALCIUM: CPT

## 2022-05-21 PROCEDURE — 87070 CULTURE OTHR SPECIMN AEROBIC: CPT | Performed by: NURSE PRACTITIONER

## 2022-05-21 PROCEDURE — 99233 SBSQ HOSP IP/OBS HIGH 50: CPT | Performed by: INTERNAL MEDICINE

## 2022-05-21 PROCEDURE — 94799 UNLISTED PULMONARY SVC/PX: CPT

## 2022-05-21 PROCEDURE — P9016 RBC LEUKOCYTES REDUCED: HCPCS

## 2022-05-21 PROCEDURE — 25010000002 EPINEPHRINE 1 MG/ML SOLUTION 30 ML VIAL: Performed by: NURSE PRACTITIONER

## 2022-05-21 PROCEDURE — 0 MILRINONE LACTATE IN DEXTROSE 20-5 MG/100ML-% SOLUTION: Performed by: NURSE PRACTITIONER

## 2022-05-21 PROCEDURE — 25010000002 ONDANSETRON PER 1 MG: Performed by: NURSE PRACTITIONER

## 2022-05-21 PROCEDURE — 94002 VENT MGMT INPAT INIT DAY: CPT

## 2022-05-21 PROCEDURE — 0 MORPHINE SULFATE 4 MG/ML SOLUTION: Performed by: NURSE PRACTITIONER

## 2022-05-21 PROCEDURE — 93010 ELECTROCARDIOGRAM REPORT: CPT | Performed by: INTERNAL MEDICINE

## 2022-05-21 PROCEDURE — 93308 TTE F-UP OR LMTD: CPT

## 2022-05-21 PROCEDURE — 25010000002 FUROSEMIDE PER 20 MG: Performed by: INTERNAL MEDICINE

## 2022-05-21 PROCEDURE — 81003 URINALYSIS AUTO W/O SCOPE: CPT | Performed by: NURSE PRACTITIONER

## 2022-05-21 PROCEDURE — 82962 GLUCOSE BLOOD TEST: CPT

## 2022-05-21 PROCEDURE — 80051 ELECTROLYTE PANEL: CPT

## 2022-05-21 PROCEDURE — 85027 COMPLETE CBC AUTOMATED: CPT | Performed by: NURSE PRACTITIONER

## 2022-05-21 PROCEDURE — 87186 SC STD MICRODIL/AGAR DIL: CPT | Performed by: NURSE PRACTITIONER

## 2022-05-21 PROCEDURE — 94761 N-INVAS EAR/PLS OXIMETRY MLT: CPT

## 2022-05-21 PROCEDURE — 25010000002 LIDOCAINE PER 10 MG: Performed by: THORACIC SURGERY (CARDIOTHORACIC VASCULAR SURGERY)

## 2022-05-21 PROCEDURE — 25010000002 CEFAZOLIN PER 500 MG: Performed by: NURSE PRACTITIONER

## 2022-05-21 PROCEDURE — 87040 BLOOD CULTURE FOR BACTERIA: CPT | Performed by: NURSE PRACTITIONER

## 2022-05-21 PROCEDURE — 36430 TRANSFUSION BLD/BLD COMPNT: CPT

## 2022-05-21 PROCEDURE — 86900 BLOOD TYPING SEROLOGIC ABO: CPT

## 2022-05-21 PROCEDURE — 82803 BLOOD GASES ANY COMBINATION: CPT

## 2022-05-21 PROCEDURE — 85018 HEMOGLOBIN: CPT

## 2022-05-21 PROCEDURE — 87205 SMEAR GRAM STAIN: CPT | Performed by: NURSE PRACTITIONER

## 2022-05-21 PROCEDURE — 93321 DOPPLER ECHO F-UP/LMTD STD: CPT

## 2022-05-21 PROCEDURE — 93321 DOPPLER ECHO F-UP/LMTD STD: CPT | Performed by: INTERNAL MEDICINE

## 2022-05-21 PROCEDURE — 80053 COMPREHEN METABOLIC PANEL: CPT | Performed by: NURSE PRACTITIONER

## 2022-05-21 PROCEDURE — 93308 TTE F-UP OR LMTD: CPT | Performed by: INTERNAL MEDICINE

## 2022-05-21 PROCEDURE — 25010000002 FUROSEMIDE PER 20 MG: Performed by: THORACIC SURGERY (CARDIOTHORACIC VASCULAR SURGERY)

## 2022-05-21 PROCEDURE — 82330 ASSAY OF CALCIUM: CPT | Performed by: THORACIC SURGERY (CARDIOTHORACIC VASCULAR SURGERY)

## 2022-05-21 PROCEDURE — 94003 VENT MGMT INPAT SUBQ DAY: CPT

## 2022-05-21 PROCEDURE — 0 MILRINONE LACTATE IN DEXTROSE 20-5 MG/100ML-% SOLUTION: Performed by: THORACIC SURGERY (CARDIOTHORACIC VASCULAR SURGERY)

## 2022-05-21 PROCEDURE — 93325 DOPPLER ECHO COLOR FLOW MAPG: CPT | Performed by: INTERNAL MEDICINE

## 2022-05-21 RX ORDER — FUROSEMIDE 10 MG/ML
40 INJECTION INTRAMUSCULAR; INTRAVENOUS ONCE
Status: COMPLETED | OUTPATIENT
Start: 2022-05-21 | End: 2022-05-21

## 2022-05-21 RX ORDER — ENOXAPARIN SODIUM 100 MG/ML
40 INJECTION SUBCUTANEOUS EVERY 24 HOURS
Status: DISCONTINUED | OUTPATIENT
Start: 2022-05-22 | End: 2022-05-23

## 2022-05-21 RX ORDER — LIDOCAINE HYDROCHLORIDE ANHYDROUS AND DEXTROSE MONOHYDRATE 5; 400 G/100ML; MG/100ML
1 INJECTION, SOLUTION INTRAVENOUS CONTINUOUS
Status: ACTIVE | OUTPATIENT
Start: 2022-05-22 | End: 2022-05-22

## 2022-05-21 RX ORDER — ALBUMIN, HUMAN INJ 5% 5 %
250 SOLUTION INTRAVENOUS ONCE
Status: COMPLETED | OUTPATIENT
Start: 2022-05-22 | End: 2022-05-21

## 2022-05-21 RX ORDER — LIDOCAINE HYDROCHLORIDE ANHYDROUS AND DEXTROSE MONOHYDRATE 5; 400 G/100ML; MG/100ML
2 INJECTION, SOLUTION INTRAVENOUS CONTINUOUS
Status: DISCONTINUED | OUTPATIENT
Start: 2022-05-21 | End: 2022-05-21

## 2022-05-21 RX ORDER — SODIUM BICARBONATE 650 MG/1
325 TABLET ORAL 3 TIMES DAILY
Status: COMPLETED | OUTPATIENT
Start: 2022-05-21 | End: 2022-05-21

## 2022-05-21 RX ADMIN — MORPHINE SULFATE 2 MG: 4 INJECTION INTRAVENOUS at 02:57

## 2022-05-21 RX ADMIN — ACETAMINOPHEN 649.6 MG: 650 SOLUTION ORAL at 15:31

## 2022-05-21 RX ADMIN — FUROSEMIDE 40 MG: 10 INJECTION, SOLUTION INTRAMUSCULAR; INTRAVENOUS at 15:29

## 2022-05-21 RX ADMIN — CHLORHEXIDINE GLUCONATE 15 ML: 1.2 RINSE ORAL at 20:09

## 2022-05-21 RX ADMIN — PROPOFOL 5 MCG/KG/MIN: 10 INJECTION, EMULSION INTRAVENOUS at 23:47

## 2022-05-21 RX ADMIN — CHLORHEXIDINE GLUCONATE 15 ML: 1.2 RINSE ORAL at 08:02

## 2022-05-21 RX ADMIN — FUROSEMIDE 40 MG: 10 INJECTION, SOLUTION INTRAMUSCULAR; INTRAVENOUS at 03:35

## 2022-05-21 RX ADMIN — VASOPRESSIN 0.03 UNITS/MIN: 0.2 INJECTION INTRAVENOUS at 15:29

## 2022-05-21 RX ADMIN — POLYETHYLENE GLYCOL 3350 17 G: 17 POWDER, FOR SOLUTION ORAL at 20:10

## 2022-05-21 RX ADMIN — MILRINONE LACTATE 0.5 MCG/KG/MIN: 0.2 INJECTION, SOLUTION INTRAVENOUS at 02:25

## 2022-05-21 RX ADMIN — ASPIRIN 81 MG: 81 TABLET, COATED ORAL at 10:17

## 2022-05-21 RX ADMIN — EPINEPHRINE 0.03 MCG/KG/MIN: 1 INJECTION INTRAMUSCULAR; INTRAVENOUS; SUBCUTANEOUS at 15:29

## 2022-05-21 RX ADMIN — SODIUM BICARBONATE 650 MG TABLET 325 MG: at 10:17

## 2022-05-21 RX ADMIN — LIDOCAINE HYDROCHLORIDE 2 MG/KG/HR: 4 INJECTION, SOLUTION INTRAVENOUS at 18:35

## 2022-05-21 RX ADMIN — DEXMEDETOMIDINE HYDROCHLORIDE 0.7 MCG/KG/HR: 4 INJECTION, SOLUTION INTRAVENOUS at 20:07

## 2022-05-21 RX ADMIN — SODIUM BICARBONATE 650 MG TABLET 325 MG: at 20:09

## 2022-05-21 RX ADMIN — SODIUM BICARBONATE 650 MG TABLET 325 MG: at 15:29

## 2022-05-21 RX ADMIN — CEFAZOLIN 2 G: 2 INJECTION, POWDER, FOR SOLUTION INTRAMUSCULAR; INTRAVENOUS at 02:31

## 2022-05-21 RX ADMIN — HYDROCODONE BITARTRATE AND ACETAMINOPHEN 1 TABLET: 5; 325 TABLET ORAL at 21:49

## 2022-05-21 RX ADMIN — ONDANSETRON 4 MG: 2 INJECTION INTRAMUSCULAR; INTRAVENOUS at 02:56

## 2022-05-21 RX ADMIN — ALBUMIN (HUMAN) 250 ML: 12.5 INJECTION, SOLUTION INTRAVENOUS at 23:48

## 2022-05-21 RX ADMIN — MILRINONE LACTATE 0.38 MCG/KG/MIN: 0.2 INJECTION, SOLUTION INTRAVENOUS at 20:07

## 2022-05-21 RX ADMIN — MUPIROCIN 1 APPLICATION: 20 OINTMENT TOPICAL at 10:17

## 2022-05-21 RX ADMIN — VASOPRESSIN 0.02 UNITS/MIN: 0.2 INJECTION INTRAVENOUS at 06:40

## 2022-05-21 RX ADMIN — MUPIROCIN 1 APPLICATION: 20 OINTMENT TOPICAL at 20:10

## 2022-05-22 ENCOUNTER — APPOINTMENT (OUTPATIENT)
Dept: CARDIOLOGY | Facility: HOSPITAL | Age: 82
End: 2022-05-22

## 2022-05-22 ENCOUNTER — APPOINTMENT (OUTPATIENT)
Dept: GENERAL RADIOLOGY | Facility: HOSPITAL | Age: 82
End: 2022-05-22

## 2022-05-22 LAB
ALBUMIN SERPL-MCNC: 3.5 G/DL (ref 3.5–5.2)
ALBUMIN/GLOB SERPL: 2.2 G/DL
ALP SERPL-CCNC: 66 U/L (ref 39–117)
ALT SERPL W P-5'-P-CCNC: 566 U/L (ref 1–33)
ANION GAP SERPL CALCULATED.3IONS-SCNC: 10 MMOL/L (ref 5–15)
ARTERIAL PATENCY WRIST A: ABNORMAL
ARTERIAL PATENCY WRIST A: NORMAL
AST SERPL-CCNC: 3101 U/L (ref 1–32)
ATMOSPHERIC PRESS: ABNORMAL MM[HG]
ATMOSPHERIC PRESS: NORMAL MM[HG]
BASE DEFICIT: -0.6 MEQ/LITER
BASE DEFICIT: -6 MEQ/LITER
BASE EXCESS BLDA CALC-SCNC: -0.5 MMOL/L (ref 0–3)
BASE EXCESS BLDA CALC-SCNC: -2.6 MMOL/L (ref 0–3)
BASE EXCESS BLDA CALC-SCNC: -5.2 MMOL/L (ref 0–3)
BASE EXCESS BLDA CALC-SCNC: -5.4 MMOL/L (ref 0–3)
BASE EXCESS BLDA CALC-SCNC: -7.8 MMOL/L (ref 0–3)
BASE EXCESS BLDA CALC-SCNC: 0.4 MMOL/L (ref 0–3)
BDY SITE: ABNORMAL
BDY SITE: NORMAL
BH BB BLOOD EXPIRATION DATE: NORMAL
BH BB BLOOD TYPE BARCODE: 5100
BH BB DISPENSE STATUS: NORMAL
BH BB PRODUCT CODE: NORMAL
BH BB UNIT NUMBER: NORMAL
BILIRUB SERPL-MCNC: 1.3 MG/DL (ref 0–1.2)
BUN SERPL-MCNC: 19 MG/DL (ref 8–23)
BUN/CREAT SERPL: 38 (ref 7–25)
CA-I BLDA-SCNC: 1.01 MMOL/L (ref 1.15–1.33)
CA-I BLDA-SCNC: 1.17 MMOL/L (ref 1.15–1.33)
CALCIUM SPEC-SCNC: 7.7 MG/DL (ref 8.6–10.5)
CHLORIDE SERPL-SCNC: 110 MMOL/L (ref 98–107)
CO2 BLDA-SCNC: 17.5 MMOL/L (ref 22–29)
CO2 BLDA-SCNC: 19.4 MMOL/L (ref 22–29)
CO2 BLDA-SCNC: 20.5 MMOL/L (ref 22–29)
CO2 BLDA-SCNC: 22.5 MMOL/L (ref 22–29)
CO2 BLDA-SCNC: 25.4 MMOL/L (ref 22–29)
CO2 BLDA-SCNC: 25.5 MMOL/L (ref 22–29)
CO2 SERPL-SCNC: 22 MMOL/L (ref 22–29)
CREAT SERPL-MCNC: 0.5 MG/DL (ref 0.57–1)
CROSSMATCH INTERPRETATION: NORMAL
DEPRECATED RDW RBC AUTO: 47.7 FL (ref 37–54)
EGFRCR SERPLBLD CKD-EPI 2021: 94.4 ML/MIN/1.73
ERYTHROCYTE [DISTWIDTH] IN BLOOD BY AUTOMATED COUNT: 16.6 % (ref 12.3–15.4)
GLOBULIN UR ELPH-MCNC: 1.6 GM/DL
GLUCOSE BLDC GLUCOMTR-MCNC: 128 MG/DL (ref 70–105)
GLUCOSE BLDC GLUCOMTR-MCNC: 134 MG/DL (ref 70–105)
GLUCOSE BLDC GLUCOMTR-MCNC: 139 MG/DL (ref 74–100)
GLUCOSE BLDC GLUCOMTR-MCNC: 139 MG/DL (ref 74–100)
GLUCOSE BLDC GLUCOMTR-MCNC: 145 MG/DL (ref 70–105)
GLUCOSE BLDC GLUCOMTR-MCNC: 145 MG/DL (ref 70–105)
GLUCOSE BLDC GLUCOMTR-MCNC: 147 MG/DL (ref 70–105)
GLUCOSE BLDC GLUCOMTR-MCNC: 149 MG/DL (ref 70–105)
GLUCOSE BLDC GLUCOMTR-MCNC: 150 MG/DL (ref 70–105)
GLUCOSE BLDC GLUCOMTR-MCNC: 166 MG/DL (ref 74–100)
GLUCOSE BLDC GLUCOMTR-MCNC: 166 MG/DL (ref 74–100)
GLUCOSE BLDC GLUCOMTR-MCNC: 183 MG/DL (ref 70–105)
GLUCOSE BLDC GLUCOMTR-MCNC: 184 MG/DL (ref 70–105)
GLUCOSE SERPL-MCNC: 144 MG/DL (ref 65–99)
HCO3 BLDA-SCNC: 16.6 MMOL/L (ref 21–28)
HCO3 BLDA-SCNC: 18.5 MMOL/L (ref 21–28)
HCO3 BLDA-SCNC: 21.4 MMOL/L (ref 21–28)
HCO3 BLDA-SCNC: 24.4 MMOL/L (ref 21–28)
HCO3 MIXED: 19.5 MMOL/L (ref 21–29)
HCO3 MIXED: 24.2 MMOL/L (ref 21–29)
HCT VFR BLD AUTO: 29.9 % (ref 34–46.6)
HCT VFR BLDA CALC: 27 % (ref 38–51)
HCT VFR BLDA CALC: 28 % (ref 38–51)
HEMODILUTION: NO
HGB BLD-MCNC: 9.7 G/DL (ref 12–15.9)
HGB BLDA-MCNC: 9.3 G/DL (ref 12–17)
HGB BLDA-MCNC: 9.6 G/DL (ref 12–17)
INHALED O2 CONCENTRATION: 40 %
MAXIMAL PREDICTED HEART RATE: 139 BPM
MCH RBC QN AUTO: 26.7 PG (ref 26.6–33)
MCHC RBC AUTO-ENTMCNC: 32.6 G/DL (ref 31.5–35.7)
MCV RBC AUTO: 82.1 FL (ref 79–97)
MODALITY: ABNORMAL
MODALITY: NORMAL
O2 SATURATION MIXED: 68.5 %
O2 SATURATION MIXED: 71.2 %
PCO2 BLDA: 28.8 MM HG (ref 35–48)
PCO2 BLDA: 29.1 MM HG (ref 35–48)
PCO2 BLDA: 33.2 MM HG (ref 35–48)
PCO2 BLDA: 36.2 MM HG (ref 35–48)
PCO2 MIXED: 34.5 MMHG (ref 35–51)
PCO2 MIXED: 38.8 MMHG (ref 35–51)
PEEP RESPIRATORY: 5 CM[H2O]
PH BLDA: 7.37 PH UNITS (ref 7.35–7.45)
PH BLDA: 7.41 PH UNITS (ref 7.35–7.45)
PH BLDA: 7.42 PH UNITS (ref 7.35–7.45)
PH BLDA: 7.44 PH UNITS (ref 7.35–7.45)
PH MIXED: 7.36 PH UNITS (ref 7.32–7.45)
PH MIXED: 7.4 PH UNITS (ref 7.32–7.45)
PLATELET # BLD AUTO: 102 10*3/MM3 (ref 140–450)
PMV BLD AUTO: 9.2 FL (ref 6–12)
PO2 BLDA: 100.1 MM HG (ref 83–108)
PO2 BLDA: 110.6 MM HG (ref 83–108)
PO2 BLDA: 117.8 MM HG (ref 83–108)
PO2 BLDA: 135.7 MM HG (ref 83–108)
PO2 MIXED: 36.7 MMHG
PO2 MIXED: 37.2 MMHG
POTASSIUM BLDA-SCNC: 3.9 MMOL/L (ref 3.5–4.5)
POTASSIUM BLDA-SCNC: 4 MMOL/L (ref 3.5–4.5)
POTASSIUM SERPL-SCNC: 3.9 MMOL/L (ref 3.5–5.2)
PROT SERPL-MCNC: 5.1 G/DL (ref 6–8.5)
PSV: 10 CMH2O
PSV: 10 CMH2O
QT INTERVAL: 482 MS
RBC # BLD AUTO: 3.64 10*6/MM3 (ref 3.77–5.28)
RESPIRATORY RATE: 12
SAO2 % BLDCOA: 98 % (ref 94–98)
SAO2 % BLDCOA: 98.5 % (ref 94–98)
SAO2 % BLDCOA: 98.6 % (ref 94–98)
SAO2 % BLDCOA: 99.2 % (ref 94–98)
SET MECH RESP RATE: 12
SODIUM BLD-SCNC: 143 MMOL/L (ref 138–146)
SODIUM BLD-SCNC: 144 MMOL/L (ref 138–146)
SODIUM SERPL-SCNC: 142 MMOL/L (ref 136–145)
STRESS TARGET HR: 118 BPM
UNIT  ABO: NORMAL
UNIT  RH: NORMAL
VENTILATOR MODE: ABNORMAL
VENTILATOR MODE: NORMAL
VT ON VENT VENT: 500 ML
VT ON VENT VENT: 500 ML
WBC NRBC COR # BLD: 5.2 10*3/MM3 (ref 3.4–10.8)

## 2022-05-22 PROCEDURE — 99233 SBSQ HOSP IP/OBS HIGH 50: CPT | Performed by: INTERNAL MEDICINE

## 2022-05-22 PROCEDURE — 85027 COMPLETE CBC AUTOMATED: CPT | Performed by: NURSE PRACTITIONER

## 2022-05-22 PROCEDURE — 25010000002 ALBUMIN HUMAN 5% PER 50 ML: Performed by: THORACIC SURGERY (CARDIOTHORACIC VASCULAR SURGERY)

## 2022-05-22 PROCEDURE — 71045 X-RAY EXAM CHEST 1 VIEW: CPT

## 2022-05-22 PROCEDURE — 93325 DOPPLER ECHO COLOR FLOW MAPG: CPT

## 2022-05-22 PROCEDURE — 25010000002 DIGOXIN PER 500 MCG: Performed by: INTERNAL MEDICINE

## 2022-05-22 PROCEDURE — 25010000002 ENOXAPARIN PER 10 MG: Performed by: THORACIC SURGERY (CARDIOTHORACIC VASCULAR SURGERY)

## 2022-05-22 PROCEDURE — 25010000002 FUROSEMIDE PER 20 MG: Performed by: THORACIC SURGERY (CARDIOTHORACIC VASCULAR SURGERY)

## 2022-05-22 PROCEDURE — 80053 COMPREHEN METABOLIC PANEL: CPT | Performed by: NURSE PRACTITIONER

## 2022-05-22 PROCEDURE — 25010000002 CEFEPIME PER 500 MG: Performed by: INTERNAL MEDICINE

## 2022-05-22 PROCEDURE — 93308 TTE F-UP OR LMTD: CPT

## 2022-05-22 PROCEDURE — 94799 UNLISTED PULMONARY SVC/PX: CPT

## 2022-05-22 PROCEDURE — 99024 POSTOP FOLLOW-UP VISIT: CPT | Performed by: THORACIC SURGERY (CARDIOTHORACIC VASCULAR SURGERY)

## 2022-05-22 PROCEDURE — 94761 N-INVAS EAR/PLS OXIMETRY MLT: CPT

## 2022-05-22 PROCEDURE — 0 MILRINONE LACTATE IN DEXTROSE 20-5 MG/100ML-% SOLUTION: Performed by: THORACIC SURGERY (CARDIOTHORACIC VASCULAR SURGERY)

## 2022-05-22 PROCEDURE — 25010000002 ONDANSETRON PER 1 MG: Performed by: NURSE PRACTITIONER

## 2022-05-22 PROCEDURE — 93010 ELECTROCARDIOGRAM REPORT: CPT | Performed by: INTERNAL MEDICINE

## 2022-05-22 PROCEDURE — 85018 HEMOGLOBIN: CPT

## 2022-05-22 PROCEDURE — 93308 TTE F-UP OR LMTD: CPT | Performed by: INTERNAL MEDICINE

## 2022-05-22 PROCEDURE — 82330 ASSAY OF CALCIUM: CPT

## 2022-05-22 PROCEDURE — 63710000001 INSULIN LISPRO (HUMAN) PER 5 UNITS: Performed by: THORACIC SURGERY (CARDIOTHORACIC VASCULAR SURGERY)

## 2022-05-22 PROCEDURE — 93321 DOPPLER ECHO F-UP/LMTD STD: CPT

## 2022-05-22 PROCEDURE — 82803 BLOOD GASES ANY COMBINATION: CPT

## 2022-05-22 PROCEDURE — 25010000002 PROPOFOL 10 MG/ML EMULSION: Performed by: THORACIC SURGERY (CARDIOTHORACIC VASCULAR SURGERY)

## 2022-05-22 PROCEDURE — P9041 ALBUMIN (HUMAN),5%, 50ML: HCPCS | Performed by: THORACIC SURGERY (CARDIOTHORACIC VASCULAR SURGERY)

## 2022-05-22 PROCEDURE — 94003 VENT MGMT INPAT SUBQ DAY: CPT

## 2022-05-22 PROCEDURE — 93005 ELECTROCARDIOGRAM TRACING: CPT | Performed by: THORACIC SURGERY (CARDIOTHORACIC VASCULAR SURGERY)

## 2022-05-22 PROCEDURE — 93325 DOPPLER ECHO COLOR FLOW MAPG: CPT | Performed by: INTERNAL MEDICINE

## 2022-05-22 PROCEDURE — 82962 GLUCOSE BLOOD TEST: CPT

## 2022-05-22 PROCEDURE — 80051 ELECTROLYTE PANEL: CPT

## 2022-05-22 PROCEDURE — 93321 DOPPLER ECHO F-UP/LMTD STD: CPT | Performed by: INTERNAL MEDICINE

## 2022-05-22 PROCEDURE — 25010000002 CALCIUM GLUCONATE-NACL 1-0.675 GM/50ML-% SOLUTION: Performed by: THORACIC SURGERY (CARDIOTHORACIC VASCULAR SURGERY)

## 2022-05-22 RX ORDER — INSULIN LISPRO 100 [IU]/ML
0-9 INJECTION, SOLUTION INTRAVENOUS; SUBCUTANEOUS AS NEEDED
Status: DISCONTINUED | OUTPATIENT
Start: 2022-05-22 | End: 2022-05-23

## 2022-05-22 RX ORDER — CALCIUM GLUCONATE 20 MG/ML
1 INJECTION, SOLUTION INTRAVENOUS ONCE
Status: COMPLETED | OUTPATIENT
Start: 2022-05-22 | End: 2022-05-22

## 2022-05-22 RX ORDER — BUMETANIDE 0.25 MG/ML
2 INJECTION INTRAMUSCULAR; INTRAVENOUS ONCE
Status: COMPLETED | OUTPATIENT
Start: 2022-05-22 | End: 2022-05-22

## 2022-05-22 RX ORDER — PANTOPRAZOLE SODIUM 40 MG/1
40 TABLET, DELAYED RELEASE ORAL
Status: DISCONTINUED | OUTPATIENT
Start: 2022-05-23 | End: 2022-06-03 | Stop reason: HOSPADM

## 2022-05-22 RX ORDER — INSULIN LISPRO 100 [IU]/ML
0-9 INJECTION, SOLUTION INTRAVENOUS; SUBCUTANEOUS EVERY 6 HOURS SCHEDULED
Status: DISCONTINUED | OUTPATIENT
Start: 2022-05-22 | End: 2022-05-23

## 2022-05-22 RX ORDER — OXYCODONE HYDROCHLORIDE 5 MG/1
5 TABLET ORAL EVERY 4 HOURS PRN
Status: DISCONTINUED | OUTPATIENT
Start: 2022-05-22 | End: 2022-05-23

## 2022-05-22 RX ORDER — ALBUMIN, HUMAN INJ 5% 5 %
250 SOLUTION INTRAVENOUS ONCE
Status: COMPLETED | OUTPATIENT
Start: 2022-05-22 | End: 2022-05-22

## 2022-05-22 RX ORDER — PANTOPRAZOLE SODIUM 40 MG/10ML
40 INJECTION, POWDER, LYOPHILIZED, FOR SOLUTION INTRAVENOUS ONCE
Status: COMPLETED | OUTPATIENT
Start: 2022-05-22 | End: 2022-05-22

## 2022-05-22 RX ORDER — FUROSEMIDE 10 MG/ML
20 INJECTION INTRAMUSCULAR; INTRAVENOUS ONCE
Status: COMPLETED | OUTPATIENT
Start: 2022-05-22 | End: 2022-05-22

## 2022-05-22 RX ORDER — DIGOXIN 0.25 MG/ML
500 INJECTION INTRAMUSCULAR; INTRAVENOUS ONCE
Status: COMPLETED | OUTPATIENT
Start: 2022-05-22 | End: 2022-05-22

## 2022-05-22 RX ORDER — DIGOXIN 0.25 MG/ML
250 INJECTION INTRAMUSCULAR; INTRAVENOUS EVERY 6 HOURS
Status: DISCONTINUED | OUTPATIENT
Start: 2022-05-23 | End: 2022-05-23

## 2022-05-22 RX ADMIN — DIGOXIN 500 MCG: 0.25 INJECTION INTRAMUSCULAR; INTRAVENOUS at 18:20

## 2022-05-22 RX ADMIN — ASPIRIN 81 MG: 81 TABLET, COATED ORAL at 08:30

## 2022-05-22 RX ADMIN — CALCIUM GLUCONATE 1 G: 20 INJECTION, SOLUTION INTRAVENOUS at 06:16

## 2022-05-22 RX ADMIN — MUPIROCIN 1 APPLICATION: 20 OINTMENT TOPICAL at 08:30

## 2022-05-22 RX ADMIN — CHLORHEXIDINE GLUCONATE 15 ML: 1.2 RINSE ORAL at 20:01

## 2022-05-22 RX ADMIN — Medication 50 MEQ: at 17:02

## 2022-05-22 RX ADMIN — PANTOPRAZOLE SODIUM 40 MG: 40 INJECTION, POWDER, FOR SOLUTION INTRAVENOUS at 03:29

## 2022-05-22 RX ADMIN — INSULIN LISPRO 2 UNITS: 100 INJECTION, SOLUTION INTRAVENOUS; SUBCUTANEOUS at 17:27

## 2022-05-22 RX ADMIN — BUMETANIDE 2 MG: 0.25 INJECTION, SOLUTION INTRAMUSCULAR; INTRAVENOUS at 11:00

## 2022-05-22 RX ADMIN — MILRINONE LACTATE 0.38 MCG/KG/MIN: 0.2 INJECTION, SOLUTION INTRAVENOUS at 12:38

## 2022-05-22 RX ADMIN — INSULIN LISPRO 2 UNITS: 100 INJECTION, SOLUTION INTRAVENOUS; SUBCUTANEOUS at 23:59

## 2022-05-22 RX ADMIN — SODIUM BICARBONATE 50 MEQ: 84 INJECTION, SOLUTION INTRAVENOUS at 17:02

## 2022-05-22 RX ADMIN — ALBUMIN (HUMAN) 250 ML: 12.5 INJECTION, SOLUTION INTRAVENOUS at 05:04

## 2022-05-22 RX ADMIN — OXYCODONE 5 MG: 5 TABLET ORAL at 20:44

## 2022-05-22 RX ADMIN — OXYCODONE 5 MG: 5 TABLET ORAL at 16:40

## 2022-05-22 RX ADMIN — ENOXAPARIN SODIUM 40 MG: 100 INJECTION SUBCUTANEOUS at 16:40

## 2022-05-22 RX ADMIN — FUROSEMIDE 20 MG: 10 INJECTION, SOLUTION INTRAMUSCULAR; INTRAVENOUS at 08:30

## 2022-05-22 RX ADMIN — SENNOSIDES AND DOCUSATE SODIUM 2 TABLET: 50; 8.6 TABLET ORAL at 20:01

## 2022-05-22 RX ADMIN — MUPIROCIN 1 APPLICATION: 20 OINTMENT TOPICAL at 20:01

## 2022-05-22 RX ADMIN — CEFEPIME HYDROCHLORIDE 2 G: 2 INJECTION, POWDER, FOR SOLUTION INTRAVENOUS at 16:40

## 2022-05-22 RX ADMIN — CALCIUM GLUCONATE 1 G: 20 INJECTION, SOLUTION INTRAVENOUS at 17:02

## 2022-05-22 RX ADMIN — ONDANSETRON 4 MG: 2 INJECTION INTRAMUSCULAR; INTRAVENOUS at 16:06

## 2022-05-22 RX ADMIN — CHLORHEXIDINE GLUCONATE 15 ML: 1.2 RINSE ORAL at 08:30

## 2022-05-22 RX ADMIN — DEXMEDETOMIDINE HYDROCHLORIDE 1.5 MCG/KG/HR: 4 INJECTION, SOLUTION INTRAVENOUS at 03:29

## 2022-05-23 ENCOUNTER — APPOINTMENT (OUTPATIENT)
Dept: ULTRASOUND IMAGING | Facility: HOSPITAL | Age: 82
End: 2022-05-23

## 2022-05-23 ENCOUNTER — APPOINTMENT (OUTPATIENT)
Dept: GENERAL RADIOLOGY | Facility: HOSPITAL | Age: 82
End: 2022-05-23

## 2022-05-23 ENCOUNTER — APPOINTMENT (OUTPATIENT)
Dept: CT IMAGING | Facility: HOSPITAL | Age: 82
End: 2022-05-23

## 2022-05-23 LAB
ALBUMIN SERPL-MCNC: 3.2 G/DL (ref 3.5–5.2)
ALBUMIN SERPL-MCNC: 3.4 G/DL (ref 3.5–5.2)
ALBUMIN/GLOB SERPL: 1.6 G/DL
ALBUMIN/GLOB SERPL: 2 G/DL
ALP SERPL-CCNC: 76 U/L (ref 39–117)
ALP SERPL-CCNC: 78 U/L (ref 39–117)
ALT SERPL W P-5'-P-CCNC: 276 U/L (ref 1–33)
ALT SERPL W P-5'-P-CCNC: 328 U/L (ref 1–33)
AMMONIA BLD-SCNC: 35 UMOL/L (ref 11–51)
ANION GAP SERPL CALCULATED.3IONS-SCNC: 16 MMOL/L (ref 5–15)
ANION GAP SERPL CALCULATED.3IONS-SCNC: 20 MMOL/L (ref 5–15)
ANION GAP SERPL CALCULATED.3IONS-SCNC: 21 MMOL/L (ref 5–15)
APPEARANCE FLD: ABNORMAL
ARTERIAL PATENCY WRIST A: ABNORMAL
AST SERPL-CCNC: 471 U/L (ref 1–32)
AST SERPL-CCNC: 761 U/L (ref 1–32)
ATMOSPHERIC PRESS: ABNORMAL MM[HG]
ATMOSPHERIC PRESS: ABNORMAL MM[HG]
BACTERIA UR QL AUTO: ABNORMAL /HPF
BASE DEFICIT: -10.6 MEQ/LITER
BASE EXCESS BLDA CALC-SCNC: -6.9 MMOL/L (ref 0–3)
BASE EXCESS BLDA CALC-SCNC: -8.7 MMOL/L (ref 0–3)
BASE EXCESS BLDA CALC-SCNC: -9.7 MMOL/L (ref 0–3)
BDY SITE: ABNORMAL
BILIRUB SERPL-MCNC: 1.9 MG/DL (ref 0–1.2)
BILIRUB SERPL-MCNC: 1.9 MG/DL (ref 0–1.2)
BILIRUB UR QL STRIP: ABNORMAL
BUN SERPL-MCNC: 36 MG/DL (ref 8–23)
BUN SERPL-MCNC: 46 MG/DL (ref 8–23)
BUN SERPL-MCNC: 54 MG/DL (ref 8–23)
BUN/CREAT SERPL: 35.3 (ref 7–25)
BUN/CREAT SERPL: 38.7 (ref 7–25)
BUN/CREAT SERPL: 41.5 (ref 7–25)
CA-I BLDA-SCNC: 1.16 MMOL/L (ref 1.15–1.33)
CALCIUM SPEC-SCNC: 7.8 MG/DL (ref 8.6–10.5)
CALCIUM SPEC-SCNC: 8.1 MG/DL (ref 8.6–10.5)
CALCIUM SPEC-SCNC: 8.5 MG/DL (ref 8.6–10.5)
CHLORIDE SERPL-SCNC: 103 MMOL/L (ref 98–107)
CHLORIDE SERPL-SCNC: 108 MMOL/L (ref 98–107)
CHLORIDE SERPL-SCNC: 111 MMOL/L (ref 98–107)
CK SERPL-CCNC: 96 U/L (ref 20–180)
CLARITY UR: ABNORMAL
CO2 BLDA-SCNC: 16.7 MMOL/L (ref 22–29)
CO2 BLDA-SCNC: 18 MMOL/L (ref 22–29)
CO2 BLDA-SCNC: 18.5 MMOL/L (ref 22–29)
CO2 SERPL-SCNC: 15 MMOL/L (ref 22–29)
CO2 SERPL-SCNC: 17 MMOL/L (ref 22–29)
CO2 SERPL-SCNC: 21 MMOL/L (ref 22–29)
COLOR FLD: YELLOW
COLOR UR: ABNORMAL
CREAT SERPL-MCNC: 1.02 MG/DL (ref 0.57–1)
CREAT SERPL-MCNC: 1.19 MG/DL (ref 0.57–1)
CREAT SERPL-MCNC: 1.3 MG/DL (ref 0.57–1)
D-LACTATE SERPL-SCNC: 1.5 MMOL/L (ref 0.5–2)
DEPRECATED RDW RBC AUTO: 49 FL (ref 37–54)
DIGOXIN SERPL-MCNC: 3.3 NG/ML (ref 0.6–1.2)
EGFRCR SERPLBLD CKD-EPI 2021: 41.4 ML/MIN/1.73
EGFRCR SERPLBLD CKD-EPI 2021: 46 ML/MIN/1.73
EGFRCR SERPLBLD CKD-EPI 2021: 55.4 ML/MIN/1.73
EOSINOPHIL SPEC QL MICRO: 0 % EOS/100 CELLS (ref 0–0)
ERYTHROCYTE [DISTWIDTH] IN BLOOD BY AUTOMATED COUNT: 16.6 % (ref 12.3–15.4)
GLOBULIN UR ELPH-MCNC: 1.7 GM/DL
GLOBULIN UR ELPH-MCNC: 2 GM/DL
GLUCOSE BLDC GLUCOMTR-MCNC: 214 MG/DL (ref 74–100)
GLUCOSE BLDC GLUCOMTR-MCNC: 214 MG/DL (ref 74–100)
GLUCOSE BLDC GLUCOMTR-MCNC: 231 MG/DL (ref 70–105)
GLUCOSE BLDC GLUCOMTR-MCNC: 245 MG/DL (ref 74–100)
GLUCOSE SERPL-MCNC: 198 MG/DL (ref 65–99)
GLUCOSE SERPL-MCNC: 206 MG/DL (ref 65–99)
GLUCOSE SERPL-MCNC: 249 MG/DL (ref 65–99)
GLUCOSE UR STRIP-MCNC: NEGATIVE MG/DL
HCO3 BLDA-SCNC: 17 MMOL/L (ref 21–28)
HCO3 BLDA-SCNC: 17.6 MMOL/L (ref 21–28)
HCO3 MIXED: 15.7 MMOL/L (ref 21–29)
HCT VFR BLD AUTO: 30.1 % (ref 34–46.6)
HCT VFR BLDA CALC: 28 % (ref 38–51)
HEMODILUTION: NO
HGB BLD-MCNC: 9.7 G/DL (ref 12–15.9)
HGB BLDA-MCNC: 9.7 G/DL (ref 12–17)
HGB UR QL STRIP.AUTO: NEGATIVE
HYALINE CASTS UR QL AUTO: ABNORMAL /LPF
INHALED O2 CONCENTRATION: <21 %
IRON 24H UR-MRATE: 23 MCG/DL (ref 37–145)
KETONES UR QL STRIP: ABNORMAL
LEUKOCYTE ESTERASE UR QL STRIP.AUTO: ABNORMAL
LYMPHOCYTES NFR FLD MANUAL: 48 %
MACROPHAGE FLUID: 4 %
MAGNESIUM SERPL-MCNC: 1.8 MG/DL (ref 1.6–2.4)
MAGNESIUM SERPL-MCNC: 2 MG/DL (ref 1.6–2.4)
MCH RBC QN AUTO: 26.7 PG (ref 26.6–33)
MCHC RBC AUTO-ENTMCNC: 32.1 G/DL (ref 31.5–35.7)
MCV RBC AUTO: 83.2 FL (ref 79–97)
MESOTHL CELL NFR FLD MANUAL: 4 %
METHOD: ABNORMAL
MODALITY: ABNORMAL
MONOCYTES NFR FLD: 17 %
NEUTROPHILS NFR FLD MANUAL: 24 %
NITRITE UR QL STRIP: POSITIVE
NUC CELL # FLD: 196 /MM3
O2 SATURATION MIXED: 72.4 %
PATHOLOGY REVIEW: YES
PCO2 BLDA: 30.4 MM HG (ref 35–48)
PCO2 BLDA: 34.7 MM HG (ref 35–48)
PCO2 MIXED: 31.6 MMHG (ref 35–51)
PH BLDA: 7.3 PH UNITS (ref 7.35–7.45)
PH BLDA: 7.37 PH UNITS (ref 7.35–7.45)
PH FLD: 7 [PH] (ref 6.5–7.5)
PH MIXED: 7.31 PH UNITS (ref 7.32–7.45)
PH UR STRIP.AUTO: <=5 [PH] (ref 5–8)
PLATELET # BLD AUTO: 183 10*3/MM3 (ref 140–450)
PMV BLD AUTO: 8.3 FL (ref 6–12)
PO2 BLDA: 64.1 MM HG (ref 83–108)
PO2 BLDA: 77.1 MM HG (ref 83–108)
PO2 MIXED: 41.2 MMHG
POTASSIUM BLDA-SCNC: 4.1 MMOL/L (ref 3.5–4.5)
POTASSIUM SERPL-SCNC: 3.7 MMOL/L (ref 3.5–5.2)
POTASSIUM SERPL-SCNC: 4.2 MMOL/L (ref 3.5–5.2)
POTASSIUM SERPL-SCNC: 4.4 MMOL/L (ref 3.5–5.2)
PROT SERPL-MCNC: 5.1 G/DL (ref 6–8.5)
PROT SERPL-MCNC: 5.2 G/DL (ref 6–8.5)
PROT UR QL STRIP: ABNORMAL
QT INTERVAL: 405 MS
QT INTERVAL: 406 MS
RBC # BLD AUTO: 3.61 10*6/MM3 (ref 3.77–5.28)
RBC # UR STRIP: ABNORMAL /HPF
REF LAB TEST METHOD: ABNORMAL
RENAL EPI CELLS #/AREA URNS HPF: ABNORMAL /HPF
SAO2 % BLDCOA: 90 % (ref 94–98)
SAO2 % BLDCOA: 95.1 % (ref 94–98)
SODIUM BLD-SCNC: 141 MMOL/L (ref 138–146)
SODIUM SERPL-SCNC: 140 MMOL/L (ref 136–145)
SODIUM SERPL-SCNC: 144 MMOL/L (ref 136–145)
SODIUM SERPL-SCNC: 148 MMOL/L (ref 136–145)
SODIUM UR-SCNC: <20 MMOL/L
SP GR UR STRIP: 1.03 (ref 1–1.03)
SQUAMOUS #/AREA URNS HPF: ABNORMAL /HPF
UNCLASSIFIED CELLS, FLUID: 2 %
URATE SERPL-MCNC: 5.5 MG/DL (ref 2.4–5.7)
UROBILINOGEN UR QL STRIP: ABNORMAL
WBC # UR STRIP: ABNORMAL /HPF
WBC NRBC COR # BLD: 9.7 10*3/MM3 (ref 3.4–10.8)

## 2022-05-23 PROCEDURE — 71045 X-RAY EXAM CHEST 1 VIEW: CPT

## 2022-05-23 PROCEDURE — 82962 GLUCOSE BLOOD TEST: CPT

## 2022-05-23 PROCEDURE — 92610 EVALUATE SWALLOWING FUNCTION: CPT

## 2022-05-23 PROCEDURE — 76775 US EXAM ABDO BACK WALL LIM: CPT

## 2022-05-23 PROCEDURE — 84300 ASSAY OF URINE SODIUM: CPT | Performed by: INTERNAL MEDICINE

## 2022-05-23 PROCEDURE — 80051 ELECTROLYTE PANEL: CPT

## 2022-05-23 PROCEDURE — 89051 BODY FLUID CELL COUNT: CPT | Performed by: INTERNAL MEDICINE

## 2022-05-23 PROCEDURE — 80162 ASSAY OF DIGOXIN TOTAL: CPT | Performed by: INTERNAL MEDICINE

## 2022-05-23 PROCEDURE — 0 MILRINONE LACTATE IN DEXTROSE 20-5 MG/100ML-% SOLUTION: Performed by: THORACIC SURGERY (CARDIOTHORACIC VASCULAR SURGERY)

## 2022-05-23 PROCEDURE — 63710000001 INSULIN LISPRO (HUMAN) PER 5 UNITS: Performed by: THORACIC SURGERY (CARDIOTHORACIC VASCULAR SURGERY)

## 2022-05-23 PROCEDURE — 82550 ASSAY OF CK (CPK): CPT | Performed by: INTERNAL MEDICINE

## 2022-05-23 PROCEDURE — 83735 ASSAY OF MAGNESIUM: CPT | Performed by: THORACIC SURGERY (CARDIOTHORACIC VASCULAR SURGERY)

## 2022-05-23 PROCEDURE — 83986 ASSAY PH BODY FLUID NOS: CPT | Performed by: INTERNAL MEDICINE

## 2022-05-23 PROCEDURE — 82150 ASSAY OF AMYLASE: CPT | Performed by: INTERNAL MEDICINE

## 2022-05-23 PROCEDURE — 99024 POSTOP FOLLOW-UP VISIT: CPT | Performed by: NURSE PRACTITIONER

## 2022-05-23 PROCEDURE — 97164 PT RE-EVAL EST PLAN CARE: CPT

## 2022-05-23 PROCEDURE — 0W9930Z DRAINAGE OF RIGHT PLEURAL CAVITY WITH DRAINAGE DEVICE, PERCUTANEOUS APPROACH: ICD-10-PCS | Performed by: INTERNAL MEDICINE

## 2022-05-23 PROCEDURE — 84157 ASSAY OF PROTEIN OTHER: CPT | Performed by: INTERNAL MEDICINE

## 2022-05-23 PROCEDURE — 82803 BLOOD GASES ANY COMBINATION: CPT

## 2022-05-23 PROCEDURE — 63710000001 INSULIN LISPRO (HUMAN) PER 5 UNITS: Performed by: NURSE PRACTITIONER

## 2022-05-23 PROCEDURE — 25010000002 EPINEPHRINE 1 MG/ML SOLUTION 30 ML VIAL: Performed by: NURSE PRACTITIONER

## 2022-05-23 PROCEDURE — 97166 OT EVAL MOD COMPLEX 45 MIN: CPT

## 2022-05-23 PROCEDURE — 25010000002 DIGOXIN PER 500 MCG: Performed by: INTERNAL MEDICINE

## 2022-05-23 PROCEDURE — 25010000002 FUROSEMIDE PER 20 MG: Performed by: THORACIC SURGERY (CARDIOTHORACIC VASCULAR SURGERY)

## 2022-05-23 PROCEDURE — 99233 SBSQ HOSP IP/OBS HIGH 50: CPT | Performed by: INTERNAL MEDICINE

## 2022-05-23 PROCEDURE — 83540 ASSAY OF IRON: CPT | Performed by: INTERNAL MEDICINE

## 2022-05-23 PROCEDURE — 87205 SMEAR GRAM STAIN: CPT | Performed by: INTERNAL MEDICINE

## 2022-05-23 PROCEDURE — 83735 ASSAY OF MAGNESIUM: CPT | Performed by: INTERNAL MEDICINE

## 2022-05-23 PROCEDURE — 25010000002 CALCIUM GLUCONATE 2-0.675 GM/100ML-% SOLUTION: Performed by: NURSE PRACTITIONER

## 2022-05-23 PROCEDURE — 88108 CYTOPATH CONCENTRATE TECH: CPT | Performed by: INTERNAL MEDICINE

## 2022-05-23 PROCEDURE — 85018 HEMOGLOBIN: CPT

## 2022-05-23 PROCEDURE — 82330 ASSAY OF CALCIUM: CPT

## 2022-05-23 PROCEDURE — 87086 URINE CULTURE/COLONY COUNT: CPT | Performed by: INTERNAL MEDICINE

## 2022-05-23 PROCEDURE — 83605 ASSAY OF LACTIC ACID: CPT | Performed by: INTERNAL MEDICINE

## 2022-05-23 PROCEDURE — 84550 ASSAY OF BLOOD/URIC ACID: CPT | Performed by: INTERNAL MEDICINE

## 2022-05-23 PROCEDURE — 83615 LACTATE (LD) (LDH) ENZYME: CPT | Performed by: INTERNAL MEDICINE

## 2022-05-23 PROCEDURE — 82140 ASSAY OF AMMONIA: CPT | Performed by: NURSE PRACTITIONER

## 2022-05-23 PROCEDURE — 80053 COMPREHEN METABOLIC PANEL: CPT | Performed by: NURSE PRACTITIONER

## 2022-05-23 PROCEDURE — 25010000002 CEFEPIME PER 500 MG: Performed by: INTERNAL MEDICINE

## 2022-05-23 PROCEDURE — 87070 CULTURE OTHR SPECIMN AEROBIC: CPT | Performed by: INTERNAL MEDICINE

## 2022-05-23 PROCEDURE — 25010000002 ONDANSETRON PER 1 MG: Performed by: NURSE PRACTITIONER

## 2022-05-23 PROCEDURE — 71250 CT THORAX DX C-: CPT

## 2022-05-23 PROCEDURE — 0 MILRINONE LACTATE IN DEXTROSE 20-5 MG/100ML-% SOLUTION: Performed by: NURSE PRACTITIONER

## 2022-05-23 PROCEDURE — 84478 ASSAY OF TRIGLYCERIDES: CPT | Performed by: INTERNAL MEDICINE

## 2022-05-23 PROCEDURE — 81001 URINALYSIS AUTO W/SCOPE: CPT | Performed by: INTERNAL MEDICINE

## 2022-05-23 PROCEDURE — 32551 INSERTION OF CHEST TUBE: CPT

## 2022-05-23 PROCEDURE — 85027 COMPLETE CBC AUTOMATED: CPT | Performed by: NURSE PRACTITIONER

## 2022-05-23 RX ORDER — CALCIUM GLUCONATE 20 MG/ML
2 INJECTION, SOLUTION INTRAVENOUS ONCE
Status: COMPLETED | OUTPATIENT
Start: 2022-05-23 | End: 2022-05-23

## 2022-05-23 RX ORDER — DEXTROSE MONOHYDRATE 25 G/50ML
25 INJECTION, SOLUTION INTRAVENOUS
Status: DISCONTINUED | OUTPATIENT
Start: 2022-05-23 | End: 2022-05-23 | Stop reason: SDUPTHER

## 2022-05-23 RX ORDER — FUROSEMIDE 10 MG/ML
40 INJECTION INTRAMUSCULAR; INTRAVENOUS ONCE
Status: COMPLETED | OUTPATIENT
Start: 2022-05-23 | End: 2022-05-23

## 2022-05-23 RX ORDER — INSULIN LISPRO 100 [IU]/ML
0-14 INJECTION, SOLUTION INTRAVENOUS; SUBCUTANEOUS EVERY 6 HOURS
Status: DISCONTINUED | OUTPATIENT
Start: 2022-05-23 | End: 2022-05-24

## 2022-05-23 RX ORDER — INSULIN LISPRO 100 [IU]/ML
0-14 INJECTION, SOLUTION INTRAVENOUS; SUBCUTANEOUS AS NEEDED
Status: DISCONTINUED | OUTPATIENT
Start: 2022-05-23 | End: 2022-05-24

## 2022-05-23 RX ORDER — NICOTINE POLACRILEX 4 MG
15 LOZENGE BUCCAL
Status: DISCONTINUED | OUTPATIENT
Start: 2022-05-23 | End: 2022-05-23 | Stop reason: SDUPTHER

## 2022-05-23 RX ORDER — OLANZAPINE 10 MG/2ML
1 INJECTION, POWDER, LYOPHILIZED, FOR SOLUTION INTRAMUSCULAR
Status: DISCONTINUED | OUTPATIENT
Start: 2022-05-23 | End: 2022-05-23 | Stop reason: SDUPTHER

## 2022-05-23 RX ORDER — ENOXAPARIN SODIUM 100 MG/ML
30 INJECTION SUBCUTANEOUS
Status: DISCONTINUED | OUTPATIENT
Start: 2022-05-23 | End: 2022-05-28

## 2022-05-23 RX ORDER — SCOLOPAMINE TRANSDERMAL SYSTEM 1 MG/1
1 PATCH, EXTENDED RELEASE TRANSDERMAL
Status: DISCONTINUED | OUTPATIENT
Start: 2022-05-23 | End: 2022-05-26

## 2022-05-23 RX ORDER — SODIUM CHLORIDE 9 MG/ML
60 INJECTION, SOLUTION INTRAVENOUS CONTINUOUS
Status: DISCONTINUED | OUTPATIENT
Start: 2022-05-23 | End: 2022-05-24

## 2022-05-23 RX ADMIN — INSULIN LISPRO 5 UNITS: 100 INJECTION, SOLUTION INTRAVENOUS; SUBCUTANEOUS at 18:53

## 2022-05-23 RX ADMIN — CALCIUM GLUCONATE 2 G: 20 INJECTION, SOLUTION INTRAVENOUS at 09:16

## 2022-05-23 RX ADMIN — OXYCODONE 5 MG: 5 TABLET ORAL at 00:58

## 2022-05-23 RX ADMIN — DIGOXIN 250 MCG: 0.25 INJECTION INTRAMUSCULAR; INTRAVENOUS at 00:57

## 2022-05-23 RX ADMIN — CHLORHEXIDINE GLUCONATE 15 ML: 1.2 RINSE ORAL at 20:24

## 2022-05-23 RX ADMIN — SODIUM BICARBONATE 50 MEQ: 84 INJECTION, SOLUTION INTRAVENOUS at 10:18

## 2022-05-23 RX ADMIN — ONDANSETRON 4 MG: 2 INJECTION INTRAMUSCULAR; INTRAVENOUS at 10:03

## 2022-05-23 RX ADMIN — FUROSEMIDE 40 MG: 10 INJECTION, SOLUTION INTRAMUSCULAR; INTRAVENOUS at 06:06

## 2022-05-23 RX ADMIN — POTASSIUM CHLORIDE 20 MEQ: 1.5 POWDER, FOR SOLUTION ORAL at 22:50

## 2022-05-23 RX ADMIN — EPINEPHRINE 0.02 MCG/KG/MIN: 1 INJECTION INTRAMUSCULAR; INTRAVENOUS; SUBCUTANEOUS at 18:55

## 2022-05-23 RX ADMIN — CEFEPIME HYDROCHLORIDE 1 G: 1 INJECTION, POWDER, FOR SOLUTION INTRAMUSCULAR; INTRAVENOUS at 18:47

## 2022-05-23 RX ADMIN — ASPIRIN 81 MG: 81 TABLET, COATED ORAL at 09:16

## 2022-05-23 RX ADMIN — SODIUM BICARBONATE 50 MEQ: 84 INJECTION, SOLUTION INTRAVENOUS at 18:47

## 2022-05-23 RX ADMIN — OXYCODONE 5 MG: 5 TABLET ORAL at 05:02

## 2022-05-23 RX ADMIN — INSULIN LISPRO 4 UNITS: 100 INJECTION, SOLUTION INTRAVENOUS; SUBCUTANEOUS at 13:30

## 2022-05-23 RX ADMIN — CEFEPIME HYDROCHLORIDE 2 G: 2 INJECTION, POWDER, FOR SOLUTION INTRAVENOUS at 00:00

## 2022-05-23 RX ADMIN — SODIUM BICARBONATE 50 MEQ: 84 INJECTION, SOLUTION INTRAVENOUS at 14:27

## 2022-05-23 RX ADMIN — MUPIROCIN 1 APPLICATION: 20 OINTMENT TOPICAL at 09:08

## 2022-05-23 RX ADMIN — SCOPALAMINE 1 PATCH: 1 PATCH, EXTENDED RELEASE TRANSDERMAL at 13:18

## 2022-05-23 RX ADMIN — PANTOPRAZOLE SODIUM 40 MG: 40 TABLET, DELAYED RELEASE ORAL at 07:05

## 2022-05-23 RX ADMIN — MUPIROCIN 1 APPLICATION: 20 OINTMENT TOPICAL at 20:24

## 2022-05-23 RX ADMIN — MILRINONE LACTATE 0.38 MCG/KG/MIN: 0.2 INJECTION, SOLUTION INTRAVENOUS at 03:53

## 2022-05-23 RX ADMIN — MILRINONE LACTATE 0.25 MCG/KG/MIN: 0.2 INJECTION, SOLUTION INTRAVENOUS at 18:47

## 2022-05-23 RX ADMIN — SODIUM CHLORIDE 60 ML/HR: 9 INJECTION, SOLUTION INTRAVENOUS at 10:19

## 2022-05-23 RX ADMIN — CHLORHEXIDINE GLUCONATE 15 ML: 1.2 RINSE ORAL at 09:08

## 2022-05-23 RX ADMIN — INSULIN LISPRO 2 UNITS: 100 INJECTION, SOLUTION INTRAVENOUS; SUBCUTANEOUS at 07:12

## 2022-05-23 RX ADMIN — CEFEPIME HYDROCHLORIDE 2 G: 2 INJECTION, POWDER, FOR SOLUTION INTRAVENOUS at 09:07

## 2022-05-24 ENCOUNTER — APPOINTMENT (OUTPATIENT)
Dept: GENERAL RADIOLOGY | Facility: HOSPITAL | Age: 82
End: 2022-05-24

## 2022-05-24 ENCOUNTER — APPOINTMENT (OUTPATIENT)
Dept: CARDIOLOGY | Facility: HOSPITAL | Age: 82
End: 2022-05-24

## 2022-05-24 LAB
ALBUMIN SERPL-MCNC: 3.1 G/DL (ref 3.5–5.2)
ALBUMIN/GLOB SERPL: 1.7 G/DL
ALP SERPL-CCNC: 78 U/L (ref 39–117)
ALT SERPL W P-5'-P-CCNC: 194 U/L (ref 1–33)
AMYLASE FLD-CCNC: 6 U/L
ANION GAP SERPL CALCULATED.3IONS-SCNC: 11 MMOL/L (ref 5–15)
ARTERIAL PATENCY WRIST A: ABNORMAL
ARTERIAL PATENCY WRIST A: POSITIVE
AST SERPL-CCNC: 214 U/L (ref 1–32)
ATMOSPHERIC PRESS: ABNORMAL MM[HG]
BACTERIA SPEC AEROBE CULT: NO GROWTH
BACTERIA SPEC RESP CULT: ABNORMAL
BACTERIA SPEC RESP CULT: ABNORMAL
BASE DEFICIT: 1.6 MEQ/LITER
BASE EXCESS BLDA CALC-SCNC: 1.6 MMOL/L (ref 0–3)
BASE EXCESS BLDA CALC-SCNC: 3.9 MMOL/L (ref 0–3)
BDY SITE: ABNORMAL
BDY SITE: ABNORMAL
BH CV ECHO MEAS - RAP SYSTOLE: 8 MMHG
BH CV ECHO MEAS - RVSP: 51.2 MMHG
BH CV ECHO MEAS - TR MAX PG: 43.2 MMHG
BH CV ECHO MEAS - TR MAX VEL: 328 CM/SEC
BILIRUB SERPL-MCNC: 1.6 MG/DL (ref 0–1.2)
BUN SERPL-MCNC: 59 MG/DL (ref 8–23)
BUN/CREAT SERPL: 46.8 (ref 7–25)
CA-I SERPL ISE-MCNC: 1.15 MMOL/L (ref 1.2–1.3)
CALCIUM SPEC-SCNC: 8.1 MG/DL (ref 8.6–10.5)
CHLORIDE SERPL-SCNC: 111 MMOL/L (ref 98–107)
CK SERPL-CCNC: 138 U/L (ref 20–180)
CO2 BLDA-SCNC: 26.6 MMOL/L (ref 22–29)
CO2 BLDA-SCNC: 27.5 MMOL/L (ref 22–29)
CO2 SERPL-SCNC: 25 MMOL/L (ref 22–29)
CREAT SERPL-MCNC: 1.26 MG/DL (ref 0.57–1)
D-LACTATE SERPL-SCNC: 1.4 MMOL/L (ref 0.5–2)
DEPRECATED RDW RBC AUTO: 48.1 FL (ref 37–54)
DIGOXIN SERPL-MCNC: 2.2 NG/ML (ref 0.6–1.2)
EGFRCR SERPLBLD CKD-EPI 2021: 43 ML/MIN/1.73
ERYTHROCYTE [DISTWIDTH] IN BLOOD BY AUTOMATED COUNT: 16.9 % (ref 12.3–15.4)
GLOBULIN UR ELPH-MCNC: 1.8 GM/DL
GLUCOSE BLDC GLUCOMTR-MCNC: 202 MG/DL (ref 70–105)
GLUCOSE BLDC GLUCOMTR-MCNC: 221 MG/DL (ref 70–105)
GLUCOSE BLDC GLUCOMTR-MCNC: 239 MG/DL (ref 70–105)
GLUCOSE SERPL-MCNC: 195 MG/DL (ref 65–99)
GRAM STN SPEC: ABNORMAL
HCO3 BLDA-SCNC: 26.5 MMOL/L (ref 21–28)
HCO3 MIXED: 25.5 MMOL/L (ref 21–29)
HCT VFR BLD AUTO: 28.2 % (ref 34–46.6)
HEMODILUTION: NO
HEMODILUTION: NO
HGB BLD-MCNC: 9.2 G/DL (ref 12–15.9)
INHALED O2 CONCENTRATION: 28 %
INHALED O2 CONCENTRATION: 28 %
LAB AP CASE REPORT: NORMAL
LDH FLD-CCNC: 176 U/L
MAGNESIUM SERPL-MCNC: 1.9 MG/DL (ref 1.6–2.4)
MAXIMAL PREDICTED HEART RATE: 139 BPM
MCH RBC QN AUTO: 26.6 PG (ref 26.6–33)
MCHC RBC AUTO-ENTMCNC: 32.5 G/DL (ref 31.5–35.7)
MCV RBC AUTO: 81.9 FL (ref 79–97)
MODALITY: ABNORMAL
MODALITY: ABNORMAL
O2 SATURATION MIXED: 78.5 %
PATH REPORT.FINAL DX SPEC: NORMAL
PCO2 BLDA: 31.4 MM HG (ref 35–48)
PCO2 MIXED: 36.2 MMHG (ref 35–51)
PH BLDA: 7.53 PH UNITS (ref 7.35–7.45)
PH MIXED: 7.46 PH UNITS (ref 7.32–7.45)
PHOSPHATE SERPL-MCNC: 1 MG/DL (ref 2.5–4.5)
PLATELET # BLD AUTO: 149 10*3/MM3 (ref 140–450)
PMV BLD AUTO: 8 FL (ref 6–12)
PO2 BLDA: 94.3 MM HG (ref 83–108)
PO2 MIXED: 40.4 MMHG
POTASSIUM SERPL-SCNC: 4.1 MMOL/L (ref 3.5–5.2)
PROT FLD-MCNC: 2.5 G/DL
PROT SERPL-MCNC: 4.9 G/DL (ref 6–8.5)
RBC # BLD AUTO: 3.44 10*6/MM3 (ref 3.77–5.28)
SAO2 % BLDCOA: 98.2 % (ref 94–98)
SODIUM SERPL-SCNC: 147 MMOL/L (ref 136–145)
STRESS TARGET HR: 118 BPM
WBC NRBC COR # BLD: 6.3 10*3/MM3 (ref 3.4–10.8)

## 2022-05-24 PROCEDURE — 25010000002 CEFEPIME PER 500 MG: Performed by: INTERNAL MEDICINE

## 2022-05-24 PROCEDURE — 25010000002 CALCIUM GLUCONATE-NACL 1-0.675 GM/50ML-% SOLUTION: Performed by: INTERNAL MEDICINE

## 2022-05-24 PROCEDURE — 83735 ASSAY OF MAGNESIUM: CPT | Performed by: INTERNAL MEDICINE

## 2022-05-24 PROCEDURE — 99233 SBSQ HOSP IP/OBS HIGH 50: CPT | Performed by: INTERNAL MEDICINE

## 2022-05-24 PROCEDURE — 93325 DOPPLER ECHO COLOR FLOW MAPG: CPT | Performed by: INTERNAL MEDICINE

## 2022-05-24 PROCEDURE — 83605 ASSAY OF LACTIC ACID: CPT | Performed by: INTERNAL MEDICINE

## 2022-05-24 PROCEDURE — 93321 DOPPLER ECHO F-UP/LMTD STD: CPT | Performed by: INTERNAL MEDICINE

## 2022-05-24 PROCEDURE — 93010 ELECTROCARDIOGRAM REPORT: CPT | Performed by: INTERNAL MEDICINE

## 2022-05-24 PROCEDURE — 93308 TTE F-UP OR LMTD: CPT | Performed by: INTERNAL MEDICINE

## 2022-05-24 PROCEDURE — 82803 BLOOD GASES ANY COMBINATION: CPT

## 2022-05-24 PROCEDURE — 93321 DOPPLER ECHO F-UP/LMTD STD: CPT

## 2022-05-24 PROCEDURE — 80053 COMPREHEN METABOLIC PANEL: CPT | Performed by: NURSE PRACTITIONER

## 2022-05-24 PROCEDURE — 93005 ELECTROCARDIOGRAM TRACING: CPT | Performed by: NURSE PRACTITIONER

## 2022-05-24 PROCEDURE — 99024 POSTOP FOLLOW-UP VISIT: CPT | Performed by: NURSE PRACTITIONER

## 2022-05-24 PROCEDURE — 63710000001 INSULIN LISPRO (HUMAN) PER 5 UNITS: Performed by: THORACIC SURGERY (CARDIOTHORACIC VASCULAR SURGERY)

## 2022-05-24 PROCEDURE — 71045 X-RAY EXAM CHEST 1 VIEW: CPT

## 2022-05-24 PROCEDURE — 85027 COMPLETE CBC AUTOMATED: CPT | Performed by: NURSE PRACTITIONER

## 2022-05-24 PROCEDURE — 82330 ASSAY OF CALCIUM: CPT | Performed by: INTERNAL MEDICINE

## 2022-05-24 PROCEDURE — 84100 ASSAY OF PHOSPHORUS: CPT | Performed by: INTERNAL MEDICINE

## 2022-05-24 PROCEDURE — 25010000002 ENOXAPARIN PER 10 MG: Performed by: THORACIC SURGERY (CARDIOTHORACIC VASCULAR SURGERY)

## 2022-05-24 PROCEDURE — 63710000001 INSULIN LISPRO (HUMAN) PER 5 UNITS: Performed by: NURSE PRACTITIONER

## 2022-05-24 PROCEDURE — 82962 GLUCOSE BLOOD TEST: CPT

## 2022-05-24 PROCEDURE — 97530 THERAPEUTIC ACTIVITIES: CPT

## 2022-05-24 PROCEDURE — 93308 TTE F-UP OR LMTD: CPT

## 2022-05-24 PROCEDURE — 0 MILRINONE LACTATE IN DEXTROSE 20-5 MG/100ML-% SOLUTION: Performed by: NURSE PRACTITIONER

## 2022-05-24 PROCEDURE — 92526 ORAL FUNCTION THERAPY: CPT

## 2022-05-24 PROCEDURE — 25010000002 MAGNESIUM SULFATE IN D5W 1G/100ML (PREMIX) 1-5 GM/100ML-% SOLUTION: Performed by: NURSE PRACTITIONER

## 2022-05-24 PROCEDURE — 82550 ASSAY OF CK (CPK): CPT | Performed by: INTERNAL MEDICINE

## 2022-05-24 PROCEDURE — 93325 DOPPLER ECHO COLOR FLOW MAPG: CPT

## 2022-05-24 PROCEDURE — 80162 ASSAY OF DIGOXIN TOTAL: CPT | Performed by: INTERNAL MEDICINE

## 2022-05-24 RX ORDER — POTASSIUM PHOS IN 0.9 % NACL 30MMOL/250
30 PLASTIC BAG, INJECTION (ML) INTRAVENOUS ONCE
Status: COMPLETED | OUTPATIENT
Start: 2022-05-24 | End: 2022-05-24

## 2022-05-24 RX ORDER — MILRINONE LACTATE 0.2 MG/ML
0.12 INJECTION, SOLUTION INTRAVENOUS CONTINUOUS
Status: DISCONTINUED | OUTPATIENT
Start: 2022-05-24 | End: 2022-05-25

## 2022-05-24 RX ORDER — CALCIUM GLUCONATE 20 MG/ML
1 INJECTION, SOLUTION INTRAVENOUS EVERY 12 HOURS
Status: COMPLETED | OUTPATIENT
Start: 2022-05-24 | End: 2022-05-24

## 2022-05-24 RX ORDER — INSULIN LISPRO 100 [IU]/ML
0-24 INJECTION, SOLUTION INTRAVENOUS; SUBCUTANEOUS AS NEEDED
Status: DISCONTINUED | OUTPATIENT
Start: 2022-05-24 | End: 2022-06-03 | Stop reason: HOSPADM

## 2022-05-24 RX ORDER — SODIUM CHLORIDE 450 MG/100ML
100 INJECTION, SOLUTION INTRAVENOUS CONTINUOUS
Status: DISCONTINUED | OUTPATIENT
Start: 2022-05-24 | End: 2022-05-25

## 2022-05-24 RX ORDER — INSULIN LISPRO 100 [IU]/ML
0-24 INJECTION, SOLUTION INTRAVENOUS; SUBCUTANEOUS EVERY 6 HOURS SCHEDULED
Status: DISCONTINUED | OUTPATIENT
Start: 2022-05-24 | End: 2022-06-03 | Stop reason: HOSPADM

## 2022-05-24 RX ORDER — MAGNESIUM SULFATE 1 G/100ML
1 INJECTION INTRAVENOUS ONCE
Status: COMPLETED | OUTPATIENT
Start: 2022-05-24 | End: 2022-05-24

## 2022-05-24 RX ADMIN — CALCIUM GLUCONATE 1 G: 20 INJECTION, SOLUTION INTRAVENOUS at 07:55

## 2022-05-24 RX ADMIN — ENOXAPARIN SODIUM 30 MG: 30 INJECTION SUBCUTANEOUS at 16:23

## 2022-05-24 RX ADMIN — INSULIN LISPRO 5 UNITS: 100 INJECTION, SOLUTION INTRAVENOUS; SUBCUTANEOUS at 01:17

## 2022-05-24 RX ADMIN — SODIUM CHLORIDE 100 ML/HR: 4.5 INJECTION, SOLUTION INTRAVENOUS at 07:58

## 2022-05-24 RX ADMIN — INSULIN LISPRO 3 UNITS: 100 INJECTION, SOLUTION INTRAVENOUS; SUBCUTANEOUS at 06:27

## 2022-05-24 RX ADMIN — INSULIN LISPRO 5 UNITS: 100 INJECTION, SOLUTION INTRAVENOUS; SUBCUTANEOUS at 17:51

## 2022-05-24 RX ADMIN — CEFEPIME HYDROCHLORIDE 1 G: 1 INJECTION, POWDER, FOR SOLUTION INTRAMUSCULAR; INTRAVENOUS at 17:51

## 2022-05-24 RX ADMIN — SODIUM BICARBONATE 50 MEQ: 84 INJECTION, SOLUTION INTRAVENOUS at 02:12

## 2022-05-24 RX ADMIN — MILRINONE LACTATE 0.12 MCG/KG/MIN: 0.2 INJECTION, SOLUTION INTRAVENOUS at 22:53

## 2022-05-24 RX ADMIN — CHLORHEXIDINE GLUCONATE 15 ML: 1.2 RINSE ORAL at 08:11

## 2022-05-24 RX ADMIN — MAGNESIUM SULFATE 1 G: 1 INJECTION INTRAVENOUS at 08:12

## 2022-05-24 RX ADMIN — CEFEPIME HYDROCHLORIDE 1 G: 1 INJECTION, POWDER, FOR SOLUTION INTRAMUSCULAR; INTRAVENOUS at 06:16

## 2022-05-24 RX ADMIN — POTASSIUM PHOSPHATE, MONOBASIC AND POTASSIUM PHOSPHATE, DIBASIC 30 MMOL: 224; 236 INJECTION, SOLUTION, CONCENTRATE INTRAVENOUS at 07:57

## 2022-05-24 RX ADMIN — MUPIROCIN 1 APPLICATION: 20 OINTMENT TOPICAL at 08:16

## 2022-05-24 RX ADMIN — ASPIRIN 81 MG: 81 TABLET, COATED ORAL at 08:11

## 2022-05-24 RX ADMIN — SODIUM CHLORIDE 100 ML/HR: 4.5 INJECTION, SOLUTION INTRAVENOUS at 17:51

## 2022-05-24 RX ADMIN — CHLORHEXIDINE GLUCONATE 15 ML: 1.2 RINSE ORAL at 20:31

## 2022-05-24 RX ADMIN — CALCIUM GLUCONATE 1 G: 20 INJECTION, SOLUTION INTRAVENOUS at 20:31

## 2022-05-24 RX ADMIN — PANTOPRAZOLE SODIUM 40 MG: 40 TABLET, DELAYED RELEASE ORAL at 06:30

## 2022-05-24 RX ADMIN — INSULIN LISPRO 5 UNITS: 100 INJECTION, SOLUTION INTRAVENOUS; SUBCUTANEOUS at 11:56

## 2022-05-25 ENCOUNTER — APPOINTMENT (OUTPATIENT)
Dept: GENERAL RADIOLOGY | Facility: HOSPITAL | Age: 82
End: 2022-05-25

## 2022-05-25 LAB
ALBUMIN SERPL-MCNC: 2.8 G/DL (ref 3.5–5.2)
ALBUMIN/GLOB SERPL: 1.6 G/DL
ALP SERPL-CCNC: 78 U/L (ref 39–117)
ALT SERPL W P-5'-P-CCNC: 124 U/L (ref 1–33)
ANION GAP SERPL CALCULATED.3IONS-SCNC: 10 MMOL/L (ref 5–15)
ARTERIAL PATENCY WRIST A: ABNORMAL
AST SERPL-CCNC: 88 U/L (ref 1–32)
ATMOSPHERIC PRESS: ABNORMAL MM[HG]
BASE EXCESS BLDA CALC-SCNC: 2 MMOL/L (ref 0–3)
BDY SITE: ABNORMAL
BILIRUB SERPL-MCNC: 1.4 MG/DL (ref 0–1.2)
BUN SERPL-MCNC: 51 MG/DL (ref 8–23)
BUN/CREAT SERPL: 69.9 (ref 7–25)
CA-I SERPL ISE-MCNC: 1.19 MMOL/L (ref 1.2–1.3)
CALCIUM SPEC-SCNC: 7.8 MG/DL (ref 8.6–10.5)
CHLORIDE SERPL-SCNC: 109 MMOL/L (ref 98–107)
CO2 BLDA-SCNC: 25.4 MMOL/L (ref 22–29)
CO2 SERPL-SCNC: 23 MMOL/L (ref 22–29)
CREAT SERPL-MCNC: 0.73 MG/DL (ref 0.57–1)
DEPRECATED RDW RBC AUTO: 49 FL (ref 37–54)
EGFRCR SERPLBLD CKD-EPI 2021: 82.7 ML/MIN/1.73
ERYTHROCYTE [DISTWIDTH] IN BLOOD BY AUTOMATED COUNT: 17.2 % (ref 12.3–15.4)
GLOBULIN UR ELPH-MCNC: 1.7 GM/DL
GLUCOSE BLDC GLUCOMTR-MCNC: 188 MG/DL (ref 70–105)
GLUCOSE BLDC GLUCOMTR-MCNC: 224 MG/DL (ref 70–105)
GLUCOSE BLDC GLUCOMTR-MCNC: 234 MG/DL (ref 70–105)
GLUCOSE SERPL-MCNC: 148 MG/DL (ref 65–99)
HCO3 BLDA-SCNC: 24.5 MMOL/L (ref 21–28)
HCT VFR BLD AUTO: 27.4 % (ref 34–46.6)
HEMODILUTION: NO
HGB BLD-MCNC: 8.9 G/DL (ref 12–15.9)
INHALED O2 CONCENTRATION: 21 %
LAB AP CASE REPORT: NORMAL
MAGNESIUM SERPL-MCNC: 2 MG/DL (ref 1.6–2.4)
MCH RBC QN AUTO: 26.8 PG (ref 26.6–33)
MCHC RBC AUTO-ENTMCNC: 32.5 G/DL (ref 31.5–35.7)
MCV RBC AUTO: 82.4 FL (ref 79–97)
MODALITY: ABNORMAL
PATH REPORT.FINAL DX SPEC: NORMAL
PATH REPORT.GROSS SPEC: NORMAL
PCO2 BLDA: 29.8 MM HG (ref 35–48)
PH BLDA: 7.52 PH UNITS (ref 7.35–7.45)
PHOSPHATE SERPL-MCNC: 1.4 MG/DL (ref 2.5–4.5)
PLATELET # BLD AUTO: 110 10*3/MM3 (ref 140–450)
PMV BLD AUTO: 7.9 FL (ref 6–12)
PO2 BLDA: 65.1 MM HG (ref 83–108)
POTASSIUM SERPL-SCNC: 3.7 MMOL/L (ref 3.5–5.2)
PROT SERPL-MCNC: 4.5 G/DL (ref 6–8.5)
QT INTERVAL: 396 MS
RBC # BLD AUTO: 3.33 10*6/MM3 (ref 3.77–5.28)
SAO2 % BLDCOA: 94.8 % (ref 94–98)
SODIUM SERPL-SCNC: 142 MMOL/L (ref 136–145)
TRIGL FLD-MCNC: 42 MG/DL
WBC NRBC COR # BLD: 4.9 10*3/MM3 (ref 3.4–10.8)

## 2022-05-25 PROCEDURE — 25010000002 CALCIUM GLUCONATE-NACL 1-0.675 GM/50ML-% SOLUTION: Performed by: INTERNAL MEDICINE

## 2022-05-25 PROCEDURE — 99233 SBSQ HOSP IP/OBS HIGH 50: CPT | Performed by: INTERNAL MEDICINE

## 2022-05-25 PROCEDURE — 85027 COMPLETE CBC AUTOMATED: CPT | Performed by: NURSE PRACTITIONER

## 2022-05-25 PROCEDURE — 71045 X-RAY EXAM CHEST 1 VIEW: CPT

## 2022-05-25 PROCEDURE — 93010 ELECTROCARDIOGRAM REPORT: CPT | Performed by: INTERNAL MEDICINE

## 2022-05-25 PROCEDURE — 92611 MOTION FLUOROSCOPY/SWALLOW: CPT

## 2022-05-25 PROCEDURE — 82962 GLUCOSE BLOOD TEST: CPT

## 2022-05-25 PROCEDURE — 83735 ASSAY OF MAGNESIUM: CPT | Performed by: INTERNAL MEDICINE

## 2022-05-25 PROCEDURE — 92526 ORAL FUNCTION THERAPY: CPT

## 2022-05-25 PROCEDURE — 97110 THERAPEUTIC EXERCISES: CPT

## 2022-05-25 PROCEDURE — 63710000001 INSULIN LISPRO (HUMAN) PER 5 UNITS: Performed by: THORACIC SURGERY (CARDIOTHORACIC VASCULAR SURGERY)

## 2022-05-25 PROCEDURE — 25010000002 FUROSEMIDE PER 20 MG: Performed by: INTERNAL MEDICINE

## 2022-05-25 PROCEDURE — 25010000002 FUROSEMIDE PER 20 MG: Performed by: THORACIC SURGERY (CARDIOTHORACIC VASCULAR SURGERY)

## 2022-05-25 PROCEDURE — 80053 COMPREHEN METABOLIC PANEL: CPT | Performed by: NURSE PRACTITIONER

## 2022-05-25 PROCEDURE — 99024 POSTOP FOLLOW-UP VISIT: CPT | Performed by: NURSE PRACTITIONER

## 2022-05-25 PROCEDURE — 84100 ASSAY OF PHOSPHORUS: CPT | Performed by: INTERNAL MEDICINE

## 2022-05-25 PROCEDURE — 25010000002 CEFTRIAXONE PER 250 MG: Performed by: NURSE PRACTITIONER

## 2022-05-25 PROCEDURE — 74230 X-RAY XM SWLNG FUNCJ C+: CPT

## 2022-05-25 PROCEDURE — 93005 ELECTROCARDIOGRAM TRACING: CPT | Performed by: NURSE PRACTITIONER

## 2022-05-25 PROCEDURE — 25010000002 CEFEPIME PER 500 MG: Performed by: INTERNAL MEDICINE

## 2022-05-25 PROCEDURE — 82330 ASSAY OF CALCIUM: CPT | Performed by: NURSE PRACTITIONER

## 2022-05-25 PROCEDURE — 97530 THERAPEUTIC ACTIVITIES: CPT

## 2022-05-25 PROCEDURE — 82803 BLOOD GASES ANY COMBINATION: CPT

## 2022-05-25 PROCEDURE — 25010000002 ENOXAPARIN PER 10 MG: Performed by: THORACIC SURGERY (CARDIOTHORACIC VASCULAR SURGERY)

## 2022-05-25 PROCEDURE — 0T2BX0Z CHANGE DRAINAGE DEVICE IN BLADDER, EXTERNAL APPROACH: ICD-10-PCS | Performed by: THORACIC SURGERY (CARDIOTHORACIC VASCULAR SURGERY)

## 2022-05-25 RX ORDER — FUROSEMIDE 10 MG/ML
20 INJECTION INTRAMUSCULAR; INTRAVENOUS ONCE
Status: COMPLETED | OUTPATIENT
Start: 2022-05-25 | End: 2022-05-25

## 2022-05-25 RX ORDER — POTASSIUM PHOS IN 0.9 % NACL 30MMOL/250
30 PLASTIC BAG, INJECTION (ML) INTRAVENOUS ONCE
Status: COMPLETED | OUTPATIENT
Start: 2022-05-25 | End: 2022-05-25

## 2022-05-25 RX ORDER — CALCIUM GLUCONATE 20 MG/ML
1 INJECTION, SOLUTION INTRAVENOUS ONCE
Status: COMPLETED | OUTPATIENT
Start: 2022-05-25 | End: 2022-05-25

## 2022-05-25 RX ADMIN — BARIUM SULFATE 50 ML: 400 SUSPENSION ORAL at 12:47

## 2022-05-25 RX ADMIN — ENOXAPARIN SODIUM 30 MG: 30 INJECTION SUBCUTANEOUS at 17:20

## 2022-05-25 RX ADMIN — SODIUM CHLORIDE 100 ML/HR: 4.5 INJECTION, SOLUTION INTRAVENOUS at 05:10

## 2022-05-25 RX ADMIN — CHLORHEXIDINE GLUCONATE 15 ML: 1.2 RINSE ORAL at 21:04

## 2022-05-25 RX ADMIN — FUROSEMIDE 20 MG: 10 INJECTION, SOLUTION INTRAMUSCULAR; INTRAVENOUS at 23:04

## 2022-05-25 RX ADMIN — INSULIN LISPRO 8 UNITS: 100 INJECTION, SOLUTION INTRAVENOUS; SUBCUTANEOUS at 12:03

## 2022-05-25 RX ADMIN — CALCIUM GLUCONATE 1 G: 20 INJECTION, SOLUTION INTRAVENOUS at 11:13

## 2022-05-25 RX ADMIN — PANTOPRAZOLE SODIUM 40 MG: 40 TABLET, DELAYED RELEASE ORAL at 06:49

## 2022-05-25 RX ADMIN — INSULIN LISPRO 8 UNITS: 100 INJECTION, SOLUTION INTRAVENOUS; SUBCUTANEOUS at 01:30

## 2022-05-25 RX ADMIN — CEFEPIME HYDROCHLORIDE 1 G: 1 INJECTION, POWDER, FOR SOLUTION INTRAMUSCULAR; INTRAVENOUS at 05:42

## 2022-05-25 RX ADMIN — POTASSIUM PHOSPHATE, MONOBASIC AND POTASSIUM PHOSPHATE, DIBASIC 30 MMOL: 224; 236 INJECTION, SOLUTION, CONCENTRATE INTRAVENOUS at 10:43

## 2022-05-25 RX ADMIN — FUROSEMIDE 20 MG: 10 INJECTION, SOLUTION INTRAMUSCULAR; INTRAVENOUS at 09:08

## 2022-05-25 RX ADMIN — CHLORHEXIDINE GLUCONATE 15 ML: 1.2 RINSE ORAL at 09:08

## 2022-05-25 RX ADMIN — ASPIRIN 81 MG: 81 TABLET, COATED ORAL at 09:08

## 2022-05-25 RX ADMIN — SENNOSIDES AND DOCUSATE SODIUM 2 TABLET: 50; 8.6 TABLET ORAL at 21:04

## 2022-05-25 RX ADMIN — INSULIN LISPRO 4 UNITS: 100 INJECTION, SOLUTION INTRAVENOUS; SUBCUTANEOUS at 17:19

## 2022-05-25 RX ADMIN — CEFTRIAXONE 1 G: 1 INJECTION, POWDER, FOR SOLUTION INTRAMUSCULAR; INTRAVENOUS at 17:21

## 2022-05-26 ENCOUNTER — APPOINTMENT (OUTPATIENT)
Dept: GENERAL RADIOLOGY | Facility: HOSPITAL | Age: 82
End: 2022-05-26

## 2022-05-26 LAB
ALBUMIN SERPL-MCNC: 2.9 G/DL (ref 3.5–5.2)
ALBUMIN/GLOB SERPL: 1.6 G/DL
ALP SERPL-CCNC: 76 U/L (ref 39–117)
ALT SERPL W P-5'-P-CCNC: 98 U/L (ref 1–33)
ANION GAP SERPL CALCULATED.3IONS-SCNC: 12 MMOL/L (ref 5–15)
AST SERPL-CCNC: 66 U/L (ref 1–32)
BACTERIA FLD CULT: NORMAL
BACTERIA SPEC AEROBE CULT: NORMAL
BACTERIA SPEC AEROBE CULT: NORMAL
BILIRUB SERPL-MCNC: 1.6 MG/DL (ref 0–1.2)
BUN SERPL-MCNC: 35 MG/DL (ref 8–23)
BUN/CREAT SERPL: 59.3 (ref 7–25)
CALCIUM SPEC-SCNC: 7.5 MG/DL (ref 8.6–10.5)
CHLORIDE SERPL-SCNC: 105 MMOL/L (ref 98–107)
CO2 SERPL-SCNC: 22 MMOL/L (ref 22–29)
CREAT SERPL-MCNC: 0.59 MG/DL (ref 0.57–1)
DEPRECATED RDW RBC AUTO: 49 FL (ref 37–54)
EGFRCR SERPLBLD CKD-EPI 2021: 90.7 ML/MIN/1.73
ERYTHROCYTE [DISTWIDTH] IN BLOOD BY AUTOMATED COUNT: 17.1 % (ref 12.3–15.4)
GLOBULIN UR ELPH-MCNC: 1.8 GM/DL
GLUCOSE BLDC GLUCOMTR-MCNC: 131 MG/DL (ref 70–105)
GLUCOSE BLDC GLUCOMTR-MCNC: 149 MG/DL (ref 70–105)
GLUCOSE BLDC GLUCOMTR-MCNC: 151 MG/DL (ref 70–105)
GLUCOSE BLDC GLUCOMTR-MCNC: 213 MG/DL (ref 70–105)
GLUCOSE SERPL-MCNC: 153 MG/DL (ref 65–99)
GRAM STN SPEC: NORMAL
GRAM STN SPEC: NORMAL
HCT VFR BLD AUTO: 30.1 % (ref 34–46.6)
HGB BLD-MCNC: 9.6 G/DL (ref 12–15.9)
MAGNESIUM SERPL-MCNC: 2 MG/DL (ref 1.6–2.4)
MCH RBC QN AUTO: 26.3 PG (ref 26.6–33)
MCHC RBC AUTO-ENTMCNC: 31.7 G/DL (ref 31.5–35.7)
MCV RBC AUTO: 82.9 FL (ref 79–97)
PHOSPHATE SERPL-MCNC: 2.4 MG/DL (ref 2.5–4.5)
PLATELET # BLD AUTO: 115 10*3/MM3 (ref 140–450)
PMV BLD AUTO: 8.6 FL (ref 6–12)
POTASSIUM SERPL-SCNC: 4.2 MMOL/L (ref 3.5–5.2)
PROT SERPL-MCNC: 4.7 G/DL (ref 6–8.5)
QT INTERVAL: 400 MS
RBC # BLD AUTO: 3.63 10*6/MM3 (ref 3.77–5.28)
SODIUM SERPL-SCNC: 139 MMOL/L (ref 136–145)
WBC NRBC COR # BLD: 5.2 10*3/MM3 (ref 3.4–10.8)

## 2022-05-26 PROCEDURE — 82962 GLUCOSE BLOOD TEST: CPT

## 2022-05-26 PROCEDURE — 92526 ORAL FUNCTION THERAPY: CPT

## 2022-05-26 PROCEDURE — 85027 COMPLETE CBC AUTOMATED: CPT | Performed by: NURSE PRACTITIONER

## 2022-05-26 PROCEDURE — 97530 THERAPEUTIC ACTIVITIES: CPT

## 2022-05-26 PROCEDURE — 25010000002 CALCIUM GLUCONATE-NACL 1-0.675 GM/50ML-% SOLUTION: Performed by: INTERNAL MEDICINE

## 2022-05-26 PROCEDURE — 99232 SBSQ HOSP IP/OBS MODERATE 35: CPT | Performed by: INTERNAL MEDICINE

## 2022-05-26 PROCEDURE — 99024 POSTOP FOLLOW-UP VISIT: CPT | Performed by: NURSE PRACTITIONER

## 2022-05-26 PROCEDURE — 80053 COMPREHEN METABOLIC PANEL: CPT | Performed by: NURSE PRACTITIONER

## 2022-05-26 PROCEDURE — 83735 ASSAY OF MAGNESIUM: CPT | Performed by: INTERNAL MEDICINE

## 2022-05-26 PROCEDURE — 97116 GAIT TRAINING THERAPY: CPT

## 2022-05-26 PROCEDURE — 25010000002 CEFTRIAXONE PER 250 MG: Performed by: NURSE PRACTITIONER

## 2022-05-26 PROCEDURE — 71045 X-RAY EXAM CHEST 1 VIEW: CPT

## 2022-05-26 PROCEDURE — 63710000001 INSULIN LISPRO (HUMAN) PER 5 UNITS: Performed by: THORACIC SURGERY (CARDIOTHORACIC VASCULAR SURGERY)

## 2022-05-26 PROCEDURE — 25010000002 ENOXAPARIN PER 10 MG: Performed by: THORACIC SURGERY (CARDIOTHORACIC VASCULAR SURGERY)

## 2022-05-26 PROCEDURE — 84100 ASSAY OF PHOSPHORUS: CPT | Performed by: INTERNAL MEDICINE

## 2022-05-26 PROCEDURE — 97110 THERAPEUTIC EXERCISES: CPT

## 2022-05-26 RX ORDER — CHOLECALCIFEROL (VITAMIN D3) 125 MCG
5 CAPSULE ORAL NIGHTLY PRN
Status: DISCONTINUED | OUTPATIENT
Start: 2022-05-26 | End: 2022-05-27

## 2022-05-26 RX ORDER — CALCIUM GLUCONATE 20 MG/ML
1 INJECTION, SOLUTION INTRAVENOUS EVERY 12 HOURS
Status: COMPLETED | OUTPATIENT
Start: 2022-05-26 | End: 2022-05-26

## 2022-05-26 RX ORDER — BUMETANIDE 1 MG/1
1 TABLET ORAL 2 TIMES DAILY
Status: DISCONTINUED | OUTPATIENT
Start: 2022-05-26 | End: 2022-05-27

## 2022-05-26 RX ORDER — FUROSEMIDE 10 MG/ML
20 INJECTION INTRAMUSCULAR; INTRAVENOUS EVERY 12 HOURS
Status: DISCONTINUED | OUTPATIENT
Start: 2022-05-26 | End: 2022-05-26

## 2022-05-26 RX ADMIN — CALCIUM GLUCONATE 1 G: 20 INJECTION, SOLUTION INTRAVENOUS at 08:03

## 2022-05-26 RX ADMIN — ASPIRIN 81 MG: 81 TABLET, COATED ORAL at 08:03

## 2022-05-26 RX ADMIN — BUMETANIDE 1 MG: 1 TABLET ORAL at 21:35

## 2022-05-26 RX ADMIN — METOPROLOL TARTRATE 12.5 MG: 25 TABLET, FILM COATED ORAL at 21:35

## 2022-05-26 RX ADMIN — ENOXAPARIN SODIUM 30 MG: 30 INJECTION SUBCUTANEOUS at 16:27

## 2022-05-26 RX ADMIN — CEFTRIAXONE 1 G: 1 INJECTION, POWDER, FOR SOLUTION INTRAMUSCULAR; INTRAVENOUS at 17:08

## 2022-05-26 RX ADMIN — CALCIUM GLUCONATE 1 G: 20 INJECTION, SOLUTION INTRAVENOUS at 21:36

## 2022-05-26 RX ADMIN — BUMETANIDE 1 MG: 1 TABLET ORAL at 10:43

## 2022-05-26 RX ADMIN — INSULIN LISPRO 8 UNITS: 100 INJECTION, SOLUTION INTRAVENOUS; SUBCUTANEOUS at 18:34

## 2022-05-26 RX ADMIN — PANTOPRAZOLE SODIUM 40 MG: 40 TABLET, DELAYED RELEASE ORAL at 08:03

## 2022-05-26 RX ADMIN — SENNOSIDES AND DOCUSATE SODIUM 2 TABLET: 50; 8.6 TABLET ORAL at 21:35

## 2022-05-26 RX ADMIN — METOPROLOL TARTRATE 12.5 MG: 25 TABLET, FILM COATED ORAL at 10:44

## 2022-05-26 RX ADMIN — POTASSIUM PHOSPHATE, MONOBASIC AND POTASSIUM PHOSPHATE, DIBASIC 21 MMOL: 224; 236 INJECTION, SOLUTION, CONCENTRATE INTRAVENOUS at 10:44

## 2022-05-26 RX ADMIN — CHLORHEXIDINE GLUCONATE 15 ML: 1.2 RINSE ORAL at 08:04

## 2022-05-26 RX ADMIN — INSULIN LISPRO 8 UNITS: 100 INJECTION, SOLUTION INTRAVENOUS; SUBCUTANEOUS at 12:14

## 2022-05-27 ENCOUNTER — APPOINTMENT (OUTPATIENT)
Dept: GENERAL RADIOLOGY | Facility: HOSPITAL | Age: 82
End: 2022-05-27

## 2022-05-27 LAB
ALBUMIN SERPL-MCNC: 2.9 G/DL (ref 3.5–5.2)
ALBUMIN/GLOB SERPL: 1.3 G/DL
ALP SERPL-CCNC: 85 U/L (ref 39–117)
ALT SERPL W P-5'-P-CCNC: 83 U/L (ref 1–33)
ANION GAP SERPL CALCULATED.3IONS-SCNC: 12 MMOL/L (ref 5–15)
AST SERPL-CCNC: 62 U/L (ref 1–32)
BILIRUB SERPL-MCNC: 1.4 MG/DL (ref 0–1.2)
BUN SERPL-MCNC: 20 MG/DL (ref 8–23)
BUN/CREAT SERPL: 39.2 (ref 7–25)
CALCIUM SPEC-SCNC: 7.9 MG/DL (ref 8.6–10.5)
CHLORIDE SERPL-SCNC: 101 MMOL/L (ref 98–107)
CO2 SERPL-SCNC: 24 MMOL/L (ref 22–29)
CREAT SERPL-MCNC: 0.51 MG/DL (ref 0.57–1)
DEPRECATED RDW RBC AUTO: 55.1 FL (ref 37–54)
EGFRCR SERPLBLD CKD-EPI 2021: 93.9 ML/MIN/1.73
ERYTHROCYTE [DISTWIDTH] IN BLOOD BY AUTOMATED COUNT: 18.2 % (ref 12.3–15.4)
GLOBULIN UR ELPH-MCNC: 2.3 GM/DL
GLUCOSE BLDC GLUCOMTR-MCNC: 149 MG/DL (ref 70–105)
GLUCOSE BLDC GLUCOMTR-MCNC: 173 MG/DL (ref 70–105)
GLUCOSE BLDC GLUCOMTR-MCNC: 185 MG/DL (ref 70–105)
GLUCOSE BLDC GLUCOMTR-MCNC: 186 MG/DL (ref 70–105)
GLUCOSE BLDC GLUCOMTR-MCNC: 204 MG/DL (ref 70–105)
GLUCOSE BLDC GLUCOMTR-MCNC: 231 MG/DL (ref 70–105)
GLUCOSE BLDC GLUCOMTR-MCNC: 247 MG/DL (ref 70–105)
GLUCOSE BLDC GLUCOMTR-MCNC: 276 MG/DL (ref 70–105)
GLUCOSE BLDC GLUCOMTR-MCNC: 57 MG/DL (ref 70–105)
GLUCOSE BLDC GLUCOMTR-MCNC: 59 MG/DL (ref 70–105)
GLUCOSE SERPL-MCNC: 201 MG/DL (ref 65–99)
HCT VFR BLD AUTO: 32.9 % (ref 34–46.6)
HGB BLD-MCNC: 10.2 G/DL (ref 12–15.9)
HOLD SPECIMEN: NORMAL
MAGNESIUM SERPL-MCNC: 2 MG/DL (ref 1.6–2.4)
MCH RBC QN AUTO: 26.8 PG (ref 26.6–33)
MCHC RBC AUTO-ENTMCNC: 30.9 G/DL (ref 31.5–35.7)
MCV RBC AUTO: 86.8 FL (ref 79–97)
PHOSPHATE SERPL-MCNC: 2 MG/DL (ref 2.5–4.5)
PLATELET # BLD AUTO: 147 10*3/MM3 (ref 140–450)
PMV BLD AUTO: 8.4 FL (ref 6–12)
POTASSIUM SERPL-SCNC: 4.1 MMOL/L (ref 3.5–5.2)
PROT SERPL-MCNC: 5.2 G/DL (ref 6–8.5)
RBC # BLD AUTO: 3.79 10*6/MM3 (ref 3.77–5.28)
SODIUM SERPL-SCNC: 137 MMOL/L (ref 136–145)
WBC NRBC COR # BLD: 7.6 10*3/MM3 (ref 3.4–10.8)

## 2022-05-27 PROCEDURE — 25010000002 DIPHENHYDRAMINE PER 50 MG

## 2022-05-27 PROCEDURE — 84100 ASSAY OF PHOSPHORUS: CPT | Performed by: THORACIC SURGERY (CARDIOTHORACIC VASCULAR SURGERY)

## 2022-05-27 PROCEDURE — 80053 COMPREHEN METABOLIC PANEL: CPT | Performed by: THORACIC SURGERY (CARDIOTHORACIC VASCULAR SURGERY)

## 2022-05-27 PROCEDURE — 25010000002 CEFTRIAXONE PER 250 MG: Performed by: NURSE PRACTITIONER

## 2022-05-27 PROCEDURE — 99232 SBSQ HOSP IP/OBS MODERATE 35: CPT | Performed by: INTERNAL MEDICINE

## 2022-05-27 PROCEDURE — 25010000002 CALCIUM GLUCONATE-NACL 1-0.675 GM/50ML-% SOLUTION: Performed by: NURSE PRACTITIONER

## 2022-05-27 PROCEDURE — 83735 ASSAY OF MAGNESIUM: CPT | Performed by: THORACIC SURGERY (CARDIOTHORACIC VASCULAR SURGERY)

## 2022-05-27 PROCEDURE — 82962 GLUCOSE BLOOD TEST: CPT

## 2022-05-27 PROCEDURE — 63710000001 INSULIN LISPRO (HUMAN) PER 5 UNITS: Performed by: THORACIC SURGERY (CARDIOTHORACIC VASCULAR SURGERY)

## 2022-05-27 PROCEDURE — 99024 POSTOP FOLLOW-UP VISIT: CPT | Performed by: NURSE PRACTITIONER

## 2022-05-27 PROCEDURE — 71045 X-RAY EXAM CHEST 1 VIEW: CPT

## 2022-05-27 PROCEDURE — 85027 COMPLETE CBC AUTOMATED: CPT | Performed by: THORACIC SURGERY (CARDIOTHORACIC VASCULAR SURGERY)

## 2022-05-27 RX ORDER — CALCIUM GLUCONATE 20 MG/ML
1 INJECTION, SOLUTION INTRAVENOUS ONCE
Status: COMPLETED | OUTPATIENT
Start: 2022-05-27 | End: 2022-05-27

## 2022-05-27 RX ORDER — BUMETANIDE 1 MG/1
1 TABLET ORAL DAILY
Status: DISCONTINUED | OUTPATIENT
Start: 2022-05-28 | End: 2022-05-29

## 2022-05-27 RX ORDER — DIPHENHYDRAMINE HYDROCHLORIDE 50 MG/ML
25 INJECTION INTRAMUSCULAR; INTRAVENOUS NIGHTLY PRN
Status: DISCONTINUED | OUTPATIENT
Start: 2022-05-27 | End: 2022-05-28

## 2022-05-27 RX ORDER — MAGNESIUM SULFATE 1 G/100ML
1 INJECTION INTRAVENOUS AS NEEDED
Status: DISCONTINUED | OUTPATIENT
Start: 2022-05-27 | End: 2022-06-03 | Stop reason: HOSPADM

## 2022-05-27 RX ORDER — MAGNESIUM SULFATE HEPTAHYDRATE 40 MG/ML
2 INJECTION, SOLUTION INTRAVENOUS AS NEEDED
Status: DISCONTINUED | OUTPATIENT
Start: 2022-05-27 | End: 2022-06-03 | Stop reason: HOSPADM

## 2022-05-27 RX ORDER — BUMETANIDE 0.25 MG/ML
1 INJECTION INTRAMUSCULAR; INTRAVENOUS 2 TIMES DAILY
Status: COMPLETED | OUTPATIENT
Start: 2022-05-27 | End: 2022-05-27

## 2022-05-27 RX ORDER — CHOLECALCIFEROL (VITAMIN D3) 125 MCG
5 CAPSULE ORAL NIGHTLY
Status: DISCONTINUED | OUTPATIENT
Start: 2022-05-27 | End: 2022-05-29

## 2022-05-27 RX ADMIN — CALCIUM GLUCONATE 1 G: 20 INJECTION, SOLUTION INTRAVENOUS at 13:30

## 2022-05-27 RX ADMIN — CEFTRIAXONE 1 G: 1 INJECTION, POWDER, FOR SOLUTION INTRAMUSCULAR; INTRAVENOUS at 17:36

## 2022-05-27 RX ADMIN — METOPROLOL TARTRATE 12.5 MG: 25 TABLET, FILM COATED ORAL at 11:29

## 2022-05-27 RX ADMIN — METOPROLOL TARTRATE 12.5 MG: 25 TABLET, FILM COATED ORAL at 08:37

## 2022-05-27 RX ADMIN — INSULIN LISPRO 4 UNITS: 100 INJECTION, SOLUTION INTRAVENOUS; SUBCUTANEOUS at 17:36

## 2022-05-27 RX ADMIN — PANTOPRAZOLE SODIUM 40 MG: 40 TABLET, DELAYED RELEASE ORAL at 07:35

## 2022-05-27 RX ADMIN — INSULIN LISPRO 12 UNITS: 100 INJECTION, SOLUTION INTRAVENOUS; SUBCUTANEOUS at 00:49

## 2022-05-27 RX ADMIN — METOPROLOL TARTRATE 25 MG: 25 TABLET, FILM COATED ORAL at 20:36

## 2022-05-27 RX ADMIN — INSULIN LISPRO 8 UNITS: 100 INJECTION, SOLUTION INTRAVENOUS; SUBCUTANEOUS at 23:55

## 2022-05-27 RX ADMIN — POTASSIUM PHOSPHATE, MONOBASIC 1000 MG: 500 TABLET, SOLUBLE ORAL at 08:36

## 2022-05-27 RX ADMIN — INSULIN LISPRO 8 UNITS: 100 INJECTION, SOLUTION INTRAVENOUS; SUBCUTANEOUS at 11:29

## 2022-05-27 RX ADMIN — DIPHENHYDRAMINE HYDROCHLORIDE 25 MG: 50 INJECTION, SOLUTION INTRAMUSCULAR; INTRAVENOUS at 22:47

## 2022-05-27 RX ADMIN — DEXTROSE MONOHYDRATE 25 ML: 25 INJECTION, SOLUTION INTRAVENOUS at 05:35

## 2022-05-27 RX ADMIN — Medication 2 PACKET: at 13:29

## 2022-05-27 RX ADMIN — BUMETANIDE 1 MG: 0.25 INJECTION INTRAMUSCULAR; INTRAVENOUS at 20:36

## 2022-05-27 RX ADMIN — ASPIRIN 81 MG: 81 TABLET, COATED ORAL at 08:37

## 2022-05-27 RX ADMIN — Medication 5 MG: at 20:36

## 2022-05-27 RX ADMIN — BUMETANIDE 1 MG: 0.25 INJECTION INTRAMUSCULAR; INTRAVENOUS at 11:29

## 2022-05-28 ENCOUNTER — APPOINTMENT (OUTPATIENT)
Dept: GENERAL RADIOLOGY | Facility: HOSPITAL | Age: 82
End: 2022-05-28

## 2022-05-28 LAB
ALBUMIN SERPL-MCNC: 2.7 G/DL (ref 3.5–5.2)
ALBUMIN/GLOB SERPL: 1.3 G/DL
ALP SERPL-CCNC: 76 U/L (ref 39–117)
ALT SERPL W P-5'-P-CCNC: 55 U/L (ref 1–33)
ANION GAP SERPL CALCULATED.3IONS-SCNC: 7 MMOL/L (ref 5–15)
ARTERIAL PATENCY WRIST A: POSITIVE
AST SERPL-CCNC: 49 U/L (ref 1–32)
ATMOSPHERIC PRESS: ABNORMAL MM[HG]
BASE EXCESS BLDA CALC-SCNC: 7.3 MMOL/L (ref 0–3)
BDY SITE: ABNORMAL
BILIRUB SERPL-MCNC: 1.1 MG/DL (ref 0–1.2)
BUN SERPL-MCNC: 15 MG/DL (ref 8–23)
BUN/CREAT SERPL: 31.9 (ref 7–25)
CALCIUM SPEC-SCNC: 7.5 MG/DL (ref 8.6–10.5)
CHLORIDE SERPL-SCNC: 103 MMOL/L (ref 98–107)
CO2 BLDA-SCNC: 29.8 MMOL/L (ref 22–29)
CO2 SERPL-SCNC: 30 MMOL/L (ref 22–29)
CREAT SERPL-MCNC: 0.47 MG/DL (ref 0.57–1)
DEPRECATED RDW RBC AUTO: 51.6 FL (ref 37–54)
EGFRCR SERPLBLD CKD-EPI 2021: 95.8 ML/MIN/1.73
ERYTHROCYTE [DISTWIDTH] IN BLOOD BY AUTOMATED COUNT: 17.7 % (ref 12.3–15.4)
GLOBULIN UR ELPH-MCNC: 2.1 GM/DL
GLUCOSE BLDC GLUCOMTR-MCNC: 167 MG/DL (ref 70–105)
GLUCOSE BLDC GLUCOMTR-MCNC: 226 MG/DL (ref 70–105)
GLUCOSE BLDC GLUCOMTR-MCNC: 247 MG/DL (ref 70–105)
GLUCOSE BLDC GLUCOMTR-MCNC: 83 MG/DL (ref 70–105)
GLUCOSE SERPL-MCNC: 84 MG/DL (ref 65–99)
HCO3 BLDA-SCNC: 28.9 MMOL/L (ref 21–28)
HCT VFR BLD AUTO: 28.4 % (ref 34–46.6)
HEMODILUTION: NO
HGB BLD-MCNC: 9 G/DL (ref 12–15.9)
INHALED O2 CONCENTRATION: 28 %
MAGNESIUM SERPL-MCNC: 1.8 MG/DL (ref 1.6–2.4)
MCH RBC QN AUTO: 26.6 PG (ref 26.6–33)
MCHC RBC AUTO-ENTMCNC: 31.5 G/DL (ref 31.5–35.7)
MCV RBC AUTO: 84.5 FL (ref 79–97)
MODALITY: ABNORMAL
PCO2 BLDA: 29.9 MM HG (ref 35–48)
PH BLDA: 7.59 PH UNITS (ref 7.35–7.45)
PHOSPHATE SERPL-MCNC: 2.8 MG/DL (ref 2.5–4.5)
PLATELET # BLD AUTO: 152 10*3/MM3 (ref 140–450)
PMV BLD AUTO: 8.4 FL (ref 6–12)
PO2 BLDA: 129.8 MM HG (ref 83–108)
POTASSIUM SERPL-SCNC: 3.6 MMOL/L (ref 3.5–5.2)
POTASSIUM SERPL-SCNC: 4.7 MMOL/L (ref 3.5–5.2)
PROT SERPL-MCNC: 4.8 G/DL (ref 6–8.5)
QT INTERVAL: 428 MS
RBC # BLD AUTO: 3.36 10*6/MM3 (ref 3.77–5.28)
SAO2 % BLDCOA: 99.4 % (ref 94–98)
SODIUM SERPL-SCNC: 140 MMOL/L (ref 136–145)
WBC NRBC COR # BLD: 7 10*3/MM3 (ref 3.4–10.8)

## 2022-05-28 PROCEDURE — 84132 ASSAY OF SERUM POTASSIUM: CPT | Performed by: THORACIC SURGERY (CARDIOTHORACIC VASCULAR SURGERY)

## 2022-05-28 PROCEDURE — 71045 X-RAY EXAM CHEST 1 VIEW: CPT

## 2022-05-28 PROCEDURE — 25010000002 ENOXAPARIN PER 10 MG: Performed by: THORACIC SURGERY (CARDIOTHORACIC VASCULAR SURGERY)

## 2022-05-28 PROCEDURE — 93005 ELECTROCARDIOGRAM TRACING: CPT | Performed by: THORACIC SURGERY (CARDIOTHORACIC VASCULAR SURGERY)

## 2022-05-28 PROCEDURE — 84100 ASSAY OF PHOSPHORUS: CPT | Performed by: THORACIC SURGERY (CARDIOTHORACIC VASCULAR SURGERY)

## 2022-05-28 PROCEDURE — 85027 COMPLETE CBC AUTOMATED: CPT | Performed by: THORACIC SURGERY (CARDIOTHORACIC VASCULAR SURGERY)

## 2022-05-28 PROCEDURE — 36600 WITHDRAWAL OF ARTERIAL BLOOD: CPT

## 2022-05-28 PROCEDURE — 25010000002 CEFTRIAXONE PER 250 MG: Performed by: NURSE PRACTITIONER

## 2022-05-28 PROCEDURE — 83735 ASSAY OF MAGNESIUM: CPT | Performed by: THORACIC SURGERY (CARDIOTHORACIC VASCULAR SURGERY)

## 2022-05-28 PROCEDURE — 25010000002 MAGNESIUM SULFATE IN D5W 1G/100ML (PREMIX) 1-5 GM/100ML-% SOLUTION: Performed by: NURSE PRACTITIONER

## 2022-05-28 PROCEDURE — 80053 COMPREHEN METABOLIC PANEL: CPT | Performed by: THORACIC SURGERY (CARDIOTHORACIC VASCULAR SURGERY)

## 2022-05-28 PROCEDURE — 99232 SBSQ HOSP IP/OBS MODERATE 35: CPT | Performed by: INTERNAL MEDICINE

## 2022-05-28 PROCEDURE — 82962 GLUCOSE BLOOD TEST: CPT

## 2022-05-28 PROCEDURE — 25010000002 FUROSEMIDE PER 20 MG: Performed by: INTERNAL MEDICINE

## 2022-05-28 PROCEDURE — 82803 BLOOD GASES ANY COMBINATION: CPT

## 2022-05-28 PROCEDURE — 63710000001 INSULIN LISPRO (HUMAN) PER 5 UNITS: Performed by: THORACIC SURGERY (CARDIOTHORACIC VASCULAR SURGERY)

## 2022-05-28 PROCEDURE — 93010 ELECTROCARDIOGRAM REPORT: CPT | Performed by: INTERNAL MEDICINE

## 2022-05-28 RX ORDER — ENOXAPARIN SODIUM 100 MG/ML
40 INJECTION SUBCUTANEOUS
Status: DISCONTINUED | OUTPATIENT
Start: 2022-05-28 | End: 2022-06-02

## 2022-05-28 RX ORDER — FUROSEMIDE 10 MG/ML
40 INJECTION INTRAMUSCULAR; INTRAVENOUS ONCE
Status: COMPLETED | OUTPATIENT
Start: 2022-05-28 | End: 2022-05-28

## 2022-05-28 RX ADMIN — INSULIN LISPRO 8 UNITS: 100 INJECTION, SOLUTION INTRAVENOUS; SUBCUTANEOUS at 17:52

## 2022-05-28 RX ADMIN — CEFTRIAXONE 1 G: 1 INJECTION, POWDER, FOR SOLUTION INTRAMUSCULAR; INTRAVENOUS at 17:30

## 2022-05-28 RX ADMIN — ENOXAPARIN SODIUM 40 MG: 100 INJECTION SUBCUTANEOUS at 16:35

## 2022-05-28 RX ADMIN — POTASSIUM CHLORIDE 20 MEQ: 1.5 POWDER, FOR SOLUTION ORAL at 09:44

## 2022-05-28 RX ADMIN — METOPROLOL TARTRATE 25 MG: 25 TABLET, FILM COATED ORAL at 09:43

## 2022-05-28 RX ADMIN — FUROSEMIDE 40 MG: 10 INJECTION, SOLUTION INTRAMUSCULAR; INTRAVENOUS at 17:30

## 2022-05-28 RX ADMIN — PANTOPRAZOLE SODIUM 40 MG: 40 TABLET, DELAYED RELEASE ORAL at 09:43

## 2022-05-28 RX ADMIN — Medication 5 MG: at 20:02

## 2022-05-28 RX ADMIN — INSULIN LISPRO 4 UNITS: 100 INJECTION, SOLUTION INTRAVENOUS; SUBCUTANEOUS at 12:19

## 2022-05-28 RX ADMIN — BUMETANIDE 1 MG: 1 TABLET ORAL at 09:43

## 2022-05-28 RX ADMIN — MAGNESIUM SULFATE 1 G: 1 INJECTION INTRAVENOUS at 08:31

## 2022-05-28 RX ADMIN — INSULIN LISPRO 8 UNITS: 100 INJECTION, SOLUTION INTRAVENOUS; SUBCUTANEOUS at 23:09

## 2022-05-28 RX ADMIN — ASPIRIN 81 MG: 81 TABLET, COATED ORAL at 09:43

## 2022-05-29 ENCOUNTER — APPOINTMENT (OUTPATIENT)
Dept: GENERAL RADIOLOGY | Facility: HOSPITAL | Age: 82
End: 2022-05-29

## 2022-05-29 LAB
ALBUMIN SERPL-MCNC: 2.7 G/DL (ref 3.5–5.2)
ALBUMIN/GLOB SERPL: 1.2 G/DL
ALP SERPL-CCNC: 98 U/L (ref 39–117)
ALT SERPL W P-5'-P-CCNC: 56 U/L (ref 1–33)
ANION GAP SERPL CALCULATED.3IONS-SCNC: 9 MMOL/L (ref 5–15)
AST SERPL-CCNC: 56 U/L (ref 1–32)
BILIRUB SERPL-MCNC: 1.1 MG/DL (ref 0–1.2)
BUN SERPL-MCNC: 14 MG/DL (ref 8–23)
BUN/CREAT SERPL: 25.9 (ref 7–25)
CALCIUM SPEC-SCNC: 7.9 MG/DL (ref 8.6–10.5)
CHLORIDE SERPL-SCNC: 97 MMOL/L (ref 98–107)
CO2 SERPL-SCNC: 32 MMOL/L (ref 22–29)
CREAT SERPL-MCNC: 0.54 MG/DL (ref 0.57–1)
DEPRECATED RDW RBC AUTO: 52.5 FL (ref 37–54)
EGFRCR SERPLBLD CKD-EPI 2021: 92.6 ML/MIN/1.73
ERYTHROCYTE [DISTWIDTH] IN BLOOD BY AUTOMATED COUNT: 18 % (ref 12.3–15.4)
GLOBULIN UR ELPH-MCNC: 2.2 GM/DL
GLUCOSE BLDC GLUCOMTR-MCNC: 126 MG/DL (ref 70–105)
GLUCOSE BLDC GLUCOMTR-MCNC: 184 MG/DL (ref 70–105)
GLUCOSE BLDC GLUCOMTR-MCNC: 214 MG/DL (ref 70–105)
GLUCOSE BLDC GLUCOMTR-MCNC: 249 MG/DL (ref 70–105)
GLUCOSE SERPL-MCNC: 114 MG/DL (ref 65–99)
HCT VFR BLD AUTO: 31.1 % (ref 34–46.6)
HGB BLD-MCNC: 10.2 G/DL (ref 12–15.9)
MAGNESIUM SERPL-MCNC: 1.8 MG/DL (ref 1.6–2.4)
MCH RBC QN AUTO: 27.2 PG (ref 26.6–33)
MCHC RBC AUTO-ENTMCNC: 32.7 G/DL (ref 31.5–35.7)
MCV RBC AUTO: 83.2 FL (ref 79–97)
PHOSPHATE SERPL-MCNC: 2.3 MG/DL (ref 2.5–4.5)
PLATELET # BLD AUTO: 215 10*3/MM3 (ref 140–450)
PMV BLD AUTO: 8.2 FL (ref 6–12)
POTASSIUM SERPL-SCNC: 4 MMOL/L (ref 3.5–5.2)
PROT SERPL-MCNC: 4.9 G/DL (ref 6–8.5)
RBC # BLD AUTO: 3.74 10*6/MM3 (ref 3.77–5.28)
SODIUM SERPL-SCNC: 138 MMOL/L (ref 136–145)
WBC NRBC COR # BLD: 9 10*3/MM3 (ref 3.4–10.8)

## 2022-05-29 PROCEDURE — 71045 X-RAY EXAM CHEST 1 VIEW: CPT

## 2022-05-29 PROCEDURE — 84100 ASSAY OF PHOSPHORUS: CPT | Performed by: THORACIC SURGERY (CARDIOTHORACIC VASCULAR SURGERY)

## 2022-05-29 PROCEDURE — 80053 COMPREHEN METABOLIC PANEL: CPT | Performed by: THORACIC SURGERY (CARDIOTHORACIC VASCULAR SURGERY)

## 2022-05-29 PROCEDURE — 82962 GLUCOSE BLOOD TEST: CPT

## 2022-05-29 PROCEDURE — 25010000002 ENOXAPARIN PER 10 MG: Performed by: THORACIC SURGERY (CARDIOTHORACIC VASCULAR SURGERY)

## 2022-05-29 PROCEDURE — 85027 COMPLETE CBC AUTOMATED: CPT | Performed by: THORACIC SURGERY (CARDIOTHORACIC VASCULAR SURGERY)

## 2022-05-29 PROCEDURE — 83735 ASSAY OF MAGNESIUM: CPT | Performed by: THORACIC SURGERY (CARDIOTHORACIC VASCULAR SURGERY)

## 2022-05-29 PROCEDURE — 99232 SBSQ HOSP IP/OBS MODERATE 35: CPT | Performed by: INTERNAL MEDICINE

## 2022-05-29 PROCEDURE — 63710000001 INSULIN LISPRO (HUMAN) PER 5 UNITS: Performed by: THORACIC SURGERY (CARDIOTHORACIC VASCULAR SURGERY)

## 2022-05-29 RX ORDER — POTASSIUM CHLORIDE 20 MEQ/1
20 TABLET, EXTENDED RELEASE ORAL 2 TIMES DAILY WITH MEALS
Status: DISCONTINUED | OUTPATIENT
Start: 2022-05-29 | End: 2022-05-29

## 2022-05-29 RX ORDER — BUMETANIDE 1 MG/1
2 TABLET ORAL 2 TIMES DAILY
Status: DISCONTINUED | OUTPATIENT
Start: 2022-05-29 | End: 2022-05-30

## 2022-05-29 RX ORDER — CHOLECALCIFEROL (VITAMIN D3) 125 MCG
10 CAPSULE ORAL NIGHTLY
Status: DISCONTINUED | OUTPATIENT
Start: 2022-05-29 | End: 2022-06-03 | Stop reason: HOSPADM

## 2022-05-29 RX ORDER — CHOLECALCIFEROL (VITAMIN D3) 125 MCG
5 CAPSULE ORAL ONCE
Status: COMPLETED | OUTPATIENT
Start: 2022-05-29 | End: 2022-05-29

## 2022-05-29 RX ORDER — POTASSIUM CHLORIDE 1.5 G/1.77G
20 POWDER, FOR SOLUTION ORAL 2 TIMES DAILY WITH MEALS
Status: DISCONTINUED | OUTPATIENT
Start: 2022-05-29 | End: 2022-05-31

## 2022-05-29 RX ORDER — CLONAZEPAM 0.5 MG/1
0.5 TABLET ORAL 2 TIMES DAILY PRN
Status: DISCONTINUED | OUTPATIENT
Start: 2022-05-29 | End: 2022-06-01

## 2022-05-29 RX ADMIN — POTASSIUM CHLORIDE 20 MEQ: 1.5 POWDER, FOR SOLUTION ORAL at 17:06

## 2022-05-29 RX ADMIN — CLONAZEPAM 0.5 MG: 0.5 TABLET ORAL at 20:44

## 2022-05-29 RX ADMIN — INSULIN LISPRO 8 UNITS: 100 INJECTION, SOLUTION INTRAVENOUS; SUBCUTANEOUS at 23:31

## 2022-05-29 RX ADMIN — SENNOSIDES AND DOCUSATE SODIUM 2 TABLET: 50; 8.6 TABLET ORAL at 20:33

## 2022-05-29 RX ADMIN — INSULIN LISPRO 8 UNITS: 100 INJECTION, SOLUTION INTRAVENOUS; SUBCUTANEOUS at 11:37

## 2022-05-29 RX ADMIN — ASPIRIN 81 MG: 81 TABLET, COATED ORAL at 08:08

## 2022-05-29 RX ADMIN — Medication 5 MG: at 01:22

## 2022-05-29 RX ADMIN — INSULIN LISPRO 4 UNITS: 100 INJECTION, SOLUTION INTRAVENOUS; SUBCUTANEOUS at 17:05

## 2022-05-29 RX ADMIN — BUMETANIDE 1 MG: 1 TABLET ORAL at 08:08

## 2022-05-29 RX ADMIN — POTASSIUM CHLORIDE 20 MEQ: 1.5 POWDER, FOR SOLUTION ORAL at 11:37

## 2022-05-29 RX ADMIN — ENOXAPARIN SODIUM 40 MG: 100 INJECTION SUBCUTANEOUS at 15:59

## 2022-05-29 RX ADMIN — Medication 10 MG: at 22:18

## 2022-05-29 RX ADMIN — BUMETANIDE 2 MG: 1 TABLET ORAL at 20:33

## 2022-05-29 RX ADMIN — PANTOPRAZOLE SODIUM 40 MG: 40 TABLET, DELAYED RELEASE ORAL at 08:09

## 2022-05-30 LAB
ALBUMIN SERPL-MCNC: 2.8 G/DL (ref 3.5–5.2)
ALBUMIN/GLOB SERPL: 1 G/DL
ALP SERPL-CCNC: 110 U/L (ref 39–117)
ALT SERPL W P-5'-P-CCNC: 46 U/L (ref 1–33)
ANION GAP SERPL CALCULATED.3IONS-SCNC: 12 MMOL/L (ref 5–15)
AST SERPL-CCNC: 53 U/L (ref 1–32)
BILIRUB SERPL-MCNC: 1.4 MG/DL (ref 0–1.2)
BUN SERPL-MCNC: 9 MG/DL (ref 8–23)
BUN/CREAT SERPL: 19.6 (ref 7–25)
CALCIUM SPEC-SCNC: 7.9 MG/DL (ref 8.6–10.5)
CHLORIDE SERPL-SCNC: 94 MMOL/L (ref 98–107)
CO2 SERPL-SCNC: 31 MMOL/L (ref 22–29)
CREAT SERPL-MCNC: 0.46 MG/DL (ref 0.57–1)
DEPRECATED RDW RBC AUTO: 55.6 FL (ref 37–54)
EGFRCR SERPLBLD CKD-EPI 2021: 96.3 ML/MIN/1.73
ERYTHROCYTE [DISTWIDTH] IN BLOOD BY AUTOMATED COUNT: 18.8 % (ref 12.3–15.4)
GLOBULIN UR ELPH-MCNC: 2.8 GM/DL
GLUCOSE BLDC GLUCOMTR-MCNC: 107 MG/DL (ref 70–105)
GLUCOSE BLDC GLUCOMTR-MCNC: 152 MG/DL (ref 70–105)
GLUCOSE BLDC GLUCOMTR-MCNC: 173 MG/DL (ref 70–105)
GLUCOSE BLDC GLUCOMTR-MCNC: 198 MG/DL (ref 70–105)
GLUCOSE BLDC GLUCOMTR-MCNC: 284 MG/DL (ref 70–105)
GLUCOSE BLDC GLUCOMTR-MCNC: 306 MG/DL (ref 70–105)
GLUCOSE SERPL-MCNC: 110 MG/DL (ref 65–99)
HCT VFR BLD AUTO: 31.5 % (ref 34–46.6)
HGB BLD-MCNC: 10 G/DL (ref 12–15.9)
MAGNESIUM SERPL-MCNC: 1.7 MG/DL (ref 1.6–2.4)
MCH RBC QN AUTO: 26.9 PG (ref 26.6–33)
MCHC RBC AUTO-ENTMCNC: 31.7 G/DL (ref 31.5–35.7)
MCV RBC AUTO: 85 FL (ref 79–97)
PHOSPHATE SERPL-MCNC: 2.3 MG/DL (ref 2.5–4.5)
PLATELET # BLD AUTO: 274 10*3/MM3 (ref 140–450)
PMV BLD AUTO: 8 FL (ref 6–12)
POTASSIUM SERPL-SCNC: 3.8 MMOL/L (ref 3.5–5.2)
PROT SERPL-MCNC: 5.6 G/DL (ref 6–8.5)
RBC # BLD AUTO: 3.71 10*6/MM3 (ref 3.77–5.28)
SODIUM SERPL-SCNC: 137 MMOL/L (ref 136–145)
WBC NRBC COR # BLD: 9.1 10*3/MM3 (ref 3.4–10.8)

## 2022-05-30 PROCEDURE — 25010000002 ENOXAPARIN PER 10 MG: Performed by: THORACIC SURGERY (CARDIOTHORACIC VASCULAR SURGERY)

## 2022-05-30 PROCEDURE — 84100 ASSAY OF PHOSPHORUS: CPT | Performed by: THORACIC SURGERY (CARDIOTHORACIC VASCULAR SURGERY)

## 2022-05-30 PROCEDURE — 63710000001 INSULIN LISPRO (HUMAN) PER 5 UNITS: Performed by: THORACIC SURGERY (CARDIOTHORACIC VASCULAR SURGERY)

## 2022-05-30 PROCEDURE — 99024 POSTOP FOLLOW-UP VISIT: CPT | Performed by: THORACIC SURGERY (CARDIOTHORACIC VASCULAR SURGERY)

## 2022-05-30 PROCEDURE — 85027 COMPLETE CBC AUTOMATED: CPT | Performed by: THORACIC SURGERY (CARDIOTHORACIC VASCULAR SURGERY)

## 2022-05-30 PROCEDURE — 83735 ASSAY OF MAGNESIUM: CPT | Performed by: THORACIC SURGERY (CARDIOTHORACIC VASCULAR SURGERY)

## 2022-05-30 PROCEDURE — 82962 GLUCOSE BLOOD TEST: CPT

## 2022-05-30 PROCEDURE — 99232 SBSQ HOSP IP/OBS MODERATE 35: CPT | Performed by: INTERNAL MEDICINE

## 2022-05-30 PROCEDURE — 80053 COMPREHEN METABOLIC PANEL: CPT | Performed by: THORACIC SURGERY (CARDIOTHORACIC VASCULAR SURGERY)

## 2022-05-30 PROCEDURE — 25010000002 MAGNESIUM SULFATE IN D5W 1G/100ML (PREMIX) 1-5 GM/100ML-% SOLUTION: Performed by: INTERNAL MEDICINE

## 2022-05-30 PROCEDURE — 25010000002 CALCIUM GLUCONATE-NACL 1-0.675 GM/50ML-% SOLUTION: Performed by: INTERNAL MEDICINE

## 2022-05-30 RX ORDER — CALCIUM GLUCONATE 20 MG/ML
1 INJECTION, SOLUTION INTRAVENOUS EVERY 12 HOURS
Status: COMPLETED | OUTPATIENT
Start: 2022-05-30 | End: 2022-05-30

## 2022-05-30 RX ORDER — FENTANYL/ROPIVACAINE/NS/PF 2-625MCG/1
15 PLASTIC BAG, INJECTION (ML) EPIDURAL ONCE
Status: COMPLETED | OUTPATIENT
Start: 2022-05-30 | End: 2022-05-30

## 2022-05-30 RX ORDER — BUMETANIDE 1 MG/1
1 TABLET ORAL 2 TIMES DAILY
Status: DISCONTINUED | OUTPATIENT
Start: 2022-05-30 | End: 2022-05-30

## 2022-05-30 RX ORDER — BUMETANIDE 1 MG/1
1 TABLET ORAL 3 TIMES DAILY
Status: DISCONTINUED | OUTPATIENT
Start: 2022-05-30 | End: 2022-05-31

## 2022-05-30 RX ORDER — MAGNESIUM SULFATE 1 G/100ML
1 INJECTION INTRAVENOUS EVERY 12 HOURS
Status: COMPLETED | OUTPATIENT
Start: 2022-05-30 | End: 2022-05-30

## 2022-05-30 RX ADMIN — ASPIRIN 81 MG: 81 TABLET, COATED ORAL at 09:30

## 2022-05-30 RX ADMIN — POTASSIUM PHOSPHATE, MONOBASIC AND POTASSIUM PHOSPHATE, DIBASIC 15 MMOL: 224; 236 INJECTION, SOLUTION, CONCENTRATE INTRAVENOUS at 09:30

## 2022-05-30 RX ADMIN — INSULIN LISPRO 12 UNITS: 100 INJECTION, SOLUTION INTRAVENOUS; SUBCUTANEOUS at 13:36

## 2022-05-30 RX ADMIN — PANTOPRAZOLE SODIUM 40 MG: 40 TABLET, DELAYED RELEASE ORAL at 09:30

## 2022-05-30 RX ADMIN — BUMETANIDE 1 MG: 1 TABLET ORAL at 09:30

## 2022-05-30 RX ADMIN — CALCIUM GLUCONATE 1 G: 20 INJECTION, SOLUTION INTRAVENOUS at 21:37

## 2022-05-30 RX ADMIN — SENNOSIDES AND DOCUSATE SODIUM 2 TABLET: 50; 8.6 TABLET ORAL at 21:38

## 2022-05-30 RX ADMIN — INSULIN LISPRO 4 UNITS: 100 INJECTION, SOLUTION INTRAVENOUS; SUBCUTANEOUS at 16:49

## 2022-05-30 RX ADMIN — POTASSIUM CHLORIDE 20 MEQ: 1.5 POWDER, FOR SOLUTION ORAL at 16:49

## 2022-05-30 RX ADMIN — POTASSIUM CHLORIDE 20 MEQ: 1.5 POWDER, FOR SOLUTION ORAL at 09:30

## 2022-05-30 RX ADMIN — Medication 10 MG: at 21:38

## 2022-05-30 RX ADMIN — CLONAZEPAM 0.5 MG: 0.5 TABLET ORAL at 11:52

## 2022-05-30 RX ADMIN — MAGNESIUM SULFATE 1 G: 1 INJECTION INTRAVENOUS at 09:30

## 2022-05-30 RX ADMIN — INSULIN LISPRO 8 UNITS: 100 INJECTION, SOLUTION INTRAVENOUS; SUBCUTANEOUS at 23:40

## 2022-05-30 RX ADMIN — MAGNESIUM SULFATE 1 G: 1 INJECTION INTRAVENOUS at 21:37

## 2022-05-30 RX ADMIN — CLONAZEPAM 0.5 MG: 0.5 TABLET ORAL at 21:41

## 2022-05-30 RX ADMIN — ENOXAPARIN SODIUM 40 MG: 100 INJECTION SUBCUTANEOUS at 16:49

## 2022-05-30 RX ADMIN — CALCIUM GLUCONATE 1 G: 20 INJECTION, SOLUTION INTRAVENOUS at 09:29

## 2022-05-30 RX ADMIN — BUMETANIDE 1 MG: 1 TABLET ORAL at 21:38

## 2022-05-30 RX ADMIN — BUMETANIDE 1 MG: 1 TABLET ORAL at 16:49

## 2022-05-31 LAB
ALBUMIN SERPL-MCNC: 2.7 G/DL (ref 3.5–5.2)
ALBUMIN/GLOB SERPL: 1.1 G/DL
ALP SERPL-CCNC: 105 U/L (ref 39–117)
ALT SERPL W P-5'-P-CCNC: 40 U/L (ref 1–33)
ANION GAP SERPL CALCULATED.3IONS-SCNC: 7 MMOL/L (ref 5–15)
AST SERPL-CCNC: 50 U/L (ref 1–32)
BILIRUB SERPL-MCNC: 1.2 MG/DL (ref 0–1.2)
BUN SERPL-MCNC: 8 MG/DL (ref 8–23)
BUN/CREAT SERPL: 21.6 (ref 7–25)
CA-I SERPL ISE-MCNC: 1.08 MMOL/L (ref 1.2–1.3)
CALCIUM SPEC-SCNC: 8.4 MG/DL (ref 8.6–10.5)
CHLORIDE SERPL-SCNC: 94 MMOL/L (ref 98–107)
CO2 SERPL-SCNC: 35 MMOL/L (ref 22–29)
CREAT SERPL-MCNC: 0.37 MG/DL (ref 0.57–1)
DEPRECATED RDW RBC AUTO: 53.8 FL (ref 37–54)
EGFRCR SERPLBLD CKD-EPI 2021: 101.5 ML/MIN/1.73
ERYTHROCYTE [DISTWIDTH] IN BLOOD BY AUTOMATED COUNT: 18.5 % (ref 12.3–15.4)
GLOBULIN UR ELPH-MCNC: 2.5 GM/DL
GLUCOSE BLDC GLUCOMTR-MCNC: 144 MG/DL (ref 70–105)
GLUCOSE BLDC GLUCOMTR-MCNC: 159 MG/DL (ref 70–105)
GLUCOSE BLDC GLUCOMTR-MCNC: 166 MG/DL (ref 70–105)
GLUCOSE BLDC GLUCOMTR-MCNC: 188 MG/DL (ref 70–105)
GLUCOSE BLDC GLUCOMTR-MCNC: 226 MG/DL (ref 70–105)
GLUCOSE BLDC GLUCOMTR-MCNC: 247 MG/DL (ref 70–105)
GLUCOSE BLDC GLUCOMTR-MCNC: 248 MG/DL (ref 70–105)
GLUCOSE BLDC GLUCOMTR-MCNC: 53 MG/DL (ref 70–105)
GLUCOSE SERPL-MCNC: 49 MG/DL (ref 65–99)
HCT VFR BLD AUTO: 29.3 % (ref 34–46.6)
HGB BLD-MCNC: 9.4 G/DL (ref 12–15.9)
MAGNESIUM SERPL-MCNC: 1.8 MG/DL (ref 1.6–2.4)
MCH RBC QN AUTO: 27 PG (ref 26.6–33)
MCHC RBC AUTO-ENTMCNC: 32.1 G/DL (ref 31.5–35.7)
MCV RBC AUTO: 84.1 FL (ref 79–97)
PHOSPHATE SERPL-MCNC: 2.8 MG/DL (ref 2.5–4.5)
PLATELET # BLD AUTO: 245 10*3/MM3 (ref 140–450)
PMV BLD AUTO: 7.8 FL (ref 6–12)
POTASSIUM SERPL-SCNC: 3.3 MMOL/L (ref 3.5–5.2)
PROT SERPL-MCNC: 5.2 G/DL (ref 6–8.5)
RBC # BLD AUTO: 3.48 10*6/MM3 (ref 3.77–5.28)
SODIUM SERPL-SCNC: 136 MMOL/L (ref 136–145)
WBC NRBC COR # BLD: 8.2 10*3/MM3 (ref 3.4–10.8)

## 2022-05-31 PROCEDURE — P9047 ALBUMIN (HUMAN), 25%, 50ML: HCPCS | Performed by: INTERNAL MEDICINE

## 2022-05-31 PROCEDURE — 25010000002 MAGNESIUM SULFATE IN D5W 1G/100ML (PREMIX) 1-5 GM/100ML-% SOLUTION: Performed by: INTERNAL MEDICINE

## 2022-05-31 PROCEDURE — 25010000002 CALCIUM GLUCONATE 2-0.675 GM/100ML-% SOLUTION: Performed by: INTERNAL MEDICINE

## 2022-05-31 PROCEDURE — 82330 ASSAY OF CALCIUM: CPT | Performed by: INTERNAL MEDICINE

## 2022-05-31 PROCEDURE — 97530 THERAPEUTIC ACTIVITIES: CPT

## 2022-05-31 PROCEDURE — 63710000001 INSULIN LISPRO (HUMAN) PER 5 UNITS: Performed by: THORACIC SURGERY (CARDIOTHORACIC VASCULAR SURGERY)

## 2022-05-31 PROCEDURE — 82962 GLUCOSE BLOOD TEST: CPT

## 2022-05-31 PROCEDURE — 84100 ASSAY OF PHOSPHORUS: CPT | Performed by: THORACIC SURGERY (CARDIOTHORACIC VASCULAR SURGERY)

## 2022-05-31 PROCEDURE — 25010000002 ENOXAPARIN PER 10 MG: Performed by: THORACIC SURGERY (CARDIOTHORACIC VASCULAR SURGERY)

## 2022-05-31 PROCEDURE — 99024 POSTOP FOLLOW-UP VISIT: CPT | Performed by: NURSE PRACTITIONER

## 2022-05-31 PROCEDURE — 80053 COMPREHEN METABOLIC PANEL: CPT | Performed by: THORACIC SURGERY (CARDIOTHORACIC VASCULAR SURGERY)

## 2022-05-31 PROCEDURE — 25010000002 ALBUMIN HUMAN 25% PER 50 ML: Performed by: INTERNAL MEDICINE

## 2022-05-31 PROCEDURE — 97116 GAIT TRAINING THERAPY: CPT

## 2022-05-31 PROCEDURE — 83735 ASSAY OF MAGNESIUM: CPT | Performed by: THORACIC SURGERY (CARDIOTHORACIC VASCULAR SURGERY)

## 2022-05-31 PROCEDURE — 97110 THERAPEUTIC EXERCISES: CPT

## 2022-05-31 PROCEDURE — 85027 COMPLETE CBC AUTOMATED: CPT | Performed by: THORACIC SURGERY (CARDIOTHORACIC VASCULAR SURGERY)

## 2022-05-31 PROCEDURE — 99232 SBSQ HOSP IP/OBS MODERATE 35: CPT | Performed by: INTERNAL MEDICINE

## 2022-05-31 RX ORDER — POTASSIUM CHLORIDE 1.5 G/1.77G
20 POWDER, FOR SOLUTION ORAL 3 TIMES DAILY
Status: DISCONTINUED | OUTPATIENT
Start: 2022-05-31 | End: 2022-06-03

## 2022-05-31 RX ORDER — ALBUMIN (HUMAN) 12.5 G/50ML
25 SOLUTION INTRAVENOUS EVERY 8 HOURS
Status: COMPLETED | OUTPATIENT
Start: 2022-05-31 | End: 2022-06-01

## 2022-05-31 RX ORDER — METOPROLOL SUCCINATE 25 MG/1
12.5 TABLET, EXTENDED RELEASE ORAL
Status: DISCONTINUED | OUTPATIENT
Start: 2022-05-31 | End: 2022-06-03 | Stop reason: HOSPADM

## 2022-05-31 RX ORDER — CALCIUM GLUCONATE 20 MG/ML
2 INJECTION, SOLUTION INTRAVENOUS EVERY 12 HOURS
Status: COMPLETED | OUTPATIENT
Start: 2022-05-31 | End: 2022-05-31

## 2022-05-31 RX ORDER — BUMETANIDE 1 MG/1
1 TABLET ORAL 2 TIMES DAILY
Status: DISCONTINUED | OUTPATIENT
Start: 2022-05-31 | End: 2022-06-03

## 2022-05-31 RX ORDER — POTASSIUM CHLORIDE 20 MEQ/1
40 TABLET, EXTENDED RELEASE ORAL ONCE
Status: COMPLETED | OUTPATIENT
Start: 2022-05-31 | End: 2022-05-31

## 2022-05-31 RX ORDER — MAGNESIUM SULFATE 1 G/100ML
1 INJECTION INTRAVENOUS ONCE
Status: COMPLETED | OUTPATIENT
Start: 2022-05-31 | End: 2022-05-31

## 2022-05-31 RX ADMIN — MAGNESIUM SULFATE 1 G: 1 INJECTION INTRAVENOUS at 08:06

## 2022-05-31 RX ADMIN — POTASSIUM CHLORIDE 40 MEQ: 1500 TABLET, EXTENDED RELEASE ORAL at 08:05

## 2022-05-31 RX ADMIN — POTASSIUM CHLORIDE 20 MEQ: 1.5 POWDER, FOR SOLUTION ORAL at 21:00

## 2022-05-31 RX ADMIN — CALCIUM GLUCONATE 2 G: 20 INJECTION, SOLUTION INTRAVENOUS at 08:06

## 2022-05-31 RX ADMIN — POTASSIUM CHLORIDE 20 MEQ: 1.5 POWDER, FOR SOLUTION ORAL at 16:31

## 2022-05-31 RX ADMIN — SENNOSIDES AND DOCUSATE SODIUM 2 TABLET: 50; 8.6 TABLET ORAL at 21:01

## 2022-05-31 RX ADMIN — ALBUMIN (HUMAN) 25 G: 0.25 INJECTION, SOLUTION INTRAVENOUS at 10:59

## 2022-05-31 RX ADMIN — ENOXAPARIN SODIUM 40 MG: 100 INJECTION SUBCUTANEOUS at 16:31

## 2022-05-31 RX ADMIN — BUMETANIDE 1 MG: 1 TABLET ORAL at 21:00

## 2022-05-31 RX ADMIN — DEXTROSE MONOHYDRATE 50 ML: 25 INJECTION, SOLUTION INTRAVENOUS at 05:38

## 2022-05-31 RX ADMIN — CLONAZEPAM 0.5 MG: 0.5 TABLET ORAL at 18:22

## 2022-05-31 RX ADMIN — ASPIRIN 81 MG: 81 TABLET, COATED ORAL at 08:06

## 2022-05-31 RX ADMIN — INSULIN LISPRO 4 UNITS: 100 INJECTION, SOLUTION INTRAVENOUS; SUBCUTANEOUS at 11:40

## 2022-05-31 RX ADMIN — BUMETANIDE 1 MG: 1 TABLET ORAL at 08:06

## 2022-05-31 RX ADMIN — ALBUMIN (HUMAN) 25 G: 0.25 INJECTION, SOLUTION INTRAVENOUS at 17:27

## 2022-05-31 RX ADMIN — CALCIUM GLUCONATE 2 G: 20 INJECTION, SOLUTION INTRAVENOUS at 20:07

## 2022-05-31 RX ADMIN — INSULIN LISPRO 8 UNITS: 100 INJECTION, SOLUTION INTRAVENOUS; SUBCUTANEOUS at 17:27

## 2022-05-31 RX ADMIN — METOPROLOL SUCCINATE 12.5 MG: 25 TABLET, EXTENDED RELEASE ORAL at 10:59

## 2022-05-31 RX ADMIN — PANTOPRAZOLE SODIUM 40 MG: 40 TABLET, DELAYED RELEASE ORAL at 08:06

## 2022-05-31 RX ADMIN — Medication 10 MG: at 21:01

## 2022-06-01 LAB
ALBUMIN SERPL-MCNC: 3.8 G/DL (ref 3.5–5.2)
ALBUMIN/GLOB SERPL: 2.2 G/DL
ALP SERPL-CCNC: 104 U/L (ref 39–117)
ALT SERPL W P-5'-P-CCNC: 34 U/L (ref 1–33)
ANION GAP SERPL CALCULATED.3IONS-SCNC: 13 MMOL/L (ref 5–15)
AST SERPL-CCNC: 48 U/L (ref 1–32)
BILIRUB SERPL-MCNC: 1.6 MG/DL (ref 0–1.2)
BUN SERPL-MCNC: 6 MG/DL (ref 8–23)
BUN/CREAT SERPL: 10.9 (ref 7–25)
CALCIUM SPEC-SCNC: 9.8 MG/DL (ref 8.6–10.5)
CHLORIDE SERPL-SCNC: 91 MMOL/L (ref 98–107)
CO2 SERPL-SCNC: 32 MMOL/L (ref 22–29)
CREAT SERPL-MCNC: 0.55 MG/DL (ref 0.57–1)
DEPRECATED RDW RBC AUTO: 57.3 FL (ref 37–54)
EGFRCR SERPLBLD CKD-EPI 2021: 92.2 ML/MIN/1.73
ERYTHROCYTE [DISTWIDTH] IN BLOOD BY AUTOMATED COUNT: 19.3 % (ref 12.3–15.4)
GLOBULIN UR ELPH-MCNC: 1.7 GM/DL
GLUCOSE BLDC GLUCOMTR-MCNC: 144 MG/DL (ref 70–105)
GLUCOSE BLDC GLUCOMTR-MCNC: 168 MG/DL (ref 70–105)
GLUCOSE BLDC GLUCOMTR-MCNC: 236 MG/DL (ref 70–105)
GLUCOSE BLDC GLUCOMTR-MCNC: 340 MG/DL (ref 70–105)
GLUCOSE SERPL-MCNC: 168 MG/DL (ref 65–99)
HCT VFR BLD AUTO: 27.4 % (ref 34–46.6)
HGB BLD-MCNC: 8.6 G/DL (ref 12–15.9)
MAGNESIUM SERPL-MCNC: 1.7 MG/DL (ref 1.6–2.4)
MCH RBC QN AUTO: 26.7 PG (ref 26.6–33)
MCHC RBC AUTO-ENTMCNC: 31.3 G/DL (ref 31.5–35.7)
MCV RBC AUTO: 85.1 FL (ref 79–97)
PHOSPHATE SERPL-MCNC: 3.9 MG/DL (ref 2.5–4.5)
PLATELET # BLD AUTO: 193 10*3/MM3 (ref 140–450)
PMV BLD AUTO: 8.1 FL (ref 6–12)
POTASSIUM SERPL-SCNC: 4.2 MMOL/L (ref 3.5–5.2)
PROT SERPL-MCNC: 5.5 G/DL (ref 6–8.5)
RBC # BLD AUTO: 3.22 10*6/MM3 (ref 3.77–5.28)
SODIUM SERPL-SCNC: 136 MMOL/L (ref 136–145)
WBC NRBC COR # BLD: 3.8 10*3/MM3 (ref 3.4–10.8)

## 2022-06-01 PROCEDURE — 97110 THERAPEUTIC EXERCISES: CPT

## 2022-06-01 PROCEDURE — 85027 COMPLETE CBC AUTOMATED: CPT | Performed by: THORACIC SURGERY (CARDIOTHORACIC VASCULAR SURGERY)

## 2022-06-01 PROCEDURE — 82962 GLUCOSE BLOOD TEST: CPT

## 2022-06-01 PROCEDURE — 97535 SELF CARE MNGMENT TRAINING: CPT

## 2022-06-01 PROCEDURE — 25010000002 ALBUMIN HUMAN 25% PER 50 ML: Performed by: INTERNAL MEDICINE

## 2022-06-01 PROCEDURE — P9047 ALBUMIN (HUMAN), 25%, 50ML: HCPCS | Performed by: INTERNAL MEDICINE

## 2022-06-01 PROCEDURE — 63710000001 INSULIN LISPRO (HUMAN) PER 5 UNITS: Performed by: THORACIC SURGERY (CARDIOTHORACIC VASCULAR SURGERY)

## 2022-06-01 PROCEDURE — 25010000002 MAGNESIUM SULFATE IN D5W 1G/100ML (PREMIX) 1-5 GM/100ML-% SOLUTION: Performed by: INTERNAL MEDICINE

## 2022-06-01 PROCEDURE — 99024 POSTOP FOLLOW-UP VISIT: CPT | Performed by: NURSE PRACTITIONER

## 2022-06-01 PROCEDURE — 83735 ASSAY OF MAGNESIUM: CPT | Performed by: THORACIC SURGERY (CARDIOTHORACIC VASCULAR SURGERY)

## 2022-06-01 PROCEDURE — 84100 ASSAY OF PHOSPHORUS: CPT | Performed by: THORACIC SURGERY (CARDIOTHORACIC VASCULAR SURGERY)

## 2022-06-01 PROCEDURE — 80053 COMPREHEN METABOLIC PANEL: CPT | Performed by: THORACIC SURGERY (CARDIOTHORACIC VASCULAR SURGERY)

## 2022-06-01 PROCEDURE — 97116 GAIT TRAINING THERAPY: CPT

## 2022-06-01 PROCEDURE — 97530 THERAPEUTIC ACTIVITIES: CPT

## 2022-06-01 PROCEDURE — 99232 SBSQ HOSP IP/OBS MODERATE 35: CPT | Performed by: INTERNAL MEDICINE

## 2022-06-01 PROCEDURE — 25010000002 ENOXAPARIN PER 10 MG: Performed by: THORACIC SURGERY (CARDIOTHORACIC VASCULAR SURGERY)

## 2022-06-01 RX ORDER — HYDROXYZINE HYDROCHLORIDE 25 MG/1
25 TABLET, FILM COATED ORAL NIGHTLY
Status: DISCONTINUED | OUTPATIENT
Start: 2022-06-01 | End: 2022-06-01

## 2022-06-01 RX ORDER — OLANZAPINE 5 MG/1
2.5 TABLET ORAL NIGHTLY
Status: DISCONTINUED | OUTPATIENT
Start: 2022-06-02 | End: 2022-06-03 | Stop reason: HOSPADM

## 2022-06-01 RX ORDER — MAGNESIUM SULFATE 1 G/100ML
1 INJECTION INTRAVENOUS EVERY 12 HOURS
Status: COMPLETED | OUTPATIENT
Start: 2022-06-01 | End: 2022-06-01

## 2022-06-01 RX ORDER — CLONAZEPAM 0.5 MG/1
0.5 TABLET ORAL NIGHTLY PRN
Status: DISCONTINUED | OUTPATIENT
Start: 2022-06-01 | End: 2022-06-01

## 2022-06-01 RX ORDER — ACETAMINOPHEN 325 MG/1
650 TABLET ORAL EVERY 6 HOURS PRN
Status: DISCONTINUED | OUTPATIENT
Start: 2022-06-01 | End: 2022-06-03 | Stop reason: HOSPADM

## 2022-06-01 RX ORDER — CLONAZEPAM 0.5 MG/1
0.25 TABLET ORAL NIGHTLY PRN
Status: DISCONTINUED | OUTPATIENT
Start: 2022-06-01 | End: 2022-06-03 | Stop reason: HOSPADM

## 2022-06-01 RX ADMIN — SENNOSIDES AND DOCUSATE SODIUM 2 TABLET: 50; 8.6 TABLET ORAL at 20:05

## 2022-06-01 RX ADMIN — POTASSIUM CHLORIDE 20 MEQ: 1.5 POWDER, FOR SOLUTION ORAL at 20:05

## 2022-06-01 RX ADMIN — ASPIRIN 81 MG: 81 TABLET, COATED ORAL at 08:08

## 2022-06-01 RX ADMIN — BUMETANIDE 1 MG: 1 TABLET ORAL at 08:08

## 2022-06-01 RX ADMIN — HYDROXYZINE HYDROCHLORIDE 25 MG: 25 TABLET, FILM COATED ORAL at 20:05

## 2022-06-01 RX ADMIN — ENOXAPARIN SODIUM 40 MG: 100 INJECTION SUBCUTANEOUS at 15:43

## 2022-06-01 RX ADMIN — CLONAZEPAM 0.25 MG: 0.5 TABLET ORAL at 22:08

## 2022-06-01 RX ADMIN — CLONAZEPAM 0.5 MG: 0.5 TABLET ORAL at 08:08

## 2022-06-01 RX ADMIN — INSULIN LISPRO 16 UNITS: 100 INJECTION, SOLUTION INTRAVENOUS; SUBCUTANEOUS at 18:01

## 2022-06-01 RX ADMIN — MAGNESIUM SULFATE 1 G: 1 INJECTION INTRAVENOUS at 11:41

## 2022-06-01 RX ADMIN — BUMETANIDE 1 MG: 1 TABLET ORAL at 20:05

## 2022-06-01 RX ADMIN — INSULIN LISPRO 8 UNITS: 100 INJECTION, SOLUTION INTRAVENOUS; SUBCUTANEOUS at 11:52

## 2022-06-01 RX ADMIN — OLANZAPINE 2.5 MG: 5 TABLET, FILM COATED ORAL at 23:47

## 2022-06-01 RX ADMIN — POTASSIUM CHLORIDE 20 MEQ: 1.5 POWDER, FOR SOLUTION ORAL at 08:08

## 2022-06-01 RX ADMIN — PANTOPRAZOLE SODIUM 40 MG: 40 TABLET, DELAYED RELEASE ORAL at 08:08

## 2022-06-01 RX ADMIN — METOPROLOL SUCCINATE 12.5 MG: 25 TABLET, EXTENDED RELEASE ORAL at 08:07

## 2022-06-01 RX ADMIN — MAGNESIUM SULFATE 1 G: 1 INJECTION INTRAVENOUS at 20:05

## 2022-06-01 RX ADMIN — Medication 10 MG: at 22:07

## 2022-06-01 RX ADMIN — ALBUMIN (HUMAN) 25 G: 0.25 INJECTION, SOLUTION INTRAVENOUS at 01:57

## 2022-06-01 RX ADMIN — POTASSIUM CHLORIDE 20 MEQ: 1.5 POWDER, FOR SOLUTION ORAL at 15:47

## 2022-06-01 RX ADMIN — ACETAMINOPHEN 650 MG: 325 TABLET ORAL at 23:47

## 2022-06-01 NOTE — PROGRESS NOTES
S/P POD# 13 urgent CABG x2 with LIMA/ tissue AVR/ MV repair/ Maze procedure with CRAIG ligation--Montseni  EF 45-50% (echo) preop    Subjective:  Drowsy after clonazepam this morning.  Nsg reports she walked out to nursing desk today.     Sitter last evening d/t confusion  Wt is up 9 kgs from preop        Intake/Output Summary (Last 24 hours) at 6/1/2022 0835  Last data filed at 6/1/2022 0457  Gross per 24 hour   Intake 1472 ml   Output 3350 ml   Net -1878 ml     Temp:  [97.6 °F (36.4 °C)-98.6 °F (37 °C)] 98.5 °F (36.9 °C)  Heart Rate:  [59-89] 59  Resp:  [15-21] 16  BP: (103-147)/(41-75) 114/75        Results from last 7 days   Lab Units 06/01/22  0653 05/31/22  0453   WBC 10*3/mm3 3.80 8.20   HEMOGLOBIN g/dL 8.6* 9.4*   HEMATOCRIT % 27.4* 29.3*   PLATELETS 10*3/mm3 193 245     Results from last 7 days   Lab Units 06/01/22  0653   CREATININE mg/dL 0.55*   POTASSIUM mmol/L 4.2   SODIUM mmol/L 136   MAGNESIUM mg/dL 1.7   PHOSPHORUS mg/dL 3.9       Physical Exam:  Neuro intact, NAD, asleep in bed, sitter at bedside  Tele:  afib 70s  Equal breath sounds, coarse, 96% 2L  Sternotomy/SVHS healing well, skin tear with Mepilex on left shin  Benign abd, + BM  + BLE pitting edema     Assessment/Plan:  Active Problems:    Age-related osteoporosis without current pathological fracture    Angina pectoris (HCC)    CAD (coronary artery disease)    Dyspnea on exertion    Hyperlipidemia    Hypertension    Diabetes mellitus with coincident hypertension (HCC)    Vitamin D deficiency    Cholelithiasis and acute cholecystitis with obstruction    Family hx osteoporosis    Ulcerative colitis (HCC)    Acute on chronic heart failure with preserved ejection fraction (HCC)    Acute congestive heart failure, unspecified heart failure type (HCC)    Mitral valve insufficiency    Nonrheumatic aortic valve insufficiency    Hypomagnesemia    - Severe AI, moderate MR, CAD, EF 45-50% (echo)--s/p tissue AVR, MV repair, CABG x2 with LIMA, Maze procedure  with CRAIG ligation (Pagni)  - New onset atrial fib, preop--s/p maze procedure/CRAIG ligation  - Acute on chronic HFrEF, NYHA class III/IV--optimize meds prior to dc  - HTN--stable  - HLD--statin after LFTs normalize  - T2DM--preop a1c 7, SSI  - Ulcerative colitis--on Endocort/Bentyl/Colestipol preop  - Postop ABLA, expected--transfused 5/19, 5/22  - Postop TCP, expected--r/t consumption, improving  - Postop elevated transaminitis--likely d/t shocky liver, improving  - Postop RV dysfunction--milrinone iv/inhaled stopped  - Frailty--likely rehab at dc  - Renal failure, anuria--renal consulted, resolved  - Metabolic acidosis--IV bicarb, resolved  - Postop pneumonia, Klebsiella oxytoca--on Rocephin  - Right pleural effusion--CT placement 5/23, 1.6L removed, CT removal 5/29  - Postop confusion, multifactorial--resolving    POD# 13.  More confused last night requiring sitter.  She will need to be sitter free for 24 hours before transfer.  On asa/bb/statin on hold d/t elevated LFTs (altho improved).  Renal managing diuretics/albumin.  Mobilize.   Ceftriaxone for Klebsiella pna completed.  Replete e-.    Routine care--as above  D/w pt/nsg, Dr. Ornelas  Anticipate [facility] re[facility] at discharge--her  is there currently.  Nash lives at their home to assist as well.    Clover Henriquez-Samuel, APRN  6/1/2022  08:35 EDT

## 2022-06-01 NOTE — THERAPY TREATMENT NOTE
"Subjective: Pt agreeable to therapeutic plan of care.    Objective:     Bed mobility - N/A or Not attempted.  Transfers - Min-A and Mod-A  Ambulation - 30 feetx2 Min-A, Assist x 2 and with rolling walker, cues for upright posture in standing.     Vitals: WNL   2L at rest, ambulates on 3L with sats mid 90s with activity.   HR 80s with activity.    Cardiac Rehab Initiated  Level 3: AROM Exercises. Ambulation with any level of assistance up to 150 feet.     Sitting tolerance: >10min  Standing tolerance: 1-5min    Precautions:   Mid-sternal incision; avoid scapular retraction and depression.   Cardiovascular impairment post-sx; encourage energy conservation strategies.    Therapeutic Exercises: 15-20 reps BLE seated exercises.     MET level equivalent: 1.4-2.0 (Self care ADLs in sitting / slow ambulation in room, light intensity activities)      Pain: 3 VAS  Education: Provided education on importance of mobility and skilled verbal / tactile cueing throughout intervention.     Assessment: Mindi Quinteros presents with functional mobility impairments which indicate the need for skilled intervention. Pt is making daily progress with improved activity tolerance this date and motivation to participate with therapy. Tolerating session today without incident. Will continue to follow and progress as tolerated.     Plan/Recommendations:   Moderate Intensity Therapy recommended post-acute care. This is recommended as therapy feels the patient would require 3-4 days per week and wouldn't tolerate \"3 hour daily\" rehab intensity. SNF would be the preferred choice. If the patient does not agree to SNF, arrange HH or OP depending on home bound status. If patient is medically complex, consider LTACH.. Pt requires no DME at discharge.     Pt desires Skilled Rehab placement at discharge. Pt cooperative; agreeable to therapeutic recommendations and plan of care.         Basic Mobility 6-click:  Rollin = Total, A lot = 2, A little " = 3; 4 = None  Supine>Sit:   1 = Total, A lot = 2, A little = 3; 4 = None   Sit>Stand with arms:  1 = Total, A lot = 2, A little = 3; 4 = None  Bed>Chair:   1 = Total, A lot = 2, A little = 3; 4 = None  Ambulate in room:  1 = Total, A lot = 2, A little = 3; 4 = None  3-5 Steps with railin = Total, A lot = 2, A little = 3; 4 = None  Score: 11    Modified Azael: N/A = No pre-op stroke/TIA    Post-Tx Position: Up in Chair, Staff Present, Alarms activated and Call light and personal items within reach  PPE: gloves, surgical mask, eyewear protection

## 2022-06-01 NOTE — PROGRESS NOTES
Cardiology Progress Note    Patient Identification:  Name: Mindi Quinteros  Age: 81 y.o.  Sex: female  :  1940  MRN: [id number]                 Follow Up / Chief Complaint: New onset afib, Acute on Chronic diastolic heart failure  Chief Complaint   Patient presents with   • Shortness of Breath       Interval History:  Patient presented with worsening shortness of breath and edema.   Patient was in new onset heart failure on admission. Echocardiogram showed borderline EF, diastolic dysfunction, Severe MR,  Mod-severe AR.  Patient underwent cardiac cath and BEST    2022 patient underwent valve surgery with aortic valve replacement and mitral valve repair and two-vessel CABG and maze procedure.  2022 cardioverted to normal sinus rhythm     NP NOTE:  Patient seen, sitter at bedside, she was confused getting out of bed overnight.  On my encounter she is sleeping in the chair.  Afib with slow ventricular rate  high 50s- monitor closely since starting beta blocker yesterday. She remains with lower extremity edema, nephrology following on PO bumex.     Electronically signed by DAKOTA Brody, 22, 11:44 AM EDT.    Cardiology attending addendum :    I have personally performed a face-to-face diagnostic evaluation, physical exam and reviewed data on this patient.  I have reviewed documentation done by me and nurse practitioner Brittany Hutson and corrected as needed.  And agree with the different components of documentation.Greater than 50% of the time spent in the care of this patient was provided by attending consultant/me.         Subjective: Patient seen and examined; chart and labs reviewed; discussed with bedside nurse.  Patient is intermittently confused.  She is in atrial fibrillation.    Objective: troponin negative; pro BNP ,   2022: glucose elevated, potassium 3.0 hgb 10.5   2022: magnesium 1.5   2022: Glucose elevated; AST 35, INR 1.27, CBC unremarkable. Mag 1.5   2022:  Hemoglobin 8.4  5/20/2022: Hemoglobin is 9.4  5/23/2022: glucose elevated; bun 46 creatinine 1.19 liver enzymes elevated; hgb 9.7  5/24/2022: Sodium is 127, creatinine 1.26, elevated ALT and AST at 196 and 214, total bilirubin 1.6, hemoglobin 9.  5/27/2022: Glucose 201, ALT 83, AST 62, bilirubin 1.4, hemoglobin 10.2  5/31/2022: Glucose 49, ALT 40 AST 50, potassium 3.3 magnesium 1.8, hgb 9.4   6/1/2022: glucose 168, potassium 4.2, bun 6 creatinine 0.55, ALT 34, AST 48 hgb 8.6      History of present illness:    Ms. Mindi Quinteros  has PMH of        # CAD, cardiac cath 2/7/18  calcified 50% proximal 70% mid LAD and 70% mid LCX, normal LVEF  #. Valvular heart disease  #  diabetes  #  hypertension, hyperlipidemia  #  ulcerative colitis  #  allergy to aspertane  #  hemorrhoidectomy, hysterectomy, bladder repair, left ankle surgery,      Presented through emergency room 5/15/2022 with complaint of nocturnal dyspnea 2 days prior to admission with cough which is resolved but has increasing pedal edema, has chronic diarrhea secondary to ulcerative colitis and fatigue.  Work-up here revealed elevated proBNP of 2099 and glucose 158.  Chest x-ray showing pulmonary edema and small bilateral pleural effusions and new onset A. Fib.  Echocardiogram 5/15/2022 reveals LV dysfunction EF of 46 to 50% with severe eccentric MR and diastolic dysfunction         ASSESSMENT:     #Shortness of breath  #New onset atrial fibrillation  #Acute   HFrEF due to diastolic dysfunction from A. fib with RVR  And systolic dysfunction from possibly valvular heart disease and ischemic cardiomyopathy  #Mitral regurgitation  #Aortic regurgitation  #Cardiomyopathy, possibly due to MR, CAD  #CABG x2 with LIMA to LAD, SVG to lateral marginal, AVR with #21 magna pericardial prosthesis, mitral valve repair with #26 physio ll ring annuloplasty, Maze procedure, CRAIG ligation  #Loss of balance/ataxia/B12 deficiency  #Impaired memory/difficulty finding words  #  CAD  #   hyperlipidemia  #  diabetes   # hypertension      PLAN:    Telemetry is revealing atrial fibrillation with controlled ventricular response    Patient has high [id number] score due to female gender, age over 75, hypertension and diabetes making it 5, will benefit from long-term anticoagulation.  We will start after recovery.    Patient underwent cardiac cath 5/17/2022 which revealed severe two-vessel disease  Patient underwent BEST which revealed severe AR.    Patient underwent two-vessel CABG, maze, aortic valve replacement and mitral valve repair by  5/19/2022  Routine post surgery care  Monitor rhythm, patient is in A. fib with controlled ventricular response  We will continue low-dose aspirin and beta-blockers and statins as tolerated  Monitor hemoglobin  Patient has ankle edema we will continue Bumex per nephrology as tolerated  Monitor renal function urine output electrolytes and replete as needed.  Patient is on ceftriaxone for Klebsiella pneumonia  Patient is having intermittent confusion has a sitter.  Discussed with RN taking care of patient      Past Medical History:  Past Medical History:   Diagnosis Date   • Acute congestive heart failure, unspecified heart failure type (HCC) 5/15/2022   • Age-related osteoporosis without current pathological fracture     prolia([id number], 9/12/2019), restarted prolia(11/3/20)   • Allergic    • Anxiety    • Arthritis    • CAD (coronary artery disease)    • Depression    • Diabetes (HCC)    • Elevated cholesterol    • HTN (hypertension)    • Hyperlipemia    • Injury of back    • Sinusitis      Past Surgical History:  Past Surgical History:   Procedure Laterality Date   • ANKLE ARTHROSCOPY     • AORTIC VALVE REPAIR/REPLACEMENT MITRAL VALVE REPAIR/REPLACEMENT N/A 5/19/2022    Procedure: AORTIC VALVE REPAIR/REPLACEMENT MITRAL VALVE REPAIR/REPLACEMENT;  Surgeon: Lane Okeefe MD;  Location: [facility];  Service: Cardiothoracic;  Laterality: N/A;  Mitral Valve  repair with 26mm Physio II ring. Aortic   Valve Replacement with 21mm Magna Ease valve.   • BELPHAROPTOSIS REPAIR     • CARDIAC CATHETERIZATION     • CARDIAC CATHETERIZATION N/A 5/17/2022    Procedure: Left Heart Cath, possible pci;  Surgeon: Natan Terrazas MD;  Location: [facility] CATH INVASIVE LOCATION;  Service: Cardiovascular;  Laterality: N/A;   • CATARACT EXTRACTION     • CHOLECYSTECTOMY N/A 10/14/2019    Procedure: Laparoscopic cholecystectomy, possible open;  Surgeon: Vijay Guerra DO;  Location: [facility] MAIN OR;  Service: General   • COLONOSCOPY     • CORONARY ARTERY BYPASS GRAFT N/A 5/19/2022    Procedure: CORONARY ARTERY BYPASS GRAFTING;  Surgeon: Lane Okeefe MD;  Location: [facility] CVOR;  Service: Cardiothoracic;  Laterality: N/A;  CABG X 2 (1 Vein graft, 1 LIMA graft)   • COSMETIC SURGERY     • ENDOSCOPY     • HEMORROIDECTOMY     • HYSTERECTOMY     • MAZE PROCEDURE N/A 5/19/2022    Procedure: MAZE PROCEDURE;  Surgeon: Lane Okeefe MD;  Location: [facility];  Service: Cardiothoracic;  Laterality: N/A;        Social History:   Social History     Tobacco Use   • Smoking status: Never Smoker   • Smokeless tobacco: Never Used   Substance Use Topics   • Alcohol use: No      Family History:  Family History   Problem Relation Age of Onset   • Diabetes Mother    • Heart disease Father    • Heart disease Brother    • Diabetes Brother    • Osteoporosis Paternal Grandmother           Allergies:  Allergies   Allergen Reactions   • Asacol [Mesalamine] Swelling   • Diclofenac Swelling     Gums swell only per patient     Scheduled Meds:  aspirin, 81 mg, Daily  Barium Sulfate, 1 teaspoon(s), Once in imaging  bumetanide, 1 mg, BID  enoxaparin, 40 mg, Q24H  hydrOXYzine, 25 mg, Nightly  insulin lispro, 0-24 Units, Q6H  magnesium sulfate, 1 g, Q12H  melatonin, 10 mg, Nightly  metoprolol succinate XL, 12.5 mg, Q24H  pantoprazole, 40 mg, QAM AC  potassium chloride, 20 mEq, TID  senna-docusate sodium, 2  "tablet, Nightly          Review of Systems:   Review of Systems   Constitutional: Negative for chills and fever.   Cardiovascular: Negative for chest pain and palpitations.   Respiratory: Negative for cough and hemoptysis.    Gastrointestinal: Negative for nausea and vomiting.           Constitutional:  Temp:  [98.5 °F (36.9 °C)-98.6 °F (37 °C)] 98.5 °F (36.9 °C)  Heart Rate:  [59-91] 85  Resp:  [16-21] 16  BP: ()/(45-76) 117/48    Physical Exam   /48   Pulse 85   Temp 98.5 °F (36.9 °C) (Oral)   Resp 16   Ht 157.5 cm (62\")   Wt 60.1 kg (132 lb 7.9 oz)   SpO2 98%   BMI 24.23 kg/m²   General: Alert and awake  Eyes: Sclera is anicteric,  conjunctiva is clear   HEENT:  No JVD. Thyroid not visibly enlarged. No mucosal pallor or cyanosis  Respiratory: Nonlabored breathing.  Clear to auscultation  Cardiovascular: S1,S2 irregular rate and rhythm.  2/6 holosystolic murmur, no rub or gallop auscultated.    Gastrointestinal: Abdomen nondistended, Castañeda catheter present  Musculoskeletal:  No abnormal movements  Extremities: 1+ ankle edema  Skin: Color pink. Skin warm and dry to touch. No rashes  No xanthoma  Neuro: Alert and awake, intermittently confused    INTAKE AND OUTPUT:    Intake/Output Summary (Last 24 hours) at 6/1/2022 1800  Last data filed at 6/1/2022 0457  Gross per 24 hour   Intake 1276 ml   Output 2950 ml   Net -1674 ml       Cardiographics  Telemetry: afib     ECG:   ECG 12 Lead   Final Result   HEART RATE= 53  bpm   RR Interval= 1123  ms   LA Interval= 294  ms   P Horizontal Axis= 5  deg   P Front Axis= 145  deg   QRSD Interval= 98  ms   QT Interval= 428  ms   QRS Axis= -43  deg   T Wave Axis= 155  deg   - ABNORMAL ECG -   Underlying rhythm is atrial flutter with diffuse nonspecific ST-T wave    changes and suggestive of old inferior wall and anterior wall MI with low    voltage complexes and left axis deviation.  Ventricular rate reduced    compared to previous EKG.   Electronically Signed " By: Eduardo Brewster ([facility]) 28-May-2022 16:49:51   Date and Time of Study: 2022-05-28 10:53:40      ECG 12 Lead   Final Result   HEART RATE= 85  bpm   RR Interval= 703  ms   NY Interval=   ms   P Horizontal Axis=   deg   P Front Axis=   deg   QRSD Interval= 104  ms   QT Interval= 400  ms   QRS Axis= -51  deg   T Wave Axis= 145  deg   - ABNORMAL ECG -   Sinus rhythm with marked first-degree AV block, PVCs.   RBBB and LAFB   Incomplete left bundle branch block   Inferior infarct, old   Anterior infarct, old   Nonspecific T abnormalities, lateral leads   When compared with ECG of 24-May-2022 10:57:02,   Significant change in rhythm: previously junctional   Electronically Signed By: Dorina Hoffmann ([facility]) 26-May-2022 12:18:49   Date and Time of Study: 2022-05-25 05:58:09      ECG 12 Lead   Final Result   HEART RATE= 88  bpm   RR Interval= 680  ms   NY Interval=   ms   P Horizontal Axis=   deg   P Front Axis=   deg   QRSD Interval= 101  ms   QT Interval= 396  ms   QRS Axis= -48  deg   T Wave Axis= 138  deg   - ABNORMAL ECG -   Accelerated junctional rhythm   Inferior infarct, old   When compared with ECG of 22-May-2022 10:30:47,   Significant change in rhythm   Electronically Signed By: Kandy Jara ([facility]) 25-May-2022 21:46:33   Date and Time of Study: 2022-05-24 10:57:02      ECG 12 Lead   Final Result   HEART RATE= 69  bpm   RR Interval= 870  ms   NY Interval= 181  ms   P Horizontal Axis=   deg   P Front Axis= 0  deg   QRSD Interval= 106  ms   QT Interval= 482  ms   QRS Axis= -54  deg   T Wave Axis= 105  deg   - ABNORMAL ECG -   atrial fibrillation   Supraventricular bigeminy   RBBB and LAFB   Incomplete left bundle branch block   Inferior infarct, old   Nonspecific T abnormalities, lateral leads   Prolonged QT interval   When compared with ECG of 21-May-2022 3:56:47,   Significant change in rhythm: previously atrial fibrillation   Significant repolarization change   Electronically Signed By: Venancio Rodas ([facility])  22-May-2022 18:50:00   Date and Time of Study: 2022-05-22 10:30:47      ECG 12 Lead   Final Result   HEART RATE= 83  bpm   RR Interval= 722  ms   [place] Interval=   ms   P Horizontal Axis=   deg   P Front Axis=   deg   QRSD Interval= 108  ms   QT Interval= 406  ms   QRS Axis= -58  deg   T Wave Axis= 114  deg   - ABNORMAL ECG -   Atrial fibrillation   RBBB and LAFB   Incomplete left bundle branch block   Inferior infarct, old   Consider anterior infarct   Nonspecific T abnormalities, lateral leads   When compared with ECG of 20-May-2022 12:16:46,   Significant repolarization change   Significant axis, voltage or hypertrophy change   Electronically Signed By: Venancio Rodas ([facility]) 21-May-2022 07:08:00   Date and Time of Study: 2022-05-21 03:56:47      ECG 12 Lead   Final Result   HEART RATE= 78  bpm   RR Interval= 770  ms   WV Interval=   ms   P Horizontal Axis=   deg   P Front Axis=   deg   QRSD Interval= 108  ms   QT Interval= 512  ms   QRS Axis= -57  deg   T Wave Axis= 246  deg   - ABNORMAL ECG -   Atrial fibrillation   RBBB and LAFB   Incomplete left bundle branch block   Low voltage, precordial leads   Nonspecific repol abnormality, diffuse leads   Prolonged QT interval   When compared with ECG of 20-May-2022 2:38:15,   Significant repolarization change   Significant axis, voltage or hypertrophy change   Electronically Signed By: Venancio Rodas ([facility]) 20-May-2022 16:06:17   Date and Time of Study: 2022-05-20 12:16:46      ECG 12 Lead   Final Result   HEART RATE= 71  bpm   RR Interval= 840  ms   WV Interval=   ms   P Horizontal Axis=   deg   P Front Axis=   deg   QRSD Interval= 119  ms   QT Interval= 389  ms   QRS Axis= -57  deg   T Wave Axis= 190  deg   - ABNORMAL ECG -   Atrial fibrillation   Ventricular premature complex   RBBB and LAFB   Incomplete left bundle branch block   Probable inferior infarct, age indeterminate   Nonspecific T abnormalities, lateral leads   When compared with ECG of 19-May-2022 17:14:47,    Significant axis, voltage or hypertrophy change   Electronically Signed By: Venancio Rodas ([facility]) 20-May-2022 16:06:40   Date and Time of Study: 2022-05-20 02:38:15      ECG 12 Lead   Final Result   HEART RATE= 85  bpm   RR Interval= 707  ms   CT Interval=   ms   P Horizontal Axis=   deg   P Front Axis=   deg   QRSD Interval= 111  ms   QT Interval= 405  ms   QRS Axis= -70  deg   T Wave Axis= 127  deg   - ABNORMAL ECG -   Atrial fibrillation   RBBB and LAFB   Incomplete left bundle branch block   Low voltage, precordial leads   When compared with ECG of 19-May-2022 13:55:56,   Significant repolarization change   Significant axis, voltage or hypertrophy change   Electronically Signed By: Rolando Gonzalez ([facility]) 23-May-2022 13:48:26   Date and Time of Study: 2022-05-19 17:14:47      ECG 12 Lead   Final Result   HEART RATE= 88  bpm   RR Interval= 683  ms   CT Interval=   ms   P Horizontal Axis=   deg   P Front Axis=   deg   QRSD Interval= 125  ms   QT Interval= 406  ms   QRS Axis= -60  deg   T Wave Axis= 139  deg   - ABNORMAL ECG -   Atrial fibrillation   RBBB and LAFB   Incomplete left bundle branch block   LVH with secondary repolarization abnormality   When compared with ECG of 18-May-2022 5:14:24,   Significant change in rhythm: previously sinus   Significant repolarization change   Electronically Signed By: Rolando Gonzalez ([facility]) 23-May-2022 13:48:30   Date and Time of Study: 2022-05-19 13:55:56      ECG 12 Lead   Final Result   HEART RATE= 79  bpm   RR Interval= 760  ms   CT Interval= 207  ms   P Horizontal Axis= 182  deg   P Front Axis= 0  deg   QRSD Interval= 112  ms   QT Interval= 480  ms   QRS Axis= -54  deg   T Wave Axis= 218  deg   - ABNORMAL ECG -   Sinus rhythm   Incomplete left bundle branch block   LVH with secondary repolarization abnormality   Inferior infarct, old   Prolonged QT interval   When compared with ECG of 15-May-2022 4:58:24,   Significant change in rhythm: previously atrial fibrillation    Significant repolarization change   Electronically Signed By: Natan Terrazas ([facility]) 19-May-2022 15:12:19   Date and Time of Study: 2022-05-18 05:14:24      ECG 12 Lead   Final Result   HEART RATE= 77  bpm   RR Interval= 777  ms   MS Interval=   ms   P Horizontal Axis=   deg   P Front Axis=   deg   QRSD Interval= 118  ms   QT Interval= 401  ms   QRS Axis= -38  deg   T Wave Axis= 106  deg   - ABNORMAL ECG -   Atrial fibrillation   Incomplete left bundle branch block   LVH with secondary repolarization abnormality   Inferior infarct, old   Anterior infarct, old   When compared with ECG of 15-May-2022 2:51:28,   Significant repolarization change   Electronically Signed By: Vijay Gomez ([facility]) 18-May-2022 11:47:42   Date and Time of Study: 2022-05-15 04:58:24      ECG 12 Lead   Preliminary Result   HEART RATE= 77  bpm   RR Interval= 783  ms   MS Interval=   ms   P Horizontal Axis=   deg   P Front Axis=   deg   QRSD Interval= 117  ms   QT Interval= 515  ms   QRS Axis= -25  deg   T Wave Axis= 68  deg   - ABNORMAL ECG -   Atrial fibrillation   Incomplete left bundle branch block   Inferior infarct, old   Anterior Q waves, possibly due to ILBBB   Prolonged QT interval   No previous ECG available for comparison   Electronically Signed By:    Date and Time of Study: 2022-05-15 02:51:28      SCANNED - TELEMETRY     Final Result         SCANNED - TELEMETRY     Final Result         SCANNED - TELEMETRY     Final Result         SCANNED - TELEMETRY     Final Result         SCANNED - TELEMETRY     Final Result         SCANNED - TELEMETRY     Final Result         SCANNED - TELEMETRY     Final Result         SCANNED - TELEMETRY     Final Result         SCANNED - TELEMETRY     Final Result         SCANNED - TELEMETRY     Final Result         SCANNED - TELEMETRY     Final Result         ECG 12 Lead    (Results Pending)   ECG 12 Lead    (Results Pending)   ECG 12 Lead    (Results Pending)     I have personally reviewed  "EKG    Echocardiogram: Results for orders placed during the hospital encounter of 05/15/22    Adult Transthoracic Echo Limited W/ Cont if Necessary Per Protocol    Interpretation Summary  Normal LV size and contractility EF of 60 to 65%  Mild right Pennchlor enlargement, moderate to severe right atrial enlargement..  Catheter seen in RV.  Normal atrial size  Aortic valve appears structurally normal.  Mitral annular calcification severe seen.  Tricuspid valve appears structurally normal, moderate  regurgitation seen.  Calculated RV systolic pressure of 56 mmHg, consistent with pulmonary hypertension  No pericardial effusion seen.  Proximal aorta appears normal in size.      Lab Review   I have reviewed the labs      Results from last 7 days   Lab Units 06/01/22  0653   MAGNESIUM mg/dL 1.7     Results from last 7 days   Lab Units 06/01/22  0653   SODIUM mmol/L 136   POTASSIUM mmol/L 4.2   BUN mg/dL 6*   CREATININE mg/dL 0.55*   CALCIUM mg/dL 9.8         Results from last 7 days   Lab Units 06/01/22  0653 05/31/22  0453 05/30/22  0507   WBC 10*3/mm3 3.80 8.20 9.10   HEMOGLOBIN g/dL 8.6* 9.4* 10.0*   HEMATOCRIT % 27.4* 29.3* 31.5*   PLATELETS 10*3/mm3 193 245 274           RADIOLOGY:  Imaging Results (Last 24 Hours)     ** No results found for the last 24 hours. **                )6/1/2022  MD DIEGO [employer] AdXpose/Transcription:   \"Dictated utilizing Dragon dictation\".   "

## 2022-06-01 NOTE — PLAN OF CARE
Goal Outcome Evaluation:               Assessment: Mindi Quinteros presents with ADL impairments below baseline abilities which indicate the need for continued skilled intervention while inpatient. Tolerating session today without incident. Pt was wary of increasing her skill in ADL and distance in functional mobility but able to be encouraged and motivated to improve. Pt appears ready for rehab from an OT standpoint. Will continue to follow and progress as tolerated.     Plan/Recommendations:   Pt would benefit from Skilled Rehab placement at discharge from facility.   Pt desires Skilled Rehab placement at discharge. Pt cooperative; agreeable to therapeutic recommendations and plan of care.    Include Z78.9 (Other Specified Conditions Influencing Health Status) As An Associated Diagnosis?: No

## 2022-06-01 NOTE — DISCHARGE SUMMARY
Date of Admission:  5/15/2022  Date of Discharge:  6/3/2022    Discharge Diagnosis:   - Severe AI, moderate MR, CAD, EF 45-50% (echo)--s/p tissue AVR, MV repair, CABG x2 with LIMA, Maze procedure with CRAIG ligation (Pagni)  - New onset atrial fib, preop/postop--s/p maze procedure/CRAIG ligation, Eliquis  - Acute on chronic HFrEF, NYHA class III/IV--diuretics, ace-i  - HTN--stable  - HLD--statin   - T2DM--preop a1c 7, SSI  - Ulcerative colitis--on Endocort/Bentyl/Colestipol preop  - Postop ABLA, expected--transfused 5/19, 5/22  - Postop TCP, expected--r/t consumption, improving  - Postop elevated transaminitis--likely d/t shocky liver, improving  - Postop RV dysfunction--milrinone iv/inhaled stopped  - Frailty--likely rehab at [facility]  - Renal failure, anuria--renal consulted, resolved  - Metabolic acidosis--IV bicarb, resolved  - Postop pneumonia, Klebsiella oxytoca--on Rocephin  - Right pleural effusion--CT placement 5/23, 1.6L removed, CT removal 5/29  - Postop confusion, multifactorial--resolving    Presenting Problem/History of Present Illness:  - Severe AI, moderate MR, CAD   - New onset atrial fib, preop  - Acute on chronic HFrEF, NYHA class III/IV, EF 45-50%  - HTN  - HLD  - T2DM  - Ulcerative colitis  - Frailty    Hospital Course:  Patient is an 81 y.o. female who presented to the ED on 5/15/22 with a chief complaint of shortness of breath and was found to be in acute on chronic heart failure with new onset atrial fibrillation. She was admitted for further evaluation and treatment. Cardiology was consulted and a transthoracic echocardiogram performed on 5/15/22 revealed EF 46-50%, moderate to severe aortic insufficiency, and moderate to severe mitral regurgitation. Patient was seen and evaluated by Cardiac Surgery on 5/16/22 and it was recommended that she undergo cardiac catheterization and BEST then likely surgery. Cardiac cath performed on 5/17/22 revealed severe 2 vessel CAD. BEST performed that same day revealed  severe aortic insufficiency and severe mitral regurgitation. On 5/19/22 she was taken to the Operating Room and under general anesthesia and via median sternotomy underwent CABG x 2, aortic valve replacement with a 21 mm Magna pericardial prosthesis, mitral valve repair with a 26 mm Physio II annuloplasty ring, and right & left cryo-maze with full set of lesions and left atrial appendage endocardial closure by Dr. Okeefe. Patient tolerated the procedure well and was transported to the Cardiovascular Care Unit in stable condition. POD1, patient had RV dysfunction overnight. Dr. Okeefe evaluated limited echo at bedside with RV function slightly better and LV hyperdynamic. Inhaled milrinone discontinued, IV milrinone increased, and epinephrine decreased. Anesthesia was asked to place new juanito in upper extremity and to discontnue femoral line. Patient diuresed well. POD2, patient still requiring significant inotropic support with milrinone and epinephrine. Patient DDD paced at 90 bpm and patient given 1 unit of packed red blood cells. POD3, gradually decreased inotropes. Patient had improved RV function on TTE. POD4, patient had decreased urine output overnight and in the morning for which renal was consulted. ABG showed metabolic acidosis. Speech therapy and nutrition consulted for swallow evaluation and tube feeding recommendations. chest tubes removed. POD5, patient still having minimal urine output, IVF adjusted per renal. Patient on low dose primacor. CVP of 15, patient appeared hypovolemic on labs. POD6, urine output improved, diuresis per renal. [place] discontinued, primacor discontinued, and patient de-escalated. Patient mildly confused, ABG checked. POD7, patient still somewhat confused and required sitter last evening. Low dose beta blocker initiated, central line and arterial line discontinued, patient still had right pleural chest tube. Patient on ceftriaxone for klebsiella pneumonia. POD8, patient still  experiencing mild confusion, conner catheter and AV wires removed, right pleural chest tube remained in place. POD9, patient had some bradycardia so beta blocker was discontinued. POD10, remaining chest tube removed and diuretic increased. POD11, patient stable. POD12, patient back at baseline personality. POD13, patent confused again that night before and required sitter. By POD15, she was ready for transfer to rehab.  Tylenol for pain as needed.  Eliquis for PAF.  Lisinopril 2.5 mg started after d/w Dr. Barroso.    Procedures Performed:  Procedure(s) 5/19/22 by Dr. Okeefe:   1.  CABG x2 with a LIMA to the mid LAD and reverse individual  saphenous vein graft to the lateral margin   2.  Aortic valve replacement with a 21 mm magna pericardial  prosthesis   3.  Mitral valve repair with a 26 mm Physio II ring annuloplasty   4.  Right left cryo-maze procedure with full set of lesions and left  atrial appendage endocardial closure   5.  Endoscopic vein harvest of the right lower extremity    Per Dr. Terrazas 5/17/2022  Heart cath       Consults:   Consults     Date and Time Order Name Status Description    5/23/2022  9:03 AM Inpatient Nephrology Consult Completed     5/20/2022  4:42 PM Inpatient Pulmonology Consult Completed     5/15/2022  5:52 AM Inpatient Cardiology Consult Completed           Pertinent Test Results:    Lab Results   Component Value Date    WBC 3.40 06/03/2022    HGB 11.4 (L) 06/03/2022    HCT 34 (L) 06/03/2022    MCV 83.3 06/03/2022     06/03/2022      Lab Results   Component Value Date    GLUCOSE 124 (H) 06/03/2022    CALCIUM 9.9 06/03/2022     06/03/2022    K 3.9 06/03/2022    CO2 31.0 (H) 06/03/2022    CL 93 (L) 06/03/2022    BUN 11 06/03/2022    CREATININE 0.72 06/03/2022    EGFRIFNONA 91 10/23/2020    BCR 15.3 06/03/2022    ANIONGAP 12.0 06/03/2022     Lab Results   Component Value Date    INR 1.58 (H) 05/20/2022    PROTIME 15.9 (H) 05/20/2022         Condition on Discharge: Stable  for transfer to [place].    Vital Signs  Temp:  [97.5 °F (36.4 °C)-98.4 °F (36.9 °C)] 97.5 °F (36.4 °C)  Heart Rate:  [78-95] 88  Resp:  [16-26] 16  BP: (110-154)/(51-75) 129/63  Body mass index is 23.95 kg/m².    Discharge Disposition  Rehab Facility or Unit (DC - External)    Discharge Medications     Discharge Medications      New Medications      Instructions Start Date   acetaminophen 325 MG tablet  Commonly known as: TYLENOL   650 mg, Oral, Every 6 Hours PRN      apixaban 2.5 MG tablet tablet  Commonly known as: ELIQUIS   2.5 mg, Oral, Every 12 Hours Scheduled      bumetanide 1 MG tablet  Commonly known as: BUMEX   1.5 mg, Oral, Daily   Start Date: June 4, 2022     dextrose 40 % gel  Commonly known as: GLUTOSE   15 g, Oral, Every 15 Minutes PRN      glipizide 5 MG tablet  Commonly known as: GLUCOTROL  Replaces: glimepiride 2 MG tablet   5 mg, Oral, Every Morning Before Breakfast   Start Date: June 4, 2022     insulin glargine 100 UNIT/ML injection  Commonly known as: LANTUS, SEMGLEE   12 Units, Subcutaneous, Daily   Start Date: June 4, 2022     insulin lispro 100 UNIT/ML injection  Commonly known as: ADMELOG   0-24 Units, Subcutaneous, Every 6 Hours Scheduled      insulin lispro 100 UNIT/ML injection  Commonly known as: ADMELOG   0-24 Units, Subcutaneous, As Needed      lisinopril 2.5 MG tablet  Commonly known as: PRINIVIL,ZESTRIL   2.5 mg, Oral, Daily      melatonin 5 MG tablet tablet   10 mg, Oral, Nightly      OLANZapine 2.5 MG tablet  Commonly known as: zyPREXA   2.5 mg, Oral, Nightly      potassium chloride 20 MEQ packet  Commonly known as: KLOR-CON   20 mEq, Oral, 2 Times Daily         Changes to Medications      Instructions Start Date   metoprolol succinate XL 25 MG 24 hr tablet  Commonly known as: TOPROL-XL  What changed:   · how much to take  · when to take this   12.5 mg, Oral, Every 24 Hours Scheduled   Start Date: June 4, 2022        Continue These Medications      Instructions Start Date    aspirin 81 MG EC tablet   81 mg, Oral, Daily      atorvastatin 40 MG tablet  Commonly known as: LIPITOR   40 mg, Oral, Every Night at Bedtime      bisacodyl 5 MG EC tablet  Commonly known as: DULCOLAX   5 mg, Oral, Daily PRN      Budesonide 3 MG 24 hr capsule  Commonly known as: ENTOCORT EC   3 mg, Oral, Every Morning      CALCIUM 500 PO   600 mg, Oral, Daily      colestipol 1 g tablet  Commonly known as: COLESTID   2 g, Oral, Daily      cyanocobalamin 1000 MCG/ML injection   1,000 mcg, Intramuscular, Every 30 Days      dicyclomine 10 MG capsule  Commonly known as: BENTYL   10 mg, Oral, Daily      dicyclomine 10 MG capsule  Commonly known as: BENTYL   20 mg, Oral, Daily PRN, Take 10-20mg      fenofibrate 160 MG tablet   160 mg, Oral, Daily      ferrous sulfate 325 (65 FE) MG tablet   325 mg, Oral, 2 Times Weekly      metFORMIN  MG 24 hr tablet  Commonly known as: GLUCOPHAGE-XR   2,000 mg, Oral, Daily With Breakfast, 4 pills a day      multivitamin with minerals tablet tablet   1 tablet, Oral, Daily      psyllium 58.6 % packet  Commonly known as: METAMUCIL   1 packet, Oral, Daily      Vitamin D3 50 MCG (2000 UT) capsule   4,000 Units, Oral, Every Evening         Stop These Medications    glimepiride 2 MG tablet  Commonly known as: AMARYL  Replaced by: glipizide 5 MG tablet     isosorbide mononitrate 60 MG 24 hr tablet  Commonly known as: IMDUR     nitroglycerin 0.4 MG SL tablet  Commonly known as: Nitrostat            Discharge Diet:   Diet Instructions     Diet: Cardiac      Discharge Diet: Cardiac      Heart healthy     Activity at Discharge:   Activity Instructions     Activity as Tolerated      Bathing Restrictions      No tub baths, hot tubs/jacuzzi tubs, swimming pools, rivers, lakes, oceans for 6 weeks    Type of Restriction: Bathing    Bathing Restrictions: No Tub Bath    Driving Restrictions      Do not resume driving while taking narcotic pain medication    Type of Restriction: Driving    Driving  Restrictions: No Driving (Time Limited)    Length: 2 Weeks    Lifting Restrictions      Type of Restriction: Lifting    Lifting Restrictions: Lifting Restriction (Indicate Limit)    Weight Limit (Pounds): 10    Length of Lifting Restriction: 6 weeks         1. No driving until seen in office and off narcotic pain medications.  2. Shower daily. Clean incisions with warm water and antibacterial soap only. Do not put any lotion or ointments on incisions.  3. Ambulate for 10 minutes at least 3 times a day.  4. No heavy lifting > 10lbs until seen in office.   5. Take all medications as prescribed.     Follow-up Appointments  Future Appointments   Date Time Provider Department Center   6/16/2022  1:30 PM Clover Beck APRN [facility]   7/14/2022  1:00 PM Clover Beck APRN [facility]   11/7/2022  2:50 PM Natan Terrazas MD [facility]   11/10/2022 10:15 AM Elisabeth Royal PA [facility]     Additional Instructions for the Follow-ups that You Need to Schedule     Discharge Follow-up with PCP   As directed       Currently Documented PCP:    Dayana Tipton MD    PCP Phone Number:    [phone]     Follow Up Details: in one month         Discharge Follow-up with Specialty: Cardiology; 2 Weeks   As directed      Specialty: Cardiology    Follow Up: 2 Weeks    Follow Up Details: Dr. Terrazas call for an appt         Discharge Follow-up with Specified Provider: Cardiac Surgery; 6 Weeks   As directed      To: Cardiac Surgery    Follow Up: 6 Weeks    Follow Up Details: DAKOTA Killian 6/16/2022 at 1:30 pm AND 7/14/2022 at 1 pm         Referral to Cardiac Rehab   As directed      Basic Metabolic Panel    Branden 10, 2022 (Approximate)      Release to patient: Immediate         Call MD With Problems / Concerns    Jul 03, 2022 (Approximate)      Instructions: Call for temp >101 or any surgical wound drainage    Order Comments: Instructions: Call for temp  >101 or any surgical wound drainage                Test Results Pending at Discharge: None     STS information:  Pt was discharged on asa/bb/statin.  Eliquis for atrial fib.  Lisinopril low dose added for HF.         Clover Beck, DAKOTA  06/03/22  13:48 EDT

## 2022-06-01 NOTE — PLAN OF CARE
Problem: Adult Inpatient Plan of Care  Goal: Optimal Comfort and Wellbeing  Outcome: Ongoing, Progressing     Problem: Skin Injury Risk Increased  Goal: Skin Health and Integrity  Outcome: Ongoing, Progressing  Intervention: Optimize Skin Protection  Recent Flowsheet Documentation  Taken 6/1/2022 0600 by Dianna Ross RN  Head of Bed ([facility]) Positioning: HOB at 60 degrees  Taken 6/1/2022 0400 by Dianna Ross RN  Pressure Reduction Techniques:   heels elevated off bed   positioned off wounds   weight shift assistance provided  Head of Bed ([facility]) Positioning: HOB at 30 degrees  Pressure Reduction Devices:   foam padding utilized   heel offloading device utilized   positioning supports utilized   specialty bed utilized  Skin Protection:   adhesive use limited   incontinence pads utilized   pulse oximeter probe site changed   silicone foam dressing in place   skin-to-device areas padded   skin-to-skin areas padded   transparent dressing maintained   tubing/devices free from skin contact  Taken 6/1/2022 0300 by Dianna Ross RN  Head of Bed ([facility]) Positioning: HOB at 30 degrees  Taken 6/1/2022 0200 by Dianna Ross RN  Head of Bed ([facility]) Positioning: HOB at 30 degrees  Taken 6/1/2022 0100 by Dianna Ross RN  Head of Bed ([facility]) Positioning: HOB at 30 degrees  Taken 6/1/2022 0000 by Dianna Ross RN  Pressure Reduction Techniques:   heels elevated off bed   positioned off wounds   pressure points protected   weight shift assistance provided   frequent weight shift encouraged  Head of Bed ([facility]) Positioning: HOB at 30 degrees  Pressure Reduction Devices:   heel offloading device utilized   positioning supports utilized   pressure-redistributing mattress utilized   specialty bed utilized  Skin Protection:   adhesive use limited   tubing/devices free from skin contact   transparent dressing maintained   skin-to-skin areas padded   skin-to-device areas padded   silicone foam dressing in place   pulse  oximeter probe site changed  Taken 5/31/2022 2300 by Dianna Ross RN  Head of Bed ([facility]) Positioning: HOB at 30 degrees  Taken 5/31/2022 2200 by Dianna Ross RN  Head of Bed ([facility]) Positioning: HOB at 60 degrees  Taken 5/31/2022 2100 by Dianna Ross RN  Head of Bed ([facility]) Positioning: HOB at 60 degrees  Taken 5/31/2022 2000 by Dianna Ross RN  Pressure Reduction Techniques:   frequent weight shift encouraged   heels elevated off bed   positioned off wounds   weight shift assistance provided  Head of Bed ([facility]) Positioning: HOB at 30 degrees  Pressure Reduction Devices:   positioning supports utilized   specialty bed utilized  Skin Protection:   adhesive use limited   incontinence pads utilized   pulse oximeter probe site changed   silicone foam dressing in place   skin-to-device areas padded   skin-to-skin areas padded   transparent dressing maintained  Taken 5/31/2022 1900 by Dianna Ross RN  Head of Bed ([facility]) Positioning: HOB at 30 degrees   Goal Outcome Evaluation:

## 2022-06-01 NOTE — CASE MANAGEMENT/SOCIAL WORK
Social Work Assessment  [facility]     Patient Name: Mindi Quinteros  MRN: [id number]  Today's Date: 6/1/2022    Admit Date: 5/15/2022     Discharge Needs Assessment    No documentation.                Discharge Plan     Row Name 06/01/22 1339       Plan    Plan Comments SW received a message from MIGUEL Dalal (RN) to see when patient would be ready for d/c.  Patients daughter was at bedside filling out paperwork and inquiring if patient would be d/c today or tomorrow. NAVYA spoke w/ patients EDVIN Jones and she stated that she would be contacting [facility] to discuss patient’s d/c.            Documentation only - no physical contact with patient or family.    Karie Tate,  [facility]        [phone] office  [phone] fax    [email or web address]

## 2022-06-01 NOTE — THERAPY TREATMENT NOTE
Subjective: Pt agreeable to therapeutic plan of care.  Cognition: oriented to Person, Place, Time and Situation, memory: Impaired short term, arousal/alertness: Alert and Attentive, safety/judgement: fair and awareness of deficits: fair awareness of safety precautions and fair awareness of deficits    Objective: needed max cues to maintain sternal precautions during transfers.    Cardiac Rehab Initiated  Level 3: AROM Exercises. Ambulation with any level of assistance up to 150 feet.     Sitting tolerance: 5-10min and unsupported  Standing tolerance: 1-5min and supported    Precautions:   Mid-sternal incision; avoid scapular retraction and depression.   Cardiovascular impairment post-sx; encourage energy conservation strategies.    Therapeutic Exercises: ankle pumps in sitting w/ legs elevated. BLE edema & shiny skin on calves noted. Mild redness. Vascular skin changes appear chronic in nature.    MET level equivalent: 1.4-2.0 (Self care ADLs in sitting / slow ambulation in room, light intensity activities)    Bed Mobility: N/A or Not attempted. up in chair  Functional Transfers: Min-A, Assist x 2, with rolling walker and utilizing compensatory strategy  Functional Ambulation: CGA, Assist x 2, with adaptive equipment and with rolling walker. Still requiring 2-3L O2 via NC.     Lower Body Dressing: Max-A  ADL Position: supported sitting  ADL Comments: Pt attempted to thread feet into brief but unable. Able to pull up front of brief w/ min (A) for standing balance.     Toileting: Dependent  ADL Position: supported sitting  ADL Comments: Pt still using periwick & has also been having bowel incontinence in coming to stand. Pt is able to report this and therapy donned a brief as she stood to avoid messiness. Pt was continent in standing this date.    Vitals: WNL on 2-3 L O2 via NC.    Pain: 3 VAS  Education: Provided education on importance of mobility and skilled verbal / tactile cueing throughout intervention.      Assessment: Mindi Quinteros presents with ADL impairments below baseline abilities which indicate the need for continued skilled intervention while inpatient. Tolerating session today without incident. Pt was wary of increasing her skill in ADL and distance in functional mobility but able to be encouraged and motivated to improve. Pt appears ready for rehab from an OT standpoint. Will continue to follow and progress as tolerated.     Plan/Recommendations:   Pt would benefit from Skilled Rehab placement at discharge from facility.   Pt desires Skilled Rehab placement at discharge. Pt cooperative; agreeable to therapeutic recommendations and plan of care.     Modified [place]: 4 = Moderately severe disability (Unable to attend to own bodily needs without assistance, and unable to walk unassisted)     Post-Tx Position: Up in Chair, Alarms activated and Call light and personal items within reach  PPE: gloves, surgical mask, eyewear protection

## 2022-06-01 NOTE — PROGRESS NOTES
ICU Daily Progress Note        Age-related osteoporosis without current pathological fracture    Angina pectoris (HCC)    CAD (coronary artery disease)    Dyspnea on exertion    Hyperlipidemia    Hypertension    Diabetes mellitus with coincident hypertension (HCC)    Vitamin D deficiency    Cholelithiasis and acute cholecystitis with obstruction    Family hx osteoporosis    Ulcerative colitis (HCC)    Acute on chronic heart failure with preserved ejection fraction (HCC)    Acute congestive heart failure, unspecified heart failure type (HCC)    Mitral valve insufficiency    Nonrheumatic aortic valve insufficiency    Hypomagnesemia      Assessment & Plan   Acute hypoxic respiratory failure  Acute congestive heart failure  Pulmonary edema   Age-related osteoporosis without current pathological fracture    Angina pectoris (HCC)    CAD (coronary artery disease)    Dyspnea on exertion    Hyperlipidemia    Hypertension    Diabetes mellitus with coincident hypertension (HCC)    Vitamin D deficiency    Cholelithiasis and acute cholecystitis with obstruction    Family hx osteoporosis    Ulcerative colitis (HCC)    Acute on chronic heart failure with preserved ejection fraction (HCC)    Acute congestive heart failure, unspecified heart failure type (HCC)    Mitral valve insufficiency    Nonrheumatic aortic valve insufficiency    Hypomagnesemia   pulm  HTN secondary     PLAN:  Oxygen supplement and titration: Currently on 2 L per nasal cannula  Pt/ot   Tolerating diet   Chest tube removed 5/29/2022    Antibiotics finished  encourage too use IS flutter valve   Bronchodilator  Inhaled corticosteroids  Electrolytes/ glycemic control  DVT and GI prophylaxis.       LOS: 17 days         Vital signs for last 24 hours:  Vitals:    06/01/22 0400 06/01/22 0500 06/01/22 0604 06/01/22 0704   BP: 131/52 124/56 139/74 114/75   BP Location: Right arm Right arm Right arm    Patient Position: Lying Lying Sitting    Pulse: 76 77 87 59   Resp: 16  Patient returned call and was given normal lab results per PCP. Patient verbalized understanding and had no questions.    16 16    Temp:       TempSrc:       SpO2: 99% 100% 100% 100%   Weight:  60.1 kg (132 lb 7.9 oz)     Height:           Intake/Output last 3 shifts:  I/O last 3 completed shifts:  In: 2338 [P.O.:1700; I.V.:638]  Out: 4900 [Urine:4900]  Intake/Output this shift:  No intake/output data recorded.           Radiology  Imaging Results (Last 24 Hours)     Procedure Component Value Units Date/Time    SCANNED - IMAGING [[id number]] Resulted: 05/15/22     Updated: 05/31/22 1405          Labs:  Results from last 7 days   Lab Units 06/01/22  0653   WBC 10*3/mm3 3.80   HEMOGLOBIN g/dL 8.6*   HEMATOCRIT % 27.4*   PLATELETS 10*3/mm3 193     Results from last 7 days   Lab Units 06/01/22  0653   SODIUM mmol/L 136   POTASSIUM mmol/L 4.2   CHLORIDE mmol/L 91*   CO2 mmol/L 32.0*   BUN mg/dL 6*   CREATININE mg/dL 0.55*   CALCIUM mg/dL 9.8   BILIRUBIN mg/dL 1.6*   ALK PHOS U/L 104   ALT (SGPT) U/L 34*   AST (SGOT) U/L 48*   GLUCOSE mg/dL 168*     Results from last 7 days   Lab Units 05/28/22  1110   PH, ARTERIAL pH units 7.594*   PO2 ART mm Hg 129.8*   PCO2, ARTERIAL mm Hg 29.9*   HCO3 ART mmol/L 28.9*     Results from last 7 days   Lab Units 06/01/22  0653 05/31/22  0453 05/30/22  0507   ALBUMIN g/dL 3.80 2.70* 2.80*             Results from last 7 days   Lab Units 06/01/22  0653   MAGNESIUM mg/dL 1.7                   Meds:   SCHEDULE  aspirin, 81 mg, Oral, Daily  Barium Sulfate, 1 teaspoon(s), Oral, Once in imaging  bumetanide, 1 mg, Oral, BID  enoxaparin, 40 mg, Subcutaneous, Q24H  insulin lispro, 0-24 Units, Subcutaneous, Q6H  magnesium sulfate, 1 g, Intravenous, Q12H  melatonin, 10 mg, Oral, Nightly  metoprolol succinate XL, 12.5 mg, Oral, Q24H  pantoprazole, 40 mg, Oral, QAM AC  potassium chloride, 20 mEq, Oral, TID  senna-docusate sodium, 2 tablet, Oral, Nightly      Infusions     PRNs  bisacodyl  •  clonazePAM  •  dextrose  •  dextrose  •  glucagon (human recombinant)  •  insulin lispro **AND** insulin lispro  •  magnesium  hydroxide  •  magnesium sulfate **OR** magnesium sulfate in D5W 1g/100mL (PREMIX)  •  [] Morphine **AND** naloxone  •  ondansetron  •  polyethylene glycol  •  potassium & sodium phosphates **OR** potassium & sodium phosphates  •  potassium chloride **OR** potassium chloride  •  potassium chloride **OR** potassium chloride    Physical Exam:  Physical Exam  General Appearance:  Alert   HEENT:  Normocephalic, without obvious abnormality, Conjunctiva/corneas clear,.   Nares normal, no drainage     Neck:  Supple, symmetrical, trachea midline. No JVD.  Lungs /Chest wall:   Bilateral basal rhonchi, respirations unlabored, symmetrical wall movement.     Heart:  Regular rate and rhythm, S1 S2 normal  Abdomen: Soft, non-tender, no masses, no organomegaly.    Extremities: + edema, no clubbing or cyanosis, positive ecchymosis in the right thigh  ROS  Review of Systems  Constitutional: Negative for chills, fever and positive for malaise/fatigue.   HENT: Negative.    Eyes: Negative.    Cardiovascular: Negative.    Respiratory: Positive for cough and shortness of breath.    Skin: Negative.    Musculoskeletal: Mild pain lower extremities.    Gastrointestinal: Negative.    Genitourinary: Negative.    Neurological: Negative.    Psychiatric/Behavioral: Negative.

## 2022-06-01 NOTE — PLAN OF CARE
Goal Outcome Evaluation:  Plan of Care Reviewed With: patient      Assessment: Mindi Quinteros presents with functional mobility impairments which indicate the need for skilled intervention. Pt is making daily progress with improved activity tolerance this date and motivation to participate with therapy. Tolerating session today without incident. Will continue to follow and progress as tolerated.

## 2022-06-01 NOTE — CASE MANAGEMENT/SOCIAL WORK
Continued Stay Note  [facility]     Patient Name: Mindi Quinteros  MRN: [id number]  Today's Date: 6/1/2022    Admit Date: 5/15/2022     Discharge Plan     Row Name 06/01/22 1314       Plan    Plan DC Plan: [facility] accepted and bed available. No precert Required. PASSR per facility. CABG/MAZE/ AVR/MVR 5/19/22.    Patient/Family in Agreement with Plan yes    Provided Post Acute Provider List? N/A    Provided Post Acute Provider Quality & Resource List? N/A    Plan Comments CM spoke with patient’s nurse and [facility] NP Clover Rivas to obtain clinical updates. Patient has had increase in confusion requiring a bedside sitter. CM spoke with nurse and [facility] NP to inform that patient must be sitter free for 24 hours when patient is safe to discontinue sitter before she can go to [facility]. [facility] NP verbalized understanding.CM will continue to follow for any additional needs that may develop and adjust discharge plan accordingly. DC Barriers: Bedside Sitter              Phone communication or documentation only- no physical contact with patient or family.          Karen Albert RN      Office Phone: [phone]  Office Cell:     [phone]

## 2022-06-01 NOTE — PROGRESS NOTES
"NEPHROLOGY PROGRESS NOTE------[facility]    [facility]  [phone]  Rosalie Barroso MD      Patient Care Team:  Dayana Tipton MD as PCP - General  Dayana Tipton MD as PCP - [facility] Medicine  [facility], Natan West MD as Consulting Physician (Cardiology)  Saira Barroso MD as Consulting Physician (Nephrology)      Provider:  Rosalie Barroso MD  Patient Name: Mindi Quinteros  :  1940    SUBJECTIVE:    F/U MORGAN    No SOB, CP, dysuria. No palpitations.     Medication:  aspirin, 81 mg, Oral, Daily  Barium Sulfate, 1 teaspoon(s), Oral, Once in imaging  bumetanide, 1 mg, Oral, BID  enoxaparin, 40 mg, Subcutaneous, Q24H  insulin lispro, 0-24 Units, Subcutaneous, Q6H  melatonin, 10 mg, Oral, Nightly  metoprolol succinate XL, 12.5 mg, Oral, Q24H  pantoprazole, 40 mg, Oral, QAM AC  potassium chloride, 20 mEq, Oral, TID  senna-docusate sodium, 2 tablet, Oral, Nightly           OBJECTIVE    Vital Sign Min/Max for last 24 hours  Temp  Min: 97.6 °F (36.4 °C)  Max: 98.6 °F (37 °C)   BP  Min: 103/49  Max: 147/67   Pulse  Min: 63  Max: 89   Resp  Min: 15  Max: 21   SpO2  Min: 92 %  Max: 100 %   No data recorded   Weight  Min: 60.1 kg (132 lb 7.9 oz)  Max: 60.1 kg (132 lb 7.9 oz)     Flowsheet Rows    Flowsheet Row First Filed Value   Admission Height 161.3 cm (63.5\") Documented at 05/15/2022 0241   Admission Weight 60.4 kg (133 lb 2.5 oz) Documented at 05/15/2022 0241          No intake/output data recorded.  I/O last 3 completed shifts:  In: 2338 [P.O.:1700; I.V.:638]  Out: 4900 [Urine:4900]    Physical Exam:    General Appearance: alert, appears stated age and cooperative  Head: normocephalic, without obvious abnormality and atraumatic +PERIORAL SORES  Eyes: conjunctivae and sclerae normal and no icterus  Neck: supple +MILD JVD  Lungs: DECREASED BS BIBASILAR  Heart: regular rhythm & normal rate and normal S1, S2 +AKI  Chest Wall: S/P " SURGICAL CHANGES  Abdomen: normal bowel sounds and soft non-tender  Extremities: moves extremities well, +TRACE BILAT LE EDEMA, no cyanosis +DJD  Skin: no bleeding, bruising or rash  Neurologic: Alert, and oriented. No focal deficits    Labs:    WBC WBC   Date Value Ref Range Status   06/01/2022 3.80 3.40 - 10.80 10*3/mm3 Final   05/31/2022 8.20 3.40 - 10.80 10*3/mm3 Final   05/30/2022 9.10 3.40 - 10.80 10*3/mm3 Final      HGB Hemoglobin   Date Value Ref Range Status   06/01/2022 8.6 (L) 12.0 - 15.9 g/dL Final   05/31/2022 9.4 (L) 12.0 - 15.9 g/dL Final   05/30/2022 10.0 (L) 12.0 - 15.9 g/dL Final      HCT Hematocrit   Date Value Ref Range Status   06/01/2022 27.4 (L) 34.0 - 46.6 % Final   05/31/2022 29.3 (L) 34.0 - 46.6 % Final   05/30/2022 31.5 (L) 34.0 - 46.6 % Final      Platlets No results found for: LABPLAT   MCV MCV   Date Value Ref Range Status   06/01/2022 85.1 79.0 - 97.0 fL Final   05/31/2022 84.1 79.0 - 97.0 fL Final   05/30/2022 85.0 79.0 - 97.0 fL Final          Sodium Sodium   Date Value Ref Range Status   05/31/2022 136 136 - 145 mmol/L Final   05/30/2022 137 136 - 145 mmol/L Final      Potassium Potassium   Date Value Ref Range Status   05/31/2022 3.3 (L) 3.5 - 5.2 mmol/L Final   05/30/2022 3.8 3.5 - 5.2 mmol/L Final      Chloride Chloride   Date Value Ref Range Status   05/31/2022 94 (L) 98 - 107 mmol/L Final   05/30/2022 94 (L) 98 - 107 mmol/L Final      CO2 CO2   Date Value Ref Range Status   05/31/2022 35.0 (H) 22.0 - 29.0 mmol/L Final   05/30/2022 31.0 (H) 22.0 - 29.0 mmol/L Final      BUN BUN   Date Value Ref Range Status   05/31/2022 8 8 - 23 mg/dL Final   05/30/2022 9 8 - 23 mg/dL Final      Creatinine Creatinine   Date Value Ref Range Status   05/31/2022 0.37 (L) 0.57 - 1.00 mg/dL Final   05/30/2022 0.46 (L) 0.57 - 1.00 mg/dL Final      Calcium Calcium   Date Value Ref Range Status   05/31/2022 8.4 (L) 8.6 - 10.5 mg/dL Final   05/30/2022 7.9 (L) 8.6 - 10.5 mg/dL Final      PO4 No components  found for: PO4   Albumin Albumin   Date Value Ref Range Status   05/31/2022 2.70 (L) 3.50 - 5.20 g/dL Final   05/30/2022 2.80 (L) 3.50 - 5.20 g/dL Final      Magnesium Magnesium   Date Value Ref Range Status   05/31/2022 1.8 1.6 - 2.4 mg/dL Final   05/30/2022 1.7 1.6 - 2.4 mg/dL Final      Uric Acid No components found for: URIC ACID     Imaging Results (Last 72 Hours)     Procedure Component Value Units Date/Time    SCANNED - IMAGING [[id number]] Resulted: 05/15/22     Updated: 05/31/22 1405    XR Chest 1 View [[id number]] Collected: 05/29/22 1819     Updated: 05/29/22 1822    Narrative:      DATE OF EXAM:  5/29/2022 6:16 PM     PROCEDURE:  XR CHEST 1 VW-     INDICATIONS:  right chest tube discontinued 12pm,  follow up effusion, assess for ptx;  I50.9-Heart failure, unspecified; I50.33-Acute on chronic diastolic  (congestive) heart failure; Z82.62-Family history of osteoporosis;  K80.01-Calculus of gallbladder with acute cholecystitis with  obstruction; E55.9-Vitamin D deficiency, unspecified; E11.9-Type 2  diabetes mellitus without complications; I10-Essential (primary     COMPARISON:  Same day     TECHNIQUE:   Single radiographic AP view of the chest was obtained.     FINDINGS:  Right pleural catheter has been removed. There is no pneumothorax. There  is no significant change in moderate bilateral pleural effusions. There  is persistent prominence of the central pulmonary vasculature with  indistinct peripheral vasculature. No new abnormality is demonstrated        Impression:         1. Status post right pleural catheter removal, no pneumothorax  2. Stable moderate bilateral pleural effusions  3. Persistent pulmonary vascular congestion     Electronically Signed By-Jaime Jacobson On:5/29/2022 6:20 PM  This report was finalized on [id number] by  Jaime Jacobson, .    XR Chest 1 View [[id number]] Collected: 05/29/22 1003     Updated: 05/29/22 1007    Narrative:      DATE OF EXAM:  5/29/2022 3:17 AM      PROCEDURE:  XR CHEST 1 VW-     INDICATIONS:  chest tube clamped, re-eval effusion; I50.9-Heart failure, unspecified;  I50.33-Acute on chronic diastolic (congestive) heart failure;  Z82.62-Family history of osteoporosis; K80.01-Calculus of gallbladder  with acute cholecystitis with obstruction; E55.9-Vitamin D deficiency,  unspecified; E11.9-Type 2 diabetes mellitus without complications;  I10-Essential (primary) hypertension; I10-Essential (prim     COMPARISON:  No Comparisons Available     TECHNIQUE:   Single radiographic AP view of the chest was obtained.     FINDINGS:  Status post coronary bypass and aortic and mitral valve repairs. Right  pleural catheter remains in place. Heart size stable. Increased  prominence of the central pulmonary vasculature and indistinctness of  the peripheral pulmonary vasculature. Persistent moderate bilateral  pleural effusions        Impression:         1. Worsening pulmonary vascular congestion  2. Grossly stable moderate bilateral pleural effusions     Electronically Signed By-Jaime Jacobson On:5/29/2022 10:05 AM  This report was finalized on [id number] by  Jaime Jacobson, .          Results for orders placed during the hospital encounter of 05/15/22    SCANNED - IMAGING      XR Chest 1 View    Narrative  DATE OF EXAM:  5/29/2022 6:16 PM    PROCEDURE:  XR CHEST 1 VW-    INDICATIONS:  right chest tube discontinued 12pm,  follow up effusion, assess for ptx;  I50.9-Heart failure, unspecified; I50.33-Acute on chronic diastolic  (congestive) heart failure; Z82.62-Family history of osteoporosis;  K80.01-Calculus of gallbladder with acute cholecystitis with  obstruction; E55.9-Vitamin D deficiency, unspecified; E11.9-Type 2  diabetes mellitus without complications; I10-Essential (primary    COMPARISON:  Same day    TECHNIQUE:  Single radiographic AP view of the chest was obtained.    FINDINGS:  Right pleural catheter has been removed. There is no pneumothorax. There  is no  significant change in moderate bilateral pleural effusions. There  is persistent prominence of the central pulmonary vasculature with  indistinct peripheral vasculature. No new abnormality is demonstrated    Impression  1. Status post right pleural catheter removal, no pneumothorax  2. Stable moderate bilateral pleural effusions  3. Persistent pulmonary vascular congestion    Electronically Signed By-Jaime Jacobson On:5/29/2022 6:20 PM  This report was finalized on [id number] by  Jaime Jacobson, .      XR Chest 1 View    Narrative  DATE OF EXAM:  5/29/2022 3:17 AM    PROCEDURE:  XR CHEST 1 VW-    INDICATIONS:  chest tube clamped, re-eval effusion; I50.9-Heart failure, unspecified;  I50.33-Acute on chronic diastolic (congestive) heart failure;  Z82.62-Family history of osteoporosis; K80.01-Calculus of gallbladder  with acute cholecystitis with obstruction; E55.9-Vitamin D deficiency,  unspecified; E11.9-Type 2 diabetes mellitus without complications;  I10-Essential (primary) hypertension; I10-Essential (prim    COMPARISON:  No Comparisons Available    TECHNIQUE:  Single radiographic AP view of the chest was obtained.    FINDINGS:  Status post coronary bypass and aortic and mitral valve repairs. Right  pleural catheter remains in place. Heart size stable. Increased  prominence of the central pulmonary vasculature and indistinctness of  the peripheral pulmonary vasculature. Persistent moderate bilateral  pleural effusions    Impression  1. Worsening pulmonary vascular congestion  2. Grossly stable moderate bilateral pleural effusions    Electronically Signed By-Jaime Jacobson On:5/29/2022 10:05 AM  This report was finalized on [id number] by  Jaime Jacobson, .      Results for orders placed during the hospital encounter of 05/15/22    Duplex Vein Mapping Lower Extremity - Bilateral CAR    Interpretation Summary  · The right greater saphenous vein is patent and of adequate size in the thigh.  · The left greater  saphenous vein is patent and of adequate size in the thigh.        ASSESSMENT / PLAN      Age-related osteoporosis without current pathological fracture    Angina pectoris (HCC)    CAD (coronary artery disease)    Dyspnea on exertion    Hyperlipidemia    Hypertension    Diabetes mellitus with coincident hypertension (HCC)    Vitamin D deficiency    Cholelithiasis and acute cholecystitis with obstruction    Family hx osteoporosis    Ulcerative colitis (HCC)    Acute on chronic heart failure with preserved ejection fraction (HCC)    Acute congestive heart failure, unspecified heart failure type (HCC)    Mitral valve insufficiency    Nonrheumatic aortic valve insufficiency    Hypomagnesemia    1. ARF/MORGAN-------Nonoliguric. MORGAN resolved.      2. ACIDOSIS------Resolved     3. DIGOXIN TOXICITY-------Resolved     4. ANEMIA-------S/P IV iron for MARLO. Normocytic and only mildly elevated RDW     5. HYPOCALCEMIA-------replaced     6. CAD S/P CABG x 2/MVR/AVR/MAZE/ATRIAL APPENDAGE LIGATION------per Cardiology and CT Surgery     7. HYPERLIPIDEMIA------On Statin. CK, TSH ok     8. H/O ULCERATIVE COLITIS     9. HYPOALBUMINEMIA-----IV Albumin to temporize     10. HYPOALBUMINEMIA     11. DVT PROPHYLAXIS------On Lovenox. Follow platelets given thrombocytopenia     12. GERD/PUD PROPHYLAXIS------PPI. Benefits outweigh risks despite ARF/MORGAN     13. ELEVATED LFTS/TRANSAMINASES    14. HYPERNATREMIA----Resolved. Follow with diuretic exposure    15. HYPOPHOSPHATEMIA------Replaced    16. THROMBOCYTOPENIA    17. MILD VOLUME EXCESS/3RD SPACED EDEMA-------po Bumex and follow.      18. HYPOMAGNESEMIA------Replaced    19. METABOLIC ALKALOSIS-------Secondary to Bumex. Mild. Follow    Rosalie Barroso MD  [facility]  [phone]  06/01/22  07:19 EDT

## 2022-06-02 LAB
ALBUMIN SERPL-MCNC: 4.1 G/DL (ref 3.5–5.2)
ALBUMIN/GLOB SERPL: 2.2 G/DL
ALP SERPL-CCNC: 130 U/L (ref 39–117)
ALT SERPL W P-5'-P-CCNC: 37 U/L (ref 1–33)
ANION GAP SERPL CALCULATED.3IONS-SCNC: 12 MMOL/L (ref 5–15)
AST SERPL-CCNC: 47 U/L (ref 1–32)
BILIRUB SERPL-MCNC: 1.6 MG/DL (ref 0–1.2)
BUN SERPL-MCNC: 11 MG/DL (ref 8–23)
BUN/CREAT SERPL: 18.6 (ref 7–25)
CALCIUM SPEC-SCNC: 9.9 MG/DL (ref 8.6–10.5)
CHLORIDE SERPL-SCNC: 90 MMOL/L (ref 98–107)
CO2 SERPL-SCNC: 33 MMOL/L (ref 22–29)
CREAT SERPL-MCNC: 0.59 MG/DL (ref 0.57–1)
DEPRECATED RDW RBC AUTO: 56.4 FL (ref 37–54)
EGFRCR SERPLBLD CKD-EPI 2021: 90.7 ML/MIN/1.73
ERYTHROCYTE [DISTWIDTH] IN BLOOD BY AUTOMATED COUNT: 19.1 % (ref 12.3–15.4)
GLOBULIN UR ELPH-MCNC: 1.9 GM/DL
GLUCOSE BLDC GLUCOMTR-MCNC: 225 MG/DL (ref 70–105)
GLUCOSE BLDC GLUCOMTR-MCNC: 251 MG/DL (ref 70–105)
GLUCOSE BLDC GLUCOMTR-MCNC: 258 MG/DL (ref 70–105)
GLUCOSE SERPL-MCNC: 252 MG/DL (ref 65–99)
HCT VFR BLD AUTO: 28.1 % (ref 34–46.6)
HGB BLD-MCNC: 9 G/DL (ref 12–15.9)
MAGNESIUM SERPL-MCNC: 1.8 MG/DL (ref 1.6–2.4)
MCH RBC QN AUTO: 27.2 PG (ref 26.6–33)
MCHC RBC AUTO-ENTMCNC: 32.2 G/DL (ref 31.5–35.7)
MCV RBC AUTO: 84.7 FL (ref 79–97)
PHOSPHATE SERPL-MCNC: 3.7 MG/DL (ref 2.5–4.5)
PLATELET # BLD AUTO: 228 10*3/MM3 (ref 140–450)
PMV BLD AUTO: 7.7 FL (ref 6–12)
POTASSIUM SERPL-SCNC: 4 MMOL/L (ref 3.5–5.2)
PROT SERPL-MCNC: 6 G/DL (ref 6–8.5)
RBC # BLD AUTO: 3.31 10*6/MM3 (ref 3.77–5.28)
SODIUM SERPL-SCNC: 135 MMOL/L (ref 136–145)
WBC NRBC COR # BLD: 5.6 10*3/MM3 (ref 3.4–10.8)

## 2022-06-02 PROCEDURE — 97530 THERAPEUTIC ACTIVITIES: CPT

## 2022-06-02 PROCEDURE — 97116 GAIT TRAINING THERAPY: CPT

## 2022-06-02 PROCEDURE — 63710000001 INSULIN LISPRO (HUMAN) PER 5 UNITS: Performed by: THORACIC SURGERY (CARDIOTHORACIC VASCULAR SURGERY)

## 2022-06-02 PROCEDURE — 99024 POSTOP FOLLOW-UP VISIT: CPT | Performed by: NURSE PRACTITIONER

## 2022-06-02 PROCEDURE — 83735 ASSAY OF MAGNESIUM: CPT | Performed by: THORACIC SURGERY (CARDIOTHORACIC VASCULAR SURGERY)

## 2022-06-02 PROCEDURE — 82962 GLUCOSE BLOOD TEST: CPT

## 2022-06-02 PROCEDURE — 80053 COMPREHEN METABOLIC PANEL: CPT | Performed by: THORACIC SURGERY (CARDIOTHORACIC VASCULAR SURGERY)

## 2022-06-02 PROCEDURE — 99232 SBSQ HOSP IP/OBS MODERATE 35: CPT | Performed by: INTERNAL MEDICINE

## 2022-06-02 PROCEDURE — 85027 COMPLETE CBC AUTOMATED: CPT | Performed by: THORACIC SURGERY (CARDIOTHORACIC VASCULAR SURGERY)

## 2022-06-02 PROCEDURE — 25010000002 MAGNESIUM SULFATE IN D5W 1G/100ML (PREMIX) 1-5 GM/100ML-% SOLUTION: Performed by: NURSE PRACTITIONER

## 2022-06-02 PROCEDURE — 63710000001 INSULIN GLARGINE PER 5 UNITS: Performed by: INTERNAL MEDICINE

## 2022-06-02 PROCEDURE — 84100 ASSAY OF PHOSPHORUS: CPT | Performed by: THORACIC SURGERY (CARDIOTHORACIC VASCULAR SURGERY)

## 2022-06-02 RX ADMIN — BUMETANIDE 1 MG: 1 TABLET ORAL at 09:31

## 2022-06-02 RX ADMIN — METOPROLOL SUCCINATE 12.5 MG: 25 TABLET, EXTENDED RELEASE ORAL at 09:31

## 2022-06-02 RX ADMIN — POTASSIUM CHLORIDE 20 MEQ: 1.5 POWDER, FOR SOLUTION ORAL at 15:12

## 2022-06-02 RX ADMIN — SENNOSIDES AND DOCUSATE SODIUM 2 TABLET: 50; 8.6 TABLET ORAL at 20:34

## 2022-06-02 RX ADMIN — BUMETANIDE 1 MG: 1 TABLET ORAL at 20:34

## 2022-06-02 RX ADMIN — POTASSIUM CHLORIDE 20 MEQ: 1.5 POWDER, FOR SOLUTION ORAL at 20:34

## 2022-06-02 RX ADMIN — INSULIN LISPRO 8 UNITS: 100 INJECTION, SOLUTION INTRAVENOUS; SUBCUTANEOUS at 17:45

## 2022-06-02 RX ADMIN — POTASSIUM CHLORIDE 20 MEQ: 1.5 POWDER, FOR SOLUTION ORAL at 09:30

## 2022-06-02 RX ADMIN — INSULIN LISPRO 12 UNITS: 100 INJECTION, SOLUTION INTRAVENOUS; SUBCUTANEOUS at 05:56

## 2022-06-02 RX ADMIN — APIXABAN 2.5 MG: 2.5 TABLET, FILM COATED ORAL at 20:34

## 2022-06-02 RX ADMIN — INSULIN LISPRO 12 UNITS: 100 INJECTION, SOLUTION INTRAVENOUS; SUBCUTANEOUS at 12:42

## 2022-06-02 RX ADMIN — INSULIN GLARGINE 12 UNITS: 100 INJECTION, SOLUTION SUBCUTANEOUS at 12:45

## 2022-06-02 RX ADMIN — PANTOPRAZOLE SODIUM 40 MG: 40 TABLET, DELAYED RELEASE ORAL at 09:31

## 2022-06-02 RX ADMIN — MAGNESIUM SULFATE 1 G: 1 INJECTION INTRAVENOUS at 20:34

## 2022-06-02 RX ADMIN — Medication 10 MG: at 20:33

## 2022-06-02 RX ADMIN — ASPIRIN 81 MG: 81 TABLET, COATED ORAL at 09:31

## 2022-06-02 NOTE — PROGRESS NOTES
S/P POD# 14 urgent CABG x2 with LIMA/ tissue AVR/ MV repair/ Maze procedure with CRAIG ligation--Pagni  EF 45-50% (echo) preop    Subjective:  Soundly sleeping this morning after being awake all night per nursing.     No events overnight  Wt is up 6 kgs from preop        Intake/Output Summary (Last 24 hours) at 6/2/2022 0832  Last data filed at 6/2/2022 0025  Gross per 24 hour   Intake 240 ml   Output 1675 ml   Net -1435 ml     Temp:  [98.3 °F (36.8 °C)-99.2 °F (37.3 °C)] 99.2 °F (37.3 °C)  Heart Rate:  [] 84  BP: ()/(45-86) 93/52        Results from last 7 days   Lab Units 06/02/22  0606 06/01/22  0653   WBC 10*3/mm3 5.60 3.80   HEMOGLOBIN g/dL 9.0* 8.6*   HEMATOCRIT % 28.1* 27.4*   PLATELETS 10*3/mm3 228 193     Results from last 7 days   Lab Units 06/02/22  0606   CREATININE mg/dL 0.59   POTASSIUM mmol/L 4.0   SODIUM mmol/L 135*   MAGNESIUM mg/dL 1.8   PHOSPHORUS mg/dL 3.7       Physical Exam:  Neuro intact, NAD, asleep in bed  Tele:  afib 70-80s  Equal breath sounds, coarse, 96% RA  Sternotomy/SVHS healing well, skin tear with Mepilex on left shin  Benign abd, + BM  + BLE pitting edema     Assessment/Plan:  Active Problems:    Age-related osteoporosis without current pathological fracture    Angina pectoris (HCC)    CAD (coronary artery disease)    Dyspnea on exertion    Hyperlipidemia    Hypertension    Diabetes mellitus with coincident hypertension (HCC)    Vitamin D deficiency    Cholelithiasis and acute cholecystitis with obstruction    Family hx osteoporosis    Ulcerative colitis (HCC)    Acute on chronic heart failure with preserved ejection fraction (HCC)    Acute congestive heart failure, unspecified heart failure type ([facility])    Mitral valve insufficiency    Nonrheumatic aortic valve insufficiency    Hypomagnesemia    - Severe AI, moderate MR, CAD, EF 45-50% (echo)--s/p tissue AVR, MV repair, CABG x2 with LIMA, Maze procedure with CRAIG ligation (Pagni)  - New onset atrial fib, preop/postop--s/p  maze procedure/CRAIG ligation  - Acute on chronic HFrEF, NYHA class III/IV--optimize meds prior to dc  - HTN--stable  - HLD--statin after LFTs normalize  - T2DM--preop a1c 7, SSI  - Ulcerative colitis--on Endocort/Bentyl/Colestipol preop  - Postop ABLA, expected--transfused 5/19, 5/22  - Postop TCP, expected--r/t consumption, improving  - Postop elevated transaminitis--likely d/t shocky liver, improving  - Postop RV dysfunction--milrinone iv/inhaled stopped  - Frailty--likely rehab at dc  - Renal failure, anuria--renal consulted, resolved  - Metabolic acidosis--IV bicarb, resolved  - Postop pneumonia, Klebsiella oxytoca--on Rocephin  - Right pleural effusion--CT placement 5/23, 1.6L removed, CT removal 5/29  - Postop confusion, multifactorial--resolving    POD# 14.  Appears she has her days/nights confused.  On scheduled melatonin and Zyprexa added last night.  She will need to be sitter free for 24 hours before transfer.  On asa/bb/statin on hold d/t elevated LFTs (altho improved).  Renal managing diuretics.  Mobilize.   Ceftriaxone for Klebsiella pna completed.  Replete e-.  Will d/w Dr. Terrazas about anticoagulation d/t PAF.    Addendum:  Eliquis 2.5 mg bid started d/t PAF after d/w Dr. Terrazas and Dr. Ornelas.    Routine care--as above  D/w pt/nsg, Dr. Ornelas, Dr. Terrazas  Anticipate [facility] re[facility] at discharge--her  is there currently.  Nash lives at their home to assist as well.    Clover Henriquez-Samuel, APRN  6/2/2022  08:32 EDT

## 2022-06-02 NOTE — PLAN OF CARE
Goal Outcome Evaluation:                Mindi Quinteros presents POD 14 tissue AVR, MV repair, CABG x2 with LIMA, Maze procedure. Pt asleep in bed upon entry and took a few minutes to awaken. Once awake, oriented to name, time and location. Pt with difficulty adhering to sternal precautions. Kyphosis significantly complicates mobility, assisting patient. Ambulated to toilet, voided, followed by 75' ambulation around nurses station. Pt fatigues quickly during ambulation with HR at 125 bpm. Vitals returned to normal with return to chair. Pt presents with functional mobility impairments which indicate the need for skilled intervention. Tolerating session today without incident. Will continue to follow and progress as tolerated.

## 2022-06-02 NOTE — PROGRESS NOTES
ICU Daily Progress Note        Age-related osteoporosis without current pathological fracture    Angina pectoris (HCC)    CAD (coronary artery disease)    Dyspnea on exertion    Hyperlipidemia    Hypertension    Diabetes mellitus with coincident hypertension (HCC)    Vitamin D deficiency    Cholelithiasis and acute cholecystitis with obstruction    Family hx osteoporosis    Ulcerative colitis (HCC)    Acute on chronic heart failure with preserved ejection fraction (HCC)    Acute congestive heart failure, unspecified heart failure type (HCC)    Mitral valve insufficiency    Nonrheumatic aortic valve insufficiency    Hypomagnesemia      Assessment & Plan   Acute hypoxic respiratory failure  Acute congestive heart failure  Pulmonary edema   Age-related osteoporosis without current pathological fracture    Angina pectoris (HCC)    CAD (coronary artery disease)    Dyspnea on exertion    Hyperlipidemia    Hypertension    Diabetes mellitus with coincident hypertension (HCC)    Vitamin D deficiency    Cholelithiasis and acute cholecystitis with obstruction    Family hx osteoporosis    Ulcerative colitis (HCC)    Acute on chronic heart failure with preserved ejection fraction (HCC)    Acute congestive heart failure, unspecified heart failure type (HCC)    Mitral valve insufficiency    Nonrheumatic aortic valve insufficiency    Hypomagnesemia   pulm  HTN secondary     PLAN:  Oxygen supplement and titration: Currently on 2 L per nasal cannula  Pt/ot   Tolerating diet   Chest tube removed 5/29/2022  Continue continue metoprolol XL 12.5 mg daily  Antibiotics finished  encourage too use IS flutter valve   Bronchodilator  Inhaled corticosteroids  Electrolytes/ glycemic control: Adjust insulin regimen  DVT and GI prophylaxis.       LOS: 18 days         Vital signs for last 24 hours:  Vitals:    06/02/22 1000 06/02/22 1010 06/02/22 1030 06/02/22 1100   BP:    128/78   Pulse: 101 85 (!) 121 116   Resp:       Temp:       TempSrc:        SpO2:  97%  97%   Weight:       Height:           Intake/Output last 3 shifts:  I/O last 3 completed shifts:  In: 1996 [P.O.:1700; I.V.:296]  Out:  [Urine:4625]  Intake/Output this shift:  No intake/output data recorded.           Radiology  Imaging Results (Last 24 Hours)     ** No results found for the last 24 hours. **          Labs:  Results from last 7 days   Lab Units 22  0606   WBC 10*3/mm3 5.60   HEMOGLOBIN g/dL 9.0*   HEMATOCRIT % 28.1*   PLATELETS 10*3/mm3 228     Results from last 7 days   Lab Units 22  0606   SODIUM mmol/L 135*   POTASSIUM mmol/L 4.0   CHLORIDE mmol/L 90*   CO2 mmol/L 33.0*   BUN mg/dL 11   CREATININE mg/dL 0.59   CALCIUM mg/dL 9.9   BILIRUBIN mg/dL 1.6*   ALK PHOS U/L 130*   ALT (SGPT) U/L 37*   AST (SGOT) U/L 47*   GLUCOSE mg/dL 252*     Results from last 7 days   Lab Units 22  1110   PH, ARTERIAL pH units 7.594*   PO2 ART mm Hg 129.8*   PCO2, ARTERIAL mm Hg 29.9*   HCO3 ART mmol/L 28.9*     Results from last 7 days   Lab Units 22  0606 22  0653 22  0453   ALBUMIN g/dL 4.10 3.80 2.70*             Results from last 7 days   Lab Units 22  0606   MAGNESIUM mg/dL 1.8                   Meds:   SCHEDULE  aspirin, 81 mg, Oral, Daily  Barium Sulfate, 1 teaspoon(s), Oral, Once in imaging  bumetanide, 1 mg, Oral, BID  enoxaparin, 40 mg, Subcutaneous, Q24H  insulin lispro, 0-24 Units, Subcutaneous, Q6H  melatonin, 10 mg, Oral, Nightly  metoprolol succinate XL, 12.5 mg, Oral, Q24H  OLANZapine, 2.5 mg, Oral, Nightly  pantoprazole, 40 mg, Oral, QAM AC  potassium chloride, 20 mEq, Oral, TID  senna-docusate sodium, 2 tablet, Oral, Nightly      Infusions     PRNs  •  acetaminophen  •  bisacodyl  •  clonazePAM  •  dextrose  •  dextrose  •  glucagon (human recombinant)  •  insulin lispro **AND** insulin lispro  •  magnesium hydroxide  •  magnesium sulfate **OR** magnesium sulfate in D5W 1g/100mL (PREMIX)  •  [] Morphine **AND** naloxone  •   ondansetron  •  polyethylene glycol  •  potassium & sodium phosphates **OR** potassium & sodium phosphates  •  potassium chloride **OR** potassium chloride  •  potassium chloride **OR** potassium chloride    Physical Exam:  Physical Exam  General Appearance:  Alert   HEENT:  Normocephalic, without obvious abnormality, Conjunctiva/corneas clear,.   Nares normal, no drainage     Neck:  Supple, symmetrical, trachea midline. No JVD.  Lungs /Chest wall:   Bilateral basal rhonchi, respirations unlabored, symmetrical wall movement.     Heart:  Regular rate and rhythm, S1 S2 normal  Abdomen: Soft, non-tender, no masses, no organomegaly.    Extremities: + edema, no clubbing or cyanosis, positive ecchymosis in the right thigh  ROS  Review of Systems  Constitutional: Negative for chills, fever and positive for malaise/fatigue.   HENT: Negative.    Eyes: Negative.    Cardiovascular: Negative.    Respiratory: Positive for cough and shortness of breath.    Skin: Negative.    Musculoskeletal: Mild pain lower extremities.    Gastrointestinal: Negative.    Genitourinary: Negative.    Neurological: Negative.    Psychiatric/Behavioral: Negative.

## 2022-06-02 NOTE — PROGRESS NOTES
Cardiology Progress Note    Patient Identification:  Name: Mindi Quinteros  Age: 81 y.o.  Sex: female  :  1940  MRN: [id number]                 Follow Up / Chief Complaint: New onset afib, Acute on Chronic diastolic heart failure  Chief Complaint   Patient presents with   • Shortness of Breath       Interval History:  Patient presented with worsening shortness of breath and edema.   Patient was in new onset heart failure on admission. Echocardiogram showed borderline EF, diastolic dysfunction, Severe MR,  Mod-severe AR.  Patient underwent cardiac cath and BEST    2022 patient underwent valve surgery with aortic valve replacement and mitral valve repair and two-vessel CABG and maze procedure.  2022 cardioverted to normal sinus rhythm     NP NOTE:  Patient seen, sitter at bedside, she was confused getting out of bed overnight.  On my encounter she is sleeping in the chair.  Afib with slow ventricular rate  high 50s- monitor closely since starting beta blocker yesterday. She remains with lower extremity edema, nephrology following on PO bumex.     Electronically signed by DAKOTA Brody, 22, 11:44 AM EDT.    Cardiology attending addendum :    I have personally performed a face-to-face diagnostic evaluation, physical exam and reviewed data on this patient.  I have reviewed documentation done by me and nurse practitioner Brittany Hutson and corrected as needed.  And agree with the different components of documentation.Greater than 50% of the time spent in the care of this patient was provided by attending consultant/me.         Subjective: Patient seen and examined.  Chart reviewed.  Labs reviewed.  Patient is in A. fib.    Objective: troponin negative; pro BNP ,   2022: glucose elevated, potassium 3.0 hgb 10.5   2022: magnesium 1.5   2022: Glucose elevated; AST 35, INR 1.27, CBC unremarkable. Mag 1.5   2022: Hemoglobin 8.4  2022: Hemoglobin is 9.4  2022: glucose  elevated; bun 46 creatinine 1.19 liver enzymes elevated; hgb 9.7  5/24/2022: Sodium is 127, creatinine 1.26, elevated ALT and AST at 196 and 214, total bilirubin 1.6, hemoglobin 9.  5/27/2022: Glucose 201, ALT 83, AST 62, bilirubin 1.4, hemoglobin 10.2  5/31/2022: Glucose 49, ALT 40 AST 50, potassium 3.3 magnesium 1.8, hgb 9.4   6/1/2022: glucose 168, potassium 4.2, bun 6 creatinine 0.55, ALT 34, AST 48 hgb 8.6      History of present illness:    Ms. Mindi Quinteros  has PMH of        # CAD, cardiac cath 2/7/18  calcified 50% proximal 70% mid LAD and 70% mid LCX, normal LVEF  #. Valvular heart disease  #  diabetes  #  hypertension, hyperlipidemia  #  ulcerative colitis  #  allergy to aspertane  #  hemorrhoidectomy, hysterectomy, bladder repair, left ankle surgery,      Presented through emergency room 5/15/2022 with complaint of nocturnal dyspnea 2 days prior to admission with cough which is resolved but has increasing pedal edema, has chronic diarrhea secondary to ulcerative colitis and fatigue.  Work-up here revealed elevated proBNP of 2099 and glucose 158.  Chest x-ray showing pulmonary edema and small bilateral pleural effusions and new onset A. Fib.  Echocardiogram 5/15/2022 reveals LV dysfunction EF of 46 to 50% with severe eccentric MR and diastolic dysfunction         ASSESSMENT:     #Shortness of breath  #New onset atrial fibrillation  #Acute   HFrEF due to diastolic dysfunction from A. fib with RVR  And systolic dysfunction from possibly valvular heart disease and ischemic cardiomyopathy  #Mitral regurgitation  #Aortic regurgitation  #Cardiomyopathy, possibly due to MR, CAD  #CABG x2 with LIMA to LAD, SVG to lateral marginal, AVR with #21 magna pericardial prosthesis, mitral valve repair with #26 physio ll ring annuloplasty, Maze procedure, CRAIG ligation  #Loss of balance/ataxia/B12 deficiency  #Impaired memory/difficulty finding words  #  CAD  #  hyperlipidemia  #  diabetes   # hypertension       PLAN:    Telemetry is revealing atrial fibrillation with controlled ventricular response    Patient has high [id number] score due to female gender, age over 75, hypertension and diabetes making it 5, will benefit from long-term anticoagulation.  We will start on anticoagulation when okay with CT surgery.  Discussed with CT surgery nurse practitioner Clover.  Patient is can be discharged therefore will start on anticoagulation to prevent thromboembolic events from A. fib.    Patient underwent cardiac cath 5/17/2022 which revealed severe two-vessel disease  Patient underwent BEST which revealed severe AR.    Patient underwent two-vessel CABG, maze, aortic valve replacement and mitral valve repair by  5/19/2022  Routine post surgery care  Monitor rhythm, patient is in A. fib with controlled ventricular response  We will continue low-dose aspirin and beta-blockers and statins as tolerated  Monitor hemoglobin  Patient has ankle edema we will continue Bumex per nephrology as tolerated  Monitor renal function urine output electrolytes and replete as needed.  Patient is on ceftriaxone for Klebsiella pneumonia  Patient is having intermittent confusion has a sitter.  Discussed with RN taking care of patient      Past Medical History:  Past Medical History:   Diagnosis Date   • Acute congestive heart failure, unspecified heart failure type (HCC) 5/15/2022   • Age-related osteoporosis without current pathological fracture     prolia([id number], 9/12/2019), restarted prolia(11/3/20)   • Allergic    • Anxiety    • Arthritis    • CAD (coronary artery disease)    • Depression    • Diabetes (HCC)    • Elevated cholesterol    • HTN (hypertension)    • Hyperlipemia    • Injury of back    • Sinusitis      Past Surgical History:  Past Surgical History:   Procedure Laterality Date   • ANKLE ARTHROSCOPY     • AORTIC VALVE REPAIR/REPLACEMENT MITRAL VALVE REPAIR/REPLACEMENT N/A 5/19/2022    Procedure: AORTIC VALVE  REPAIR/REPLACEMENT MITRAL VALVE REPAIR/REPLACEMENT;  Surgeon: Lane Okeefe MD;  Location: [facility];  Service: Cardiothoracic;  Laterality: N/A;  Mitral Valve repair with 26mm Physio II ring. Aortic   Valve Replacement with 21mm Magna Ease valve.   • BELPHAROPTOSIS REPAIR     • CARDIAC CATHETERIZATION     • CARDIAC CATHETERIZATION N/A 5/17/2022    Procedure: Left Heart Cath, possible pci;  Surgeon: Natan Terrazas MD;  Location: [facility] CATH INVASIVE LOCATION;  Service: Cardiovascular;  Laterality: N/A;   • CATARACT EXTRACTION     • CHOLECYSTECTOMY N/A 10/14/2019    Procedure: Laparoscopic cholecystectomy, possible open;  Surgeon: Vijay Guerra DO;  Location: [facility] MAIN OR;  Service: General   • COLONOSCOPY     • CORONARY ARTERY BYPASS GRAFT N/A 5/19/2022    Procedure: CORONARY ARTERY BYPASS GRAFTING;  Surgeon: Lane Okeefe MD;  Location: [facility];  Service: Cardiothoracic;  Laterality: N/A;  CABG X 2 (1 Vein graft, 1 LIMA graft)   • COSMETIC SURGERY     • ENDOSCOPY     • HEMORROIDECTOMY     • HYSTERECTOMY     • MAZE PROCEDURE N/A 5/19/2022    Procedure: MAZE PROCEDURE;  Surgeon: Lane Okeefe MD;  Location: [facility];  Service: Cardiothoracic;  Laterality: N/A;        Social History:   Social History     Tobacco Use   • Smoking status: Never Smoker   • Smokeless tobacco: Never Used   Substance Use Topics   • Alcohol use: No      Family History:  Family History   Problem Relation Age of Onset   • Diabetes Mother    • Heart disease Father    • Heart disease Brother    • Diabetes Brother    • Osteoporosis Paternal Grandmother           Allergies:  Allergies   Allergen Reactions   • Asacol [Mesalamine] Swelling   • Diclofenac Swelling     Gums swell only per patient     Scheduled Meds:  apixaban, 2.5 mg, Q12H  aspirin, 81 mg, Daily  Barium Sulfate, 1 teaspoon(s), Once in imaging  bumetanide, 1 mg, BID  insulin glargine, 12 Units, Daily  insulin lispro, 0-24 Units, Q6H  melatonin, 10  "mg, Nightly  metoprolol succinate XL, 12.5 mg, Q24H  OLANZapine, 2.5 mg, Nightly  pantoprazole, 40 mg, QAM AC  potassium chloride, 20 mEq, TID  senna-docusate sodium, 2 tablet, Nightly          Review of Systems:   Review of Systems   Constitutional: Negative for chills and fever.   Cardiovascular: Negative for chest pain and palpitations.   Respiratory: Negative for cough and hemoptysis.    Gastrointestinal: Negative for nausea and vomiting.           Constitutional:  Temp:  [97.6 °F (36.4 °C)-99.2 °F (37.3 °C)] 97.6 °F (36.4 °C)  Heart Rate:  [] 91  Resp:  [22] 22  BP: ()/(45-86) 154/65    Physical Exam   /65   Pulse 91   Temp 97.6 °F (36.4 °C) (Oral)   Resp 22   Ht 157.5 cm (62\")   Wt 58.3 kg (128 lb 8.5 oz)   SpO2 94%   BMI 23.51 kg/m²   General: Alert and awake  Eyes: Sclera is anicteric,  conjunctiva is clear   HEENT:  No JVD. Thyroid not visibly enlarged. No mucosal pallor or cyanosis  Respiratory: Nonlabored breathing.  Clear to auscultation  Cardiovascular: S1,S2 irregular rate and rhythm.  2/6 holosystolic murmur, no rub or gallop auscultated.    Gastrointestinal: Abdomen nondistended, Castañeda catheter present  Musculoskeletal:  No abnormal movements  Extremities: 1+ ankle edema  Skin: Color pink. Skin warm and dry to touch. No rashes  No xanthoma  Neuro: Alert and awake, intermittently confused    INTAKE AND OUTPUT:    Intake/Output Summary (Last 24 hours) at 6/2/2022 1705  Last data filed at 6/2/2022 1200  Gross per 24 hour   Intake 480 ml   Output 1675 ml   Net -1195 ml       Cardiographics  Telemetry: afib     ECG:   ECG 12 Lead   Final Result   HEART RATE= 53  bpm   RR Interval= 1123  ms   IA Interval= 294  ms   P Horizontal Axis= 5  deg   P Front Axis= 145  deg   QRSD Interval= 98  ms   QT Interval= 428  ms   QRS Axis= -43  deg   T Wave Axis= 155  deg   - ABNORMAL ECG -   Underlying rhythm is atrial flutter with diffuse nonspecific ST-T wave    changes and suggestive of old " inferior wall and anterior wall MI with low    voltage complexes and left axis deviation.  Ventricular rate reduced    compared to previous EKG.   Electronically Signed By: Eduardo Brewster ([facility]) 28-May-2022 16:49:51   Date and Time of Study: 2022-05-28 10:53:40      ECG 12 Lead   Final Result   HEART RATE= 85  bpm   RR Interval= 703  ms   LA Interval=   ms   P Horizontal Axis=   deg   P Front Axis=   deg   QRSD Interval= 104  ms   QT Interval= 400  ms   QRS Axis= -51  deg   T Wave Axis= 145  deg   - ABNORMAL ECG -   Sinus rhythm with marked first-degree AV block, PVCs.   RBBB and LAFB   Incomplete left bundle branch block   Inferior infarct, old   Anterior infarct, old   Nonspecific T abnormalities, lateral leads   When compared with ECG of 24-May-2022 10:57:02,   Significant change in rhythm: previously junctional   Electronically Signed By: Dorina Hoffmann ([facility]) 26-May-2022 12:18:49   Date and Time of Study: 2022-05-25 05:58:09      ECG 12 Lead   Final Result   HEART RATE= 88  bpm   RR Interval= 680  ms   LA Interval=   ms   P Horizontal Axis=   deg   P Front Axis=   deg   QRSD Interval= 101  ms   QT Interval= 396  ms   QRS Axis= -48  deg   T Wave Axis= 138  deg   - ABNORMAL ECG -   Accelerated junctional rhythm   Inferior infarct, old   When compared with ECG of 22-May-2022 10:30:47,   Significant change in rhythm   Electronically Signed By: Kandy Jara ([facility]) 25-May-2022 21:46:33   Date and Time of Study: 2022-05-24 10:57:02      ECG 12 Lead   Final Result   HEART RATE= 69  bpm   RR Interval= 870  ms   LA Interval= 181  ms   P Horizontal Axis=   deg   P Front Axis= 0  deg   QRSD Interval= 106  ms   QT Interval= 482  ms   QRS Axis= -54  deg   T Wave Axis= 105  deg   - ABNORMAL ECG -   atrial fibrillation   Supraventricular bigeminy   RBBB and LAFB   Incomplete left bundle branch block   Inferior infarct, old   Nonspecific T abnormalities, lateral leads   Prolonged QT interval   When compared with ECG of  21-May-2022 3:56:47,   Significant change in rhythm: previously atrial fibrillation   Significant repolarization change   Electronically Signed By: Venancio Rodas ([facility]) 22-May-2022 18:50:00   Date and Time of Study: 2022-05-22 10:30:47      ECG 12 Lead   Final Result   HEART RATE= 83  bpm   RR Interval= 722  ms   IN Interval=   ms   P Horizontal Axis=   deg   P Front Axis=   deg   QRSD Interval= 108  ms   QT Interval= 406  ms   QRS Axis= -58  deg   T Wave Axis= 114  deg   - ABNORMAL ECG -   Atrial fibrillation   RBBB and LAFB   Incomplete left bundle branch block   Inferior infarct, old   Consider anterior infarct   Nonspecific T abnormalities, lateral leads   When compared with ECG of 20-May-2022 12:16:46,   Significant repolarization change   Significant axis, voltage or hypertrophy change   Electronically Signed By: Venancio Rodas ([facility]) 21-May-2022 07:08:00   Date and Time of Study: 2022-05-21 03:56:47      ECG 12 Lead   Final Result   HEART RATE= 78  bpm   RR Interval= 770  ms   IN Interval=   ms   P Horizontal Axis=   deg   P Front Axis=   deg   QRSD Interval= 108  ms   QT Interval= 512  ms   QRS Axis= -57  deg   T Wave Axis= 246  deg   - ABNORMAL ECG -   Atrial fibrillation   RBBB and LAFB   Incomplete left bundle branch block   Low voltage, precordial leads   Nonspecific repol abnormality, diffuse leads   Prolonged QT interval   When compared with ECG of 20-May-2022 2:38:15,   Significant repolarization change   Significant axis, voltage or hypertrophy change   Electronically Signed By: Venancio Rodas ([facility]) 20-May-2022 16:06:17   Date and Time of Study: 2022-05-20 12:16:46      ECG 12 Lead   Final Result   HEART RATE= 71  bpm   RR Interval= 840  ms   IN Interval=   ms   P Horizontal Axis=   deg   P Front Axis=   deg   QRSD Interval= 119  ms   QT Interval= 389  ms   QRS Axis= -57  deg   T Wave Axis= 190  deg   - ABNORMAL ECG -   Atrial fibrillation   Ventricular premature complex   RBBB and LAFB   Incomplete left  bundle branch block   Probable inferior infarct, age indeterminate   Nonspecific T abnormalities, lateral leads   When compared with ECG of 19-May-2022 17:14:47,   Significant axis, voltage or hypertrophy change   Electronically Signed By: Venancio Rodas ([facility]) 20-May-2022 16:06:40   Date and Time of Study: 2022-05-20 02:38:15      ECG 12 Lead   Final Result   HEART RATE= 85  bpm   RR Interval= 707  ms   LA Interval=   ms   P Horizontal Axis=   deg   P Front Axis=   deg   QRSD Interval= 111  ms   QT Interval= 405  ms   QRS Axis= -70  deg   T Wave Axis= 127  deg   - ABNORMAL ECG -   Atrial fibrillation   RBBB and LAFB   Incomplete left bundle branch block   Low voltage, precordial leads   When compared with ECG of 19-May-2022 13:55:56,   Significant repolarization change   Significant axis, voltage or hypertrophy change   Electronically Signed By: Rolando Gonzalez ([facility]) 23-May-2022 13:48:26   Date and Time of Study: 2022-05-19 17:14:47      ECG 12 Lead   Final Result   HEART RATE= 88  bpm   RR Interval= 683  ms   LA Interval=   ms   P Horizontal Axis=   deg   P Front Axis=   deg   QRSD Interval= 125  ms   QT Interval= 406  ms   QRS Axis= -60  deg   T Wave Axis= 139  deg   - ABNORMAL ECG -   Atrial fibrillation   RBBB and LAFB   Incomplete left bundle branch block   LVH with secondary repolarization abnormality   When compared with ECG of 18-May-2022 5:14:24,   Significant change in rhythm: previously sinus   Significant repolarization change   Electronically Signed By: Rolando Gonzalez ([facility]) 23-May-2022 13:48:30   Date and Time of Study: 2022-05-19 13:55:56      ECG 12 Lead   Final Result   HEART RATE= 79  bpm   RR Interval= 760  ms   LA Interval= 207  ms   P Horizontal Axis= 182  deg   P Front Axis= 0  deg   QRSD Interval= 112  ms   QT Interval= 480  ms   QRS Axis= -54  deg   T Wave Axis= 218  deg   - ABNORMAL ECG -   Sinus rhythm   Incomplete left bundle branch block   LVH with secondary repolarization abnormality    Inferior infarct, old   Prolonged QT interval   When compared with ECG of 15-May-2022 4:58:24,   Significant change in rhythm: previously atrial fibrillation   Significant repolarization change   Electronically Signed By: Natan Terrazas ([facility]) 19-May-2022 15:12:19   Date and Time of Study: 2022-05-18 05:14:24      ECG 12 Lead   Final Result   HEART RATE= 77  bpm   RR Interval= 777  ms   VT Interval=   ms   P Horizontal Axis=   deg   P Front Axis=   deg   QRSD Interval= 118  ms   QT Interval= 401  ms   QRS Axis= -38  deg   T Wave Axis= 106  deg   - ABNORMAL ECG -   Atrial fibrillation   Incomplete left bundle branch block   LVH with secondary repolarization abnormality   Inferior infarct, old   Anterior infarct, old   When compared with ECG of 15-May-2022 2:51:28,   Significant repolarization change   Electronically Signed By: Vijay Gomez ([facility]) 18-May-2022 11:47:42   Date and Time of Study: 2022-05-15 04:58:24      ECG 12 Lead   Preliminary Result   HEART RATE= 77  bpm   RR Interval= 783  ms   VT Interval=   ms   P Horizontal Axis=   deg   P Front Axis=   deg   QRSD Interval= 117  ms   QT Interval= 515  ms   QRS Axis= -25  deg   T Wave Axis= 68  deg   - ABNORMAL ECG -   Atrial fibrillation   Incomplete left bundle branch block   Inferior infarct, old   Anterior Q waves, possibly due to ILBBB   Prolonged QT interval   No previous ECG available for comparison   Electronically Signed By:    Date and Time of Study: 2022-05-15 02:51:28      SCANNED - TELEMETRY     Final Result         SCANNED - TELEMETRY     Final Result         SCANNED - TELEMETRY     Final Result         SCANNED - TELEMETRY     Final Result         SCANNED - TELEMETRY     Final Result         SCANNED - TELEMETRY     Final Result         SCANNED - TELEMETRY     Final Result         SCANNED - TELEMETRY     Final Result         SCANNED - TELEMETRY     Final Result         SCANNED - TELEMETRY     Final Result         SCANNED - TELEMETRY     Final  "Result         ECG 12 Lead    (Results Pending)   ECG 12 Lead    (Results Pending)   ECG 12 Lead    (Results Pending)     I have personally reviewed EKG    Echocardiogram: Results for orders placed during the hospital encounter of 05/15/22    Adult Transthoracic Echo Limited W/ Cont if Necessary Per Protocol    Interpretation Summary  Normal LV size and contractility EF of 60 to 65%  Mild right Pennchlor enlargement, moderate to severe right atrial enlargement..  Catheter seen in RV.  Normal atrial size  Aortic valve appears structurally normal.  Mitral annular calcification severe seen.  Tricuspid valve appears structurally normal, moderate  regurgitation seen.  Calculated RV systolic pressure of 56 mmHg, consistent with pulmonary hypertension  No pericardial effusion seen.  Proximal aorta appears normal in size.      Lab Review   I have reviewed the labs      Results from last 7 days   Lab Units 06/02/22  0606   MAGNESIUM mg/dL 1.8     Results from last 7 days   Lab Units 06/02/22  0606   SODIUM mmol/L 135*   POTASSIUM mmol/L 4.0   BUN mg/dL 11   CREATININE mg/dL 0.59   CALCIUM mg/dL 9.9         Results from last 7 days   Lab Units 06/02/22  0606 06/01/22  0653 05/31/22  0453   WBC 10*3/mm3 5.60 3.80 8.20   HEMOGLOBIN g/dL 9.0* 8.6* 9.4*   HEMATOCRIT % 28.1* 27.4* 29.3*   PLATELETS 10*3/mm3 228 193 245           RADIOLOGY:  Imaging Results (Last 24 Hours)     ** No results found for the last 24 hours. **                )6/2/2022  Natan Terrazas MD      EMR Dragon/Transcription:   \"Dictated utilizing Dragon dictation\".   "

## 2022-06-02 NOTE — THERAPY TREATMENT NOTE
"Subjective: Pt agreeable to therapeutic plan of care. Sleeping soundly at time of entry, awakens to voice and removal of covers/cues to sit EOB.      Objective:      Bed mobility - Max-A  Difficulty adhering to sternal precautions  Transfers - Mod-A  Transfers from bed, to/from toilet, to recliner chair  Ambulation - 10' min A x 2 with RW; 75 feet Min-A with RW  Kyphotic posture, cues to improve- verbal and tactile     Vitals:  throughout ambulation     Cardiac Rehab Initiated  Level 3      Sitting tolerance: 5-10min  Standing tolerance: 1-5min     Precautions:   Mid-sternal incision; avoid scapular retraction and depression.   Cardiovascular impairment post-sx; encourage energy conservation strategies.  Max cues and assist to adhere to sternal precautions     Therapeutic Exercises: 10 reps LE AROM in seated position.      Pain: 3 VAS  Education: Provided education on importance of following sternal precautions, mobility and skilled verbal / tactile cueing throughout intervention.      Assessment: Mindi Quinteros presents POD 14 tissue AVR, MV repair, CABG x2 with LIMA, Maze procedure. Pt asleep in bed upon entry and took a few minutes to awaken. Once awake, oriented to name, time and location. Pt with difficulty adhering to sternal precautions. Kyphosis significantly complicates mobility, assisting patient. Ambulated to toilet, voided, followed by 75' ambulation around nurses station. Pt fatigues quickly during ambulation with HR at 125 bpm. Vitals returned to normal with return to chair. Pt presents with functional mobility impairments which indicate the need for skilled intervention. Tolerating session today without incident. Will continue to follow and progress as tolerated.      Plan/Recommendations:   Moderate Intensity Therapy recommended post-acute care. This is recommended as therapy feels the patient would require 3-4 days per week and wouldn't tolerate \"3 hour daily\" rehab intensity. SNF would be the " preferred choice. If the patient does not agree to SNF, arrange HH or OP depending on home bound status. If patient is medically complex, consider LTACH.. Pt requires no DME at discharge.      Pt desires Skilled Rehab placement at discharge. Pt cooperative; agreeable to therapeutic recommendations and plan of care.            Basic Mobility 6-click:  Rollin = Total, A lot = 2, A little = 3; 4 = None  Supine>Sit:                      1 = Total, A lot = 2, A little = 3; 4 = None   Sit>Stand with arms:       1 = Total, A lot = 2, A little = 3; 4 = None  Bed>Chair:                      1 = Total, A lot = 2, A little = 3; 4 = None  Ambulate in room:           1 = Total, A lot = 2, A little = 3; 4 = None  3-5 Steps with railin = Total, A lot = 2, A little = 3; 4 = None  Score: 10        Post-Tx Position: Up in Chair, Alarms activated and Call light and personal items within reach  PPE: gloves, surgical mask, eyewear protection

## 2022-06-02 NOTE — CASE MANAGEMENT/SOCIAL WORK
Continued Stay Note   [facility]     Patient Name: Mindi Quinteros  MRN: [id number]  Today's Date: 6/2/2022    Admit Date: 5/15/2022     Discharge Plan     Row Name 06/02/22 1235       Plan    Plan DC Plan: [facility] accepted and bed available. No precert Required. PASSR per facility. CABG/MAZE/ AVR/MVR 5/19/22.    Patient/Family in Agreement with Plan yes    Provided Post Acute Provider List? N/A    Provided Post Acute Provider Quality & Resource List? N/A    Plan Comments CM spoke with patient’s nurse Pennie and [facility] NP Clover Rivas to obtain clinical updates. Pennie reports patient has been sitter free since 6/2/22 at 00:01.No significant changes in condition or care plans to report. Plan for discharge on 6/3/22. FADY informed liaison Carolynn of current discharge plan.              LACE 12    Phone communication or documentation only- no physical contact with patient or family.      Karen Albert RN     Office Phone: [phone]  Office Cell:     [phone]

## 2022-06-02 NOTE — NURSING NOTE
Patient extremely confused and agitated over night.  Oriented only to person.  Klonopin 0.5 mg po given per prn order without relief of anxiety. Patient very restless attempting to get out of bed unassisted.  Patient transferred from bed to chair to bed and back to chair per patient request in attempts to make patient more comfortable.  Melatonin 10 mg po given per prn order. Patient did not sleep during the night.  Order received and Zyprexa 2.5 mg po given with no relief of restlessness or agitation.  Tylenol 650 mg po given for complaints of coccyx pain. Patient stated that medication did not relieve pain.  Patient at bedside at this time. Patient continually removing pulse ox reader, heart monitor leads, blood pressure cuff and nasal cannula.  Patient becomes very agitated when reapplied.  Attempts to reorient unsuccessful.

## 2022-06-02 NOTE — PROGRESS NOTES
"NEPHROLOGY PROGRESS NOTE------[facility]    [facility]  [phone]  Rosalie Barroso MD      Patient Care Team:  Dayana Tipton MD as PCP - General  Dayana Tipton MD as PCP - [facility]  [facility], Natan West MD as Consulting Physician (Cardiology)  Saira Barroso MD as Consulting Physician (Nephrology)      Provider:  Rosalie Barroso MD  Patient Name: Mindi Quinteros  :  1940    SUBJECTIVE:    F/U MORGAN    Up in chair. No angina. No dysuria     Medication:  aspirin, 81 mg, Oral, Daily  Barium Sulfate, 1 teaspoon(s), Oral, Once in imaging  bumetanide, 1 mg, Oral, BID  enoxaparin, 40 mg, Subcutaneous, Q24H  insulin lispro, 0-24 Units, Subcutaneous, Q6H  melatonin, 10 mg, Oral, Nightly  metoprolol succinate XL, 12.5 mg, Oral, Q24H  OLANZapine, 2.5 mg, Oral, Nightly  pantoprazole, 40 mg, Oral, QAM AC  potassium chloride, 20 mEq, Oral, TID  senna-docusate sodium, 2 tablet, Oral, Nightly           OBJECTIVE    Vital Sign Min/Max for last 24 hours  Temp  Min: 98.3 °F (36.8 °C)  Max: 99.2 °F (37.3 °C)   BP  Min: 93/52  Max: 147/58   Pulse  Min: 59  Max: 100   No data recorded   SpO2  Min: 90 %  Max: 100 %   No data recorded   Weight  Min: 58.3 kg (128 lb 8.5 oz)  Max: 58.3 kg (128 lb 8.5 oz)     Flowsheet Rows    Flowsheet Row First Filed Value   Admission Height 161.3 cm (63.5\") Documented at 05/15/2022 0241   Admission Weight 60.4 kg (133 lb 2.5 oz) Documented at 05/15/2022 0241          No intake/output data recorded.  I/O last 3 completed shifts:  In:  [P.O.:1700; I.V.:296]  Out:  [Urine:4625]    Physical Exam:    General Appearance: alert, appears stated age and cooperative  Head: normocephalic, without obvious abnormality and atraumatic +PERIORAL SORES  Eyes: conjunctivae and sclerae normal and no icterus  Neck: supple +MILD JVD  Lungs: DECREASED BS BIBASILAR  Heart: regular rhythm & normal rate and normal " S1, S2 +AKI  Chest Wall: S/P SURGICAL CHANGES  Abdomen: normal bowel sounds and soft non-tender  Extremities: moves extremities well, +TRACE BILAT LE EDEMA, no cyanosis +DJD  Skin: no bleeding, bruising or rash  Neurologic: Alert, and oriented. No focal deficits    Labs:    WBC WBC   Date Value Ref Range Status   06/02/2022 5.60 3.40 - 10.80 10*3/mm3 Final   06/01/2022 3.80 3.40 - 10.80 10*3/mm3 Final   05/31/2022 8.20 3.40 - 10.80 10*3/mm3 Final      HGB Hemoglobin   Date Value Ref Range Status   06/02/2022 9.0 (L) 12.0 - 15.9 g/dL Final   06/01/2022 8.6 (L) 12.0 - 15.9 g/dL Final   05/31/2022 9.4 (L) 12.0 - 15.9 g/dL Final      HCT Hematocrit   Date Value Ref Range Status   06/02/2022 28.1 (L) 34.0 - 46.6 % Final   06/01/2022 27.4 (L) 34.0 - 46.6 % Final   05/31/2022 29.3 (L) 34.0 - 46.6 % Final      Platlets No results found for: LABPLAT   MCV MCV   Date Value Ref Range Status   06/02/2022 84.7 79.0 - 97.0 fL Final   06/01/2022 85.1 79.0 - 97.0 fL Final   05/31/2022 84.1 79.0 - 97.0 fL Final          Sodium Sodium   Date Value Ref Range Status   06/02/2022 135 (L) 136 - 145 mmol/L Final   06/01/2022 136 136 - 145 mmol/L Final   05/31/2022 136 136 - 145 mmol/L Final      Potassium Potassium   Date Value Ref Range Status   06/02/2022 4.0 3.5 - 5.2 mmol/L Final   06/01/2022 4.2 3.5 - 5.2 mmol/L Final     Comment:     Result checked    05/31/2022 3.3 (L) 3.5 - 5.2 mmol/L Final      Chloride Chloride   Date Value Ref Range Status   06/02/2022 90 (L) 98 - 107 mmol/L Final   06/01/2022 91 (L) 98 - 107 mmol/L Final   05/31/2022 94 (L) 98 - 107 mmol/L Final      CO2 CO2   Date Value Ref Range Status   06/02/2022 33.0 (H) 22.0 - 29.0 mmol/L Final   06/01/2022 32.0 (H) 22.0 - 29.0 mmol/L Final   05/31/2022 35.0 (H) 22.0 - 29.0 mmol/L Final      BUN BUN   Date Value Ref Range Status   06/02/2022 11 8 - 23 mg/dL Final   06/01/2022 6 (L) 8 - 23 mg/dL Final   05/31/2022 8 8 - 23 mg/dL Final      Creatinine Creatinine   Date  Value Ref Range Status   06/02/2022 0.59 0.57 - 1.00 mg/dL Final   06/01/2022 0.55 (L) 0.57 - 1.00 mg/dL Final   05/31/2022 0.37 (L) 0.57 - 1.00 mg/dL Final      Calcium Calcium   Date Value Ref Range Status   06/02/2022 9.9 8.6 - 10.5 mg/dL Final   06/01/2022 9.8 8.6 - 10.5 mg/dL Final   05/31/2022 8.4 (L) 8.6 - 10.5 mg/dL Final      PO4 No components found for: PO4   Albumin Albumin   Date Value Ref Range Status   06/02/2022 4.10 3.50 - 5.20 g/dL Final   06/01/2022 3.80 3.50 - 5.20 g/dL Final   05/31/2022 2.70 (L) 3.50 - 5.20 g/dL Final      Magnesium Magnesium   Date Value Ref Range Status   06/02/2022 1.8 1.6 - 2.4 mg/dL Final   06/01/2022 1.7 1.6 - 2.4 mg/dL Final   05/31/2022 1.8 1.6 - 2.4 mg/dL Final      Uric Acid No components found for: URIC ACID     Imaging Results (Last 72 Hours)     Procedure Component Value Units Date/Time    SCANNED - IMAGING [[id number]] Resulted: 05/15/22     Updated: 05/31/22 1405          Results for orders placed during the hospital encounter of 05/15/22    SCANNED - IMAGING      XR Chest 1 View    Narrative  DATE OF EXAM:  5/29/2022 6:16 PM    PROCEDURE:  XR CHEST 1 VW-    INDICATIONS:  right chest tube discontinued 12pm,  follow up effusion, assess for ptx;  I50.9-Heart failure, unspecified; I50.33-Acute on chronic diastolic  (congestive) heart failure; Z82.62-Family history of osteoporosis;  K80.01-Calculus of gallbladder with acute cholecystitis with  obstruction; E55.9-Vitamin D deficiency, unspecified; E11.9-Type 2  diabetes mellitus without complications; I10-Essential (primary    COMPARISON:  Same day    TECHNIQUE:  Single radiographic AP view of the chest was obtained.    FINDINGS:  Right pleural catheter has been removed. There is no pneumothorax. There  is no significant change in moderate bilateral pleural effusions. There  is persistent prominence of the central pulmonary vasculature with  indistinct peripheral vasculature. No new abnormality is  demonstrated    Impression  1. Status post right pleural catheter removal, no pneumothorax  2. Stable moderate bilateral pleural effusions  3. Persistent pulmonary vascular congestion    Electronically Signed By-Jaime Jacobson On:5/29/2022 6:20 PM  This report was finalized on [id number] by  Jaime Jacobson, .      XR Chest 1 View    Narrative  DATE OF EXAM:  5/29/2022 3:17 AM    PROCEDURE:  XR CHEST 1 VW-    INDICATIONS:  chest tube clamped, re-eval effusion; I50.9-Heart failure, unspecified;  I50.33-Acute on chronic diastolic (congestive) heart failure;  Z82.62-Family history of osteoporosis; K80.01-Calculus of gallbladder  with acute cholecystitis with obstruction; E55.9-Vitamin D deficiency,  unspecified; E11.9-Type 2 diabetes mellitus without complications;  I10-Essential (primary) hypertension; I10-Essential (prim    COMPARISON:  No Comparisons Available    TECHNIQUE:  Single radiographic AP view of the chest was obtained.    FINDINGS:  Status post coronary bypass and aortic and mitral valve repairs. Right  pleural catheter remains in place. Heart size stable. Increased  prominence of the central pulmonary vasculature and indistinctness of  the peripheral pulmonary vasculature. Persistent moderate bilateral  pleural effusions    Impression  1. Worsening pulmonary vascular congestion  2. Grossly stable moderate bilateral pleural effusions    Electronically Signed By-Jaime Jacobson On:5/29/2022 10:05 AM  This report was finalized on [id number] by  Jaime Jacobson, .      Results for orders placed during the hospital encounter of 05/15/22    Duplex Vein Mapping Lower Extremity - Bilateral CAR    Interpretation Summary  · The right greater saphenous vein is patent and of adequate size in the thigh.  · The left greater saphenous vein is patent and of adequate size in the thigh.        ASSESSMENT / PLAN      Age-related osteoporosis without current pathological fracture    Angina pectoris (HCC)    CAD  (coronary artery disease)    Dyspnea on exertion    Hyperlipidemia    Hypertension    Diabetes mellitus with coincident hypertension (HCC)    Vitamin D deficiency    Cholelithiasis and acute cholecystitis with obstruction    Family hx osteoporosis    Ulcerative colitis (HCC)    Acute on chronic heart failure with preserved ejection fraction (HCC)    Acute congestive heart failure, unspecified heart failure type (HCC)    Mitral valve insufficiency    Nonrheumatic aortic valve insufficiency    Hypomagnesemia    1. ARF/MORGAN-------Nonoliguric. MORGAN resolved.      2. ACIDOSIS------Resolved     3. DIGOXIN TOXICITY-------Resolved     4. ANEMIA-------S/P IV iron for MARLO. Normocytic and only mildly elevated RDW     5. HYPOCALCEMIA-------replaced     6. CAD S/P CABG x 2/MVR/AVR/MAZE/ATRIAL APPENDAGE LIGATION------per Cardiology and CT Surgery     7. HYPERLIPIDEMIA------On Statin. CK, TSH ok     8. H/O ULCERATIVE COLITIS     9. HYPOALBUMINEMIA-----S/P IV Albumin to temporize     10. HYPOALBUMINEMIA     11. DVT PROPHYLAXIS------On Lovenox. Follow platelets given thrombocytopenia     12. GERD/PUD PROPHYLAXIS------PPI. Benefits outweigh risks despite ARF/MORGAN     13. ELEVATED LFTS/TRANSAMINASES    14. HYPERNATREMIA----Resolved. Follow with diuretic exposure    15. HYPOPHOSPHATEMIA------Replaced    16. THROMBOCYTOPENIA    17. MILD VOLUME EXCESS/3RD SPACED EDEMA-------Nice diuresis on po Bumex and follow.      18. HYPOMAGNESEMIA------Replaced    19. METABOLIC ALKALOSIS-------Secondary to Bumex. Mild. Follow for need to back down on Bumex    Rosalie Barroso MD  [facility]  [phone]  06/02/22  07:41 EDT

## 2022-06-03 VITALS
OXYGEN SATURATION: 91 % | RESPIRATION RATE: 16 BRPM | BODY MASS INDEX: 24.1 KG/M2 | WEIGHT: 130.95 LBS | SYSTOLIC BLOOD PRESSURE: 134 MMHG | HEIGHT: 62 IN | TEMPERATURE: 97.5 F | HEART RATE: 85 BPM | DIASTOLIC BLOOD PRESSURE: 54 MMHG

## 2022-06-03 PROBLEM — I50.23 ACUTE ON CHRONIC SYSTOLIC HEART FAILURE: Status: ACTIVE | Noted: 2022-06-03

## 2022-06-03 LAB
ALBUMIN SERPL-MCNC: 3.5 G/DL (ref 3.5–5.2)
ALBUMIN/GLOB SERPL: 1.5 G/DL
ALP SERPL-CCNC: 117 U/L (ref 39–117)
ALT SERPL W P-5'-P-CCNC: 29 U/L (ref 1–33)
ANION GAP SERPL CALCULATED.3IONS-SCNC: 12 MMOL/L (ref 5–15)
AST SERPL-CCNC: 39 U/L (ref 1–32)
BILIRUB SERPL-MCNC: 1.1 MG/DL (ref 0–1.2)
BUN SERPL-MCNC: 11 MG/DL (ref 8–23)
BUN/CREAT SERPL: 15.3 (ref 7–25)
CALCIUM SPEC-SCNC: 9.9 MG/DL (ref 8.6–10.5)
CHLORIDE SERPL-SCNC: 93 MMOL/L (ref 98–107)
CO2 SERPL-SCNC: 31 MMOL/L (ref 22–29)
CREAT SERPL-MCNC: 0.72 MG/DL (ref 0.57–1)
DEPRECATED RDW RBC AUTO: 56.9 FL (ref 37–54)
EGFRCR SERPLBLD CKD-EPI 2021: 84.1 ML/MIN/1.73
ERYTHROCYTE [DISTWIDTH] IN BLOOD BY AUTOMATED COUNT: 19.5 % (ref 12.3–15.4)
GLOBULIN UR ELPH-MCNC: 2.3 GM/DL
GLUCOSE BLDC GLUCOMTR-MCNC: 232 MG/DL (ref 70–105)
GLUCOSE BLDC GLUCOMTR-MCNC: 317 MG/DL (ref 70–105)
GLUCOSE BLDC GLUCOMTR-MCNC: 335 MG/DL (ref 70–105)
GLUCOSE SERPL-MCNC: 124 MG/DL (ref 65–99)
HCT VFR BLD AUTO: 27.1 % (ref 34–46.6)
HGB BLD-MCNC: 8.9 G/DL (ref 12–15.9)
MAGNESIUM SERPL-MCNC: 1.9 MG/DL (ref 1.6–2.4)
MCH RBC QN AUTO: 27.3 PG (ref 26.6–33)
MCHC RBC AUTO-ENTMCNC: 32.8 G/DL (ref 31.5–35.7)
MCV RBC AUTO: 83.3 FL (ref 79–97)
PHOSPHATE SERPL-MCNC: 3.9 MG/DL (ref 2.5–4.5)
PLATELET # BLD AUTO: 175 10*3/MM3 (ref 140–450)
PMV BLD AUTO: 8.1 FL (ref 6–12)
POTASSIUM SERPL-SCNC: 3.9 MMOL/L (ref 3.5–5.2)
PROT SERPL-MCNC: 5.8 G/DL (ref 6–8.5)
RBC # BLD AUTO: 3.25 10*6/MM3 (ref 3.77–5.28)
SODIUM SERPL-SCNC: 136 MMOL/L (ref 136–145)
WBC NRBC COR # BLD: 3.4 10*3/MM3 (ref 3.4–10.8)

## 2022-06-03 PROCEDURE — 82962 GLUCOSE BLOOD TEST: CPT

## 2022-06-03 PROCEDURE — 80051 ELECTROLYTE PANEL: CPT

## 2022-06-03 PROCEDURE — 80053 COMPREHEN METABOLIC PANEL: CPT | Performed by: THORACIC SURGERY (CARDIOTHORACIC VASCULAR SURGERY)

## 2022-06-03 PROCEDURE — 82330 ASSAY OF CALCIUM: CPT

## 2022-06-03 PROCEDURE — 99232 SBSQ HOSP IP/OBS MODERATE 35: CPT | Performed by: INTERNAL MEDICINE

## 2022-06-03 PROCEDURE — 85018 HEMOGLOBIN: CPT

## 2022-06-03 PROCEDURE — 99024 POSTOP FOLLOW-UP VISIT: CPT | Performed by: NURSE PRACTITIONER

## 2022-06-03 PROCEDURE — 92526 ORAL FUNCTION THERAPY: CPT

## 2022-06-03 PROCEDURE — 25010000002 MAGNESIUM SULFATE IN D5W 1G/100ML (PREMIX) 1-5 GM/100ML-% SOLUTION: Performed by: INTERNAL MEDICINE

## 2022-06-03 PROCEDURE — 82803 BLOOD GASES ANY COMBINATION: CPT

## 2022-06-03 PROCEDURE — 63710000001 INSULIN GLARGINE PER 5 UNITS: Performed by: INTERNAL MEDICINE

## 2022-06-03 PROCEDURE — 84100 ASSAY OF PHOSPHORUS: CPT | Performed by: THORACIC SURGERY (CARDIOTHORACIC VASCULAR SURGERY)

## 2022-06-03 PROCEDURE — 85027 COMPLETE CBC AUTOMATED: CPT | Performed by: THORACIC SURGERY (CARDIOTHORACIC VASCULAR SURGERY)

## 2022-06-03 PROCEDURE — 83735 ASSAY OF MAGNESIUM: CPT | Performed by: THORACIC SURGERY (CARDIOTHORACIC VASCULAR SURGERY)

## 2022-06-03 PROCEDURE — 63710000001 INSULIN LISPRO (HUMAN) PER 5 UNITS: Performed by: THORACIC SURGERY (CARDIOTHORACIC VASCULAR SURGERY)

## 2022-06-03 RX ORDER — POTASSIUM CHLORIDE 1.5 G/1.77G
20 POWDER, FOR SOLUTION ORAL 2 TIMES DAILY
Status: DISCONTINUED | OUTPATIENT
Start: 2022-06-03 | End: 2022-06-03 | Stop reason: HOSPADM

## 2022-06-03 RX ORDER — METOPROLOL SUCCINATE 25 MG/1
12.5 TABLET, EXTENDED RELEASE ORAL
Qty: 30 TABLET | Refills: 1 | Status: SHIPPED | OUTPATIENT
Start: 2022-06-04 | End: 2022-12-09 | Stop reason: SDUPTHER

## 2022-06-03 RX ORDER — GLIPIZIDE 5 MG/1
5 TABLET ORAL
Status: DISCONTINUED | OUTPATIENT
Start: 2022-06-03 | End: 2022-06-03 | Stop reason: HOSPADM

## 2022-06-03 RX ORDER — BUMETANIDE 1 MG/1
1 TABLET ORAL DAILY
Status: DISCONTINUED | OUTPATIENT
Start: 2022-06-03 | End: 2022-06-03

## 2022-06-03 RX ORDER — OLANZAPINE 2.5 MG/1
2.5 TABLET ORAL NIGHTLY
Qty: 15 TABLET | Refills: 0 | Status: SHIPPED | OUTPATIENT
Start: 2022-06-03 | End: 2022-06-27

## 2022-06-03 RX ORDER — BUMETANIDE 1 MG/1
1.5 TABLET ORAL DAILY
Qty: 45 TABLET | Refills: 1 | Status: SHIPPED | OUTPATIENT
Start: 2022-06-04 | End: 2022-07-06 | Stop reason: ALTCHOICE

## 2022-06-03 RX ORDER — LISINOPRIL 2.5 MG/1
2.5 TABLET ORAL DAILY
Qty: 30 TABLET | Refills: 1 | Status: SHIPPED | OUTPATIENT
Start: 2022-06-03 | End: 2022-11-10

## 2022-06-03 RX ORDER — INSULIN LISPRO 100 [IU]/ML
0-24 INJECTION, SOLUTION INTRAVENOUS; SUBCUTANEOUS AS NEEDED
Refills: 12
Start: 2022-06-03 | End: 2022-11-10

## 2022-06-03 RX ORDER — ACETAMINOPHEN 325 MG/1
650 TABLET ORAL EVERY 6 HOURS PRN
Qty: 30 TABLET | Refills: 0 | Status: SHIPPED | OUTPATIENT
Start: 2022-06-03

## 2022-06-03 RX ORDER — GLIPIZIDE 5 MG/1
5 TABLET ORAL
Qty: 30 TABLET | Refills: 1 | Status: SHIPPED | OUTPATIENT
Start: 2022-06-04 | End: 2022-11-10

## 2022-06-03 RX ORDER — POTASSIUM CHLORIDE 1.5 G/1.77G
20 POWDER, FOR SOLUTION ORAL DAILY
Status: DISCONTINUED | OUTPATIENT
Start: 2022-06-03 | End: 2022-06-03

## 2022-06-03 RX ORDER — BUMETANIDE 1 MG/1
1.5 TABLET ORAL DAILY
Status: DISCONTINUED | OUTPATIENT
Start: 2022-06-03 | End: 2022-06-03 | Stop reason: HOSPADM

## 2022-06-03 RX ORDER — NICOTINE POLACRILEX 4 MG
15 LOZENGE BUCCAL
Start: 2022-06-03 | End: 2022-11-10

## 2022-06-03 RX ORDER — POTASSIUM CHLORIDE 1.5 G/1.77G
20 POWDER, FOR SOLUTION ORAL 2 TIMES DAILY
Qty: 60 PACKET | Refills: 1 | Status: SHIPPED | OUTPATIENT
Start: 2022-06-03 | End: 2023-01-13 | Stop reason: DRUGHIGH

## 2022-06-03 RX ORDER — MAGNESIUM SULFATE 1 G/100ML
1 INJECTION INTRAVENOUS ONCE
Status: COMPLETED | OUTPATIENT
Start: 2022-06-03 | End: 2022-06-03

## 2022-06-03 RX ORDER — METFORMIN HYDROCHLORIDE 500 MG/1
2000 TABLET, EXTENDED RELEASE ORAL
Status: DISCONTINUED | OUTPATIENT
Start: 2022-06-03 | End: 2022-06-03 | Stop reason: HOSPADM

## 2022-06-03 RX ORDER — INSULIN LISPRO 100 [IU]/ML
0-24 INJECTION, SOLUTION INTRAVENOUS; SUBCUTANEOUS EVERY 6 HOURS SCHEDULED
Refills: 12
Start: 2022-06-03 | End: 2022-11-10

## 2022-06-03 RX ORDER — CHOLECALCIFEROL (VITAMIN D3) 125 MCG
10 CAPSULE ORAL NIGHTLY
Qty: 30 TABLET | Refills: 0 | Status: SHIPPED | OUTPATIENT
Start: 2022-06-03 | End: 2022-12-07

## 2022-06-03 RX ADMIN — INSULIN GLARGINE 12 UNITS: 100 INJECTION, SOLUTION SUBCUTANEOUS at 10:38

## 2022-06-03 RX ADMIN — PANTOPRAZOLE SODIUM 40 MG: 40 TABLET, DELAYED RELEASE ORAL at 10:34

## 2022-06-03 RX ADMIN — MAGNESIUM SULFATE 1 G: 1 INJECTION INTRAVENOUS at 10:34

## 2022-06-03 RX ADMIN — APIXABAN 2.5 MG: 2.5 TABLET, FILM COATED ORAL at 10:34

## 2022-06-03 RX ADMIN — OLANZAPINE 2.5 MG: 5 TABLET, FILM COATED ORAL at 00:13

## 2022-06-03 RX ADMIN — INSULIN LISPRO 16 UNITS: 100 INJECTION, SOLUTION INTRAVENOUS; SUBCUTANEOUS at 12:48

## 2022-06-03 RX ADMIN — ACETAMINOPHEN 650 MG: 325 TABLET ORAL at 00:13

## 2022-06-03 RX ADMIN — ASPIRIN 81 MG: 81 TABLET, COATED ORAL at 10:34

## 2022-06-03 RX ADMIN — METOPROLOL SUCCINATE 12.5 MG: 25 TABLET, EXTENDED RELEASE ORAL at 10:34

## 2022-06-03 RX ADMIN — POTASSIUM CHLORIDE 20 MEQ: 1.5 POWDER, FOR SOLUTION ORAL at 10:32

## 2022-06-03 RX ADMIN — BUMETANIDE 1.5 MG: 1 TABLET ORAL at 10:34

## 2022-06-03 RX ADMIN — METFORMIN HYDROCHLORIDE 2000 MG: 500 TABLET, EXTENDED RELEASE ORAL at 12:49

## 2022-06-03 RX ADMIN — GLIPIZIDE 5 MG: 5 TABLET ORAL at 12:49

## 2022-06-03 RX ADMIN — INSULIN LISPRO 8 UNITS: 100 INJECTION, SOLUTION INTRAVENOUS; SUBCUTANEOUS at 02:09

## 2022-06-03 NOTE — CASE MANAGEMENT/SOCIAL WORK
Continued Stay Note   [facility]     Patient Name: Mindi Quinteros  MRN: [id number]  Today's Date: 6/3/2022    Admit Date: 5/15/2022     Discharge Plan     Row Name 06/03/22 1345       Plan    Plan DC Plan: [facility] accepted and bed available. No precert Required. PASSR per facility. CABG/MAZE/ AVR/MVR 5/19/22.    Patient/Family in Agreement with Plan yes    Provided Post Acute Provider List? N/A    Provided Post Acute Provider Quality & Resource List? N/A    Plan Comments CM spoke with patient’s nurse and [facility] NP Clover Rivas to obtain clinical updates. Patient is ready to discharge today 6/3/22 if a bed is available. CM reached out to [facility]scar [facility]as to confirm bed is still available. Eloise reports it is available and she will be going to room 311 at [facility]. CM reached out to [facility] to run cost analysis for Eliquis. See notes. Pharmacy additionally ran cost for Brilinta and Plavix. CM spoke to [facility] NP Clover to discuss cost analysis. Clover requests to run an additional cost analysis on Xarelto. CM reached out to [facility] pharmacy a second time to request. CM reported back cost to [facility] NP Clover which is also very high per note from pharmacy. Clover states they will send to facility on 30 days of Eliquis and then re evaluate medication for anticoagulation at follow up. CM contacted retail phamacy to inquire if they could address 30 day suppy of Eliquis for free since the patient is going to a facility with a satellite pharmacy. Retail pharmacy will provide 1st thirty day to bedside in patient room to transport to facility with patient. CM notified patient nurse Pennie, oscar Navas, and patients daughter Michelle Quinteros of the plan to supply Eliquis and take to the facility to be administered. All verbalized understanding.CM will continue to follow for any additional needs that may develop.                Expected Discharge Date and Time     Expected Discharge Date Expected Discharge Time    Branden 3, 2022          Phone communication or documentation only- no physical contact with patient or family.      Karen Albert RN      Office Phone: [phone]  Office Cell:     [phone]

## 2022-06-03 NOTE — PROGRESS NOTES
S/P POD# 15 urgent CABG x2 with LIMA/ tissue AVR/ MV repair/ Maze procedure with CRAIG ligation--Sary  EF 45-50% (echo) preop    Subjective:  Reports she is ready to get up in chair this morning     No events overnight  Eliquis started yest 2.5 mg bid  Room changed yesterday so she would have a window to help with days/nights  Wt is down 1 kgs from preop        Intake/Output Summary (Last 24 hours) at 6/3/2022 1037  Last data filed at 6/3/2022 0649  Gross per 24 hour   Intake 240 ml   Output 1200 ml   Net -960 ml     Temp:  [97.5 °F (36.4 °C)-98.4 °F (36.9 °C)] 97.5 °F (36.4 °C)  Heart Rate:  [] 88  Resp:  [16-26] 16  BP: (110-154)/(51-85) 129/63        Results from last 7 days   Lab Units 06/03/22  0418 06/02/22  0606   WBC 10*3/mm3 3.40 5.60   HEMOGLOBIN g/dL 8.9* 9.0*   HEMATOCRIT % 27.1* 28.1*   PLATELETS 10*3/mm3 175 228     Results from last 7 days   Lab Units 06/03/22  0418   CREATININE mg/dL 0.72   POTASSIUM mmol/L 3.9   SODIUM mmol/L 136   MAGNESIUM mg/dL 1.9   PHOSPHORUS mg/dL 3.9       Physical Exam:  Neuro intact, NAD, asleep in bed  Tele:  afib 70-80s  Equal breath sounds, coarse, 96% RA  Sternotomy/SVHS healing well, skin tear with Mepilex on left shin  Benign abd, + BM  + BLE pitting edema     Assessment/Plan:  Active Problems:    Age-related osteoporosis without current pathological fracture    Angina pectoris (HCC)    CAD (coronary artery disease)    Dyspnea on exertion    Hyperlipidemia    Hypertension    Diabetes mellitus with coincident hypertension (HCC)    Vitamin D deficiency    Cholelithiasis and acute cholecystitis with obstruction    Family hx osteoporosis    Ulcerative colitis (HCC)    Acute on chronic heart failure with preserved ejection fraction (HCC)    Acute congestive heart failure, unspecified heart failure type (HCC)    Mitral valve insufficiency    Nonrheumatic aortic valve insufficiency    Hypomagnesemia    - Severe AI, moderate MR, CAD, EF 45-50% (echo)--s/p tissue AVR, MV  repair, CABG x2 with LIMA, Maze procedure with CRAIG ligation (Pagni)  - New onset atrial fib, preop/postop--s/p maze procedure/CRAIG ligation  - Acute on chronic HFrEF, NYHA class III/IV--optimize meds prior to dc  - HTN--stable  - HLD--statin after LFTs normalize  - T2DM--preop a1c 7, SSI  - Ulcerative colitis--on Endocort/Bentyl/Colestipol preop  - Postop ABLA, expected--transfused 5/19, 5/22  - Postop TCP, expected--r/t consumption, improving  - Postop elevated transaminitis--likely d/t shocky liver, improving  - Postop RV dysfunction--milrinone iv/inhaled stopped  - Frailty--likely rehab at dc  - Renal failure, anuria--renal consulted, resolved  - Metabolic acidosis--IV bicarb, resolved  - Postop pneumonia, Klebsiella oxytoca--on Rocephin  - Right pleural effusion--CT placement 5/23, 1.6L removed, CT removal 5/29  - Postop confusion, multifactorial--resolving    POD# 15.  Doing well.  On scheduled melatonin and Zyprexa.  On asa/bb/statin on hold d/t elevated LFTs (altho improved).  Renal managing diuretics.  Mobilize.  Ceftriaxone for Klebsiella pna completed.  Replete e-.  D/w Dr. Terrazas this morning.    Routine care--as above  D/w pt/nsg, Dr. Ornelas, Dr. Terrazas  Anticipate [facility] at discharge--her  is there currently.  Nash lives at their home to assist as well.    Clover Beck, APRN  6/3/2022  10:37 EDT

## 2022-06-03 NOTE — PHARMACY RECOMMENDATION
"Transitions-of-Care ([facility]) Pharmacy Future COST Assessment:     /Nurse requesting test claim: N/A - this patient is on the high-cost drug report list    Pharmacy ran test claim for the following:     Drug Sig Covered/PA required Patient Copay per month   Xarelto (rivaroxaban) QD Covered without PA $ 318.00     Patient Insurance Type: Medicare - this means they are NOT eligible for a monthly discount card in the future (see \"free month\" note below)    Deductible/Medicare Gap Issue? Unknown    Is patient signed up for M2B service? no    Is above drug(s) eligible for 1 month free through M2B service? N/A - patient discharging to facility/LTC/etc.  If eligible,  or [facility] pharmacy can provide coupon upon request.           For billing questions, reach out to [facility] at [phone]  For M2B questions, reach out to Retail Pharmacy at [phone]    Maraino Falk PharmHINA   6/3/2022 12:26 EDT    "

## 2022-06-03 NOTE — PROGRESS NOTES
"NEPHROLOGY PROGRESS NOTE------[facility]    [facility]  [phone]  Rosalie Barroso MD      Patient Care Team:  Dayana Tipton MD as PCP - General  Dayana Tipton MD as PCP - [facility]  [facility], Natan West MD as Consulting Physician (Cardiology)  Saira Barroso MD as Consulting Physician (Nephrology)      Provider:  Rosalie Barroso MD  Patient Name: Mindi Quinteros  :  1940    SUBJECTIVE:    F/U MORGAN    Feeling and breathing okay. Anxious to be d/c to rehab.    Medication:  apixaban, 2.5 mg, Oral, Q12H  aspirin, 81 mg, Oral, Daily  Barium Sulfate, 1 teaspoon(s), Oral, Once in imaging  bumetanide, 1 mg, Oral, BID  insulin glargine, 12 Units, Subcutaneous, Daily  insulin lispro, 0-24 Units, Subcutaneous, Q6H  melatonin, 10 mg, Oral, Nightly  metoprolol succinate XL, 12.5 mg, Oral, Q24H  OLANZapine, 2.5 mg, Oral, Nightly  pantoprazole, 40 mg, Oral, QAM AC  potassium chloride, 20 mEq, Oral, TID  senna-docusate sodium, 2 tablet, Oral, Nightly           OBJECTIVE    Vital Sign Min/Max for last 24 hours  Temp  Min: 97.5 °F (36.4 °C)  Max: 98.4 °F (36.9 °C)   BP  Min: 110/59  Max: 154/65   Pulse  Min: 78  Max: 124   Resp  Min: 22  Max: 26   SpO2  Min: 76 %  Max: 100 %   No data recorded   Weight  Min: 59.4 kg (130 lb 15.3 oz)  Max: 59.4 kg (130 lb 15.3 oz)     Flowsheet Rows    Flowsheet Row First Filed Value   Admission Height 161.3 cm (63.5\") Documented at 05/15/2022 0241   Admission Weight 60.4 kg (133 lb 2.5 oz) Documented at 05/15/2022 0241          No intake/output data recorded.  I/O last 3 completed shifts:  In: 480 [P.O.:480]  Out: 2100 [Urine:2100]    Physical Exam:    General Appearance: alert, appears stated age and cooperative  Head: normocephalic, without obvious abnormality and atraumatic +PERIORAL SORES  Eyes: conjunctivae and sclerae normal and no icterus  Neck: supple +MILD JVD  Lungs: DECREASED BS " BIBASILAR  Heart: regular rhythm & normal rate and normal S1, S2 +AKI  Chest Wall: S/P SURGICAL CHANGES  Abdomen: normal bowel sounds and soft non-tender  Extremities: moves extremities well, +TRACE BILAT LE EDEMA, no cyanosis +DJD  Skin: no bleeding, bruising or rash  Neurologic: Alert, and oriented. No focal deficits    Labs:    WBC WBC   Date Value Ref Range Status   06/03/2022 3.40 3.40 - 10.80 10*3/mm3 Final   06/02/2022 5.60 3.40 - 10.80 10*3/mm3 Final   06/01/2022 3.80 3.40 - 10.80 10*3/mm3 Final      HGB Hemoglobin   Date Value Ref Range Status   06/03/2022 8.9 (L) 12.0 - 15.9 g/dL Final   06/02/2022 9.0 (L) 12.0 - 15.9 g/dL Final   06/01/2022 8.6 (L) 12.0 - 15.9 g/dL Final      HCT Hematocrit   Date Value Ref Range Status   06/03/2022 27.1 (L) 34.0 - 46.6 % Final   06/02/2022 28.1 (L) 34.0 - 46.6 % Final   06/01/2022 27.4 (L) 34.0 - 46.6 % Final      Platlets No results found for: LABPLAT   MCV MCV   Date Value Ref Range Status   06/03/2022 83.3 79.0 - 97.0 fL Final   06/02/2022 84.7 79.0 - 97.0 fL Final   06/01/2022 85.1 79.0 - 97.0 fL Final          Sodium Sodium   Date Value Ref Range Status   06/03/2022 136 136 - 145 mmol/L Final   06/02/2022 135 (L) 136 - 145 mmol/L Final   06/01/2022 136 136 - 145 mmol/L Final      Potassium Potassium   Date Value Ref Range Status   06/03/2022 3.9 3.5 - 5.2 mmol/L Final     Comment:     Slight hemolysis detected by analyzer. Results may be affected.   06/02/2022 4.0 3.5 - 5.2 mmol/L Final   06/01/2022 4.2 3.5 - 5.2 mmol/L Final     Comment:     Result checked       Chloride Chloride   Date Value Ref Range Status   06/03/2022 93 (L) 98 - 107 mmol/L Final   06/02/2022 90 (L) 98 - 107 mmol/L Final   06/01/2022 91 (L) 98 - 107 mmol/L Final      CO2 CO2   Date Value Ref Range Status   06/03/2022 31.0 (H) 22.0 - 29.0 mmol/L Final   06/02/2022 33.0 (H) 22.0 - 29.0 mmol/L Final   06/01/2022 32.0 (H) 22.0 - 29.0 mmol/L Final      BUN BUN   Date Value Ref Range Status    06/03/2022 11 8 - 23 mg/dL Final   06/02/2022 11 8 - 23 mg/dL Final   06/01/2022 6 (L) 8 - 23 mg/dL Final      Creatinine Creatinine   Date Value Ref Range Status   06/03/2022 0.72 0.57 - 1.00 mg/dL Final   06/02/2022 0.59 0.57 - 1.00 mg/dL Final   06/01/2022 0.55 (L) 0.57 - 1.00 mg/dL Final      Calcium Calcium   Date Value Ref Range Status   06/03/2022 9.9 8.6 - 10.5 mg/dL Final   06/02/2022 9.9 8.6 - 10.5 mg/dL Final   06/01/2022 9.8 8.6 - 10.5 mg/dL Final      PO4 No components found for: PO4   Albumin Albumin   Date Value Ref Range Status   06/03/2022 3.50 3.50 - 5.20 g/dL Final   06/02/2022 4.10 3.50 - 5.20 g/dL Final   06/01/2022 3.80 3.50 - 5.20 g/dL Final      Magnesium Magnesium   Date Value Ref Range Status   06/03/2022 1.9 1.6 - 2.4 mg/dL Final   06/02/2022 1.8 1.6 - 2.4 mg/dL Final   06/01/2022 1.7 1.6 - 2.4 mg/dL Final      Uric Acid No components found for: URIC ACID     Imaging Results (Last 72 Hours)     Procedure Component Value Units Date/Time    SCANNED - IMAGING [[id number]] Resulted: 05/15/22     Updated: 05/31/22 1405          Results for orders placed during the hospital encounter of 05/15/22    SCANNED - IMAGING      XR Chest 1 View    Narrative  DATE OF EXAM:  5/29/2022 6:16 PM    PROCEDURE:  XR CHEST 1 VW-    INDICATIONS:  right chest tube discontinued 12pm,  follow up effusion, assess for ptx;  I50.9-Heart failure, unspecified; I50.33-Acute on chronic diastolic  (congestive) heart failure; Z82.62-Family history of osteoporosis;  K80.01-Calculus of gallbladder with acute cholecystitis with  obstruction; E55.9-Vitamin D deficiency, unspecified; E11.9-Type 2  diabetes mellitus without complications; I10-Essential (primary    COMPARISON:  Same day    TECHNIQUE:  Single radiographic AP view of the chest was obtained.    FINDINGS:  Right pleural catheter has been removed. There is no pneumothorax. There  is no significant change in moderate bilateral pleural effusions. There  is persistent  prominence of the central pulmonary vasculature with  indistinct peripheral vasculature. No new abnormality is demonstrated    Impression  1. Status post right pleural catheter removal, no pneumothorax  2. Stable moderate bilateral pleural effusions  3. Persistent pulmonary vascular congestion    Electronically Signed By-Jaime Jacobson On:5/29/2022 6:20 PM  This report was finalized on [id number] by  Jaime Jacobson, .      XR Chest 1 View    Narrative  DATE OF EXAM:  5/29/2022 3:17 AM    PROCEDURE:  XR CHEST 1 VW-    INDICATIONS:  chest tube clamped, re-eval effusion; I50.9-Heart failure, unspecified;  I50.33-Acute on chronic diastolic (congestive) heart failure;  Z82.62-Family history of osteoporosis; K80.01-Calculus of gallbladder  with acute cholecystitis with obstruction; E55.9-Vitamin D deficiency,  unspecified; E11.9-Type 2 diabetes mellitus without complications;  I10-Essential (primary) hypertension; I10-Essential (prim    COMPARISON:  No Comparisons Available    TECHNIQUE:  Single radiographic AP view of the chest was obtained.    FINDINGS:  Status post coronary bypass and aortic and mitral valve repairs. Right  pleural catheter remains in place. Heart size stable. Increased  prominence of the central pulmonary vasculature and indistinctness of  the peripheral pulmonary vasculature. Persistent moderate bilateral  pleural effusions    Impression  1. Worsening pulmonary vascular congestion  2. Grossly stable moderate bilateral pleural effusions    Electronically Signed ByConnie Jacobson On:5/29/2022 10:05 AM  This report was finalized on [id number] by  Jaime Jacobson, .      Results for orders placed during the hospital encounter of 05/15/22    Duplex Vein Mapping Lower Extremity - Bilateral CAR    Interpretation Summary  · The right greater saphenous vein is patent and of adequate size in the thigh.  · The left greater saphenous vein is patent and of adequate size in the thigh.        ASSESSMENT /  PLAN      Age-related osteoporosis without current pathological fracture    Angina pectoris (HCC)    CAD (coronary artery disease)    Dyspnea on exertion    Hyperlipidemia    Hypertension    Diabetes mellitus with coincident hypertension (HCC)    Vitamin D deficiency    Cholelithiasis and acute cholecystitis with obstruction    Family hx osteoporosis    Ulcerative colitis (HCC)    Acute on chronic heart failure with preserved ejection fraction (HCC)    Acute congestive heart failure, unspecified heart failure type (HCC)    Mitral valve insufficiency    Nonrheumatic aortic valve insufficiency    Hypomagnesemia    1. ARF/MORGAN-------Nonoliguric. MORGAN resolved.      2. MILD METABOLIC ALKALOSIS------Back down Bumex and Kdur a little     3. DIGOXIN TOXICITY-------Resolved     4. ANEMIA-------S/P IV iron for MARLO. Normocytic and only mildly elevated RDW     5. HYPOCALCEMIA-------replaced     6. CAD S/P CABG x 2/MVR/AVR/MAZE/ATRIAL APPENDAGE LIGATION------per Cardiology and CT Surgery     7. HYPERLIPIDEMIA------On Statin. CK, TSH ok     8. H/O ULCERATIVE COLITIS     9. HYPOALBUMINEMIA-----S/P IV Albumin to temporize     10. HYPOALBUMINEMIA     11. DVT PROPHYLAXIS------On Lovenox. Follow platelets given thrombocytopenia     12. GERD/PUD PROPHYLAXIS------PPI. Benefits outweigh risks despite ARF/MORGAN     13. ELEVATED LFTS/TRANSAMINASES    14. HYPERNATREMIA----Resolved. Follow with diuretic exposure    15. HYPOPHOSPHATEMIA------Replaced    16. THROMBOCYTOPENIA    17. MILD VOLUME EXCESS/3RD SPACED EDEMA-------Nice diuresis on po Bumex and follow.      18. HYPOMAGNESEMIA------Replace IV    Okay from RENAL standpoint to d/c today and f/u as an outpatient as ordered    Rosalie Barroso MD  [facility]  [phone]  06/03/22  08:06 EDT

## 2022-06-03 NOTE — PROGRESS NOTES
Cardiology Progress Note    Patient Identification:  Name: Mindi Quinteros  Age: 81 y.o.  Sex: female  :  1940  MRN: [id number]                 Follow Up / Chief Complaint: New onset afib, Acute on Chronic diastolic heart failure  Chief Complaint   Patient presents with   • Shortness of Breath       Interval History:  Patient presented with worsening shortness of breath and edema.   Patient was in new onset heart failure on admission. Echocardiogram showed borderline EF, diastolic dysfunction, Severe MR,  Mod-severe AR.  Patient underwent cardiac cath and BEST    2022 patient underwent valve surgery with aortic valve replacement and mitral valve repair and two-vessel CABG and maze procedure.  2022 cardioverted to normal sinus rhythm          Subjective: Patient seen and examined.  Chart reviewed.  Labs reviewed.  Discussed with RN taking care of patient    Objective: troponin negative; pro BNP ,   2022: glucose elevated, potassium 3.0 hgb 10.5   2022: magnesium 1.5   2022: Glucose elevated; AST 35, INR 1.27, CBC unremarkable. Mag 1.5   2022: Hemoglobin 8.4  2022: Hemoglobin is 9.4  2022: glucose elevated; bun 46 creatinine 1.19 liver enzymes elevated; hgb 9.7  2022: Sodium is 127, creatinine 1.26, elevated ALT and AST at 196 and 214, total bilirubin 1.6, hemoglobin 9.  2022: Glucose 201, ALT 83, AST 62, bilirubin 1.4, hemoglobin 10.2  2022: Glucose 49, ALT 40 AST 50, potassium 3.3 magnesium 1.8, hgb 9.4   2022: glucose 168, potassium 4.2, bun 6 creatinine 0.55, ALT 34, AST 48 hgb 8.6  6/3/2022: Glucose 152, potassium 3.8, hemoglobin 11.4      History of present illness:    Ms. Mindi Quinteros  has PMH of        # CAD, cardiac cath 18  calcified 50% proximal 70% mid LAD and 70% mid LCX, normal LVEF  #. Valvular heart disease  #  diabetes  #  hypertension, hyperlipidemia  #  ulcerative colitis  #  allergy to aspertane  #  hemorrhoidectomy,  hysterectomy, bladder repair, left ankle surgery,      Presented through emergency room 5/15/2022 with complaint of nocturnal dyspnea 2 days prior to admission with cough which is resolved but has increasing pedal edema, has chronic diarrhea secondary to ulcerative colitis and fatigue.  Work-up here revealed elevated proBNP of 2099 and glucose 158.  Chest x-ray showing pulmonary edema and small bilateral pleural effusions and new onset A. Fib.  Echocardiogram 5/15/2022 reveals LV dysfunction EF of 46 to 50% with severe eccentric MR and diastolic dysfunction         ASSESSMENT:     #Shortness of breath  #New onset atrial fibrillation  #Acute   HFrEF due to diastolic dysfunction from A. fib with RVR  And systolic dysfunction from possibly valvular heart disease and ischemic cardiomyopathy  #Mitral regurgitation  #Aortic regurgitation  #Cardiomyopathy, possibly due to MR, CAD  #CABG x2 with LIMA to LAD, SVG to lateral marginal, AVR with #21 magna pericardial prosthesis, mitral valve repair with #26 physio ll ring annuloplasty, Maze procedure, CRAIG ligation  #Loss of balance/ataxia/B12 deficiency  #Impaired memory/difficulty finding words  #  CAD  #  hyperlipidemia  #  diabetes   # hypertension      PLAN:    Telemetry is revealing atrial fibrillation with controlled ventricular response    Patient has high [id number] score due to female gender, age over 75, hypertension and diabetes making it 5, will benefit from long-term anticoagulation.    Discussed with CT surgery nurse practitioner Brittany Hutson.  We will start Eliquis 2.5 twice daily.    Patient underwent cardiac cath 5/17/2022 which revealed severe two-vessel disease  Patient underwent BEST which revealed severe AR.    Patient underwent two-vessel CABG, maze, aortic valve replacement and mitral valve repair by  5/19/2022  Routine post surgery care  Monitor rhythm, patient is in A. fib with controlled ventricular response  We will continue low-dose aspirin  and beta-blockers and statins as tolerated  Monitor hemoglobin  Patient has ankle edema we will continue Bumex per nephrology as tolerated  Monitor renal function urine output electrolytes and replete as needed.  Patient is on ceftriaxone for Klebsiella pneumonia  Patient is having intermittent confusion has a sitter.  Discussed with RN taking care of patient      Past Medical History:  Past Medical History:   Diagnosis Date   • Acute congestive heart failure, unspecified heart failure type ([facility]) 5/15/2022   • Age-related osteoporosis without current pathological fracture     prolia([id number], 9/12/2019), restarted prolia(11/3/20)   • Allergic    • Anxiety    • Arthritis    • CAD (coronary artery disease)    • Depression    • Diabetes ([facility])    • Elevated cholesterol    • HTN (hypertension)    • Hyperlipemia    • Injury of back    • Sinusitis      Past Surgical History:  Past Surgical History:   Procedure Laterality Date   • ANKLE ARTHROSCOPY     • AORTIC VALVE REPAIR/REPLACEMENT MITRAL VALVE REPAIR/REPLACEMENT N/A 5/19/2022    Procedure: AORTIC VALVE REPAIR/REPLACEMENT MITRAL VALVE REPAIR/REPLACEMENT;  Surgeon: Lane Okeefe MD;  Location: [facility] CVOR;  Service: Cardiothoracic;  Laterality: N/A;  Mitral Valve repair with 26mm Physio II ring. Aortic   Valve Replacement with 21mm Magna Ease valve.   • BELPHAROPTOSIS REPAIR     • CARDIAC CATHETERIZATION     • CARDIAC CATHETERIZATION N/A 5/17/2022    Procedure: Left Heart Cath, possible pci;  Surgeon: Natan Terrazas MD;  Location: [facility] CATH INVASIVE LOCATION;  Service: Cardiovascular;  Laterality: N/A;   • CATARACT EXTRACTION     • CHOLECYSTECTOMY N/A 10/14/2019    Procedure: Laparoscopic cholecystectomy, possible open;  Surgeon: Vijay Guerra DO;  Location: [facility] MAIN OR;  Service: General   • COLONOSCOPY     • CORONARY ARTERY BYPASS GRAFT N/A 5/19/2022    Procedure: CORONARY ARTERY BYPASS GRAFTING;  Surgeon: Lane Okeefe MD;  Location: [facility]  "CVOR;  Service: Cardiothoracic;  Laterality: N/A;  CABG X 2 (1 Vein graft, 1 LIMA graft)   • COSMETIC SURGERY     • ENDOSCOPY     • HEMORROIDECTOMY     • HYSTERECTOMY     • MAZE PROCEDURE N/A 5/19/2022    Procedure: MAZE PROCEDURE;  Surgeon: Lane Okeefe MD;  Location: [facility];  Service: Cardiothoracic;  Laterality: N/A;        Social History:   Social History     Tobacco Use   • Smoking status: Never Smoker   • Smokeless tobacco: Never Used   Substance Use Topics   • Alcohol use: No      Family History:  Family History   Problem Relation Age of Onset   • Diabetes Mother    • Heart disease Father    • Heart disease Brother    • Diabetes Brother    • Osteoporosis Paternal Grandmother           Allergies:  Allergies   Allergen Reactions   • Asacol [Mesalamine] Swelling   • Diclofenac Swelling     Gums swell only per patient     Scheduled Meds:  apixaban, 2.5 mg, Q12H  aspirin, 81 mg, Daily  Barium Sulfate, 1 teaspoon(s), Once in imaging  bumetanide, 1.5 mg, Daily  insulin glargine, 12 Units, Daily  insulin lispro, 0-24 Units, Q6H  magnesium sulfate, 1 g, Once  melatonin, 10 mg, Nightly  metoprolol succinate XL, 12.5 mg, Q24H  OLANZapine, 2.5 mg, Nightly  pantoprazole, 40 mg, QAM AC  potassium chloride, 20 mEq, BID  senna-docusate sodium, 2 tablet, Nightly          Review of Systems:   Review of Systems   Constitutional: Negative for chills and fever.   Cardiovascular: Negative for chest pain and palpitations.   Respiratory: Negative for cough and hemoptysis.    Gastrointestinal: Negative for nausea and vomiting.           Constitutional:  Temp:  [97.5 °F (36.4 °C)-98.4 °F (36.9 °C)] 98.4 °F (36.9 °C)  Heart Rate:  [] 88  Resp:  [22-26] 24  BP: (110-154)/(51-85) 126/69    Physical Exam   /69 (BP Location: Left arm, Patient Position: Lying)   Pulse 88   Temp 98.4 °F (36.9 °C) (Oral)   Resp 24   Ht 157.5 cm (62\")   Wt 59.4 kg (130 lb 15.3 oz)   SpO2 98%   BMI 23.95 kg/m²   General:  Appears in " no acute distress  Eyes: Sclera is anicteric,  conjunctiva is clear   HEENT:  Thyroid not visibly enlarged. No mucosal pallor or cyanosis  Respiratory: Respirations regular and unlabored at rest.  Skin: Color pink.   Neuro: Alert and awake.    INTAKE AND OUTPUT:    Intake/Output Summary (Last 24 hours) at 6/3/2022 0952  Last data filed at 6/3/2022 0649  Gross per 24 hour   Intake 240 ml   Output 1200 ml   Net -960 ml       Cardiographics  Telemetry: afib     ECG:   ECG 12 Lead   Final Result   HEART RATE= 53  bpm   RR Interval= 1123  ms   MI Interval= 294  ms   P Horizontal Axis= 5  deg   P Front Axis= 145  deg   QRSD Interval= 98  ms   QT Interval= 428  ms   QRS Axis= -43  deg   T Wave Axis= 155  deg   - ABNORMAL ECG -   Underlying rhythm is atrial flutter with diffuse nonspecific ST-T wave    changes and suggestive of old inferior wall and anterior wall MI with low    voltage complexes and left axis deviation.  Ventricular rate reduced    compared to previous EKG.   Electronically Signed By: Eduardo Brewtser ([facility]) 28-May-2022 16:49:51   Date and Time of Study: 2022-05-28 10:53:40      ECG 12 Lead   Final Result   HEART RATE= 85  bpm   RR Interval= 703  ms   MI Interval=   ms   P Horizontal Axis=   deg   P Front Axis=   deg   QRSD Interval= 104  ms   QT Interval= 400  ms   QRS Axis= -51  deg   T Wave Axis= 145  deg   - ABNORMAL ECG -   Sinus rhythm with marked first-degree AV block, PVCs.   RBBB and LAFB   Incomplete left bundle branch block   Inferior infarct, old   Anterior infarct, old   Nonspecific T abnormalities, lateral leads   When compared with ECG of 24-May-2022 10:57:02,   Significant change in rhythm: previously junctional   Electronically Signed By: Dorina Hoffmann ([facility]) 26-May-2022 12:18:49   Date and Time of Study: 2022-05-25 05:58:09      ECG 12 Lead   Final Result   HEART RATE= 88  bpm   RR Interval= 680  ms   MI Interval=   ms   P Horizontal Axis=   deg   P Front Axis=   deg   QRSD Interval= 101   ms   QT Interval= 396  ms   QRS Axis= -48  deg   T Wave Axis= 138  deg   - ABNORMAL ECG -   Accelerated junctional rhythm   Inferior infarct, old   When compared with ECG of 22-May-2022 10:30:47,   Significant change in rhythm   Electronically Signed By: Kandy Jara ([facility]) 25-May-2022 21:46:33   Date and Time of Study: 2022-05-24 10:57:02      ECG 12 Lead   Final Result   HEART RATE= 69  bpm   RR Interval= 870  ms   RI Interval= 181  ms   P Horizontal Axis=   deg   P Front Axis= 0  deg   QRSD Interval= 106  ms   QT Interval= 482  ms   QRS Axis= -54  deg   T Wave Axis= 105  deg   - ABNORMAL ECG -   atrial fibrillation   Supraventricular bigeminy   RBBB and LAFB   Incomplete left bundle branch block   Inferior infarct, old   Nonspecific T abnormalities, lateral leads   Prolonged QT interval   When compared with ECG of 21-May-2022 3:56:47,   Significant change in rhythm: previously atrial fibrillation   Significant repolarization change   Electronically Signed By: Venancio Rodas ([facility]) 22-May-2022 18:50:00   Date and Time of Study: 2022-05-22 10:30:47      ECG 12 Lead   Final Result   HEART RATE= 83  bpm   RR Interval= 722  ms   RI Interval=   ms   P Horizontal Axis=   deg   P Front Axis=   deg   QRSD Interval= 108  ms   QT Interval= 406  ms   QRS Axis= -58  deg   T Wave Axis= 114  deg   - ABNORMAL ECG -   Atrial fibrillation   RBBB and LAFB   Incomplete left bundle branch block   Inferior infarct, old   Consider anterior infarct   Nonspecific T abnormalities, lateral leads   When compared with ECG of 20-May-2022 12:16:46,   Significant repolarization change   Significant axis, voltage or hypertrophy change   Electronically Signed By: Venancio Rodas ([facility]) 21-May-2022 07:08:00   Date and Time of Study: 2022-05-21 03:56:47      ECG 12 Lead   Final Result   HEART RATE= 78  bpm   RR Interval= 770  ms   RI Interval=   ms   P Horizontal Axis=   deg   P Front Axis=   deg   QRSD Interval= 108  ms   QT Interval= 512  ms   QRS  Axis= -57  deg   T Wave Axis= 246  deg   - ABNORMAL ECG -   Atrial fibrillation   RBBB and LAFB   Incomplete left bundle branch block   Low voltage, precordial leads   Nonspecific repol abnormality, diffuse leads   Prolonged QT interval   When compared with ECG of 20-May-2022 2:38:15,   Significant repolarization change   Significant axis, voltage or hypertrophy change   Electronically Signed By: Venancio Rodas ([facility]) 20-May-2022 16:06:17   Date and Time of Study: 2022-05-20 12:16:46      ECG 12 Lead   Final Result   HEART RATE= 71  bpm   RR Interval= 840  ms   WI Interval=   ms   P Horizontal Axis=   deg   P Front Axis=   deg   QRSD Interval= 119  ms   QT Interval= 389  ms   QRS Axis= -57  deg   T Wave Axis= 190  deg   - ABNORMAL ECG -   Atrial fibrillation   Ventricular premature complex   RBBB and LAFB   Incomplete left bundle branch block   Probable inferior infarct, age indeterminate   Nonspecific T abnormalities, lateral leads   When compared with ECG of 19-May-2022 17:14:47,   Significant axis, voltage or hypertrophy change   Electronically Signed By: Venancio Rodas ([facility]) 20-May-2022 16:06:40   Date and Time of Study: 2022-05-20 02:38:15      ECG 12 Lead   Final Result   HEART RATE= 85  bpm   RR Interval= 707  ms   WI Interval=   ms   P Horizontal Axis=   deg   P Front Axis=   deg   QRSD Interval= 111  ms   QT Interval= 405  ms   QRS Axis= -70  deg   T Wave Axis= 127  deg   - ABNORMAL ECG -   Atrial fibrillation   RBBB and LAFB   Incomplete left bundle branch block   Low voltage, precordial leads   When compared with ECG of 19-May-2022 13:55:56,   Significant repolarization change   Significant axis, voltage or hypertrophy change   Electronically Signed By: Rolando Gonzalez ([facility]) 23-May-2022 13:48:26   Date and Time of Study: 2022-05-19 17:14:47      ECG 12 Lead   Final Result   HEART RATE= 88  bpm   RR Interval= 683  ms   WI Interval=   ms   P Horizontal Axis=   deg   P Front Axis=   deg   QRSD Interval= 125  ms    QT Interval= 406  ms   QRS Axis= -60  deg   T Wave Axis= 139  deg   - ABNORMAL ECG -   Atrial fibrillation   RBBB and LAFB   Incomplete left bundle branch block   LVH with secondary repolarization abnormality   When compared with ECG of 18-May-2022 5:14:24,   Significant change in rhythm: previously sinus   Significant repolarization change   Electronically Signed By: Rolando Gonzalez ([facility]) 23-May-2022 13:48:30   Date and Time of Study: 2022-05-19 13:55:56      ECG 12 Lead   Final Result   HEART RATE= 79  bpm   RR Interval= 760  ms   AK Interval= 207  ms   P Horizontal Axis= 182  deg   P Front Axis= 0  deg   QRSD Interval= 112  ms   QT Interval= 480  ms   QRS Axis= -54  deg   T Wave Axis= 218  deg   - ABNORMAL ECG -   Sinus rhythm   Incomplete left bundle branch block   LVH with secondary repolarization abnormality   Inferior infarct, old   Prolonged QT interval   When compared with ECG of 15-May-2022 4:58:24,   Significant change in rhythm: previously atrial fibrillation   Significant repolarization change   Electronically Signed By: Natan Terrazas ([facility]) 19-May-2022 15:12:19   Date and Time of Study: 2022-05-18 05:14:24      ECG 12 Lead   Final Result   HEART RATE= 77  bpm   RR Interval= 777  ms   AK Interval=   ms   P Horizontal Axis=   deg   P Front Axis=   deg   QRSD Interval= 118  ms   QT Interval= 401  ms   QRS Axis= -38  deg   T Wave Axis= 106  deg   - ABNORMAL ECG -   Atrial fibrillation   Incomplete left bundle branch block   LVH with secondary repolarization abnormality   Inferior infarct, old   Anterior infarct, old   When compared with ECG of 15-May-2022 2:51:28,   Significant repolarization change   Electronically Signed By: Vijay Gomez ([facility]) 18-May-2022 11:47:42   Date and Time of Study: 2022-05-15 04:58:24      ECG 12 Lead   Preliminary Result   HEART RATE= 77  bpm   RR Interval= 783  ms   AK Interval=   ms   P Horizontal Axis=   deg   P Front Axis=   deg   QRSD Interval= 117  ms   QT Interval=  515  ms   QRS Axis= -25  deg   T Wave Axis= 68  deg   - ABNORMAL ECG -   Atrial fibrillation   Incomplete left bundle branch block   Inferior infarct, old   Anterior Q waves, possibly due to ILBBB   Prolonged QT interval   No previous ECG available for comparison   Electronically Signed By:    Date and Time of Study: 2022-05-15 02:51:28      SCANNED - TELEMETRY     Final Result         SCANNED - TELEMETRY     Final Result         SCANNED - TELEMETRY     Final Result         SCANNED - TELEMETRY     Final Result         SCANNED - TELEMETRY     Final Result         SCANNED - TELEMETRY     Final Result         SCANNED - TELEMETRY     Final Result         SCANNED - TELEMETRY     Final Result         SCANNED - TELEMETRY     Final Result         SCANNED - TELEMETRY     Final Result         SCANNED - TELEMETRY     Final Result         ECG 12 Lead    (Results Pending)   ECG 12 Lead    (Results Pending)   ECG 12 Lead    (Results Pending)     I have personally reviewed EKG    Echocardiogram: Results for orders placed during the hospital encounter of 05/15/22    Adult Transthoracic Echo Limited W/ Cont if Necessary Per Protocol    Interpretation Summary  Normal LV size and contractility EF of 60 to 65%  Mild right Pennchlor enlargement, moderate to severe right atrial enlargement..  Catheter seen in RV.  Normal atrial size  Aortic valve appears structurally normal.  Mitral annular calcification severe seen.  Tricuspid valve appears structurally normal, moderate  regurgitation seen.  Calculated RV systolic pressure of 56 mmHg, consistent with pulmonary hypertension  No pericardial effusion seen.  Proximal aorta appears normal in size.      Lab Review   I have reviewed the labs      Results from last 7 days   Lab Units 06/03/22  0418   MAGNESIUM mg/dL 1.9     Results from last 7 days   Lab Units 06/03/22  0418   SODIUM mmol/L 136   POTASSIUM mmol/L 3.9   BUN mg/dL 11   CREATININE mg/dL 0.72   CALCIUM mg/dL 9.9         Results  "from last 7 days   Lab Units 06/03/22  0418 06/02/22  0606 06/01/22  0653   WBC 10*3/mm3 3.40 5.60 3.80   HEMOGLOBIN g/dL 8.9* 9.0* 8.6*   HEMATOCRIT % 27.1* 28.1* 27.4*   PLATELETS 10*3/mm3 175 228 193           RADIOLOGY:  Imaging Results (Last 24 Hours)     ** No results found for the last 24 hours. **                )6/3/2022  MD DIEGO Sales/Transcription:   \"Dictated utilizing Dragon dictation\".   "

## 2022-06-03 NOTE — SIGNIFICANT NOTE
Michelle given the Eliquis and paperwork that will be going to [place].  Daughter driving patient to [place].

## 2022-06-03 NOTE — PHARMACY RECOMMENDATION
"Transitions-of-Care ([facility]) Pharmacy Future COST Assessment:     /Nurse requesting test claim: [facility]    Pharmacy ran test claim for the following:     Drug Sig Covered/PA required Patient Copay per month   Eliquis (apixiban) BID Covered without PA $ 321.05     Patient Insurance Type: Medicare - this means they are NOT eligible for a monthly discount card in the future (see \"free month\" note below)    Deductible/Medicare Gap Issue? Unknown    Is patient signed up for M2B service? no    Is above drug(s) eligible for 1 month free through M2B service? N/A - patient discharging to facility/LTC/etc.  If eligible,  or [facility] pharmacy can provide coupon upon request.           For billing questions, reach out to [facility] at [phone]  For M2B questions, reach out to [facility] Pharmacy at [phone]    Leslee Skyepack [place]   6/3/2022 10:22 EDT    Transitions-of-Care ([facility]) Pharmacy Future COST Assessment:     /Nurse requesting test claim: [facility]    Pharmacy ran test claim for the following:     Drug Sig Covered/PA required Patient Copay per month   Brilinta BID Covered without PA $ 295.53     Patient Insurance Type: Medicare - this means they are NOT eligible for a monthly discount card in the future (see \"free month\" note below)    Deductible/Medicare Gap Issue? Unknown    Is patient signed up for M2B service? no    Is above drug(s) eligible for 1 month free through M2B service? N/A - patient discharging to facility/LTC/etc.  If eligible,  or [facility] pharmacy can provide coupon upon request.           For billing questions, reach out to [facility] at [phone]  For M2B questions, reach out to Retail Pharmacy at [phone]    MeeGenius Jose   6/3/2022 10:22 EDT      Transitions-of-Care ([facility] [place]) Pharmacy Future COST Assessment:     /Nurse requesting test claim: [facility]    Pharmacy ran test claim for the following:     Drug Sig Covered/PA required Patient Copay per month   Plavix " "QD Covered without PA $ 0     Patient Insurance Type: Medicare - this means they are NOT eligible for a monthly discount card in the future (see \"free month\" note below)    Deductible/Medicare Gap Issue? Unknown    Is patient signed up for M2B service? no    Is above drug(s) eligible for 1 month free through M2B service? N/A - patient discharging to facility/LTC/etc.  If eligible,  or [facility] pharmacy can provide coupon upon request.           For billing questions, reach out to [facility] at [phone]  For M2B questions, reach out to Retail Pharmacy at [phone]    Leslee Garcia   6/3/2022 10:22 EDT  "

## 2022-06-03 NOTE — THERAPY TREATMENT NOTE
Acute Care - Speech Language Pathology Treatment Note     Diomedes     Patient Name: Mindi Quinteros  : 1940  MRN: [id number]    Today's Date: 6/3/2022                   Admit Date: 5/15/2022       Visit Dx:      ICD-10-CM ICD-9-CM   1. Acute congestive heart failure, unspecified heart failure type ([facility])  I50.9 428.0   2. Acute on chronic heart failure with preserved ejection fraction (HCC)  I50.33 428.23   3. Family hx osteoporosis  Z82.62 V17.81   4. Cholelithiasis and acute cholecystitis with obstruction  K80.01 574.01   5. Vitamin D deficiency  E55.9 268.9   6. Diabetes mellitus with coincident hypertension ([facility])  E11.9 250.00    I10 401.9   7. Primary hypertension  I10 401.9   8. Hyperlipidemia, unspecified hyperlipidemia type  E78.5 272.4   9. Dyspnea on exertion  R06.00 786.09   10. Coronary artery disease involving native coronary artery of native heart with unstable angina pectoris ([facility])  I25.110 414.01     411.1   11. Angina pectoris ([facility])  I20.9 413.9   12. Age-related osteoporosis without current pathological fracture  M81.0 733.01   13. Mitral valve insufficiency, unspecified etiology  I34.0 424.0   14. S/P CABG (coronary artery bypass graft)  Z95.1 V45.81   15. S/P AVR  Z95.2 V43.3   16. S/P MVR (mitral valve repair)  Z98.890 V45.89   17. S/P Maze operation for atrial fibrillation  Z98.890 V45.89    Z86.79        Patient Active Problem List   Diagnosis   • Age-related osteoporosis without current pathological fracture   • Angina pectoris ([facility])   • CAD (coronary artery disease)   • Dyspnea on exertion   • Hyperlipidemia   • Hypertension   • Diabetes mellitus with coincident hypertension ([facility])   • Vitamin D deficiency   • Cholelithiasis and acute cholecystitis with obstruction   • Family hx osteoporosis   • Ulcerative colitis (HCC)   • Acute on chronic heart failure with preserved ejection fraction (HCC)   • Acute congestive heart failure, unspecified heart failure type ([facility])   • Mitral valve  insufficiency   • Nonrheumatic aortic valve insufficiency   • Hypomagnesemia   • Acute on chronic systolic heart failure (CMS/HCC)       Past Medical History:   Diagnosis Date   • Acute congestive heart failure, unspecified heart failure type ([facility]) 5/15/2022   • Age-related osteoporosis without current pathological fracture     prolia([id number], 9/12/2019), restarted prolia(11/3/20)   • Allergic    • Anxiety    • Arthritis    • CAD (coronary artery disease)    • Depression    • Diabetes ([facility])    • Elevated cholesterol    • HTN (hypertension)    • Hyperlipemia    • Injury of back    • Sinusitis        Past Surgical History:   Procedure Laterality Date   • ANKLE ARTHROSCOPY     • AORTIC VALVE REPAIR/REPLACEMENT MITRAL VALVE REPAIR/REPLACEMENT N/A 5/19/2022    Procedure: AORTIC VALVE REPAIR/REPLACEMENT MITRAL VALVE REPAIR/REPLACEMENT;  Surgeon: Lane Okeefe MD;  Location: [facility];  Service: Cardiothoracic;  Laterality: N/A;  Mitral Valve repair with 26mm Physio II ring. Aortic   Valve Replacement with 21mm Magna Ease valve.   • BELPHAROPTOSIS REPAIR     • CARDIAC CATHETERIZATION     • CARDIAC CATHETERIZATION N/A 5/17/2022    Procedure: Left Heart Cath, possible pci;  Surgeon: Natan Terrazas MD;  Location: [facility] CATH INVASIVE LOCATION;  Service: Cardiovascular;  Laterality: N/A;   • CATARACT EXTRACTION     • CHOLECYSTECTOMY N/A 10/14/2019    Procedure: Laparoscopic cholecystectomy, possible open;  Surgeon: Vijay Guerra DO;  Location: [facility] MAIN OR;  Service: General   • COLONOSCOPY     • CORONARY ARTERY BYPASS GRAFT N/A 5/19/2022    Procedure: CORONARY ARTERY BYPASS GRAFTING;  Surgeon: Lane Okeefe MD;  Location: [facility];  Service: Cardiothoracic;  Laterality: N/A;  CABG X 2 (1 Vein graft, 1 LIMA graft)   • COSMETIC SURGERY     • ENDOSCOPY     • HEMORROIDECTOMY     • HYSTERECTOMY     • MAZE PROCEDURE N/A 5/19/2022    Procedure: MAZE PROCEDURE;  Surgeon: Lane Okeefe MD;  Location:  "[facility] CVOR;  Service: Cardiothoracic;  Laterality: N/A;     Skilled ST intervention conducted this date targeting dysphagia.  Pt alert, pleasant and amenable to treatment.  She denies pain. Pt on 1 liters/min via nasal cannula during session. Pt currently prescribed a Mechanical Soft and Thin liquids     Treatment narrative/results: The patient was seen today during an established noon meal. Pt seated up in bed at approximately a 90 degree angle when clinician entered her room. Pt had lunch tray on her lap and was feeding herself. She was assessed consuming macaroni and cheese, a roll/bun, sherbet and thin tea by straw. The patient used an appropriate rate of intake, small bites/sips and clears oral cavity before next bite appropriately. No anterior labial spillage noted or oral residue evident. No pharyngeal findings audible.  No cough, throat clear, or \"wet\" vocal quality noted. PT eager to discharge today and noted orders for transfer.     SLP Recommendation and Plan:    ST recommends that the patient continue her current diet of [facility] soft/thin liquid for now  She should continue to utilize the above noted safe swallow compensations.  ST recommends continued skilled dysphagia therapy at next level of care for potential further/additional diet upgrade.           EDUCATION    The patient has been educated in the following areas:     Discussed diet, goals, plan, etc. PT expressed understanding and in agreement. Pt is eager to discharge      Dysphagia (Swallowing Impairment) Modified Diet Instruction.             SLP GOALS     Row Name 06/03/22 1300       Oral Nutrition/Hydration Goal 1 (SLP)    Oral Nutrition/Hydration Goal 1, SLP The patient will participate in ongoing assessment of swallow, including re-evaluation clinically and/or including instrumental assessment of swallow if indicated, to further assess swallow function in anticipation to initiate a po diet  -SM    Time Frame (Oral Nutrition/Hydration Goal 1, " SLP) 1 day  -SM    Barriers (Oral Nutrition/Hydration Goal 1, SLP) See above note  -SM    Progress/Outcomes (Oral Nutrition/Hydration Goal 1, SLP) goal ongoing  -SM            Oral Nutrition/Hydration Goal 2 (SLP)    Oral Nutrition/Hydration Goal 2, SLP The pt will maximize swallow function for least restricitve PO diet, no complications associated with dysphagia, adequate PO intake, and demonstrating independent use of swallow compensations.  -SM    Time Frame (Oral Nutrition/Hydration Goal 2, SLP) by discharge  -SM    Barriers (Oral Nutrition/Hydration Goal 2, SLP) Current diet is mech soft, thin liquid  -SM    Progress/Outcomes (Oral Nutrition/Hydration Goal 2, SLP) goal ongoing  -SM            Oral Nutrition/Hydration Goal (SLP)    Oral Nutrition/Hydration Goal, SLP Patient will be seen for a meal assessment within 24-48 hours to further assess swallow function  -SM    Time Frame (Oral Nutrition/Hydration Goal, SLP) 2 days  -SM    Barriers (Oral Nutrition/Hydration Goal, SLP) Meal assessment completed this date. See above note  -SM    Progress/Outcomes (Oral Nutrition/Hydration Goal, SLP) goal ongoing;goal revised this date  -SM          User Key  (r) = Recorded By, (t) = Taken By, (c) = Cosigned By    Initials Name Provider Type    Amina Mohan, SLP Speech and Language Pathologist                            Time Calculation:                                  MATTHIEU Moore  6/3/2022

## 2022-06-03 NOTE — PROGRESS NOTES
"Nutrition Services    Patient Name: Mindi Quinteros  YOB: 1940  MRN: [id number]  Admission date: 5/15/2022      PPE Documentation        PPE Worn By Provider Did not enter room for this encounter   PPE Worn By Patient  N/A     PROGRESS NOTE      Encounter Information: Progress note to monitor PO intakes. PO intakes at 25-50% of meals. Has Boost Glucose Control ordered to help supplement PO diet.        Labs (reviewed below): Reviewed below management per attending        GI Function:  Last BM 6/2        Nutrition Intervention: Continue with oral nutritional supplements as ordered, will continue to monitor         PO Diet/Supplements: Diet Texture, Diabetic/Consistent Carbs; Diabetic - Consistent Carb; Mechanical Ground; Full Feed - Nursing     Boost Glucose Control TID (Provides 570 kcals, 48 g protein if consumed)    EN Prescription:        Intake/Output:   Intake/Output Summary (Last 24 hours) at 6/3/2022 1024  Last data filed at 6/3/2022 0649  Gross per 24 hour   Intake 240 ml   Output 1200 ml   Net -960 ml            Height: Height: 157.5 cm (62\")   Weight: Weight: 59.4 kg (130 lb 15.3 oz) (06/03/22 0505)   BMI: Body mass index is 23.95 kg/m².     Results from last 7 days   Lab Units 06/03/22 0418 06/02/22 0606 06/01/22  0653   SODIUM mmol/L 136 135* 136   POTASSIUM mmol/L 3.9 4.0 4.2   CHLORIDE mmol/L 93* 90* 91*   CO2 mmol/L 31.0* 33.0* 32.0*   BUN mg/dL 11 11 6*   CREATININE mg/dL 0.72 0.59 0.55*   CALCIUM mg/dL 9.9 9.9 9.8   BILIRUBIN mg/dL 1.1 1.6* 1.6*   ALK PHOS U/L 117 130* 104   ALT (SGPT) U/L 29 37* 34*   AST (SGOT) U/L 39* 47* 48*   GLUCOSE mg/dL 124* 252* 168*     Results from last 7 days   Lab Units 06/03/22 0418 06/02/22  0606 06/01/22  0653   MAGNESIUM mg/dL 1.9 1.8 1.7   PHOSPHORUS mg/dL 3.9 3.7 3.9   HEMOGLOBIN g/dL 8.9* 9.0* 8.6*   HEMATOCRIT % 27.1* 28.1* 27.4*     COVID19   Date Value Ref Range Status   05/18/2022 Not Detected Not Detected - Ref. Range Final     Lab Results "   Component Value Date    HGBA1C 7.0 (H) 05/15/2022       Vitals:    06/03/22 0752   BP: 126/69   Pulse: 88   Resp: 24   Temp: 98.4 °F (36.9 °C)   SpO2:        RD to follow up per protocol.    Electronically signed by:  Sunitha Mann RD  06/03/22 10:24 EDT

## 2022-06-03 NOTE — PROGRESS NOTES
ICU Daily Progress Note        Age-related osteoporosis without current pathological fracture    Angina pectoris (HCC)    CAD (coronary artery disease)    Dyspnea on exertion    Hyperlipidemia    Hypertension    Diabetes mellitus with coincident hypertension (HCC)    Vitamin D deficiency    Cholelithiasis and acute cholecystitis with obstruction    Family hx osteoporosis    Ulcerative colitis (HCC)    Acute on chronic heart failure with preserved ejection fraction (HCC)    Acute congestive heart failure, unspecified heart failure type (HCC)    Mitral valve insufficiency    Nonrheumatic aortic valve insufficiency    Hypomagnesemia      Assessment & Plan   Acute hypoxic respiratory failure  Acute congestive heart failure  Pulmonary edema   Age-related osteoporosis without current pathological fracture    Angina pectoris (HCC)    CAD (coronary artery disease)    Dyspnea on exertion    Hyperlipidemia    Hypertension    Diabetes mellitus with coincident hypertension (HCC)    Vitamin D deficiency    Cholelithiasis and acute cholecystitis with obstruction    Family hx osteoporosis    Ulcerative colitis (HCC)    Acute on chronic heart failure with preserved ejection fraction (HCC)    Acute congestive heart failure, unspecified heart failure type (HCC)    Mitral valve insufficiency    Nonrheumatic aortic valve insufficiency    Hypomagnesemia   pulm  HTN secondary     PLAN:  Oxygen supplement and titration: Currently on 1 L per nasal cannula and will try on room air as tolerated  Pt/ot   Tolerating diet   Chest tube removed 5/29/2022  Continue continue metoprolol XL 12.5 mg daily  Antibiotics finished  encourage too use IS flutter valve   Bronchodilator  Inhaled corticosteroids  Electrolytes/ glycemic control: Adjust insulin regimen and resume her home oral diabetic medications  DVT and GI prophylaxis.  To discharge to rehab when bed is available       LOS: 19 days         Vital signs for last 24 hours:  Vitals:    06/03/22  0600 06/03/22 0700 06/03/22 0752 06/03/22 1024   BP: 110/59 126/69 126/69 129/63   BP Location:   Left arm Left arm   Patient Position:   Lying Sitting   Pulse: 81 89 88    Resp:   24 16   Temp:   98.4 °F (36.9 °C) 97.5 °F (36.4 °C)   TempSrc:   Oral Oral   SpO2: 97% 98%     Weight:       Height:           Intake/Output last 3 shifts:  I/O last 3 completed shifts:  In: 480 [P.O.:480]  Out: 2100 [Urine:2100]  Intake/Output this shift:  No intake/output data recorded.           Radiology  Imaging Results (Last 24 Hours)     ** No results found for the last 24 hours. **          Labs:  Results from last 7 days   Lab Units 06/03/22  1057 06/03/22  0418   WBC 10*3/mm3  --  3.40   HEMOGLOBIN g/dL  --  8.9*   HEMOGLOBIN, POC g/dL 11.4*  --    HEMATOCRIT %  --  27.1*   HEMATOCRIT POC % 34*  --    PLATELETS 10*3/mm3  --  175     Results from last 7 days   Lab Units 06/03/22  0418   SODIUM mmol/L 136   POTASSIUM mmol/L 3.9   CHLORIDE mmol/L 93*   CO2 mmol/L 31.0*   BUN mg/dL 11   CREATININE mg/dL 0.72   CALCIUM mg/dL 9.9   BILIRUBIN mg/dL 1.1   ALK PHOS U/L 117   ALT (SGPT) U/L 29   AST (SGOT) U/L 39*   GLUCOSE mg/dL 124*     Results from last 7 days   Lab Units 06/03/22  1057   PH, ARTERIAL pH units 7.406   PO2 ART mm Hg 155.5*   PCO2, ARTERIAL mm Hg 61.1*   HCO3 ART mmol/L 38.4*     Results from last 7 days   Lab Units 06/03/22  0418 06/02/22  0606 06/01/22  0653   ALBUMIN g/dL 3.50 4.10 3.80             Results from last 7 days   Lab Units 06/03/22  0418   MAGNESIUM mg/dL 1.9                   Meds:   SCHEDULE  apixaban, 2.5 mg, Oral, Q12H  aspirin, 81 mg, Oral, Daily  Barium Sulfate, 1 teaspoon(s), Oral, Once in imaging  bumetanide, 1.5 mg, Oral, Daily  insulin glargine, 12 Units, Subcutaneous, Daily  insulin lispro, 0-24 Units, Subcutaneous, Q6H  melatonin, 10 mg, Oral, Nightly  metoprolol succinate XL, 12.5 mg, Oral, Q24H  OLANZapine, 2.5 mg, Oral, Nightly  pantoprazole, 40 mg, Oral, QAM AC  potassium chloride, 20 mEq,  Oral, BID  senna-docusate sodium, 2 tablet, Oral, Nightly      Infusions     PRNs  •  acetaminophen  •  bisacodyl  •  clonazePAM  •  dextrose  •  dextrose  •  glucagon (human recombinant)  •  insulin lispro **AND** insulin lispro  •  magnesium hydroxide  •  magnesium sulfate **OR** magnesium sulfate in D5W 1g/100mL (PREMIX)  •  [] Morphine **AND** naloxone  •  ondansetron  •  polyethylene glycol  •  potassium & sodium phosphates **OR** potassium & sodium phosphates  •  potassium chloride **OR** potassium chloride  •  potassium chloride **OR** potassium chloride    Physical Exam:  Physical Exam  General Appearance:  Alert   HEENT:  Normocephalic, without obvious abnormality, Conjunctiva/corneas clear,.   Nares normal, no drainage     Neck:  Supple, symmetrical, trachea midline. No JVD.  Lungs /Chest wall:   Bilateral basal rhonchi, respirations unlabored, symmetrical wall movement.     Heart:  Regular rate and rhythm, S1 S2 normal  Abdomen: Soft, non-tender, no masses, no organomegaly.    Extremities: + edema, no clubbing or cyanosis, positive ecchymosis in the right thigh  ROS  Review of Systems  Constitutional: Negative for chills, fever and positive for malaise/fatigue.   HENT: Negative.    Eyes: Negative.    Cardiovascular: Negative.    Respiratory: Positive for cough and shortness of breath.    Skin: Negative.    Musculoskeletal: Mild pain lower extremities.    Gastrointestinal: Negative.    Genitourinary: Negative.    Neurological: Negative.    Psychiatric/Behavioral: Negative.

## 2022-06-04 LAB — GLUCOSE BLDC GLUCOMTR-MCNC: 131 MG/DL (ref 70–105)

## 2022-06-06 LAB
ARTERIAL PATENCY WRIST A: NORMAL
ATMOSPHERIC PRESS: NORMAL MM[HG]
BASE EXCESS BLDA CALC-SCNC: NORMAL MMOL/L
BDY SITE: NORMAL
CA-I BLDA-SCNC: NORMAL MMOL/L
CO2 BLDA-SCNC: NORMAL MMOL/L
GLUCOSE BLDC GLUCOMTR-MCNC: NORMAL MG/DL
GLUCOSE BLDC GLUCOMTR-MCNC: NORMAL MG/DL
HCO3 BLDA-SCNC: NORMAL MMOL/L
HCT VFR BLDA CALC: NORMAL %
HEMODILUTION: NORMAL
HGB BLDA-MCNC: NORMAL G/DL
INHALED O2 CONCENTRATION: NORMAL %
MODALITY: NORMAL
PCO2 BLDA: NORMAL MM[HG]
PEEP RESPIRATORY: NORMAL CM[H2O]
PH BLDA: NORMAL [PH]
PO2 BLDA: NORMAL MM[HG]
POTASSIUM BLDA-SCNC: NORMAL MMOL/L
RESPIRATORY RATE: NORMAL
SAO2 % BLDCOA: NORMAL %
SODIUM BLD-SCNC: NORMAL MMOL/L
VENTILATOR MODE: NORMAL
VT ON VENT VENT: NORMAL ML

## 2022-06-06 NOTE — CASE MANAGEMENT/SOCIAL WORK
Transportation Services  Private: Car (with daughter)    Final Discharge Disposition Code: 03 - skilled nursing facility (SNF) ([place])

## 2022-06-13 LAB
BH CV ECHO MEAS - EDV(CUBED): 55.5 ML
BH CV ECHO MEAS - ESV(CUBED): 22.5 ML
BH CV ECHO MEAS - FS: 26 %
BH CV ECHO MEAS - IVS/LVPW: 1.08 CM
BH CV ECHO MEAS - IVSD: 1.1 CM
BH CV ECHO MEAS - LV MASS(C)D: 128.5 GRAMS
BH CV ECHO MEAS - LVIDD: 3.8 CM
BH CV ECHO MEAS - LVIDS: 2.8 CM
BH CV ECHO MEAS - LVPWD: 1.02 CM
BH CV ECHO MEAS - RAP SYSTOLE: 3 MMHG
BH CV ECHO MEAS - RVDD: 4.3 CM
BH CV ECHO MEAS - RVSP: 23.5 MMHG
BH CV ECHO MEAS - TR MAX PG: 20.5 MMHG
BH CV ECHO MEAS - TR MAX VEL: 226.1 CM/SEC
MAXIMAL PREDICTED HEART RATE: 139 BPM
STRESS TARGET HR: 118 BPM

## 2022-06-16 ENCOUNTER — TELEPHONE (OUTPATIENT)
Dept: CARDIOLOGY | Facility: CLINIC | Age: 82
End: 2022-06-16

## 2022-06-16 NOTE — TELEPHONE ENCOUNTER
Pt's daughter Michelle called, we discussed meds.      Pt is in [facility] currently due to heart surgery.    I asked her to bring an updated med list when she comes to appt next week.

## 2022-06-23 ENCOUNTER — TELEPHONE (OUTPATIENT)
Dept: CARDIAC REHAB | Facility: HOSPITAL | Age: 82
End: 2022-06-23

## 2022-06-23 NOTE — TELEPHONE ENCOUNTER
Spoke with daughter regarding cardiac rehab. Still at [place]. Maybe another couple weeks. Thinks she will be interested. Will call back 2-3 weeks.

## 2022-06-27 ENCOUNTER — OFFICE VISIT (OUTPATIENT)
Dept: CARDIAC SURGERY | Facility: CLINIC | Age: 82
End: 2022-06-27

## 2022-06-27 VITALS
WEIGHT: 115 LBS | TEMPERATURE: 97.8 F | HEART RATE: 103 BPM | RESPIRATION RATE: 20 BRPM | BODY MASS INDEX: 19.63 KG/M2 | SYSTOLIC BLOOD PRESSURE: 103 MMHG | DIASTOLIC BLOOD PRESSURE: 66 MMHG | OXYGEN SATURATION: 93 % | HEIGHT: 64 IN

## 2022-06-27 DIAGNOSIS — Z98.890 S/P MVR (MITRAL VALVE REPAIR): ICD-10-CM

## 2022-06-27 DIAGNOSIS — Z95.1 S/P CABG X 2: Primary | ICD-10-CM

## 2022-06-27 DIAGNOSIS — Z95.2 S/P AVR (AORTIC VALVE REPLACEMENT): ICD-10-CM

## 2022-06-27 DIAGNOSIS — Z98.890 S/P LEFT ATRIAL APPENDAGE LIGATION: ICD-10-CM

## 2022-06-27 DIAGNOSIS — Z86.79 S/P MAZE OPERATION FOR ATRIAL FIBRILLATION: ICD-10-CM

## 2022-06-27 DIAGNOSIS — Z98.890 S/P MAZE OPERATION FOR ATRIAL FIBRILLATION: ICD-10-CM

## 2022-06-27 PROCEDURE — 99024 POSTOP FOLLOW-UP VISIT: CPT | Performed by: NURSE PRACTITIONER

## 2022-06-27 RX ORDER — TRAMADOL HYDROCHLORIDE 50 MG/1
50 TABLET ORAL AS NEEDED
COMMUNITY
End: 2022-11-10

## 2022-06-27 RX ORDER — LORAZEPAM 0.5 MG/1
0.5 TABLET ORAL EVERY 8 HOURS PRN
COMMUNITY
End: 2022-11-10

## 2022-06-27 NOTE — PROGRESS NOTES
"CARDIOVASCULAR SURGERY FOLLOW-UP PROGRESS NOTE  Chief Complaint: Postop follow-up        HPI:   Dear Dr. Tipton, Dayana LU MD and colleagues:    It was nice to see Mindi Quinteros in follow up 5 weeks after surgery.  As you know, she is a 81 y.o. female with valvular disease, CAD, new onset atrial fibrillation, ischemic/nonischemic cardiomyopathy EF 45 to 50%, hypertension, hyperlipidemia, DM2, ulcerative colitis who underwent tissue AVR, mitral valve repair, CABG x2 with LIMA, right and left cryo-maze procedure, left after appendage closure on 5/19/2022 at [facility] by Dr. Okeefe. She had a prolonged postoperative course due to RV dysfunction, postop pneumonia, and panic attacks.  She comes in today complaining about the diet at her rehab service, I reviewed her swallow study and the recommendation was mechanical soft with thin liquids, will send that report with her so that they can make appropriate changes to her diet.  She otherwise looks quite good, she states that she is still weak and unable to lift herself out of a sitting position if it is not elevated, she may need assistive devices upon return home.  She continues to take Eliquis for anticoagulation, she has follow-up with Dr. Terrazas on Friday. She comes in today complaining of lower extremity edema, although that has significantly improved from her hospitalization, I have instructed her to wear compression hose to assist with dependent edema, she continues Bumex for diuresis.  Her activity level has been fair.  From a surgical standpoint, the sternal incision is well approximated without erythema, edema, or drainage.  The sternum is stable to palpation, and the patient denies any popping or clicking with deep inspiration or coughing.      Physical Exam:         /66 (BP Location: Left arm, Patient Position: Sitting, Cuff Size: Adult)   Pulse 103   Temp 97.8 °F (36.6 °C) (Oral)   Resp 20   Ht 161.3 cm (63.5\")   Wt 52.2 kg (115 lb)   " SpO2 93%   BMI 20.05 kg/m²   Heart:  regular rate and rhythm, S1, S2 normal, no murmur, click, rub or gallop  Lungs:  clear to auscultation bilaterally  Extremities:  1+ lower extremity edema bilateral ankle  Incision(s):  mid chest healing well, right leg healing well, sternum stable    Assessment/Plan:     S/P CABG, AVR, mitral valve repair and Maze procedure. Overall, she is doing well.    Slow post-op rehabilitation progress    No heavy lifting > 10 pounds for 1 more weeks  Keep incisions clean and dry  Follow-up as scheduled with cardiology  Prophylactic antibiotics before dental work and other surgeries lifelong with artificial valve, prosthesis, or grafting  Return to clinic in 6-12 month(s) with serial EKGs, 2D echocardiogram, and 48-hour Holter monitor prior to maze clinic for routine surgical ablation surveillance    Continue lifting restriction of 10 lbs until 6 weeks and 50 lbs until 12 weeks from the date of surgery, no excessive jarring motions or twisting motions until 12 weeks from the date of surgery    Return to clinic if any signs or symptoms of infection or sternal instability develop       Thank you for allowing me to participate in the care of your patient.    Regards,  DAKOTA Dennison    Current outpatient and discharge medications have been reconciled for the patient.  Reviewed by: DAKOTA Dennison

## 2022-07-01 ENCOUNTER — OFFICE VISIT (OUTPATIENT)
Dept: CARDIOLOGY | Facility: CLINIC | Age: 82
End: 2022-07-01

## 2022-07-01 VITALS
HEART RATE: 79 BPM | SYSTOLIC BLOOD PRESSURE: 102 MMHG | BODY MASS INDEX: 20.02 KG/M2 | OXYGEN SATURATION: 97 % | HEIGHT: 63 IN | WEIGHT: 113 LBS | DIASTOLIC BLOOD PRESSURE: 68 MMHG

## 2022-07-01 DIAGNOSIS — I48.0 PAROXYSMAL ATRIAL FIBRILLATION: ICD-10-CM

## 2022-07-01 DIAGNOSIS — Z79.01 LONG TERM (CURRENT) USE OF ANTICOAGULANTS: ICD-10-CM

## 2022-07-01 DIAGNOSIS — Z95.2 H/O AORTIC VALVE REPLACEMENT: ICD-10-CM

## 2022-07-01 DIAGNOSIS — I25.810 CORONARY ARTERY DISEASE INVOLVING CORONARY BYPASS GRAFT OF NATIVE HEART WITHOUT ANGINA PECTORIS: ICD-10-CM

## 2022-07-01 DIAGNOSIS — I38 VALVULAR HEART DISEASE: ICD-10-CM

## 2022-07-01 DIAGNOSIS — E78.5 DYSLIPIDEMIA: ICD-10-CM

## 2022-07-01 DIAGNOSIS — Z98.890 H/O MITRAL VALVE REPAIR: ICD-10-CM

## 2022-07-01 DIAGNOSIS — I50.22 CHRONIC HFREF (HEART FAILURE WITH REDUCED EJECTION FRACTION): Primary | ICD-10-CM

## 2022-07-01 DIAGNOSIS — E11.9 TYPE 2 DIABETES MELLITUS WITHOUT COMPLICATION, WITHOUT LONG-TERM CURRENT USE OF INSULIN: ICD-10-CM

## 2022-07-01 DIAGNOSIS — I10 ESSENTIAL HYPERTENSION: ICD-10-CM

## 2022-07-01 PROCEDURE — 99214 OFFICE O/P EST MOD 30 MIN: CPT | Performed by: INTERNAL MEDICINE

## 2022-07-01 NOTE — PROGRESS NOTES
Subjective:     Encounter Date:07/01/2022      Patient ID: Mindi Quinteros is a 81 y.o. female.    Chief Complaint : Follow-up for atrial fibrillation, CHF, MR, AR, CAD, CABG    History of Present Illness         Ms. Mindi Quinteros  has PMH of        # CAD, cardiac cath 2/7/18  calcified 50% proximal 70% mid LAD and 70% mid LCX, normal LVEF, cardiac cath 5/17/2022 revealed severe two-vessel disease in LAD and LCx.  Echo revealed valvular heart disease with severe AR and MR.  Patient underwent CABG x2 with LIMA to LAD and SVG to lateral marginal and AVR and mitral valve repair and maze and left atrial appendage occlusion 5/19/2022  #CABG x2 with LIMA to LAD and SVG to lateral marginal 5/19/2020  #Valvular heart disease with 5/19/2022 aortic valve replacement with #21 magna pericardial prosthesis and mitral valve repair with #26 physeal ring, left atrial appendage endocardial closure and right and left cryo maze  #Paroxysmal atrial fibrillation, s/p left and right cryo maze and left atrial appendage endocardial closure    #  Diabetes  #  Hypertension,  #  Hyperlipidemia  #  ulcerative colitis  #  allergy to aspertane  #  hemorrhoidectomy, hysterectomy, bladder repair, left ankle surgery,      Here for hospital follow-up.  Patient denies any chest pain or shortness of breath.      Patient's arterial blood pressure is 102/68, heart rate 79, O2 sat of 97% on room air.      Review of records revealed that patient presented through emergency room 5/15/2022 with complaint of nocturnal dyspnea 2 days prior to admission with cough which is resolved but has increasing pedal edema, has chronic diarrhea secondary to ulcerative colitis and fatigue.  Work-up here revealed elevated proBNP of 2099 and glucose 158.  Chest x-ray showing pulmonary edema and small bilateral pleural effusions and new onset A. Fib.  Echocardiogram 5/15/2022 reveals LV dysfunction EF of 46 to 50% with severe eccentric MR and diastolic dysfunction   Patient  was in new onset heart failure on admission. Echocardiogram showed borderline EF, diastolic dysfunction, Severe MR,  Mod-severe AR.  Patient underwent cardiac cath and BEST     5/19/2022 patient underwent valve surgery with aortic valve replacement and mitral valve repair and two-vessel CABG and maze procedure.  5/20/2022 cardioverted to normal sinus rhythm   5/27/2022: Glucose 201, ALT 83, AST 62, bilirubin 1.4, hemoglobin 10.2  5/31/2022: Glucose 49, ALT 40 AST 50, potassium 3.3 magnesium 1.8, hgb 9.4   6/1/2022: glucose 168, potassium 4.2, bun 6 creatinine 0.55, ALT 34, AST 48 hgb 8.6  6/3/2022: Glucose 152, potassium 3.8, hemoglobin 11.4      ASSESSMENT:     #CAD, CABG  # paroxysmal atrial fibrillation, s/p maze, left atrial appendage closure  #Chronic HFrEF due to   systolic and diastolic dysfunction from ischemic cardiomyopathy and valvular heart disease and A. fib.  #Mitral regurgitation, s/p mitral valve repair  #Aortic regurgitation, status post tissue aortic valve replacement  #Cardiomyopathy, possibly due to MR, ischemic cardiomyopathy and A. fib with RVR  #CABG x2 with LIMA to LAD, SVG to lateral marginal, AVR with #21 magna pericardial prosthesis, mitral valve repair with #26 physio ll ring annuloplasty, Maze procedure, CRAIG ligation  #Loss of balance/ataxia/B12 deficiency  #Impaired memory/difficulty finding words  #  hyperlipidemia  #  diabetes   # hypertension      PLAN:    Continue medical management with Eliquis, aspirin, atorvastatin, Bumex, metoprolol to help with CAD, valvular heart disease, CHF, hypertension, atrial fibrillation, dyslipidemia as tolerated     Patient has high [id number] score due to female gender, age over 75, hypertension and diabetes making it 5, will benefit from long-term anticoagulation given severity we will continue Eliquis as tolerated.     Patient underwent cardiac cath 5/17/2022 which revealed severe two-vessel disease  Patient underwent BEST which revealed severe  AR.     Patient underwent two-vessel CABG, maze, aortic valve replacement and mitral valve repair by  5/19/2022  Routine post surgery care  Monitor hemoglobin  Follow-up with surgery.  We will follow-up in 3 months and check labs before visit.       Procedures EKG done 5/28/2022 reviewed/interpreted by nataliia urbina with a rate of 53 bpm    The following portions of the patient's history were reviewed and updated as appropriate: allergies, current medications, past family history, past medical history, past social history, past surgical history and problem list.    Assessment:         [facility]     Diagnosis Plan   1. Chronic HFrEF (heart failure with reduced ejection fraction) (HCC)  BNP    Comprehensive Metabolic Panel    Lipid Panel   2. Valvular heart disease  BNP    Comprehensive Metabolic Panel    Lipid Panel   3. H/O aortic valve replacement  BNP    Comprehensive Metabolic Panel    Lipid Panel   4. H/O mitral valve repair  BNP    Comprehensive Metabolic Panel    Lipid Panel   5. Paroxysmal atrial fibrillation (HCC)  BNP    Comprehensive Metabolic Panel    Lipid Panel   6. Long term (current) use of anticoagulants  BNP    Comprehensive Metabolic Panel    Lipid Panel   7. Coronary artery disease involving coronary bypass graft of native heart without angina pectoris  BNP    Comprehensive Metabolic Panel    Lipid Panel   8. Dyslipidemia  BNP    Comprehensive Metabolic Panel    Lipid Panel   9. Essential hypertension  BNP    Comprehensive Metabolic Panel    Lipid Panel   10. Type 2 diabetes mellitus without complication, without long-term current use of insulin (HCC)  BNP    Comprehensive Metabolic Panel    Lipid Panel          Plan:               Past Medical History:  Past Medical History:   Diagnosis Date   • Acute congestive heart failure, unspecified heart failure type (HCC) 5/15/2022   • Age-related osteoporosis without current pathological fracture     prolia([id number], 9/12/2019), restarted  prolia(11/3/20)   • Allergic    • Anxiety    • Arthritis    • CAD (coronary artery disease)    • Depression    • Diabetes (HCC)    • Elevated cholesterol    • HTN (hypertension)    • Hyperlipemia    • Injury of back    • Sinusitis      Past Surgical History:  Past Surgical History:   Procedure Laterality Date   • ANKLE ARTHROSCOPY     • AORTIC VALVE REPAIR/REPLACEMENT MITRAL VALVE REPAIR/REPLACEMENT N/A 5/19/2022    Procedure: AORTIC VALVE REPAIR/REPLACEMENT MITRAL VALVE REPAIR/REPLACEMENT;  Surgeon: Lane Okeefe MD;  Location: [facility];  Service: Cardiothoracic;  Laterality: N/A;  Mitral Valve repair with 26mm Physio II ring. Aortic   Valve Replacement with 21mm Magna Ease valve.   • BELPHAROPTOSIS REPAIR     • CARDIAC CATHETERIZATION     • CARDIAC CATHETERIZATION N/A 5/17/2022    Procedure: Left Heart Cath, possible pci;  Surgeon: Natan Terrazas MD;  Location: [facility] CATH INVASIVE LOCATION;  Service: Cardiovascular;  Laterality: N/A;   • CATARACT EXTRACTION     • CHOLECYSTECTOMY N/A 10/14/2019    Procedure: Laparoscopic cholecystectomy, possible open;  Surgeon: Vijay Guerra DO;  Location: [facility] MAIN OR;  Service: General   • COLONOSCOPY     • CORONARY ARTERY BYPASS GRAFT N/A 5/19/2022    Procedure: CORONARY ARTERY BYPASS GRAFTING;  Surgeon: Lane Okeefe MD;  Location: [facility];  Service: Cardiothoracic;  Laterality: N/A;  CABG X 2 (1 Vein graft, 1 LIMA graft)   • COSMETIC SURGERY     • ENDOSCOPY     • HEMORROIDECTOMY     • HYSTERECTOMY     • MAZE PROCEDURE N/A 5/19/2022    Procedure: MAZE PROCEDURE;  Surgeon: Lane Okeefe MD;  Location: [facility];  Service: Cardiothoracic;  Laterality: N/A;      Allergies:  Allergies   Allergen Reactions   • Asacol [Mesalamine] Swelling   • Diclofenac Swelling     Gums swell only per patient     Home Meds:  Current Meds:     Current Outpatient Medications:   •  acetaminophen (TYLENOL) 325 MG tablet, Take 2 tablets by mouth Every 6 (Six) Hours  As Needed for Mild Pain ., Disp: 30 tablet, Rfl: 0  •  apixaban (ELIQUIS) 2.5 MG tablet tablet, Take 1 tablet by mouth Every 12 (Twelve) Hours. Indications: Atrial Fibrillation, Atrial Fibrillation, Disp: 60 tablet, Rfl: 2  •  aspirin (aspirin) 81 MG EC tablet, Take 81 mg by mouth Daily., Disp: , Rfl:   •  atorvastatin (LIPITOR) 40 MG tablet, Take 1 tablet by mouth every night at bedtime., Disp: 90 tablet, Rfl: 3  •  bisacodyl (DULCOLAX) 5 MG EC tablet, Take 5 mg by mouth Daily As Needed for Constipation., Disp: , Rfl:   •  bumetanide (BUMEX) 1 MG tablet, Take 1.5 tablets by mouth Daily., Disp: 45 tablet, Rfl: 1  •  Calcium Carbonate (CALCIUM 500 PO), Take 600 mg by mouth Daily., Disp: , Rfl:   •  Cholecalciferol (VITAMIN D3) 2000 units capsule, Take 4,000 Units by mouth Every Evening., Disp: , Rfl:   •  colestipol (COLESTID) 1 g tablet, Take 2 g by mouth Daily., Disp: , Rfl:   •  cyanocobalamin 1000 MCG/ML injection, Inject 1,000 mcg into the appropriate muscle as directed by prescriber Every 30 (Thirty) Days., Disp: , Rfl:   •  dextrose (GLUTOSE) 40 % gel, Take 15 g by mouth Every 15 (Fifteen) Minutes As Needed for Low Blood Sugar (per [employer])., Disp: , Rfl:   •  dicyclomine (BENTYL) 10 MG capsule, Take 10 mg by mouth Daily., Disp: , Rfl:   •  fenofibrate 160 MG tablet, Take 160 mg by mouth Daily., Disp: , Rfl:   •  ferrous sulfate 325 (65 FE) MG tablet, Take 325 mg by mouth 2 (Two) Times a Week., Disp: , Rfl:   •  glipizide (GLUCOTROL) 5 MG tablet, Take 1 tablet by mouth Every Morning Before Breakfast., Disp: 30 tablet, Rfl: 1  •  insulin glargine (LANTUS, SEMGLEE) 100 UNIT/ML injection, Inject 12 Units under the skin into the appropriate area as directed Daily., Disp: 3 mL, Rfl: 1  •  insulin lispro (ADMELOG) 100 UNIT/ML injection, Inject 0-24 Units under the skin into the appropriate area as directed Every 6 (Six) Hours., Disp: , Rfl: 12  •  insulin lispro (ADMELOG) 100 UNIT/ML injection, Inject 0-24  Units under the skin into the appropriate area as directed As Needed for High Blood Sugar (Per the administration instructions). Indications: Insulin-Dependent Diabetes, Disp: , Rfl: 12  •  lisinopril (PRINIVIL,ZESTRIL) 2.5 MG tablet, Take 1 tablet by mouth Daily., Disp: 30 tablet, Rfl: 1  •  LORazepam (ATIVAN) 0.5 MG tablet, Take 0.5 mg by mouth Every 8 (Eight) Hours As Needed for Anxiety., Disp: , Rfl:   •  melatonin 5 MG tablet tablet, Take 2 tablets by mouth Every Night., Disp: 30 tablet, Rfl: 0  •  metFORMIN ER (GLUCOPHAGE-XR) 500 MG 24 hr tablet, Take 2,000 mg by mouth Daily With Breakfast. 4 pills a day, Disp: , Rfl:   •  metoprolol succinate XL (TOPROL-XL) 25 MG 24 hr tablet, Take 0.5 tablets by mouth Daily., Disp: 30 tablet, Rfl: 1  •  multivitamin with minerals tablet tablet, Take 1 tablet by mouth Daily., Disp: , Rfl:   •  potassium chloride (KLOR-CON) 20 MEQ packet, Take 20 mEq by mouth 2 (Two) Times a Day., Disp: 60 packet, Rfl: 1  •  psyllium (METAMUCIL) 58.6 % packet, Take 1 packet by mouth Daily., Disp: , Rfl:   •  traMADol (ULTRAM) 50 MG tablet, Take 50 mg by mouth As Needed for Moderate Pain ., Disp: , Rfl:     Current Facility-Administered Medications:   •  denosumab (PROLIA) syringe 60 mg, 60 mg, Subcutaneous, Q6 Months, Elisabeth Royal PA, 60 mg at 05/09/22 1020  Social History:   Social History     Tobacco Use   • Smoking status: Never Smoker   • Smokeless tobacco: Never Used   Substance Use Topics   • Alcohol use: No      Family History:  Family History   Problem Relation Age of Onset   • Diabetes Mother    • Heart disease Father    • Heart disease Brother    • Diabetes Brother    • Osteoporosis Paternal Grandmother               Review of Systems   Cardiovascular: Positive for leg swelling. Negative for chest pain and palpitations.   Respiratory: Positive for shortness of breath.    Neurological: Negative for dizziness and numbness.     All other systems are negative         Objective:      "Physical Exam  /68 (BP Location: Left arm, Patient Position: Sitting, Cuff Size: Adult)   Pulse 79   Ht 160 cm (63\")   Wt 51.3 kg (113 lb)   SpO2 97%   BMI 20.02 kg/m²   General:  Appears in no acute distress  Eyes: Sclera is anicteric,  conjunctiva is clear   HEENT:  No JVD.  No carotid bruits  Respiratory: Respirations regular and unlabored at rest.  Clear to auscultation  Cardiovascular: S1,S2 Regular rate and rhythm. No murmur, rub or gallop auscultated.   Extremities: No digital clubbing or cyanosis, no edema  Skin: Color pink. Skin warm and dry to touch. No rashes  No xanthoma  Neuro: Alert and awake.    Lab Reviewed:         Natan Terrazas MD  7/4/2022 17:13 EDT      EMR Dragon/Transcription:   \"Dictated utilizing Dragon dictation\".        "

## 2022-07-05 ENCOUNTER — TELEPHONE (OUTPATIENT)
Dept: CARDIAC SURGERY | Facility: CLINIC | Age: 82
End: 2022-07-05

## 2022-07-05 ENCOUNTER — TELEPHONE (OUTPATIENT)
Dept: CARDIOLOGY | Facility: CLINIC | Age: 82
End: 2022-07-05

## 2022-07-05 NOTE — TELEPHONE ENCOUNTER
Spoke with Michelle. Michelle has questions about heart hugger. Heart Hugger questions answered. She verbalized understanding and this was agreeable.

## 2022-07-05 NOTE — TELEPHONE ENCOUNTER
Incoming Refill Request      Medication requested (name and dose): fursomide 20 mg, once daily  clopidedgrel    Pharmacy where request should be sent: [facility], wants [employer] mail order, but needs ASAP.    Additional details provided by patient: was at [place] and was put on a different water pill, daughter wants her to go back to Fursomide which is $30 less.  Completely out, was out before went to nursing home, coming home this Thursday..    Wants to switch to clopidegrel because Eliquis Rx is over $500 monthly    Best call back number: [phone]    Does the patient have less than a 3 day supply:  [x] Yes  [] No    Mary Seipel, RegSched Rep   07/05/22, 08:57 EDT

## 2022-07-05 NOTE — TELEPHONE ENCOUNTER
Caller: NATHANAEL GARCIA    Relationship: Emergency Contact    Best call back number: [phone]    Who are you requesting to speak with (clinical staff, provider,  specific staff member): ANY    What was the call regarding: NATHANAEL GARCIA WOULD LIKE A CALL BACK TO KNOW HOW LONG HER MOTHER RENETTA NEEDS TO WEAR HOLTER MONITOR.     Do you require a callback: YES

## 2022-07-06 RX ORDER — FUROSEMIDE 40 MG/1
40 TABLET ORAL DAILY
Qty: 30 TABLET | Refills: 0 | Status: SHIPPED | OUTPATIENT
Start: 2022-07-06 | End: 2022-07-06 | Stop reason: SDUPTHER

## 2022-07-06 RX ORDER — FUROSEMIDE 40 MG/1
40 TABLET ORAL DAILY
Qty: 90 TABLET | Refills: 1 | Status: SHIPPED | OUTPATIENT
Start: 2022-07-06

## 2022-07-06 NOTE — TELEPHONE ENCOUNTER
Michelle contacted, she will check with her insurance company to see which company they would use for home INR checks.

## 2022-07-07 ENCOUNTER — TELEPHONE (OUTPATIENT)
Dept: CARDIOLOGY | Facility: CLINIC | Age: 82
End: 2022-07-07

## 2022-07-07 NOTE — TELEPHONE ENCOUNTER
Incoming Refill Request      Medication requested (name and dose): ELIQUIS 2.5 MG    Pharmacy where request should be sent: [facility]    Additional details provided by patient: WAS GOING TO TRY A DIFFERENT MEDICATION, BUT CHANGED HER MIND AND WANTS ELIQUIS.     Best call back number:[phone]  Does the patient have less than a 3 day supply:  [] Yes  [] No    Desi Ramsey Rep  07/07/22, 09:56 EDT

## 2022-07-07 NOTE — TELEPHONE ENCOUNTER
Rx Refill Note  Requested Prescriptions      No prescriptions requested or ordered in this encounter      Last office visit with prescribing clinician: 7/1/2022      Next office visit with prescribing clinician: 10/3/2022            Tatum Hammer MA  07/07/22, 10:22 EDT

## 2022-07-11 ENCOUNTER — TELEPHONE (OUTPATIENT)
Dept: CARDIAC REHAB | Facility: HOSPITAL | Age: 82
End: 2022-07-11

## 2022-07-28 ENCOUNTER — OFFICE VISIT (OUTPATIENT)
Dept: CARDIAC SURGERY | Facility: CLINIC | Age: 82
End: 2022-07-28

## 2022-07-28 VITALS
RESPIRATION RATE: 20 BRPM | SYSTOLIC BLOOD PRESSURE: 127 MMHG | HEART RATE: 119 BPM | TEMPERATURE: 98 F | DIASTOLIC BLOOD PRESSURE: 80 MMHG | OXYGEN SATURATION: 96 % | WEIGHT: 106.5 LBS | BODY MASS INDEX: 18.87 KG/M2 | HEIGHT: 63 IN

## 2022-07-28 DIAGNOSIS — Z98.890 S/P MVR (MITRAL VALVE REPAIR): ICD-10-CM

## 2022-07-28 DIAGNOSIS — Z86.79 S/P MAZE OPERATION FOR ATRIAL FIBRILLATION: ICD-10-CM

## 2022-07-28 DIAGNOSIS — Z98.890 S/P MAZE OPERATION FOR ATRIAL FIBRILLATION: ICD-10-CM

## 2022-07-28 DIAGNOSIS — Z95.2 S/P AVR (AORTIC VALVE REPLACEMENT): ICD-10-CM

## 2022-07-28 DIAGNOSIS — Z98.890 S/P LEFT ATRIAL APPENDAGE LIGATION: ICD-10-CM

## 2022-07-28 DIAGNOSIS — Z95.1 S/P CABG X 2: Primary | ICD-10-CM

## 2022-07-28 PROCEDURE — 99024 POSTOP FOLLOW-UP VISIT: CPT | Performed by: NURSE PRACTITIONER

## 2022-08-01 NOTE — PROGRESS NOTES
"CARDIOVASCULAR SURGERY FOLLOW-UP PROGRESS NOTE  Chief Complaint: Postop follow-up        HPI:   Dear Dayana Rodriguez MD and colleagues:    It was nice to see Mindi Quinteros in follow up 10 weeks after surgery.  As you know, she is a 81 y.o. female with valvular heart disease, CAD, new onset atrial fibrillation, ischemic/nonischemic cardiomyopathy EF 45 to 50%, hypertension, hyperlipidemia, DM2, ulcerative colitis who underwent tissue AVR, mitral valve repair, CABG x2 with LIMA, right and left cryo-maze procedure, left after appendage closure on 5/19/2022 at [facility] by Dr. Okeefe.  She had a prolonged postoperative course due to RV dysfunction, postop pneumonia, and panic attacks.  She was sent to rehab for a period of time prior to returning home, she is now home and being followed by home health services.  She continues to take Eliquis for anticoagulation and follows with Dr. Terrazas.  She comes in today complaining of bilateral chest wall pain that is normal for postop recovery.  Her activity level has been good.  From a surgical standpoint, the sternal incision is well approximated without erythema, edema, or drainage.  The sternum is stable to palpation, and the patient denies any popping or clicking with deep inspiration or coughing.      Physical Exam:         /80 (BP Location: Left arm, Patient Position: Sitting, Cuff Size: Adult)   Pulse 119   Temp 98 °F (36.7 °C)   Resp 20   Ht 160 cm (63\")   Wt 48.3 kg (106 lb 8 oz)   SpO2 96%   BMI 18.87 kg/m²   Heart:  regular rate and rhythm, S1, S2 normal, no murmur, click, rub or gallop  Lungs:  clear to auscultation bilaterally  Extremities:  no edema  Incision(s):  mid chest healing well, right leg healing well, sternum stable    Assessment/Plan:     S/P CABG, AVR, mitral valve repair and Maze procedure. Overall, she is doing well.    Slow post-op rehabilitation progress    Follow-up with CT surgery as needed with the exception of chart " check and potential follow-up in office for routine surgical ablation surveillance    Prophylactic antibiotics before dental work and other surgeries lifelong with artificial valve, prosthesis, or grafting    Continue lifting restriction of 10 lbs until 6 weeks and 50 lbs until 12 weeks from the date of surgery, no excessive jarring motions or twisting motions until 12 weeks from the date of surgery    Return to clinic if any signs or symptoms of infection or sternal instability develop       Thank you for allowing me to participate in the care of your patient.    Regards,  DAKOTA Dennison

## 2022-08-30 ENCOUNTER — TELEPHONE (OUTPATIENT)
Dept: CARDIOLOGY | Facility: CLINIC | Age: 82
End: 2022-08-30

## 2022-08-30 NOTE — TELEPHONE ENCOUNTER
CALL FROM YE WITH DR. MELCHOR'S OFFICE. PATIENT CALLED THERE ASKING DR. MELCHOR TO ORDER PHYSICAL THERAPY. PATIENT TOLD THEM SHE HAS BEEN TRYING TO GET DR. PARDO TO ORDER THAT SINCE MAY. DR. MELCHOR IS NOT WANTING TO ORDER DUE TO NOT HAVING GOOD UNDERSTANDING OF CARDIAC NEEDS. YE ASKING IF WE CAN ORDER PHYSICAL THERAPY FOR PATIENT?  ANY QUESTIONS YE CAN BE REACHED [phone]

## 2022-08-30 NOTE — TELEPHONE ENCOUNTER
Chandrika contacted and made aware that Dr. Terrazas does not order physical therapy. We could try ordering cardiac rehab, however insurance may not want to pay for it.

## 2022-09-01 ENCOUNTER — TELEPHONE (OUTPATIENT)
Dept: CARDIOLOGY | Facility: CLINIC | Age: 82
End: 2022-09-01

## 2022-09-01 NOTE — TELEPHONE ENCOUNTER
CALLED PATIENT AT HOME NUMBER. HAD TO LEAVE A MESSAGE FOR A RETURN CALL RE: INFORMATION FOR ORAL SURGERY WITH DR. MUNGUIA.

## 2022-09-01 NOTE — TELEPHONE ENCOUNTER
Caller: RENETTA GARCIA    Relationship: SELF    Best call back number: [phone]    What form or medical record are you requesting: RECORDS FROM OPEN HEART SURGERY WITH     Who is requesting this form or medical record from you:     How would you like to receive the form or medical records (pick-up, mail, fax):   If pick-up, provide patient with address and location details    Timeframe paperwork needed: BY Tuesday     Additional notes: PT IS NEEDING HER OPEN HEART SURGERY MEDICAL RECORDS SO THAT SHE CAN RECEIVE ORAL SURGERY TO HAVE A TOOTH REMOVED.

## 2022-09-02 ENCOUNTER — TELEPHONE (OUTPATIENT)
Dept: CARDIOLOGY | Facility: CLINIC | Age: 82
End: 2022-09-02

## 2022-09-02 DIAGNOSIS — I50.22 CHRONIC HFREF (HEART FAILURE WITH REDUCED EJECTION FRACTION): ICD-10-CM

## 2022-09-02 DIAGNOSIS — Z95.1 S/P CABG (CORONARY ARTERY BYPASS GRAFT): Primary | ICD-10-CM

## 2022-09-02 NOTE — TELEPHONE ENCOUNTER
PATIENT RETURNED CALL.  SHE HAS AN APPT WITH DR. MUNGUIA ON TUES 09/12/22 TO FIX A BROKEN TOOTH. PER HER TELEPHONE NOTE SHE WANTED INFORMATION ON HER OPEN HEART SURGERY WHICH DR. MELVIN PREFORMED. SHE WILL CONTACT HIS OFFICE IF THAT INFORMATION IS NEEDED AFTER SHE SEES DR. MUNGUIA.

## 2022-09-02 NOTE — TELEPHONE ENCOUNTER
Caller: Mindi Quinteros    Relationship: Self    Best call back number: [phone]    What is the medical concern/diagnosis:     What specialty or service is being requested: REHAB    What is the office location: [facility] IN REHAB    What is the office phone number: [phone]    Any additional details: PATIENT HAD HEART SURGERY, NEEDS MORE THERAPY FOR MUSCLES AND DR. MELCHOR DECLINED DOING THIS FOR PATIENT AND SAID IT WOULD BE BETTER IF HEART  TOOK CARE OF THIS. PATIENT HAD HEART SURGERY 05/19/22

## 2022-09-12 ENCOUNTER — TELEPHONE (OUTPATIENT)
Dept: CARDIOLOGY | Facility: CLINIC | Age: 82
End: 2022-09-12

## 2022-09-12 NOTE — TELEPHONE ENCOUNTER
PATIENT COMPLAINING OF ACHING AT OPEN HEART SITE. THIS MORNING AT DENTIST BP WAS 99/84. PATIENT SAID THAT IS VERY UNUSUAL FOR HER. MADE APT 9/16/22, AND ADVISED PATIENT TO HAVE LABS PRIOR TO APT.

## 2022-09-13 ENCOUNTER — LAB (OUTPATIENT)
Dept: LAB | Facility: HOSPITAL | Age: 82
End: 2022-09-13

## 2022-09-13 DIAGNOSIS — Z98.890 H/O MITRAL VALVE REPAIR: ICD-10-CM

## 2022-09-13 DIAGNOSIS — E78.5 DYSLIPIDEMIA: ICD-10-CM

## 2022-09-13 DIAGNOSIS — Z79.01 LONG TERM (CURRENT) USE OF ANTICOAGULANTS: ICD-10-CM

## 2022-09-13 DIAGNOSIS — I10 ESSENTIAL HYPERTENSION: ICD-10-CM

## 2022-09-13 DIAGNOSIS — I25.810 CORONARY ARTERY DISEASE INVOLVING CORONARY BYPASS GRAFT OF NATIVE HEART WITHOUT ANGINA PECTORIS: ICD-10-CM

## 2022-09-13 DIAGNOSIS — I48.0 PAROXYSMAL ATRIAL FIBRILLATION: ICD-10-CM

## 2022-09-13 DIAGNOSIS — E11.9 TYPE 2 DIABETES MELLITUS WITHOUT COMPLICATION, WITHOUT LONG-TERM CURRENT USE OF INSULIN: ICD-10-CM

## 2022-09-13 DIAGNOSIS — I38 VALVULAR HEART DISEASE: ICD-10-CM

## 2022-09-13 DIAGNOSIS — I50.22 CHRONIC HFREF (HEART FAILURE WITH REDUCED EJECTION FRACTION): ICD-10-CM

## 2022-09-13 DIAGNOSIS — Z95.2 H/O AORTIC VALVE REPLACEMENT: ICD-10-CM

## 2022-09-13 LAB
ALBUMIN SERPL-MCNC: 4 G/DL (ref 3.5–5.2)
ALBUMIN/GLOB SERPL: 1.7 G/DL
ALP SERPL-CCNC: 38 U/L (ref 39–117)
ALT SERPL W P-5'-P-CCNC: 14 U/L (ref 1–33)
ANION GAP SERPL CALCULATED.3IONS-SCNC: 13 MMOL/L (ref 5–15)
AST SERPL-CCNC: 20 U/L (ref 1–32)
BILIRUB SERPL-MCNC: 0.7 MG/DL (ref 0–1.2)
BUN SERPL-MCNC: 10 MG/DL (ref 8–23)
BUN/CREAT SERPL: 14.5 (ref 7–25)
CALCIUM SPEC-SCNC: 10.2 MG/DL (ref 8.6–10.5)
CHLORIDE SERPL-SCNC: 100 MMOL/L (ref 98–107)
CHOLEST SERPL-MCNC: 120 MG/DL (ref 0–200)
CO2 SERPL-SCNC: 28 MMOL/L (ref 22–29)
CREAT SERPL-MCNC: 0.69 MG/DL (ref 0.57–1)
EGFRCR SERPLBLD CKD-EPI 2021: 87.3 ML/MIN/1.73
GLOBULIN UR ELPH-MCNC: 2.4 GM/DL
GLUCOSE SERPL-MCNC: 190 MG/DL (ref 65–99)
HDLC SERPL-MCNC: 26 MG/DL (ref 40–60)
LDLC SERPL CALC-MCNC: 68 MG/DL (ref 0–100)
LDLC/HDLC SERPL: 2.47 {RATIO}
NT-PROBNP SERPL-MCNC: 944 PG/ML (ref 0–1800)
POTASSIUM SERPL-SCNC: 3.9 MMOL/L (ref 3.5–5.2)
PROT SERPL-MCNC: 6.4 G/DL (ref 6–8.5)
SODIUM SERPL-SCNC: 141 MMOL/L (ref 136–145)
TRIGL SERPL-MCNC: 149 MG/DL (ref 0–150)
VLDLC SERPL-MCNC: 26 MG/DL (ref 5–40)

## 2022-09-13 PROCEDURE — 36415 COLL VENOUS BLD VENIPUNCTURE: CPT

## 2022-09-13 PROCEDURE — 80053 COMPREHEN METABOLIC PANEL: CPT

## 2022-09-13 PROCEDURE — 80061 LIPID PANEL: CPT

## 2022-09-13 PROCEDURE — 83880 ASSAY OF NATRIURETIC PEPTIDE: CPT

## 2022-09-16 ENCOUNTER — OFFICE VISIT (OUTPATIENT)
Dept: CARDIOLOGY | Facility: CLINIC | Age: 82
End: 2022-09-16

## 2022-09-16 VITALS
HEART RATE: 92 BPM | OXYGEN SATURATION: 97 % | DIASTOLIC BLOOD PRESSURE: 69 MMHG | HEIGHT: 63 IN | BODY MASS INDEX: 18.61 KG/M2 | WEIGHT: 105 LBS | SYSTOLIC BLOOD PRESSURE: 111 MMHG

## 2022-09-16 DIAGNOSIS — E78.5 DYSLIPIDEMIA: ICD-10-CM

## 2022-09-16 DIAGNOSIS — Z95.1 S/P CABG (CORONARY ARTERY BYPASS GRAFT): ICD-10-CM

## 2022-09-16 DIAGNOSIS — Z79.01 LONG TERM (CURRENT) USE OF ANTICOAGULANTS: ICD-10-CM

## 2022-09-16 DIAGNOSIS — E11.9 TYPE 2 DIABETES MELLITUS WITHOUT COMPLICATION, WITHOUT LONG-TERM CURRENT USE OF INSULIN: ICD-10-CM

## 2022-09-16 DIAGNOSIS — Z98.890 H/O MITRAL VALVE REPAIR: ICD-10-CM

## 2022-09-16 DIAGNOSIS — Z95.2 H/O AORTIC VALVE REPLACEMENT: ICD-10-CM

## 2022-09-16 DIAGNOSIS — I50.22 CHRONIC HFREF (HEART FAILURE WITH REDUCED EJECTION FRACTION): ICD-10-CM

## 2022-09-16 DIAGNOSIS — I73.9 PVD (PERIPHERAL VASCULAR DISEASE) WITH CLAUDICATION: ICD-10-CM

## 2022-09-16 DIAGNOSIS — I48.0 PAROXYSMAL ATRIAL FIBRILLATION: ICD-10-CM

## 2022-09-16 DIAGNOSIS — I38 VALVULAR HEART DISEASE: ICD-10-CM

## 2022-09-16 DIAGNOSIS — R07.89 CHEST WALL PAIN: Primary | ICD-10-CM

## 2022-09-16 PROCEDURE — 99214 OFFICE O/P EST MOD 30 MIN: CPT | Performed by: INTERNAL MEDICINE

## 2022-09-16 RX ORDER — GLIMEPIRIDE 2 MG/1
1 TABLET ORAL
COMMUNITY

## 2022-09-16 RX ORDER — FLUCONAZOLE 100 MG/1
100 TABLET ORAL DAILY
COMMUNITY
Start: 2022-09-01 | End: 2022-11-10

## 2022-09-16 RX ORDER — POTASSIUM CHLORIDE 600 MG/1
TABLET, FILM COATED, EXTENDED RELEASE ORAL
COMMUNITY
Start: 2022-08-29 | End: 2022-11-10

## 2022-09-16 NOTE — PROGRESS NOTES
Subjective:     Encounter Date:09/16/2022      Patient ID: Mindi Quinteros is a 81 y.o. female.    Chief Complaint : Follow-up for CAD, CABG, A. fib, valvular heart disease    History of Present Illness          Ms. Mindi Quinteros  has PMH of        # CAD, cardiac cath 2/7/18  calcified 50% proximal 70% mid LAD and 70% mid LCX, normal LVEF, cardiac cath 5/17/2022 revealed severe two-vessel disease in LAD and LCx.  Echo revealed valvular heart disease with severe AR and MR.  Patient underwent CABG x2 with LIMA to LAD and SVG to lateral marginal and AVR and mitral valve repair and maze and left atrial appendage occlusion 5/19/2022  #CABG x2 with LIMA to LAD and SVG to lateral marginal 5/19/2020  #Valvular heart disease with 5/19/2022 aortic valve replacement with #21 magna pericardial prosthesis and mitral valve repair with #26 physeal ring, left atrial appendage endocardial closure and right and left cryo maze  #Paroxysmal atrial fibrillation, s/p left and right cryo maze and left atrial appendage endocardial closure     #  Diabetes  #  Hypertension,  #  Hyperlipidemia  #  ulcerative colitis  #  allergy to aspertane  #  hemorrhoidectomy, hysterectomy, bladder repair, left ankle surgery,      Here for  follow-up.  Patient is complaining of thoracotomy pain.  Low blood pressure and edema.  Patient is complaining of feet feeling cold and legs turning red.  Has decreased appetite.        Patient's arterial blood pressure is 111/69, heart rate 92, O2 sat of 97% on room air.        Review of records revealed that patient presented through emergency room 5/15/2022 with complaint of nocturnal dyspnea 2 days prior to admission with cough which is resolved but has increasing pedal edema, has chronic diarrhea secondary to ulcerative colitis and fatigue.  Work-up here revealed elevated proBNP of 2099 and glucose 158.  Chest x-ray showing pulmonary edema and small bilateral pleural effusions and new onset A. Fib.  Echocardiogram  5/15/2022 reveals LV dysfunction EF of 46 to 50% with severe eccentric MR and diastolic dysfunction   Patient was in new onset heart failure on admission. Echocardiogram showed borderline EF, diastolic dysfunction, Severe MR,  Mod-severe AR.  Patient underwent cardiac cath and BEST     5/19/2022 patient underwent valve surgery with aortic valve replacement and mitral valve repair and two-vessel CABG and maze procedure.  5/20/2022 cardioverted to normal sinus rhythm   5/27/2022: Glucose 201, ALT 83, AST 62, bilirubin 1.4, hemoglobin 10.2  5/31/2022: Glucose 49, ALT 40 AST 50, potassium 3.3 magnesium 1.8, hgb 9.4   6/1/2022: glucose 168, potassium 4.2, bun 6 creatinine 0.55, ALT 34, AST 48 hgb 8.6  6/3/2022: Glucose 152, potassium 3.8, hemoglobin 11.4      ASSESSMENT:       #Chest wall pain, thoracotomy pain  #Cold feet, edema, claudication  #CAD, CABG  # paroxysmal atrial fibrillation, s/p maze, left atrial appendage closure  #Chronic HFrEF due to   systolic and diastolic dysfunction from ischemic cardiomyopathy and valvular heart disease and A. fib.  #Mitral regurgitation, s/p mitral valve repair  #Aortic regurgitation, status post tissue aortic valve replacement  #Cardiomyopathy, possibly due to MR, ischemic cardiomyopathy and A. fib with RVR  #CABG x2 with LIMA to LAD, SVG to lateral marginal, AVR with #21 magna pericardial prosthesis, mitral valve repair with #26 physio ll ring annuloplasty, Maze procedure, CRAIG ligation  #Loss of balance/ataxia/B12 deficiency  #Impaired memory/difficulty finding words  #  hyperlipidemia  #  diabetes   # hypertension, dyslipidemia     PLAN:     Check BNP level for edema which is normal.  We will check a CT chest to evaluate thoracotomy pain.   Will make an appointment with CT surgery to evaluate her thoracotomy.  We will check ABIs for rubor and cold feet possible claudication.    Continue medical management with Eliquis, aspirin, atorvastatin, Bumex, metoprolol to help with CAD,  valvular heart disease, CHF, hypertension, atrial fibrillation, dyslipidemia as tolerated     Patient has high [id number] score due to female gender, age over 75, hypertension and diabetes making it 5, will benefit from long-term anticoagulation given severity we will continue Eliquis as tolerated.     Patient underwent cardiac cath 5/17/2022 which revealed severe two-vessel disease  Patient underwent BEST which revealed severe AR.     Patient underwent two-vessel CABG, maze, aortic valve replacement and mitral valve repair by  5/19/2022       Procedures EKG done 5/28/2022 reviewed/interpreted by me reveals A. fib with a rate of 53 bpm       Procedures EKG done 5/28/2022 reviewed/interpreted by me reveals atrial for flutter at the rate of 53 bpm    Copied text in this portion of the note has been reviewed and is accurate as of 9/16/2022  The following portions of the patient's history were reviewed and updated as appropriate: allergies, current medications, past family history, past medical history, past social history, past surgical history and problem list.    Assessment:         [facility]     Diagnosis Plan   1. Chest wall pain  CT Chest Without Contrast    Doppler Ankle Brachial Index Single Level CAR    Ambulatory Referral to Cardiothoracic Surgery   2. S/P CABG (coronary artery bypass graft)  CT Chest Without Contrast    Doppler Ankle Brachial Index Single Level CAR    Ambulatory Referral to Cardiothoracic Surgery   3. Chronic HFrEF (heart failure with reduced ejection fraction) ([facility])  CT Chest Without Contrast    Doppler Ankle Brachial Index Single Level CAR    Ambulatory Referral to Cardiothoracic Surgery   4. Valvular heart disease  CT Chest Without Contrast    Doppler Ankle Brachial Index Single Level CAR    Ambulatory Referral to Cardiothoracic Surgery   5. H/O aortic valve replacement  CT Chest Without Contrast    Doppler Ankle Brachial Index Single Level CAR    Ambulatory Referral to Cardiothoracic  Surgery   6. H/O mitral valve repair  CT Chest Without Contrast    Doppler Ankle Brachial Index Single Level CAR    Ambulatory Referral to Cardiothoracic Surgery   7. Paroxysmal atrial fibrillation (HCC)  CT Chest Without Contrast    Doppler Ankle Brachial Index Single Level CAR    Ambulatory Referral to Cardiothoracic Surgery   8. Long term (current) use of anticoagulants  CT Chest Without Contrast    Doppler Ankle Brachial Index Single Level CAR    Ambulatory Referral to Cardiothoracic Surgery   9. Dyslipidemia  CT Chest Without Contrast    Doppler Ankle Brachial Index Single Level CAR    Ambulatory Referral to Cardiothoracic Surgery   10. Type 2 diabetes mellitus without complication, without long-term current use of insulin (HCC)  CT Chest Without Contrast    Doppler Ankle Brachial Index Single Level CAR    Ambulatory Referral to Cardiothoracic Surgery   11. PVD (peripheral vascular disease) with claudication (HCC)  CT Chest Without Contrast    Doppler Ankle Brachial Index Single Level CAR    Ambulatory Referral to Cardiothoracic Surgery          Plan:               Past Medical History:  Past Medical History:   Diagnosis Date   • Acute congestive heart failure, unspecified heart failure type (HCC) 5/15/2022   • Age-related osteoporosis without current pathological fracture     prolia([id number], 9/12/2019), restarted prolia(11/3/20)   • Allergic    • Anxiety    • Arthritis    • CAD (coronary artery disease)    • Depression    • Diabetes (HCC)    • Elevated cholesterol    • HTN (hypertension)    • Hyperlipemia    • Injury of back    • Sinusitis      Past Surgical History:  Past Surgical History:   Procedure Laterality Date   • ANKLE ARTHROSCOPY     • AORTIC VALVE REPAIR/REPLACEMENT MITRAL VALVE REPAIR/REPLACEMENT N/A 5/19/2022    Procedure: AORTIC VALVE REPAIR/REPLACEMENT MITRAL VALVE REPAIR/REPLACEMENT;  Surgeon: Lane Okeefe MD;  Location: [facility];  Service: Cardiothoracic;  Laterality: N/A;  Mitral  Valve repair with 26mm Physio II ring. Aortic   Valve Replacement with 21mm Magna Ease valve.   • BELPHAROPTOSIS REPAIR     • CARDIAC CATHETERIZATION     • CARDIAC CATHETERIZATION N/A 5/17/2022    Procedure: Left Heart Cath, possible pci;  Surgeon: Natan Terrazas MD;  Location: [facility] CATH INVASIVE LOCATION;  Service: Cardiovascular;  Laterality: N/A;   • CATARACT EXTRACTION     • CHOLECYSTECTOMY N/A 10/14/2019    Procedure: Laparoscopic cholecystectomy, possible open;  Surgeon: Vijay Guerra DO;  Location: [facility] MAIN OR;  Service: General   • COLONOSCOPY     • CORONARY ARTERY BYPASS GRAFT N/A 5/19/2022    Procedure: CORONARY ARTERY BYPASS GRAFTING;  Surgeon: Lane Okeefe MD;  Location: [facility] CVOR;  Service: Cardiothoracic;  Laterality: N/A;  CABG X 2 (1 Vein graft, 1 LIMA graft)   • COSMETIC SURGERY     • ENDOSCOPY     • HEMORROIDECTOMY     • HYSTERECTOMY     • MAZE PROCEDURE N/A 5/19/2022    Procedure: MAZE PROCEDURE;  Surgeon: Lane Okeefe MD;  Location: [facility] CVOR;  Service: Cardiothoracic;  Laterality: N/A;      Allergies:  Allergies   Allergen Reactions   • Asacol [Mesalamine] Swelling   • Diclofenac Swelling     Gums swell only per patient     Home Meds:  Current Meds:     Current Outpatient Medications:   •  acetaminophen (TYLENOL) 325 MG tablet, Take 2 tablets by mouth Every 6 (Six) Hours As Needed for Mild Pain ., Disp: 30 tablet, Rfl: 0  •  apixaban (ELIQUIS) 2.5 MG tablet tablet, Take 1 tablet by mouth Every 12 (Twelve) Hours. Indications: Atrial Fibrillation, Atrial Fibrillation, Disp: 180 tablet, Rfl: 2  •  aspirin (aspirin) 81 MG EC tablet, Take 81 mg by mouth Daily., Disp: , Rfl:   •  atorvastatin (LIPITOR) 40 MG tablet, Take 1 tablet by mouth every night at bedtime., Disp: 90 tablet, Rfl: 3  •  Calcium Carbonate (CALCIUM 500 PO), Take 600 mg by mouth Daily., Disp: , Rfl:   •  Cholecalciferol (VITAMIN D3) 2000 units capsule, Take 4,000 Units by mouth Every Evening., Disp: ,  Rfl:   •  cyanocobalamin 1000 MCG/ML injection, Inject 1,000 mcg into the appropriate muscle as directed by prescriber Every 30 (Thirty) Days., Disp: , Rfl:   •  dicyclomine (BENTYL) 10 MG capsule, Take 10 mg by mouth Daily., Disp: , Rfl:   •  ferrous sulfate 325 (65 FE) MG tablet, Take 325 mg by mouth 2 (Two) Times a Week., Disp: , Rfl:   •  furosemide (LASIX) 40 MG tablet, Take 1 tablet by mouth Daily. (Patient taking differently: Take 20 mg by mouth Daily.), Disp: 90 tablet, Rfl: 1  •  glimepiride (AMARYL) 2 MG tablet, Take 2 mg by mouth Every Morning Before Breakfast., Disp: , Rfl:   •  lisinopril (PRINIVIL,ZESTRIL) 2.5 MG tablet, Take 1 tablet by mouth Daily., Disp: 30 tablet, Rfl: 1  •  metFORMIN ER (GLUCOPHAGE-XR) 500 MG 24 hr tablet, Take 2,000 mg by mouth Daily With Breakfast. 4 pills a day, Disp: , Rfl:   •  metoprolol succinate XL (TOPROL-XL) 25 MG 24 hr tablet, Take 0.5 tablets by mouth Daily., Disp: 30 tablet, Rfl: 1  •  multivitamin with minerals tablet tablet, Take 1 tablet by mouth Daily., Disp: , Rfl:   •  potassium chloride (KLOR-CON) 20 MEQ packet, Take 20 mEq by mouth 2 (Two) Times a Day. (Patient taking differently: Take 8 mEq by mouth 2 (Two) Times a Day.), Disp: 60 packet, Rfl: 1  •  potassium chloride (KLOR-CON) 8 MEQ CR tablet, , Disp: , Rfl:   •  psyllium (METAMUCIL) 58.6 % packet, Take 1 packet by mouth Daily., Disp: , Rfl:   •  bisacodyl (DULCOLAX) 5 MG EC tablet, Take 5 mg by mouth Daily As Needed for Constipation., Disp: , Rfl:   •  dextrose (GLUTOSE) 40 % gel, Take 15 g by mouth Every 15 (Fifteen) Minutes As Needed for Low Blood Sugar (per [employer])., Disp: , Rfl:   •  fenofibrate 160 MG tablet, Take 160 mg by mouth Daily., Disp: , Rfl:   •  fluconazole (DIFLUCAN) 100 MG tablet, Take 100 mg by mouth Daily., Disp: , Rfl:   •  glipizide (GLUCOTROL) 5 MG tablet, Take 1 tablet by mouth Every Morning Before Breakfast., Disp: 30 tablet, Rfl: 1  •  insulin glargine (LANTUS, SEMGLEE) 100  "UNIT/ML injection, Inject 12 Units under the skin into the appropriate area as directed Daily., Disp: 3 mL, Rfl: 1  •  insulin lispro (ADMELOG) 100 UNIT/ML injection, Inject 0-24 Units under the skin into the appropriate area as directed Every 6 (Six) Hours., Disp: , Rfl: 12  •  insulin lispro (ADMELOG) 100 UNIT/ML injection, Inject 0-24 Units under the skin into the appropriate area as directed As Needed for High Blood Sugar (Per the administration instructions). Indications: Insulin-Dependent Diabetes, Disp: , Rfl: 12  •  LORazepam (ATIVAN) 0.5 MG tablet, Take 0.5 mg by mouth Every 8 (Eight) Hours As Needed for Anxiety., Disp: , Rfl:   •  melatonin 5 MG tablet tablet, Take 2 tablets by mouth Every Night., Disp: 30 tablet, Rfl: 0  •  traMADol (ULTRAM) 50 MG tablet, Take 50 mg by mouth As Needed for Moderate Pain ., Disp: , Rfl:     Current Facility-Administered Medications:   •  denosumab (PROLIA) syringe 60 mg, 60 mg, Subcutaneous, Q6 Months, Elisabeth Royal PA, 60 mg at 05/09/22 1020  Social History:   Social History     Tobacco Use   • Smoking status: Never Smoker   • Smokeless tobacco: Never Used   Substance Use Topics   • Alcohol use: No      Family History:  Family History   Problem Relation Age of Onset   • Diabetes Mother    • Heart disease Father    • Heart disease Brother    • Diabetes Brother    • Osteoporosis Paternal Grandmother               Review of Systems   Constitutional: Negative for malaise/fatigue.   Cardiovascular: Positive for chest pain and leg swelling. Negative for palpitations.   Respiratory: Negative for shortness of breath.    Skin: Negative for rash.   Neurological: Negative for dizziness, light-headedness and numbness.     All other systems are negative         Objective:     Physical Exam  /69   Pulse 92   Ht 160 cm (63\")   Wt 47.6 kg (105 lb)   SpO2 97%   BMI 18.60 kg/m²   General:  Appears in no acute distress  Eyes: Sclera is anicteric,  conjunctiva is clear " "  HEENT:  No JVD.  No carotid bruits  Respiratory: Respirations regular and unlabored at rest.  Clear to auscultation  Cardiovascular: S1,S2 Regular rate and rhythm. No murmur, rub or gallop auscultated.   Extremities: No digital clubbing or cyanosis, no edema  Skin: Color pink. Skin warm and dry to touch. No rashes  No xanthoma  Neuro: Alert and awake.    Lab Reviewed:         Natan Terrazas MD  9/16/2022 12:14 EDT      EMR Dragon/Transcription:   \"Dictated utilizing Dragon dictation\".        "

## 2022-09-19 ENCOUNTER — TELEPHONE (OUTPATIENT)
Dept: CARDIAC SURGERY | Facility: CLINIC | Age: 82
End: 2022-09-19

## 2022-09-19 NOTE — TELEPHONE ENCOUNTER
Mindi calling requesting PT be ordered for her. States she is driving but needs PT for walking. Advised she would need to discuss with her pcp. Mindi states her PCP told her that her heart surgeon would need to order.     I called patient PCP 's office, per staff orders for PT placed on 8/29/22.     Called and spoke with Mindi, advised her pcp placed referral orders for PT on 8/29/22. She thanked me for helping her, advised should she have any further questions or concerns to call the office. She verbalized understanding and this was agreeable.

## 2022-09-26 ENCOUNTER — TELEPHONE (OUTPATIENT)
Dept: CARDIOLOGY | Facility: CLINIC | Age: 82
End: 2022-09-26

## 2022-09-26 NOTE — TELEPHONE ENCOUNTER
Rx Refill Note  Requested Prescriptions      No prescriptions requested or ordered in this encounter      Last office visit with prescribing clinician: 9/16/2022      Next office visit with prescribing clinician: 3/16/2023            Tatum Hammer MA  09/26/22, 12:33 EDT

## 2022-09-26 NOTE — TELEPHONE ENCOUNTER
Caller: Mindi Quinteros A    Relationship: Self    Best call back number: [phone]    What is the best time to reach you: MORNINGS    Who are you requesting to speak with (clinical staff, provider,  specific staff member): ANY    What was the call regarding: APPOINTMENT    Do you require a callback: YES      PT'S CALLED IN STATING THAT SHE SEEN  LAST WEEK AND HE INFORMED HER THAT HE WOULD LET 'S OFFICE KNOW THAT SHE NEEDED AN APPOINTMENT AFTER HER TESTING BUT SHE HAS NOT HEARD ANYTHING FROM ANYONE. SHE ALSO WANTS TO KNOW WHO SHE NEEDS TO SEE AFTER HER TESTING.

## 2022-09-27 NOTE — TELEPHONE ENCOUNTER
"\"HUB TO SHARE\"    REFERRAL FOR DR. MELVIN IN CHART. COMMUNICATION IN REFERRAL STATES PATIENT HAS CT SCHEDULED 9/29 AND PATIENT CAN BE SCHEDULED AFTER THAT. THEIR OFFICE SHOULD BE REACHING OUT TO PATIENT SOON TO MAKE APT.  I CALLED PATIENT AND LEFT HER A MESSAGE.  "

## 2022-09-29 ENCOUNTER — HOSPITAL ENCOUNTER (OUTPATIENT)
Dept: CT IMAGING | Facility: HOSPITAL | Age: 82
Discharge: HOME | End: 2022-09-29

## 2022-09-29 ENCOUNTER — HOSPITAL ENCOUNTER (OUTPATIENT)
Dept: CARDIOLOGY | Facility: HOSPITAL | Age: 82
Discharge: HOME | End: 2022-09-29

## 2022-09-29 DIAGNOSIS — Z95.1 S/P CABG (CORONARY ARTERY BYPASS GRAFT): ICD-10-CM

## 2022-09-29 DIAGNOSIS — I38 VALVULAR HEART DISEASE: ICD-10-CM

## 2022-09-29 DIAGNOSIS — R07.89 CHEST WALL PAIN: ICD-10-CM

## 2022-09-29 DIAGNOSIS — I73.9 PVD (PERIPHERAL VASCULAR DISEASE) WITH CLAUDICATION: ICD-10-CM

## 2022-09-29 DIAGNOSIS — I50.22 CHRONIC HFREF (HEART FAILURE WITH REDUCED EJECTION FRACTION): ICD-10-CM

## 2022-09-29 DIAGNOSIS — I48.0 PAROXYSMAL ATRIAL FIBRILLATION: ICD-10-CM

## 2022-09-29 DIAGNOSIS — E11.9 TYPE 2 DIABETES MELLITUS WITHOUT COMPLICATION, WITHOUT LONG-TERM CURRENT USE OF INSULIN: ICD-10-CM

## 2022-09-29 DIAGNOSIS — E78.5 DYSLIPIDEMIA: ICD-10-CM

## 2022-09-29 DIAGNOSIS — Z79.01 LONG TERM (CURRENT) USE OF ANTICOAGULANTS: ICD-10-CM

## 2022-09-29 DIAGNOSIS — Z98.890 H/O MITRAL VALVE REPAIR: ICD-10-CM

## 2022-09-29 DIAGNOSIS — Z95.2 H/O AORTIC VALVE REPLACEMENT: ICD-10-CM

## 2022-09-29 LAB
BH CV LOWER ARTERIAL LEFT ABI RATIO: 1.05
BH CV LOWER ARTERIAL LEFT DORSALIS PEDIS SYS MAX: 101
BH CV LOWER ARTERIAL LEFT GREAT TOE SYS MAX: 81
BH CV LOWER ARTERIAL LEFT POST TIBIAL SYS MAX: 104
BH CV LOWER ARTERIAL LEFT TBI RATIO: 0.82
BH CV LOWER ARTERIAL RIGHT ABI RATIO: 1.23
BH CV LOWER ARTERIAL RIGHT DORSALIS PEDIS SYS MAX: 113
BH CV LOWER ARTERIAL RIGHT GREAT TOE SYS MAX: 85
BH CV LOWER ARTERIAL RIGHT POST TIBIAL SYS MAX: 122
BH CV LOWER ARTERIAL RIGHT TBI RATIO: 0.86
MAXIMAL PREDICTED HEART RATE: 139 BPM
STRESS TARGET HR: 118 BPM
UPPER ARTERIAL LEFT ARM BRACHIAL SYS MAX: 92 MMHG
UPPER ARTERIAL RIGHT ARM BRACHIAL SYS MAX: 99 MMHG

## 2022-09-29 PROCEDURE — 93922 UPR/L XTREMITY ART 2 LEVELS: CPT

## 2022-09-29 PROCEDURE — 71250 CT THORAX DX C-: CPT

## 2022-10-04 ENCOUNTER — OFFICE (OUTPATIENT)
Dept: URBAN - METROPOLITAN AREA CLINIC 64 | Facility: CLINIC | Age: 82
End: 2022-10-04

## 2022-10-04 VITALS — WEIGHT: 104 LBS | HEIGHT: 64 IN

## 2022-10-04 DIAGNOSIS — R15.9 FULL INCONTINENCE OF FECES: ICD-10-CM

## 2022-10-04 DIAGNOSIS — K51.90 ULCERATIVE COLITIS, UNSPECIFIED, WITHOUT COMPLICATIONS: ICD-10-CM

## 2022-10-04 DIAGNOSIS — K59.1 FUNCTIONAL DIARRHEA: ICD-10-CM

## 2022-10-04 PROCEDURE — 99214 OFFICE O/P EST MOD 30 MIN: CPT | Performed by: NURSE PRACTITIONER

## 2022-11-10 ENCOUNTER — OFFICE VISIT (OUTPATIENT)
Dept: ORTHOPEDIC SURGERY | Facility: CLINIC | Age: 82
End: 2022-11-10

## 2022-11-10 VITALS
DIASTOLIC BLOOD PRESSURE: 74 MMHG | SYSTOLIC BLOOD PRESSURE: 129 MMHG | HEART RATE: 97 BPM | HEIGHT: 63 IN | WEIGHT: 114 LBS | BODY MASS INDEX: 20.2 KG/M2

## 2022-11-10 DIAGNOSIS — I10 DIABETES MELLITUS WITH COINCIDENT HYPERTENSION: Chronic | ICD-10-CM

## 2022-11-10 DIAGNOSIS — Z82.62 FAMILY HX OSTEOPOROSIS: ICD-10-CM

## 2022-11-10 DIAGNOSIS — F41.9 ANXIETY DISORDER, UNSPECIFIED TYPE: ICD-10-CM

## 2022-11-10 DIAGNOSIS — F32.A DEPRESSION, UNSPECIFIED DEPRESSION TYPE: ICD-10-CM

## 2022-11-10 DIAGNOSIS — M81.0 AGE-RELATED OSTEOPOROSIS WITHOUT CURRENT PATHOLOGICAL FRACTURE: Primary | ICD-10-CM

## 2022-11-10 DIAGNOSIS — I48.91 ATRIAL FIBRILLATION, UNSPECIFIED TYPE: ICD-10-CM

## 2022-11-10 DIAGNOSIS — E55.9 VITAMIN D DEFICIENCY: ICD-10-CM

## 2022-11-10 DIAGNOSIS — K51.90 ULCERATIVE COLITIS WITHOUT COMPLICATIONS, UNSPECIFIED LOCATION: ICD-10-CM

## 2022-11-10 DIAGNOSIS — I50.33 ACUTE ON CHRONIC DIASTOLIC (CONGESTIVE) HEART FAILURE: ICD-10-CM

## 2022-11-10 DIAGNOSIS — E11.9 DIABETES MELLITUS WITH COINCIDENT HYPERTENSION: Chronic | ICD-10-CM

## 2022-11-10 DIAGNOSIS — Z51.81 MEDICATION MONITORING ENCOUNTER: ICD-10-CM

## 2022-11-10 PROBLEM — I21.9 ACUTE MYOCARDIAL INFARCTION, UNSPECIFIED (HCC): Status: ACTIVE | Noted: 2022-06-04

## 2022-11-10 PROBLEM — J91.8 PLEURAL EFFUSION IN OTHER CONDITIONS CLASSIFIED ELSEWHERE: Status: ACTIVE | Noted: 2022-06-04

## 2022-11-10 PROBLEM — M19.90 UNSPECIFIED OSTEOARTHRITIS, UNSPECIFIED SITE: Status: ACTIVE | Noted: 2022-06-03

## 2022-11-10 PROBLEM — I27.24 CHRONIC THROMBOEMBOLIC PULMONARY HYPERTENSION: Status: ACTIVE | Noted: 2022-06-04

## 2022-11-10 PROBLEM — M62.81 GENERALIZED MUSCLE WEAKNESS: Status: ACTIVE | Noted: 2022-06-05

## 2022-11-10 PROBLEM — R53.1 WEAKNESS: Status: ACTIVE | Noted: 2022-06-03

## 2022-11-10 PROBLEM — R00.1 BRADYCARDIA, UNSPECIFIED: Status: ACTIVE | Noted: 2022-06-04

## 2022-11-10 PROBLEM — R26.81 UNSTEADINESS ON FEET: Status: ACTIVE | Noted: 2022-06-05

## 2022-11-10 PROBLEM — R26.2 DIFFICULTY IN WALKING, NOT ELSEWHERE CLASSIFIED: Status: ACTIVE | Noted: 2022-06-05

## 2022-11-10 PROBLEM — Z95.1 PRESENCE OF AORTOCORONARY BYPASS GRAFT: Status: ACTIVE | Noted: 2022-06-03

## 2022-11-10 PROBLEM — I51.7 CARDIOMEGALY: Status: ACTIVE | Noted: 2022-06-04

## 2022-11-10 PROBLEM — I25.710: Status: ACTIVE | Noted: 2022-06-04

## 2022-11-10 PROBLEM — J32.9 CHRONIC SINUSITIS, UNSPECIFIED: Status: ACTIVE | Noted: 2022-06-03

## 2022-11-10 PROBLEM — I50.9 HEART FAILURE, UNSPECIFIED: Status: ACTIVE | Noted: 2022-06-03

## 2022-11-10 PROBLEM — N17.9 ACUTE KIDNEY FAILURE, UNSPECIFIED: Status: ACTIVE | Noted: 2022-06-03

## 2022-11-10 PROCEDURE — 96372 THER/PROPH/DIAG INJ SC/IM: CPT | Performed by: PHYSICIAN ASSISTANT

## 2022-11-10 PROCEDURE — 99214 OFFICE O/P EST MOD 30 MIN: CPT | Performed by: PHYSICIAN ASSISTANT

## 2022-11-10 NOTE — PROGRESS NOTES
ORTHO FOLLOW UP       Subjective:    HPI:   Mindi Quinteros is a 81 y.o. female who presents in follow-up for osteoporosis.  She is currently on Prolia and reports that she is doing well with no noticeable side effects.  This medication was restarted on 11/3/2020 with her last injection on 5/9/2022.  She also continues 1600 mg of calcium and 2000 international units of D3 per day.  She denies any significant fractures since last office visit.  She is currently in physical therapy.  She denies any falls in the last year, does not feel unsteady with walking, but is worried about falling.  She denies any new pain or change in her existing pain.  Recent labs were reviewed.  She is due for a new DEXA scan at this time and [facility].  The patient does report that she is scheduled for an upcoming dental extraction.    STEAD[facility] Fall Risk Assessment was completed, and patient is at LOW risk for falls.Assessment completed on:11/10/2022      Past Medical History:   Diagnosis Date   • Acute congestive heart failure, unspecified heart failure type (HCC) 5/15/2022   • Age-related osteoporosis without current pathological fracture     prolia([id number], 9/12/2019), restarted prolia(11/3/20)   • Allergic    • Anxiety    • Arthritis    • CAD (coronary artery disease)    • Depression    • Diabetes (HCC)    • Elevated cholesterol    • HTN (hypertension)    • Hyperlipemia    • Injury of back    • Sinusitis        Past Surgical History:   Procedure Laterality Date   • ANKLE ARTHROSCOPY     • AORTIC VALVE REPAIR/REPLACEMENT MITRAL VALVE REPAIR/REPLACEMENT N/A 5/19/2022    Procedure: AORTIC VALVE REPAIR/REPLACEMENT MITRAL VALVE REPAIR/REPLACEMENT;  Surgeon: Lane Okeefe MD;  Location: [facility];  Service: Cardiothoracic;  Laterality: N/A;  Mitral Valve repair with 26mm Physio II ring. Aortic   Valve Replacement with 21mm Magna Ease valve.   • BELPHAROPTOSIS REPAIR     • CARDIAC  CATHETERIZATION     • CARDIAC CATHETERIZATION N/A 5/17/2022    Procedure: Left Heart Cath, possible pci;  Surgeon: Natan Terrazas MD;  Location: [facility] CATH INVASIVE LOCATION;  Service: Cardiovascular;  Laterality: N/A;   • CATARACT EXTRACTION     • CHOLECYSTECTOMY N/A 10/14/2019    Procedure: Laparoscopic cholecystectomy, possible open;  Surgeon: Vijay Guerra DO;  Location: [facility] MAIN OR;  Service: General   • COLONOSCOPY     • CORONARY ARTERY BYPASS GRAFT N/A 5/19/2022    Procedure: CORONARY ARTERY BYPASS GRAFTING;  Surgeon: Lane Okeefe MD;  Location: [facility] CVOR;  Service: Cardiothoracic;  Laterality: N/A;  CABG X 2 (1 Vein graft, 1 LIMA graft)   • COSMETIC SURGERY     • ENDOSCOPY     • HEMORROIDECTOMY     • HYSTERECTOMY     • MAZE PROCEDURE N/A 5/19/2022    Procedure: MAZE PROCEDURE;  Surgeon: Lane Okeefe MD;  Location: [facility];  Service: Cardiothoracic;  Laterality: N/A;       Social History     Occupational History   • Not on file   Tobacco Use   • Smoking status: Never   • Smokeless tobacco: Never   Vaping Use   • Vaping Use: Never used   Substance and Sexual Activity   • Alcohol use: No   • Drug use: No   • Sexual activity: Defer      The following portions of the patient's history were reviewed and updated as appropriate: allergies, current medications, past family history, past medical history, past social history, past surgical history and problem list.    Medications:    Current Outpatient Medications:   •  acetaminophen (TYLENOL) 325 MG tablet, Take 2 tablets by mouth Every 6 (Six) Hours As Needed for Mild Pain ., Disp: 30 tablet, Rfl: 0  •  apixaban (ELIQUIS) 2.5 MG tablet tablet, Take 1 tablet by mouth Every 12 (Twelve) Hours. Indications: Atrial Fibrillation, Atrial Fibrillation, Disp: 180 tablet, Rfl: 2  •  aspirin (aspirin) 81 MG EC tablet, Take 81 mg by mouth Daily., Disp: , Rfl:   •  atorvastatin (LIPITOR) 40 MG tablet, Take 1 tablet by mouth every night at  "bedtime., Disp: 90 tablet, Rfl: 3  •  Calcium Carbonate (CALCIUM 500 PO), Take 2 tablets by mouth Daily., Disp: , Rfl:   •  Cholecalciferol (VITAMIN D3) 2000 units capsule, Take 1 capsule by mouth Every Evening., Disp: , Rfl:   •  cyanocobalamin 1000 MCG/ML injection, Inject 1,000 mcg into the appropriate muscle as directed by prescriber Every 30 (Thirty) Days., Disp: , Rfl:   •  dicyclomine (BENTYL) 10 MG capsule, Take 10 mg by mouth Daily., Disp: , Rfl:   •  fenofibrate 160 MG tablet, Take 160 mg by mouth Daily., Disp: , Rfl:   •  ferrous sulfate 325 (65 FE) MG tablet, Take 325 mg by mouth 2 (Two) Times a Week., Disp: , Rfl:   •  furosemide (LASIX) 40 MG tablet, Take 1 tablet by mouth Daily. (Patient taking differently: Take 20 mg by mouth Daily.), Disp: 90 tablet, Rfl: 1  •  glimepiride (AMARYL) 2 MG tablet, Take 2 mg by mouth Every Morning Before Breakfast., Disp: , Rfl:   •  melatonin 5 MG tablet tablet, Take 2 tablets by mouth Every Night., Disp: 30 tablet, Rfl: 0  •  metFORMIN ER (GLUCOPHAGE-XR) 500 MG 24 hr tablet, Take 2,000 mg by mouth Daily With Breakfast. 4 pills a day, Disp: , Rfl:   •  metoprolol succinate XL (TOPROL-XL) 25 MG 24 hr tablet, Take 0.5 tablets by mouth Daily., Disp: 30 tablet, Rfl: 1  •  multivitamin with minerals tablet tablet, Take 1 tablet by mouth Daily., Disp: , Rfl:   •  potassium chloride (KLOR-CON) 20 MEQ packet, Take 20 mEq by mouth 2 (Two) Times a Day. (Patient taking differently: Take 8 mEq by mouth 2 (Two) Times a Day.), Disp: 60 packet, Rfl: 1    Current Facility-Administered Medications:   •  denosumab (PROLIA) syringe 60 mg, 60 mg, Subcutaneous, Q6 Months, Elisabeth Royal PA, 60 mg at 11/10/22 1021    Allergies:  Allergies   Allergen Reactions   • Asacol [Mesalamine] Swelling   • Diclofenac Swelling     Gums swell only per patient          Objective   Objective:    /74   Pulse 97   Ht 160 cm (63\")   Wt 51.7 kg (114 lb)   BMI 20.19 kg/m²     Physical " Examination:  Thin, somewhat frail-appearing individual in no acute distress, patient is alert and cooperative with the exam, appears to have normal mood and affect with a normal attention span and concentration, ambulating unassisted  normocephalic, atraumatic, extraocular movements intact, conjunctiva and sclera are clear, grossly normal hearing  no lymphadenopathy or thyromegaly  normal respiratory effort  abdomen is nontender, without guarding or rebound and nondistended  Appears very slightly kyphotic  Bilateral LE edema noted  skin intact without lesions or rashes visible      [facility]agin[facility], revealed a T score -2.2 in the left femoral neck, FRAX scores of 15.0% and 4.7%.  11/3/2020-patient restarted on Prolia  2021-Prolia injection  2021-Prolia injection  2022-Prolia injection  11/10/2022-Prolia injection          Assessment:  1. Age-related osteoporosis without current pathological fracture    2. Vitamin D deficiency    3. Ulcerative colitis without complications, unspecified location ([facility])    4. Diabetes mellitus with coincident hypertension ([facility])    5. Family hx osteoporosis    6. Acute on chronic diastolic (congestive) heart failure ([facility])    7. Atrial fibrillation, unspecified type ([facility])    8. Anxiety disorder, unspecified type    9. Depression, unspecified depression type    10. Medication monitoring encounter         Currently on Prolia since 11/3/2020        Plan:  Prolia injection today.  Keep appointment to have new [facility]22 [facility].  According to her hip FRAX score of 4.7%,  She continues to be at very high risk for hip fracture in the next 10 years.  At this time, I am recommending that she continue her Prolia injections every 6 months, as well as her calcium and vitamin D daily.  Dental letter provided today and discussed increased risk of osteonecrosis with multiple extractions at 1 time.  Patient voiced understanding.   We did review weightbearing exercises and fall prevention today.  She she will continue her physical therapy.  I will plan to see her back in 1 year and as needed, and she will be scheduled for next Prolia injection in 6 months.  She should call with any questions or concerns.              STEPHANIE Owusu  11/10/22  10:53 EST    EMR Dragon/Transcription disclaimer:  Much of this encounter note is an electronic transcription/translation of spoken language to printed text. The electronic translation of spoken language may permit erroneous, or at times, nonsensical words or phrases to be inadvertently transcribed; Although I have reviewed the note for such errors, some may still exist.

## 2022-11-10 NOTE — PATIENT INSTRUCTIONS
Osteoporosis  Osteoporosis is when the bones get thin and weak. This can cause your bones to break (fracture) more easily.  What are the causes?  The exact cause of this condition is not known.  What increases the risk?  Having family members with this condition.  Not eating enough healthy foods.  Taking certain medicines.  Being female.  Being age 50 or older.  Smoking or using other products that contain nicotine or tobacco, such as e-cigarettes or chewing tobacco.  Not exercising.  Being of  or  ancestry.  Having a small body frame.  What are the signs or symptoms?  A broken bone might be the first sign, especially if the break results from a fall or injury that usually would not cause a bone to break.  Other signs and symptoms include:  Pain in the neck or low back.  Being hunched over (stooped posture).  Getting shorter.  How is this treated?  Eating more foods with more calcium and vitamin D in them.  Doing exercises.  Stopping tobacco use.  Limiting how much alcohol you drink.  Taking medicines to slow bone loss or help make the bones stronger.  Taking supplements of calcium and vitamin D every day.  Taking medicines to replace chemicals in the body (hormone replacement medicines).  Monitoring your levels of calcium and vitamin D.  The goal of treatment is to strengthen your bones and lower your risk for a bone break.  Follow these instructions at home:  Eating and drinking  Eat plenty of calcium and vitamin D. These nutrients are good for your bones. Good sources of calcium and vitamin D include:  Some fish, such as salmon and tuna.  Foods that have calcium and vitamin D added to them (fortified foods), such as some breakfast cereals.  Egg yolks.  Cheese.  Liver.    Activity  Do exercises as told by your doctor. Ask your doctor what exercises are safe for you. You should do:  Exercises that make your muscles work to hold your body weight up (weight-bearing exercises). These include michelle chi,  yoga, and walking.  Exercises to make your muscles stronger. One example is lifting weights.  Lifestyle  Do not drink alcohol if:  Your doctor tells you not to drink.  You are pregnant, may be pregnant, or are planning to become pregnant.  If you drink alcohol:  Limit how much you use to:  0-1 drink a day for women.  0-2 drinks a day for men.  Know how much alcohol is in your drink. In the [place] one drink equals one 12 oz bottle of beer (355 mL), one 5 oz glass of wine (148 mL), or one 1½ oz glass of hard liquor (44 mL).  Do not smoke or use any products that contain nicotine or tobacco. If you need help quitting, ask your doctor.  Preventing falls  Use tools to help you move around (mobility aids) as needed. These include canes, walkers, scooters, and crutches.  Keep rooms well-lit.  Put away things on the floor that could make you trip. These include cords and rugs.  Install safety rails on stairs. Install grab bars in bathrooms.  Use rubber mats in slippery areas, like bathrooms.  Wear shoes that:  Fit you well.  Support your feet.  Have closed toes.  Have rubber soles or low heels.  Tell your doctor about all of the medicines you are taking. Some medicines can make you more likely to fall.  General instructions  Take over-the-counter and prescription medicines only as told by your doctor.  Keep all follow-up visits.  Contact a doctor if:  You have not been tested (screened) for osteoporosis and you are:  A woman who is age 65 or older.  A man who is age 70 or older.  Get help right away if:  You fall.  You get hurt.  Summary  Osteoporosis happens when your bones get thin and weak.  Weak bones can break (fracture) more easily.  Eat plenty of calcium and vitamin D. These are good for your bones.  Tell your doctor about all of the medicines that you take.  This information is not intended to replace advice given to you by your health care provider. Make sure you discuss any questions you have with your health care  provider.  Document Revised: 06/03/2021 Document Reviewed: 06/03/2021  Elsevier Patient Education © 2022 [employer] Inc[employer]

## 2022-11-17 ENCOUNTER — OFFICE VISIT (OUTPATIENT)
Dept: CARDIAC SURGERY | Facility: CLINIC | Age: 82
End: 2022-11-17

## 2022-11-17 VITALS
WEIGHT: 112 LBS | HEIGHT: 63 IN | DIASTOLIC BLOOD PRESSURE: 67 MMHG | OXYGEN SATURATION: 97 % | RESPIRATION RATE: 20 BRPM | BODY MASS INDEX: 19.84 KG/M2 | TEMPERATURE: 97.1 F | SYSTOLIC BLOOD PRESSURE: 124 MMHG | HEART RATE: 73 BPM

## 2022-11-17 DIAGNOSIS — Z98.890 S/P MAZE OPERATION FOR ATRIAL FIBRILLATION: ICD-10-CM

## 2022-11-17 DIAGNOSIS — Z95.1 PRESENCE OF AORTOCORONARY BYPASS GRAFT: ICD-10-CM

## 2022-11-17 DIAGNOSIS — Z98.890 S/P MVR (MITRAL VALVE REPAIR): ICD-10-CM

## 2022-11-17 DIAGNOSIS — Z86.79 S/P MAZE OPERATION FOR ATRIAL FIBRILLATION: ICD-10-CM

## 2022-11-17 DIAGNOSIS — Z95.2 S/P AVR (AORTIC VALVE REPLACEMENT): ICD-10-CM

## 2022-11-17 DIAGNOSIS — I10 HYPERTENSION, UNSPECIFIED TYPE: Chronic | ICD-10-CM

## 2022-11-17 DIAGNOSIS — Z95.1 S/P CABG X 2: Primary | ICD-10-CM

## 2022-11-17 DIAGNOSIS — I50.33 ACUTE ON CHRONIC DIASTOLIC (CONGESTIVE) HEART FAILURE: ICD-10-CM

## 2022-11-17 PROCEDURE — 99024 POSTOP FOLLOW-UP VISIT: CPT | Performed by: THORACIC SURGERY (CARDIOTHORACIC VASCULAR SURGERY)

## 2022-11-17 RX ORDER — MONTELUKAST SODIUM 4 MG/1
1 TABLET, CHEWABLE ORAL AS NEEDED
COMMUNITY

## 2022-11-27 NOTE — PROGRESS NOTES
[facility] note  I saw Mrs. Quinteros in follow-up 6 months after her aortic valve replacement, mitral valve replacement, Maze procedure and CABG.  She was seen in our office by our team after surgery and she was progressing well.  She continues to do so but she still has chest discomfort.  Her vitals are stable.  She had a chest CTA that showed no dehiscence.  There are no signs of infection or discharge in the wound.  Her vitals are stable.  She has spent some time in a rehab center and she has regained some of her independence.  She continues to do outpatient rehab.  Her lungs are clear bilateral, heart is regular, her sternum is stable and incisions are clean.  I think overall she is doing well but she is going through a long recovery from major surgery.  She has had's chest CT that showed ascending aorta 4.2 cm.  There is no aneurysmal dilatation.  She has ectasia.  I recommend a chest CT in 1 year for follow-up and also to give a second look to the sternum.

## 2022-12-06 ENCOUNTER — TELEPHONE (OUTPATIENT)
Dept: CARDIOLOGY | Facility: CLINIC | Age: 82
End: 2022-12-06

## 2022-12-06 NOTE — TELEPHONE ENCOUNTER
Caller: Mindi Quinteros A    Relationship to patient: Self    Best call back number: [phone]    Patient is needing: PT IS HAVING SWELLING FROM ENTIRE RIGHT FOOT UP TO BELOW KNEE WITH SOME LEAKAGE, AND LEFT LEG WAS DOING IT A FEW DAYS AGO. PT STATES THAT THERE IS NO WOUND ON HER HEELS OR ANYTHING LIKE THAT. LAST NIGHT SHE FOUND OUT THE HER WHOLE SOCK AND SHOE ON HER RIGHT FOOT WAS WET FROM HER FOOT LEAKING. PT SAYS THAT SHE HAD TO PUT A PLASTIC BAG ON HER RIGHT FOOT AND LEG TO KEEP BED FROM GETTING WET DURING THE NIGHT. PLEASE ADVISE PT AS SOON AS POSSIBLE ON WHAT SHE SHOULD DO IN REGARDS OF THIS.WHEN SHE SAW HER PCP, THEY UPPED HER WATER PILL TO 1 IN THE MORNING AND A HALF AT NIGHT. IF UNABLE TO REACH LEAVE , OR MESSAGE FOR HUB TO READ. THANK YOU.

## 2022-12-06 NOTE — TELEPHONE ENCOUNTER
Called spoke to pt, having some sob   Also spoke to Juli RN     Made appt for tomorrow   Pt asked about the ER   I told her she could go if she wants to be evaluated today. She said she rather wait and come into office tomorrow

## 2022-12-07 ENCOUNTER — OFFICE VISIT (OUTPATIENT)
Dept: CARDIOLOGY | Facility: CLINIC | Age: 82
End: 2022-12-07

## 2022-12-07 VITALS
WEIGHT: 117 LBS | SYSTOLIC BLOOD PRESSURE: 118 MMHG | HEART RATE: 88 BPM | DIASTOLIC BLOOD PRESSURE: 71 MMHG | HEIGHT: 63 IN | OXYGEN SATURATION: 98 % | BODY MASS INDEX: 20.73 KG/M2

## 2022-12-07 DIAGNOSIS — Z98.890 S/P MAZE OPERATION FOR ATRIAL FIBRILLATION: ICD-10-CM

## 2022-12-07 DIAGNOSIS — Z95.2 S/P AVR (AORTIC VALVE REPLACEMENT): ICD-10-CM

## 2022-12-07 DIAGNOSIS — I10 DIABETES MELLITUS WITH COINCIDENT HYPERTENSION: Chronic | ICD-10-CM

## 2022-12-07 DIAGNOSIS — E78.2 MIXED HYPERLIPIDEMIA: Chronic | ICD-10-CM

## 2022-12-07 DIAGNOSIS — E11.9 DIABETES MELLITUS WITH COINCIDENT HYPERTENSION: Chronic | ICD-10-CM

## 2022-12-07 DIAGNOSIS — Z98.890 S/P MVR (MITRAL VALVE REPAIR): ICD-10-CM

## 2022-12-07 DIAGNOSIS — Z98.890 S/P LEFT ATRIAL APPENDAGE LIGATION: ICD-10-CM

## 2022-12-07 DIAGNOSIS — R60.0 BILATERAL LEG EDEMA: Primary | ICD-10-CM

## 2022-12-07 DIAGNOSIS — I10 PRIMARY HYPERTENSION: Chronic | ICD-10-CM

## 2022-12-07 DIAGNOSIS — I50.33 ACUTE ON CHRONIC HEART FAILURE WITH PRESERVED EJECTION FRACTION: Chronic | ICD-10-CM

## 2022-12-07 DIAGNOSIS — Z95.1 S/P CABG X 2: ICD-10-CM

## 2022-12-07 DIAGNOSIS — Z86.79 S/P MAZE OPERATION FOR ATRIAL FIBRILLATION: ICD-10-CM

## 2022-12-07 PROCEDURE — 99214 OFFICE O/P EST MOD 30 MIN: CPT | Performed by: NURSE PRACTITIONER

## 2022-12-07 RX ORDER — NITROGLYCERIN 0.4 MG/1
0.4 TABLET SUBLINGUAL
COMMUNITY

## 2022-12-07 NOTE — PATIENT INSTRUCTIONS
Increase furosemide to 40mg in am 20mg in pm   Increase potassium to Three times per day or 2 pills in am and 2 pills in pm with food         - Daily weight:  same time every day, same clothing   - Low Salt (sodium) diet   - Watch fluid intake          Heart Failure Action Plan  A heart failure action plan helps you understand what to do when you have symptoms of heart failure. Your action plan is a color-coded plan that lists the symptoms to watch for and indicates what actions to take.  If you have symptoms in the red zone, you need medical care right away.  If you have symptoms in the yellow zone, you are having problems.  If you have symptoms in the green zone, you are doing well.  Follow the plan that was created by you and your health care provider. Review your plan each time you visit your health care provider.  Red zone  These signs and symptoms mean you should get medical help right away:  You have trouble breathing when resting.  You have a dry cough that is getting worse.  You have swelling or pain in your legs or abdomen that is getting worse.  You suddenly gain more than 2-3 lb (0.9-1.4 kg) in 24 hours, or more than 5 lb (2.3 kg) in a week. This amount may be more or less depending on your condition.  You have trouble staying awake or you feel confused.  You have chest pain.  You do not have an appetite.  You pass out.  You have worsening sadness or depression.  If you have any of these symptoms, call your local emergency services (911 in the [place] right away. Do not drive yourself to the hospital.  Yellow zone  These signs and symptoms mean your condition may be getting worse and you should make some changes:  You have trouble breathing when you are active, or you need to sleep with your head raised on extra pillows to help you breathe.  You have swelling in your legs or abdomen.  You gain 2-3 lb (0.9-1.4 kg) in 24 hours, or 5 lb (2.3 kg) in a week. This amount may be more or less depending on your  condition.  You get tired easily.  You have trouble sleeping.  You have a dry cough.  If you have any of these symptoms:  Contact your health care provider within the next day.  Your health care provider may adjust your medicines.  Green zone  These signs mean you are doing well and can continue what you are doing:  You do not have shortness of breath.  You have very little swelling or no new swelling.  Your weight is stable (no gain or loss).  You have a normal activity level.  You do not have chest pain or any other new symptoms.  Follow these instructions at home:  Take over-the-counter and prescription medicines only as told by your health care provider.  Weigh yourself daily. Your target weight is __________ lb (__________ kg).  Call your health care provider if you gain more than __________ lb (__________ kg) in 24 hours, or more than __________ lb (__________ kg) in a week.  Health care provider name: _____________________________________________________  Health care provider phone number: _____________________________________________________  Eat a heart-healthy diet. Work with a diet and nutrition specialist (dietitian) to create an eating plan that is best for you.  Keep all follow-up visits. This is important.  Where to find more information  American Heart Association: www.heart.org  Summary  A heart failure action plan helps you understand what to do when you have symptoms of heart failure.  Follow the action plan that was created by you and your health care provider.  Get help right away if you have any symptoms in the red zone.  This information is not intended to replace advice given to you by your health care provider. Make sure you discuss any questions you have with your health care provider.  Document Revised: 08/02/2021 Document Reviewed: 08/02/2021  Elsevier Patient Education © 2022 [employer]

## 2022-12-07 NOTE — PROGRESS NOTES
Subjective:     Encounter Date:12/07/2022      Patient ID: Mindi Quinteros is a 82 y.o. female.    Chief Complaint : acute visit for lower extermity edema       History of Present Illness      MsBenita Quinteros  has PMH of        # CAD, cardiac cath 2/7/18  calcified 50% proximal 70% mid LAD and 70% mid LCX, normal LVEF, cardiac cath 5/17/2022 revealed severe two-vessel disease in LAD and LCx.  Echo revealed valvular heart disease with severe AR and MR.  Patient underwent CABG x2 with LIMA to LAD and SVG to lateral marginal and AVR and mitral valve repair and maze and left atrial appendage occlusion 5/19/2022  # CABG x2 with LIMA to LAD and SVG to lateral marginal 5/19/2020  # Valvular heart disease with 5/19/2022 aortic valve replacement with #21 magna pericardial prosthesis and mitral valve repair with #26 physeal ring, left atrial appendage endocardial closure and right and left cryo maze  # Paroxysmal atrial fibrillation, s/p left and right cryo maze and left atrial appendage endocardial closure  # Diabetes  #  Hypertension,  #  Hyperlipidemia  #  ulcerative colitis  #  allergy to aspertane  #  hemorrhoidectomy, hysterectomy, bladder repair, left ankle surgery,        Here for an acute visit for lower extremity edema and weeping.  Patient states that she saw her PCP back in October and her lasix was increased to 1 pill in am 1/2 pill in the evening however she is not certain the actual dosage of the pill (40mg vs 20mg).  She denies any chest pain, orthopnea or shortness of breath at rest.  She does have mild shortness of breath on exertion and reports thumping when laying on her left side.   Patient is in outpatient rehab at [facility] and plans for cardiac rehab when finished.     Today her blood pressure is 118/71  HR 88, oxygen 98%,  Weight 117lbs up from 112lbs on 11/17/2022        Chart review-   Echocardiogram 5/15/2022 reveals LV dysfunction EF of 46 to 50% with severe eccentric MR and diastolic  dysfunction   Patient was in new onset heart failure on admission. Echocardiogram showed borderline EF, diastolic dysfunction, Severe MR,  Mod-severe AR.  Patient underwent cardiac cath and BEST then proceed with  tissue AVR, mitral valve repair, CABG x2 with LIMA, right and left cryo-maze procedure, left after appendage closure on 5/19/2022       Labs from 9/13/2022 normal pro bnp 944, blood sugar 190  9/29/2022 normal MIKE           Procedures    Copied text in this portion of the note has been reviewed and is accurate as of 12/7/2022  The following portions of the patient's history were reviewed and updated as appropriate: allergies, current medications, past family history, past medical history, past social history, past surgical history and problem list.    Assessment:         [facility]     Diagnosis Plan   1. Bilateral leg edema  Basic Metabolic Panel      2. Acute on chronic heart failure with preserved ejection fraction (HCC)        3. Primary hypertension        4. Mixed hyperlipidemia        5. Diabetes mellitus with coincident hypertension ([facility])        6. S/P CABG x 2        7. S/P AVR (aortic valve replacement)        8. S/P MVR (mitral valve repair)        9. S/P Maze operation for atrial fibrillation        10. S/P left atrial appendage ligation               Plan:         Increase lasix to 40mg in am and 20mg in pm  (double check dosage of lasix at home and call with correct dosage)  Increase potassium to three times daily     Check bmp in 2 weeks  Follow up in 1 month for recheck     Discussed low sodium diet, fluid restriction, daily weights     Discussed symptoms of when to go to the ED--- shortness of breath, no improvement in edema, decreasing urine output, chest pressure.             Past Medical History:  Past Medical History:   Diagnosis Date   • Acute congestive heart failure, unspecified heart failure type ([facility]) 05/15/2022   • Age-related osteoporosis without current pathological fracture      prolia([id number], 9/12/2019), restarted prolia(11/3/20)   • Allergic    • Anemia    • Anxiety    • Arthritis    • CAD (coronary artery disease)    • Depression    • Diabetes (HCC)    • Elevated cholesterol    • HTN (hypertension)    • Hyperlipemia    • Injury of back    • Sinusitis      Past Surgical History:  Past Surgical History:   Procedure Laterality Date   • ANKLE ARTHROSCOPY     • AORTIC VALVE REPAIR/REPLACEMENT MITRAL VALVE REPAIR/REPLACEMENT N/A 5/19/2022    Procedure: AORTIC VALVE REPAIR/REPLACEMENT MITRAL VALVE REPAIR/REPLACEMENT;  Surgeon: Lane Okeefe MD;  Location: [facility];  Service: Cardiothoracic;  Laterality: N/A;  Mitral Valve repair with 26mm Physio II ring. Aortic   Valve Replacement with 21mm Magna Ease valve.   • BELPHAROPTOSIS REPAIR     • CARDIAC CATHETERIZATION     • CARDIAC CATHETERIZATION N/A 5/17/2022    Procedure: Left Heart Cath, possible pci;  Surgeon: Natan Terrazas MD;  Location: [facility] CATH INVASIVE LOCATION;  Service: Cardiovascular;  Laterality: N/A;   • CATARACT EXTRACTION     • CHOLECYSTECTOMY N/A 10/14/2019    Procedure: Laparoscopic cholecystectomy, possible open;  Surgeon: Vijay Guerra DO;  Location: [facility] MAIN OR;  Service: General   • COLONOSCOPY     • CORONARY ARTERY BYPASS GRAFT N/A 5/19/2022    Procedure: CORONARY ARTERY BYPASS GRAFTING;  Surgeon: Lane Okeefe MD;  Location: [facility];  Service: Cardiothoracic;  Laterality: N/A;  CABG X 2 (1 Vein graft, 1 LIMA graft)   • COSMETIC SURGERY     • ENDOSCOPY     • HEMORROIDECTOMY     • HYSTERECTOMY     • MAZE PROCEDURE N/A 5/19/2022    Procedure: MAZE PROCEDURE;  Surgeon: Lane Okeefe MD;  Location: [facility];  Service: Cardiothoracic;  Laterality: N/A;      Allergies:  Allergies   Allergen Reactions   • Asacol [Mesalamine] Swelling   • Diclofenac Swelling     Gums swell only per patient     Home Meds:  Current Meds:     Current Outpatient Medications:   •  acetaminophen (TYLENOL) 325 MG  tablet, Take 2 tablets by mouth Every 6 (Six) Hours As Needed for Mild Pain ., Disp: 30 tablet, Rfl: 0  •  apixaban (ELIQUIS) 2.5 MG tablet tablet, Take 1 tablet by mouth Every 12 (Twelve) Hours. Indications: Atrial Fibrillation, Atrial Fibrillation, Disp: 180 tablet, Rfl: 2  •  aspirin (aspirin) 81 MG EC tablet, Take 81 mg by mouth Daily., Disp: , Rfl:   •  atorvastatin (LIPITOR) 40 MG tablet, Take 1 tablet by mouth every night at bedtime., Disp: 90 tablet, Rfl: 3  •  Calcium Carbonate (CALCIUM 500 PO), Take 2 tablets by mouth Daily., Disp: , Rfl:   •  Cholecalciferol (VITAMIN D3) 2000 units capsule, Take 1 capsule by mouth Every Evening., Disp: , Rfl:   •  colestipol (COLESTID) 1 g tablet, Take 1 g by mouth As Needed., Disp: , Rfl:   •  cyanocobalamin 1000 MCG/ML injection, Inject 1,000 mcg into the appropriate muscle as directed by prescriber Every 30 (Thirty) Days., Disp: , Rfl:   •  dicyclomine (BENTYL) 10 MG capsule, Take 10 mg by mouth Daily., Disp: , Rfl:   •  fenofibrate 160 MG tablet, Take 160 mg by mouth Daily., Disp: , Rfl:   •  ferrous sulfate 325 (65 FE) MG tablet, Take 325 mg by mouth Daily With Breakfast., Disp: , Rfl:   •  furosemide (LASIX) 40 MG tablet, Take 1 tablet by mouth Daily. (Patient taking differently: Take 20 mg by mouth Daily. 20 mg in the am, 10 mg in the pm), Disp: 90 tablet, Rfl: 1  •  glimepiride (AMARYL) 2 MG tablet, Take 3 mg by mouth Every Morning Before Breakfast., Disp: , Rfl:   •  METAMUCIL FIBER PO, Take  by mouth., Disp: , Rfl:   •  metFORMIN ER (GLUCOPHAGE-XR) 500 MG 24 hr tablet, Take 2,000 mg by mouth Daily With Breakfast. 4 pills a day, Disp: , Rfl:   •  metoprolol succinate XL (TOPROL-XL) 25 MG 24 hr tablet, Take 0.5 tablets by mouth Daily., Disp: 30 tablet, Rfl: 1  •  multivitamin with minerals tablet tablet, Take 1 tablet by mouth Daily., Disp: , Rfl:   •  potassium chloride (KLOR-CON) 20 MEQ packet, Take 20 mEq by mouth 2 (Two) Times a Day. (Patient taking  "differently: Take 8 mEq by mouth 2 (Two) Times a Day.), Disp: 60 packet, Rfl: 1  •  nitroglycerin (NITROSTAT) 0.4 MG SL tablet, Place 0.4 mg under the tongue Every 5 (Five) Minutes As Needed for Chest Pain. Take no more than 3 doses in 15 minutes., Disp: , Rfl:     Current Facility-Administered Medications:   •  denosumab (PROLIA) syringe 60 mg, 60 mg, Subcutaneous, Q6 Months, Elisabeth Royal PA, 60 mg at 11/10/22 1021  Social History:   Social History     Tobacco Use   • Smoking status: Never   • Smokeless tobacco: Never   Substance Use Topics   • Alcohol use: No      Family History:  Family History   Problem Relation Age of Onset   • Diabetes Mother    • Heart disease Father    • Heart disease Brother    • Diabetes Brother    • Osteoporosis Paternal Grandmother               Review of Systems   Constitutional: Positive for malaise/fatigue.   Cardiovascular: Positive for leg swelling and palpitations. Negative for chest pain.   Respiratory: Negative for shortness of breath.    Skin: Negative for rash.   Neurological: Negative for dizziness, light-headedness and numbness.     All other systems are negative         Objective:     Physical Exam  /71   Pulse 88   Ht 160 cm (63\")   Wt 53.1 kg (117 lb)   SpO2 98%   BMI 20.73 kg/m²   General:  Appears in no acute distress  Eyes: Sclera is anicteric,  conjunctiva is clear   HEENT:  No JVD.   Respiratory: Respirations regular and unlabored at rest.  Clear to auscultation  Cardiovascular: S1,S2 Regular rate and rhythm. No murmur, rub or gallop auscultated.   Extremities: No digital clubbing or cyanosis, no edema  Skin: Color pink. Skin warm and dry to touch. No rashes  No xanthoma  Neuro: Alert and awake.    Lab Reviewed:         Brittany Hutson, APRN  12/7/2022 15:01 EST      EMR Dragon/Transcription:   \"Dictated utilizing Dragon dictation\".        "

## 2022-12-08 ENCOUNTER — TELEPHONE (OUTPATIENT)
Dept: CARDIOLOGY | Facility: CLINIC | Age: 82
End: 2022-12-08

## 2022-12-08 RX ORDER — SPIRONOLACTONE 25 MG/1
25 TABLET ORAL DAILY
Qty: 30 TABLET | Refills: 1 | Status: SHIPPED | OUTPATIENT
Start: 2022-12-08 | End: 2023-02-01

## 2022-12-08 NOTE — TELEPHONE ENCOUNTER
Caller: Mindi Quinteros A    Relationship: Self    Best call back number: [phone]    What is the best time to reach you: ANYTIME BEFORE 4:00PM    What was the call regarding: PATIENT SAW LIANA YESTERDAY AND WAS TO CALL IN AND REPORT WHAT MG HER FUROSEMIDE WAS. SHE STATED SHE IS CURRENTLY TAKING 40 MG AND THIS HAS BEEN THE MG FOR A WHILE. SHE STATED WHERE HER RIGHT ANKLE IS LOSING WATER SHE FOUND A 1/8 SLIT IN IT, SHE WANTS TO KNOW IF A NEEDS TO HAVE A STITCH PUT IN THIS AREA OR JUST TO COVER WITH A BANDAGE.     Do you require a callback: YES

## 2022-12-08 NOTE — TELEPHONE ENCOUNTER
If she is already taking 40mg in am and 20mg in pm.  I would like to add spironolactone 25mg daily(this helps hold in potassium so take her normal dose of potassium).  Get labs in 2 weeks, if not improvement go to the ED before that.

## 2022-12-09 RX ORDER — METOPROLOL SUCCINATE 25 MG/1
12.5 TABLET, EXTENDED RELEASE ORAL
Qty: 90 TABLET | Refills: 1 | Status: SHIPPED | OUTPATIENT
Start: 2022-12-09 | End: 2022-12-15 | Stop reason: SDUPTHER

## 2022-12-09 NOTE — TELEPHONE ENCOUNTER
Rx Refill Note  Requested Prescriptions      No prescriptions requested or ordered in this encounter      Last office visit with prescribing clinician: 9/16/2022   Last telemedicine visit with prescribing clinician: 1/13/2023   Next office visit with prescribing clinician: 3/16/2023                         Would you like a call back once the refill request has been completed: [] Yes [] No    If the office needs to give you a call back, can they leave a voicemail: [] Yes [] No    Tatum Hammer MA  12/09/22, 10:44 EST

## 2022-12-15 ENCOUNTER — TELEPHONE (OUTPATIENT)
Dept: CARDIOLOGY | Facility: CLINIC | Age: 82
End: 2022-12-15

## 2022-12-15 RX ORDER — METOPROLOL SUCCINATE 25 MG/1
12.5 TABLET, EXTENDED RELEASE ORAL
Qty: 45 TABLET | Refills: 3 | Status: SHIPPED | OUTPATIENT
Start: 2022-12-15

## 2022-12-15 NOTE — TELEPHONE ENCOUNTER
Rx Refill Note  Requested Prescriptions     Pending Prescriptions Disp Refills   • metoprolol succinate XL (TOPROL-XL) 25 MG 24 hr tablet 45 tablet 3     Sig: Take 0.5 tablets by mouth Daily.      Last office visit with prescribing clinician: 9/16/2022   Last telemedicine visit with prescribing clinician: 1/13/2023   Next office visit with prescribing clinician: 3/16/2023                         Would you like a call back once the refill request has been completed: [] Yes [] No    If the office needs to give you a call back, can they leave a voicemail: [] Yes [] No    Jocy Mart MA  12/15/22, 12:38 EST

## 2022-12-15 NOTE — TELEPHONE ENCOUNTER
Caller: [facility] - [facility] [address] - [phone] [facility] [phone]    Relationship: Pharmacy    Best call back number: [phone]    What is the best time to reach you: ANY    Who are you requesting to speak with (clinical staff, provider,  specific staff member): CLINICAL    Do you know the name of the person who called: MARIA L    What was the call regarding: PT NEEDS metoprolol succinate XL (TOPROL-XL) 25 MG 24 hr tablet  SENT TO [facility]    Do you require a callback: IF AN ISSUE WITH GETTING PRESCRIPTION TO [facility]

## 2022-12-20 ENCOUNTER — TELEPHONE (OUTPATIENT)
Dept: CARDIAC REHAB | Facility: HOSPITAL | Age: 82
End: 2022-12-20

## 2022-12-30 RX ORDER — AMOXICILLIN 500 MG/1
2000 CAPSULE ORAL
Qty: 4 CAPSULE | Refills: 0 | Status: SHIPPED | OUTPATIENT
Start: 2022-12-30 | End: 2022-12-30

## 2023-01-02 ENCOUNTER — LAB (OUTPATIENT)
Dept: LAB | Facility: HOSPITAL | Age: 83
End: 2023-01-02
Payer: MEDICARE

## 2023-01-02 DIAGNOSIS — R60.0 BILATERAL LEG EDEMA: ICD-10-CM

## 2023-01-02 LAB
ANION GAP SERPL CALCULATED.3IONS-SCNC: 9.7 MMOL/L (ref 5–15)
BUN SERPL-MCNC: 18 MG/DL (ref 8–23)
BUN/CREAT SERPL: 22.2 (ref 7–25)
CALCIUM SPEC-SCNC: 10.3 MG/DL (ref 8.6–10.5)
CHLORIDE SERPL-SCNC: 98 MMOL/L (ref 98–107)
CO2 SERPL-SCNC: 30.3 MMOL/L (ref 22–29)
CREAT SERPL-MCNC: 0.81 MG/DL (ref 0.57–1)
EGFRCR SERPLBLD CKD-EPI 2021: 72.6 ML/MIN/1.73
GLUCOSE SERPL-MCNC: 74 MG/DL (ref 65–99)
POTASSIUM SERPL-SCNC: 4.4 MMOL/L (ref 3.5–5.2)
SODIUM SERPL-SCNC: 138 MMOL/L (ref 136–145)

## 2023-01-02 PROCEDURE — 36415 COLL VENOUS BLD VENIPUNCTURE: CPT

## 2023-01-02 PROCEDURE — 80048 BASIC METABOLIC PNL TOTAL CA: CPT

## 2023-01-05 ENCOUNTER — OFFICE VISIT (OUTPATIENT)
Dept: CARDIAC REHAB | Facility: HOSPITAL | Age: 83
End: 2023-01-05
Payer: MEDICARE

## 2023-01-05 DIAGNOSIS — Z98.890 S/P MVR (MITRAL VALVE REPAIR): ICD-10-CM

## 2023-01-05 DIAGNOSIS — I50.22 CHRONIC HFREF (HEART FAILURE WITH REDUCED EJECTION FRACTION): ICD-10-CM

## 2023-01-05 DIAGNOSIS — Z95.1 S/P CABG (CORONARY ARTERY BYPASS GRAFT): ICD-10-CM

## 2023-01-05 DIAGNOSIS — Z95.2 S/P AVR: Primary | ICD-10-CM

## 2023-01-05 PROCEDURE — 93798 PHYS/QHP OP CAR RHAB W/ECG: CPT

## 2023-01-06 ENCOUNTER — TELEPHONE (OUTPATIENT)
Dept: ORTHOPEDIC SURGERY | Facility: CLINIC | Age: 83
End: 2023-01-06

## 2023-01-06 NOTE — TELEPHONE ENCOUNTER
Provider:  TIFFANIE MACK PA-C  Caller: RENETTA GARCIA  Relationship to Patient: SELF    Phone Number:  [phone]  Reason for Call: PATIENT GOT LETTER ABOUT TIFFANIE MACK PA-C. PATIENT HAS APPT. ON SCHEDULE FOR 5/11/23. PLEASE ADVISE.

## 2023-01-09 ENCOUNTER — TREATMENT (OUTPATIENT)
Dept: CARDIAC REHAB | Facility: HOSPITAL | Age: 83
End: 2023-01-09
Payer: MEDICARE

## 2023-01-09 DIAGNOSIS — Z98.890 S/P MVR (MITRAL VALVE REPAIR): ICD-10-CM

## 2023-01-09 DIAGNOSIS — I50.22 CHRONIC HFREF (HEART FAILURE WITH REDUCED EJECTION FRACTION): ICD-10-CM

## 2023-01-09 DIAGNOSIS — Z95.2 S/P AVR: ICD-10-CM

## 2023-01-09 DIAGNOSIS — Z95.1 S/P CABG (CORONARY ARTERY BYPASS GRAFT): Primary | ICD-10-CM

## 2023-01-09 PROCEDURE — 93798 PHYS/QHP OP CAR RHAB W/ECG: CPT

## 2023-01-11 RX ORDER — CLINDAMYCIN HYDROCHLORIDE 300 MG/1
600 CAPSULE ORAL TAKE AS DIRECTED
Qty: 2 CAPSULE | Refills: 0 | Status: SHIPPED | OUTPATIENT
Start: 2023-01-11 | End: 2023-02-24 | Stop reason: SDUPTHER

## 2023-01-12 ENCOUNTER — TREATMENT (OUTPATIENT)
Dept: CARDIAC REHAB | Facility: HOSPITAL | Age: 83
End: 2023-01-12
Payer: MEDICARE

## 2023-01-12 DIAGNOSIS — Z95.1 S/P CABG (CORONARY ARTERY BYPASS GRAFT): Primary | ICD-10-CM

## 2023-01-12 PROCEDURE — 93798 PHYS/QHP OP CAR RHAB W/ECG: CPT

## 2023-01-12 NOTE — PROGRESS NOTES
See the written copy of this report in the patient's paper medical record.  These results did not interface directly into the electronic medical record and are summarized here.

## 2023-01-13 ENCOUNTER — OFFICE VISIT (OUTPATIENT)
Dept: CARDIOLOGY | Facility: CLINIC | Age: 83
End: 2023-01-13
Payer: MEDICARE

## 2023-01-13 VITALS
WEIGHT: 108 LBS | OXYGEN SATURATION: 98 % | SYSTOLIC BLOOD PRESSURE: 128 MMHG | DIASTOLIC BLOOD PRESSURE: 65 MMHG | BODY MASS INDEX: 19.14 KG/M2 | HEIGHT: 63 IN | HEART RATE: 93 BPM

## 2023-01-13 DIAGNOSIS — E78.2 MIXED HYPERLIPIDEMIA: Chronic | ICD-10-CM

## 2023-01-13 DIAGNOSIS — I10 PRIMARY HYPERTENSION: Chronic | ICD-10-CM

## 2023-01-13 DIAGNOSIS — Z98.890 S/P LEFT ATRIAL APPENDAGE LIGATION: ICD-10-CM

## 2023-01-13 DIAGNOSIS — I25.10 CORONARY ARTERY DISEASE INVOLVING NATIVE CORONARY ARTERY OF NATIVE HEART WITHOUT ANGINA PECTORIS: Chronic | ICD-10-CM

## 2023-01-13 DIAGNOSIS — Z98.890 S/P MAZE OPERATION FOR ATRIAL FIBRILLATION: ICD-10-CM

## 2023-01-13 DIAGNOSIS — Z95.2 S/P AVR (AORTIC VALVE REPLACEMENT): ICD-10-CM

## 2023-01-13 DIAGNOSIS — I50.32 CHRONIC DIASTOLIC CHF (CONGESTIVE HEART FAILURE), NYHA CLASS 2: Primary | ICD-10-CM

## 2023-01-13 DIAGNOSIS — Z98.890 S/P MVR (MITRAL VALVE REPAIR): ICD-10-CM

## 2023-01-13 DIAGNOSIS — Z86.79 S/P MAZE OPERATION FOR ATRIAL FIBRILLATION: ICD-10-CM

## 2023-01-13 DIAGNOSIS — Z95.1 S/P CABG X 2: ICD-10-CM

## 2023-01-13 PROCEDURE — 99214 OFFICE O/P EST MOD 30 MIN: CPT | Performed by: NURSE PRACTITIONER

## 2023-01-13 NOTE — PROGRESS NOTES
Subjective:     Encounter Date:01/13/2023      Patient ID: Mindi Quinteros is a 82 y.o. female.    Chief Complaint:  History of Present Illness  Ms. Mindi Quinteros  has PMH of        # CAD, cardiac cath 2/7/18  calcified 50% proximal 70% mid LAD and 70% mid LCX, normal LVEF, cardiac cath 5/17/2022 revealed severe two-vessel disease in LAD and LCx.  Echo revealed valvular heart disease with severe AR and MR.  Patient underwent CABG x2 with LIMA to LAD and SVG to lateral marginal and AVR and mitral valve repair and maze and left atrial appendage occlusion 5/19/2022  # CABG x2 with LIMA to LAD and SVG to lateral marginal 5/19/2020  # Valvular heart disease with 5/19/2022 aortic valve replacement with #21 magna pericardial prosthesis and mitral valve repair with #26 physeal ring, left atrial appendage endocardial closure and right and left cryo maze  # Paroxysmal atrial fibrillation, s/p left and right cryo maze and left atrial appendage endocardial closure  # Diabetes  #  Hypertension,  #  Hyperlipidemia  #  ulcerative colitis  # iron deficiency  anemia requiring iron infusions   #  allergy to aspertane  #  hemorrhoidectomy, hysterectomy, bladder repair, left ankle surgery    Here for one month follow up.  She reports significant improvement in edema since starting aldactone.  Patient denies any chest pain, shortness of breath. She does still have some edema left greater than right however this was also a previous ankle fracture.  She reports occasional lightheadedness that has happened maybe twice over the last month without any near syncopal episodes.  She also reports significant improvement overall since receiving two iron infusions last month per hematology Dr. Kenney.   She has finished outpatient rehab at [facility] and started at cardiac rehab and is doing well.     Blood pressure today 128/65 HR 93 oxygen 98% on room air.                          Lab Review:   Labs from 9/13/2022 normal pro bnp 944, blood sugar  190  9/29/2022 normal MIKE   Labs from 1/2/2023 showed potassium 4.4 and renal function normal      Chart review-   Echocardiogram 5/15/2022 reveals LV dysfunction EF of 46 to 50% with severe eccentric MR and diastolic dysfunction   Patient was in new onset heart failure on admission. Echocardiogram showed borderline EF, diastolic dysfunction, Severe MR,  Mod-severe AR.  Patient underwent cardiac cath and BEST then proceed with  tissue AVR, mitral valve repair, CABG x2 with LIMA, right and left cryo-maze procedure, left atrial appendage closure on 5/19/2022     The following portions of the patient's history were reviewed and updated as appropriate: allergies, current medications, past family history, past medical history, past social history, past surgical history and problem list.    Review of Systems   Constitutional: Positive for malaise/fatigue.   Cardiovascular: Positive for leg swelling (left greater than right). Negative for chest pain, dyspnea on exertion, irregular heartbeat, palpitations and syncope.   Gastrointestinal: Negative for abdominal pain, nausea and vomiting.   Neurological: Positive for light-headedness (occasional ).   All other systems reviewed and are negative.    Past Medical History:   Diagnosis Date   • Acute congestive heart failure, unspecified heart failure type (HCC) 05/15/2022   • Age-related osteoporosis without current pathological fracture     prolia([id number], 9/12/2019), restarted prolia(11/3/20)   • Allergic    • Anemia    • Anxiety    • Arthritis    • CAD (coronary artery disease)    • Depression    • Diabetes (HCC)    • Elevated cholesterol    • HTN (hypertension)    • Hyperlipemia    • Injury of back    • Sinusitis    • Type II diabetes mellitus (HCC)      Past Surgical History:   Procedure Laterality Date   • ANKLE ARTHROSCOPY     • AORTIC VALVE REPAIR/REPLACEMENT MITRAL VALVE REPAIR/REPLACEMENT N/A 5/19/2022    Procedure: AORTIC VALVE REPAIR/REPLACEMENT MITRAL VALVE  "REPAIR/REPLACEMENT;  Surgeon: Lane Okeefe MD;  Location: [facility];  Service: Cardiothoracic;  Laterality: N/A;  Mitral Valve repair with 26mm Physio II ring. Aortic   Valve Replacement with 21mm Magna Ease valve.   • BELPHAROPTOSIS REPAIR     • CARDIAC CATHETERIZATION     • CARDIAC CATHETERIZATION N/A 5/17/2022    Procedure: Left Heart Cath, possible pci;  Surgeon: Natan Terrazas MD;  Location: [facility] CATH INVASIVE LOCATION;  Service: Cardiovascular;  Laterality: N/A;   • CATARACT EXTRACTION     • CHOLECYSTECTOMY N/A 10/14/2019    Procedure: Laparoscopic cholecystectomy, possible open;  Surgeon: Vijay Guerra DO;  Location: [facility] MAIN OR;  Service: General   • COLONOSCOPY     • CORONARY ARTERY BYPASS GRAFT N/A 5/19/2022    Procedure: CORONARY ARTERY BYPASS GRAFTING;  Surgeon: Lane Okeefe MD;  Location: [facility];  Service: Cardiothoracic;  Laterality: N/A;  CABG X 2 (1 Vein graft, 1 LIMA graft)   • COSMETIC SURGERY     • ENDOSCOPY     • HEMORROIDECTOMY     • HYSTERECTOMY     • MAZE PROCEDURE N/A 5/19/2022    Procedure: MAZE PROCEDURE;  Surgeon: Lane Okeefe MD;  Location: [facility];  Service: Cardiothoracic;  Laterality: N/A;     /65   Pulse 93   Ht 160 cm (63\")   Wt 49 kg (108 lb)   SpO2 98%   BMI 19.13 kg/m²   Family History   Problem Relation Age of Onset   • Diabetes Mother    • Heart disease Father    • Heart disease Brother    • Diabetes Brother    • Osteoporosis Paternal Grandmother        Current Outpatient Medications:   •  acetaminophen (TYLENOL) 325 MG tablet, Take 2 tablets by mouth Every 6 (Six) Hours As Needed for Mild Pain ., Disp: 30 tablet, Rfl: 0  •  apixaban (ELIQUIS) 2.5 MG tablet tablet, Take 1 tablet by mouth Every 12 (Twelve) Hours. Indications: Atrial Fibrillation, Atrial Fibrillation, Disp: 180 tablet, Rfl: 2  •  aspirin (aspirin) 81 MG EC tablet, Take 81 mg by mouth Daily., Disp: , Rfl:   •  atorvastatin (LIPITOR) 40 MG tablet, Take 1 tablet " by mouth every night at bedtime., Disp: 90 tablet, Rfl: 3  •  Calcium Carbonate (CALCIUM 500 PO), Take 2 tablets by mouth Daily., Disp: , Rfl:   •  Cholecalciferol (VITAMIN D3) 2000 units capsule, Take 1 capsule by mouth Every Evening., Disp: , Rfl:   •  clindamycin (Cleocin) 300 MG capsule, Take 2 capsules by mouth Take As Directed. Take 2 capsules by mouth 30 to 60 minutes prior to dental procedure., Disp: 2 capsule, Rfl: 0  •  colestipol (COLESTID) 1 g tablet, Take 1 g by mouth As Needed., Disp: , Rfl:   •  cyanocobalamin 1000 MCG/ML injection, Inject 1,000 mcg into the appropriate muscle as directed by prescriber Every 30 (Thirty) Days., Disp: , Rfl:   •  dicyclomine (BENTYL) 10 MG capsule, Take 10 mg by mouth Daily., Disp: , Rfl:   •  fenofibrate 160 MG tablet, Take 160 mg by mouth Daily., Disp: , Rfl:   •  ferrous sulfate 325 (65 FE) MG tablet, Take 325 mg by mouth Daily With Breakfast., Disp: , Rfl:   •  furosemide (LASIX) 40 MG tablet, Take 1 tablet by mouth Daily. (Patient taking differently: Take 20 mg by mouth Daily. 20 mg in the am, 10 mg in the pm), Disp: 90 tablet, Rfl: 1  •  glimepiride (AMARYL) 2 MG tablet, Take 3 mg by mouth Every Morning Before Breakfast., Disp: , Rfl:   •  METAMUCIL FIBER PO, Take  by mouth., Disp: , Rfl:   •  metFORMIN ER (GLUCOPHAGE-XR) 500 MG 24 hr tablet, Take 2,000 mg by mouth Daily With Breakfast. 4 pills a day, Disp: , Rfl:   •  metoprolol succinate XL (TOPROL-XL) 25 MG 24 hr tablet, Take 0.5 tablets by mouth Daily., Disp: 45 tablet, Rfl: 3  •  multivitamin with minerals tablet tablet, Take 1 tablet by mouth Daily., Disp: , Rfl:   •  nitroglycerin (NITROSTAT) 0.4 MG SL tablet, Place 0.4 mg under the tongue Every 5 (Five) Minutes As Needed for Chest Pain. Take no more than 3 doses in 15 minutes., Disp: , Rfl:   •  spironolactone (ALDACTONE) 25 MG tablet, Take 1 tablet by mouth Daily., Disp: 30 tablet, Rfl: 1    Current Facility-Administered Medications:   •  denosumab  (PROLIA) syringe 60 mg, 60 mg, Subcutaneous, Q6 Months, Elisabeth Royal PA, 60 mg at 11/10/22 1021  Allergies   Allergen Reactions   • Asacol [Mesalamine] Swelling   • Diclofenac Swelling     Gums swell only per patient   • Penicillins Other (See Comments)     Gums swelling per patient.      Social History     Socioeconomic History   • Marital status:    Tobacco Use   • Smoking status: Never   • Smokeless tobacco: Never   Vaping Use   • Vaping Use: Never used   Substance and Sexual Activity   • Alcohol use: No   • Drug use: No   • Sexual activity: Defer                Objective:     Vitals reviewed.   Constitutional:       Appearance: Not in distress. Frail.   Neck:      Vascular: No JVR. JVD normal.   Pulmonary:      Effort: Pulmonary effort is normal.      Breath sounds: Normal breath sounds. No wheezing. No rhonchi. No rales.   Chest:      Chest wall: Not tender to palpatation.   Cardiovascular:      PMI at left midclavicular line. Normal rate. Regular rhythm. Normal S1. Normal S2.      Murmurs: There is no murmur.      No gallop. No click. No rub.   Pulses:     Intact distal pulses.   Edema:     Peripheral edema present.     Pretibial: trace edema of the left pretibial area and 1+ edema of the right pretibial area.     Ankle: trace edema of the left ankle and 1+ edema of the right ankle.  Abdominal:      General: Bowel sounds are normal.      Palpations: Abdomen is soft.      Tenderness: There is no abdominal tenderness.   Musculoskeletal: Normal range of motion.         General: No tenderness. Skin:     General: Skin is warm and dry.   Neurological:      General: No focal deficit present.      Mental Status: Alert and oriented to person, place and time.       Procedures                  Assessment:     [facility]     Diagnosis Plan   1. Chronic diastolic CHF (congestive heart failure), NYHA class 2 ([facility])  Basic Metabolic Panel      2. Primary hypertension        3. Mixed hyperlipidemia        4. Coronary  artery disease involving native coronary artery of native heart without angina pectoris        5. S/P CABG x 2        6. S/P AVR (aortic valve replacement)        7. S/P MVR (mitral valve repair)        8. S/P Maze operation for atrial fibrillation        9. S/P left atrial appendage ligation                       Plan:       Chart and labs reviewed   Reviewed medications   Patient has concerns of potassium not being absorb as she sees it in her stool most of the time    Advised patient to stop potassium since we've started spironolactone   Follow up BMP prior to follow up in March with Dr. Terrazas   Discussed if potassium drops we will increase spironolactone     Continue lasix 40mg am, 20mg pm; spironolactone 25mg daily metoprolol succ 12.5mg daily   Continue aspirin and statin for CAD     - Daily weight:  same time every day, same clothing   - Low Salt (sodium) diet   - Watch fluid intake           CHADVASC2 SCORE   [id number] Score: 7 (1/13/2023  3:48 PM)  continue Eliquis 5mg BID   Although patient has had a Left atrial appendage closure 5/19/2022      Keep appointments with hematology for [facility]     Electronically signed by DAKOTA Brody, 01/13/23, 4:10 PM EST.          This document is intended for medical expert use only.  Reading of this document by patients and/or patient's family without participating medical staff guidance may result in misinterpretation and unintended morbidity. Any interpretation of such data is the responsibility of the patient and/or family member responsible for the patient in concert with their primary or specialist providers, not to be left for sources of online search as such as [employer], [employer] or similar queries.  Relying on these approaches to knowledge may result in misinterpretation, misguided goals of care and even death should patient or family members try recommendations outside of the realm of professional medical care in a supervised inpatient environment.

## 2023-01-16 ENCOUNTER — TREATMENT (OUTPATIENT)
Dept: CARDIAC REHAB | Facility: HOSPITAL | Age: 83
End: 2023-01-16
Payer: MEDICARE

## 2023-01-16 DIAGNOSIS — Z95.1 S/P CABG (CORONARY ARTERY BYPASS GRAFT): Primary | ICD-10-CM

## 2023-01-16 PROCEDURE — 93798 PHYS/QHP OP CAR RHAB W/ECG: CPT

## 2023-01-19 ENCOUNTER — TREATMENT (OUTPATIENT)
Dept: CARDIAC REHAB | Facility: HOSPITAL | Age: 83
End: 2023-01-19
Payer: MEDICARE

## 2023-01-19 DIAGNOSIS — Z95.1 S/P CABG (CORONARY ARTERY BYPASS GRAFT): Primary | ICD-10-CM

## 2023-01-19 PROCEDURE — 93798 PHYS/QHP OP CAR RHAB W/ECG: CPT

## 2023-01-22 ENCOUNTER — TELEPHONE (OUTPATIENT)
Dept: CARDIOLOGY | Facility: CLINIC | Age: 83
End: 2023-01-22
Payer: MEDICARE

## 2023-01-22 NOTE — TELEPHONE ENCOUNTER
Pt left msg with answering service 1/21/23 stating she is having two teeth pulled on Tuesday and needs to know when or if she needs to stop her Eliquis. Pt call back number is [phone].

## 2023-01-23 ENCOUNTER — TREATMENT (OUTPATIENT)
Dept: CARDIAC REHAB | Facility: HOSPITAL | Age: 83
End: 2023-01-23
Payer: MEDICARE

## 2023-01-23 DIAGNOSIS — Z95.1 S/P CABG (CORONARY ARTERY BYPASS GRAFT): Primary | ICD-10-CM

## 2023-01-23 DIAGNOSIS — Z98.890 S/P MVR (MITRAL VALVE REPAIR): ICD-10-CM

## 2023-01-23 PROCEDURE — 93798 PHYS/QHP OP CAR RHAB W/ECG: CPT

## 2023-01-23 NOTE — TELEPHONE ENCOUNTER
Called pt she said she spoke to the oral surgeon and was told she does not need to hold her Eliquis

## 2023-01-26 ENCOUNTER — APPOINTMENT (OUTPATIENT)
Dept: CARDIAC REHAB | Facility: HOSPITAL | Age: 83
End: 2023-01-26
Payer: MEDICARE

## 2023-01-29 ENCOUNTER — HOSPITAL ENCOUNTER (EMERGENCY)
Facility: HOSPITAL | Age: 83
Discharge: HOME | End: 2023-01-29
Attending: EMERGENCY MEDICINE | Admitting: EMERGENCY MEDICINE
Payer: MEDICARE

## 2023-01-29 VITALS
DIASTOLIC BLOOD PRESSURE: 60 MMHG | BODY MASS INDEX: 18.4 KG/M2 | HEART RATE: 74 BPM | RESPIRATION RATE: 18 BRPM | SYSTOLIC BLOOD PRESSURE: 120 MMHG | TEMPERATURE: 97.8 F | WEIGHT: 107.81 LBS | HEIGHT: 64 IN | OXYGEN SATURATION: 97 %

## 2023-01-29 DIAGNOSIS — D64.9 CHRONIC ANEMIA: ICD-10-CM

## 2023-01-29 DIAGNOSIS — K06.8 GINGIVAL BLEEDING: Primary | ICD-10-CM

## 2023-01-29 LAB
BASOPHILS # BLD AUTO: 0 10*3/MM3 (ref 0–0.2)
BASOPHILS NFR BLD AUTO: 0.5 % (ref 0–1.5)
DEPRECATED RDW RBC AUTO: 53.4 FL (ref 37–54)
EOSINOPHIL # BLD AUTO: 0 10*3/MM3 (ref 0–0.4)
EOSINOPHIL NFR BLD AUTO: 1.1 % (ref 0.3–6.2)
ERYTHROCYTE [DISTWIDTH] IN BLOOD BY AUTOMATED COUNT: 17.8 % (ref 12.3–15.4)
HCT VFR BLD AUTO: 29.9 % (ref 34–46.6)
HGB BLD-MCNC: 9.7 G/DL (ref 12–15.9)
LYMPHOCYTES # BLD AUTO: 0.5 10*3/MM3 (ref 0.7–3.1)
LYMPHOCYTES NFR BLD AUTO: 13.5 % (ref 19.6–45.3)
MCH RBC QN AUTO: 28.1 PG (ref 26.6–33)
MCHC RBC AUTO-ENTMCNC: 32.4 G/DL (ref 31.5–35.7)
MCV RBC AUTO: 86.9 FL (ref 79–97)
MONOCYTES # BLD AUTO: 0.3 10*3/MM3 (ref 0.1–0.9)
MONOCYTES NFR BLD AUTO: 7.1 % (ref 5–12)
NEUTROPHILS NFR BLD AUTO: 2.8 10*3/MM3 (ref 1.7–7)
NEUTROPHILS NFR BLD AUTO: 77.8 % (ref 42.7–76)
NRBC BLD AUTO-RTO: 0 /100 WBC (ref 0–0.2)
PLATELET # BLD AUTO: 189 10*3/MM3 (ref 140–450)
PMV BLD AUTO: 7.4 FL (ref 6–12)
RBC # BLD AUTO: 3.44 10*6/MM3 (ref 3.77–5.28)
WBC NRBC COR # BLD: 3.5 10*3/MM3 (ref 3.4–10.8)

## 2023-01-29 PROCEDURE — 99282 EMERGENCY DEPT VISIT SF MDM: CPT

## 2023-01-29 PROCEDURE — 36415 COLL VENOUS BLD VENIPUNCTURE: CPT

## 2023-01-29 PROCEDURE — 85025 COMPLETE CBC W/AUTO DIFF WBC: CPT | Performed by: EMERGENCY MEDICINE

## 2023-01-30 ENCOUNTER — APPOINTMENT (OUTPATIENT)
Dept: CARDIAC REHAB | Facility: HOSPITAL | Age: 83
End: 2023-01-30
Payer: MEDICARE

## 2023-02-01 RX ORDER — SPIRONOLACTONE 25 MG/1
TABLET ORAL
Qty: 30 TABLET | Refills: 3 | Status: SHIPPED | OUTPATIENT
Start: 2023-02-01 | End: 2023-02-01 | Stop reason: SDUPTHER

## 2023-02-01 RX ORDER — SPIRONOLACTONE 25 MG/1
25 TABLET ORAL DAILY
Qty: 90 TABLET | Refills: 1 | Status: SHIPPED | OUTPATIENT
Start: 2023-02-01

## 2023-02-01 NOTE — TELEPHONE ENCOUNTER
Rx Refill Note  Requested Prescriptions      No prescriptions requested or ordered in this encounter      Last office visit with prescribing clinician: 9/16/2022   Last telemedicine visit with prescribing clinician: 3/16/2023   Next office visit with prescribing clinician: 3/16/2023                         Would you like a call back once the refill request has been completed: [] Yes [] No    If the office needs to give you a call back, can they leave a voicemail: [] Yes [] No    Tatum Hammer MA  02/01/23, 16:46 EST

## 2023-02-01 NOTE — TELEPHONE ENCOUNTER
Rx Refill Note  Requested Prescriptions     Pending Prescriptions Disp Refills   • spironolactone (ALDACTONE) 25 MG tablet [Pharmacy Med Name: SPIRONOLACTONE 25 MG TABLET] 30 tablet 3     Sig: TAKE 1 TABLET BY MOUTH EVERY DAY      Last office visit with prescribing clinician: 1/13/2023   Last telemedicine visit with prescribing clinician: 3/16/2023   Next office visit with prescribing clinician: Visit date not found                         Would you like a call back once the refill request has been completed: [] Yes [] No    If the office needs to give you a call back, can they leave a voicemail: [] Yes [] No    Page LORI Daly  02/01/23, 08:45 EST

## 2023-02-02 ENCOUNTER — TREATMENT (OUTPATIENT)
Dept: CARDIAC REHAB | Facility: HOSPITAL | Age: 83
End: 2023-02-02
Payer: MEDICARE

## 2023-02-02 DIAGNOSIS — Z95.1 S/P CABG (CORONARY ARTERY BYPASS GRAFT): Primary | ICD-10-CM

## 2023-02-02 PROCEDURE — 93798 PHYS/QHP OP CAR RHAB W/ECG: CPT

## 2023-02-06 ENCOUNTER — TREATMENT (OUTPATIENT)
Dept: CARDIAC REHAB | Facility: HOSPITAL | Age: 83
End: 2023-02-06
Payer: MEDICARE

## 2023-02-06 DIAGNOSIS — Z95.1 S/P CABG (CORONARY ARTERY BYPASS GRAFT): Primary | ICD-10-CM

## 2023-02-06 PROCEDURE — 93798 PHYS/QHP OP CAR RHAB W/ECG: CPT

## 2023-02-09 ENCOUNTER — TREATMENT (OUTPATIENT)
Dept: CARDIAC REHAB | Facility: HOSPITAL | Age: 83
End: 2023-02-09
Payer: MEDICARE

## 2023-02-09 DIAGNOSIS — Z95.1 S/P CABG (CORONARY ARTERY BYPASS GRAFT): Primary | ICD-10-CM

## 2023-02-09 PROCEDURE — 93798 PHYS/QHP OP CAR RHAB W/ECG: CPT

## 2023-02-13 ENCOUNTER — TREATMENT (OUTPATIENT)
Dept: CARDIAC REHAB | Facility: HOSPITAL | Age: 83
End: 2023-02-13
Payer: MEDICARE

## 2023-02-13 DIAGNOSIS — Z95.2 S/P AVR: Primary | ICD-10-CM

## 2023-02-13 DIAGNOSIS — Z95.1 S/P CABG (CORONARY ARTERY BYPASS GRAFT): ICD-10-CM

## 2023-02-13 DIAGNOSIS — Z98.890 S/P MVR (MITRAL VALVE REPAIR): ICD-10-CM

## 2023-02-13 PROCEDURE — 93798 PHYS/QHP OP CAR RHAB W/ECG: CPT

## 2023-02-16 ENCOUNTER — TREATMENT (OUTPATIENT)
Dept: CARDIAC REHAB | Facility: HOSPITAL | Age: 83
End: 2023-02-16
Payer: MEDICARE

## 2023-02-16 ENCOUNTER — APPOINTMENT (OUTPATIENT)
Dept: CARDIAC REHAB | Facility: HOSPITAL | Age: 83
End: 2023-02-16
Payer: MEDICARE

## 2023-02-16 DIAGNOSIS — Z95.2 S/P AVR: Primary | ICD-10-CM

## 2023-02-16 PROCEDURE — 93798 PHYS/QHP OP CAR RHAB W/ECG: CPT

## 2023-02-17 ENCOUNTER — APPOINTMENT (OUTPATIENT)
Dept: CARDIAC REHAB | Facility: HOSPITAL | Age: 83
End: 2023-02-17
Payer: MEDICARE

## 2023-02-20 ENCOUNTER — TREATMENT (OUTPATIENT)
Dept: CARDIAC REHAB | Facility: HOSPITAL | Age: 83
End: 2023-02-20
Payer: MEDICARE

## 2023-02-20 ENCOUNTER — APPOINTMENT (OUTPATIENT)
Dept: CARDIAC REHAB | Facility: HOSPITAL | Age: 83
End: 2023-02-20
Payer: MEDICARE

## 2023-02-20 DIAGNOSIS — Z95.1 S/P CABG (CORONARY ARTERY BYPASS GRAFT): ICD-10-CM

## 2023-02-20 DIAGNOSIS — Z95.2 S/P AVR: Primary | ICD-10-CM

## 2023-02-20 PROCEDURE — 93798 PHYS/QHP OP CAR RHAB W/ECG: CPT

## 2023-02-21 ENCOUNTER — APPOINTMENT (OUTPATIENT)
Dept: CARDIAC REHAB | Facility: HOSPITAL | Age: 83
End: 2023-02-21
Payer: MEDICARE

## 2023-02-22 ENCOUNTER — APPOINTMENT (OUTPATIENT)
Dept: CARDIAC REHAB | Facility: HOSPITAL | Age: 83
End: 2023-02-22
Payer: MEDICARE

## 2023-02-23 ENCOUNTER — APPOINTMENT (OUTPATIENT)
Dept: CARDIAC REHAB | Facility: HOSPITAL | Age: 83
End: 2023-02-23
Payer: MEDICARE

## 2023-02-24 ENCOUNTER — APPOINTMENT (OUTPATIENT)
Dept: CARDIAC REHAB | Facility: HOSPITAL | Age: 83
End: 2023-02-24
Payer: MEDICARE

## 2023-02-24 RX ORDER — CLINDAMYCIN HYDROCHLORIDE 300 MG/1
600 CAPSULE ORAL TAKE AS DIRECTED
Qty: 2 CAPSULE | Refills: 0 | Status: SHIPPED | OUTPATIENT
Start: 2023-02-24

## 2023-02-24 NOTE — TELEPHONE ENCOUNTER
Called pt and stated needed antibiotic prophylaxis prior to dental treatment. Sent in Clindamycin due to pt intolerance to amoxicillin. Pt stated needs to go to [facility] [address]. Called [facility] [phone] spoke with Aashish and richa sent in Rx. She stated that she just needs us to fax back the form she sent with the correct abx and instructions and they can have it on file and will Rx for any additional treatments. Placed form on Dr. Mk bar.

## 2023-02-27 ENCOUNTER — APPOINTMENT (OUTPATIENT)
Dept: CARDIAC REHAB | Facility: HOSPITAL | Age: 83
End: 2023-02-27
Payer: MEDICARE

## 2023-02-27 ENCOUNTER — TREATMENT (OUTPATIENT)
Dept: CARDIAC REHAB | Facility: HOSPITAL | Age: 83
End: 2023-02-27
Payer: MEDICARE

## 2023-02-27 DIAGNOSIS — Z95.2 S/P AVR: Primary | ICD-10-CM

## 2023-02-27 PROCEDURE — 93798 PHYS/QHP OP CAR RHAB W/ECG: CPT

## 2023-02-28 ENCOUNTER — APPOINTMENT (OUTPATIENT)
Dept: CARDIAC REHAB | Facility: HOSPITAL | Age: 83
End: 2023-02-28
Payer: MEDICARE

## 2023-03-01 ENCOUNTER — APPOINTMENT (OUTPATIENT)
Dept: CARDIAC REHAB | Facility: HOSPITAL | Age: 83
End: 2023-03-01
Payer: MEDICARE

## 2023-03-02 ENCOUNTER — TREATMENT (OUTPATIENT)
Dept: CARDIAC REHAB | Facility: HOSPITAL | Age: 83
End: 2023-03-02
Payer: MEDICARE

## 2023-03-02 DIAGNOSIS — Z95.1 S/P CABG (CORONARY ARTERY BYPASS GRAFT): ICD-10-CM

## 2023-03-02 DIAGNOSIS — Z95.2 S/P AVR: Primary | ICD-10-CM

## 2023-03-02 DIAGNOSIS — Z98.890 S/P MVR (MITRAL VALVE REPAIR): ICD-10-CM

## 2023-03-02 PROCEDURE — 93798 PHYS/QHP OP CAR RHAB W/ECG: CPT

## 2023-03-03 ENCOUNTER — APPOINTMENT (OUTPATIENT)
Dept: CARDIAC REHAB | Facility: HOSPITAL | Age: 83
End: 2023-03-03
Payer: MEDICARE

## 2023-03-06 ENCOUNTER — APPOINTMENT (OUTPATIENT)
Dept: CARDIAC REHAB | Facility: HOSPITAL | Age: 83
End: 2023-03-06
Payer: MEDICARE

## 2023-03-06 DIAGNOSIS — Z95.2 S/P AVR: Primary | ICD-10-CM

## 2023-03-06 DIAGNOSIS — Z98.890 S/P MVR (MITRAL VALVE REPAIR): ICD-10-CM

## 2023-03-06 DIAGNOSIS — Z95.1 S/P CABG (CORONARY ARTERY BYPASS GRAFT): ICD-10-CM

## 2023-03-06 PROCEDURE — 93798 PHYS/QHP OP CAR RHAB W/ECG: CPT

## 2023-03-07 ENCOUNTER — APPOINTMENT (OUTPATIENT)
Dept: CARDIAC REHAB | Facility: HOSPITAL | Age: 83
End: 2023-03-07
Payer: MEDICARE

## 2023-03-08 ENCOUNTER — APPOINTMENT (OUTPATIENT)
Dept: CARDIAC REHAB | Facility: HOSPITAL | Age: 83
End: 2023-03-08
Payer: MEDICARE

## 2023-03-09 ENCOUNTER — TREATMENT (OUTPATIENT)
Dept: CARDIAC REHAB | Facility: HOSPITAL | Age: 83
End: 2023-03-09
Payer: MEDICARE

## 2023-03-09 DIAGNOSIS — Z95.2 S/P AVR: Primary | ICD-10-CM

## 2023-03-09 PROCEDURE — 93798 PHYS/QHP OP CAR RHAB W/ECG: CPT

## 2023-03-10 ENCOUNTER — APPOINTMENT (OUTPATIENT)
Dept: CARDIAC REHAB | Facility: HOSPITAL | Age: 83
End: 2023-03-10
Payer: MEDICARE

## 2023-03-13 ENCOUNTER — APPOINTMENT (OUTPATIENT)
Dept: CARDIAC REHAB | Facility: HOSPITAL | Age: 83
End: 2023-03-13
Payer: MEDICARE

## 2023-03-14 ENCOUNTER — TREATMENT (OUTPATIENT)
Dept: CARDIAC REHAB | Facility: HOSPITAL | Age: 83
End: 2023-03-14
Payer: MEDICARE

## 2023-03-14 DIAGNOSIS — Z95.2 S/P AVR: Primary | ICD-10-CM

## 2023-03-14 PROCEDURE — 93798 PHYS/QHP OP CAR RHAB W/ECG: CPT

## 2023-03-15 ENCOUNTER — APPOINTMENT (OUTPATIENT)
Dept: CARDIAC REHAB | Facility: HOSPITAL | Age: 83
End: 2023-03-15
Payer: MEDICARE

## 2023-03-16 ENCOUNTER — APPOINTMENT (OUTPATIENT)
Dept: CARDIAC REHAB | Facility: HOSPITAL | Age: 83
End: 2023-03-16
Payer: MEDICARE

## 2023-03-16 ENCOUNTER — OFFICE VISIT (OUTPATIENT)
Dept: CARDIOLOGY | Facility: CLINIC | Age: 83
End: 2023-03-16
Payer: MEDICARE

## 2023-03-16 VITALS
WEIGHT: 112 LBS | BODY MASS INDEX: 19.84 KG/M2 | HEIGHT: 63 IN | HEART RATE: 83 BPM | DIASTOLIC BLOOD PRESSURE: 75 MMHG | SYSTOLIC BLOOD PRESSURE: 118 MMHG

## 2023-03-16 DIAGNOSIS — Z95.2 S/P AVR (AORTIC VALVE REPLACEMENT): ICD-10-CM

## 2023-03-16 DIAGNOSIS — Z79.01 LONG TERM (CURRENT) USE OF ANTICOAGULANTS: ICD-10-CM

## 2023-03-16 DIAGNOSIS — R00.2 PALPITATIONS: ICD-10-CM

## 2023-03-16 DIAGNOSIS — Z98.890 S/P MVR (MITRAL VALVE REPAIR): ICD-10-CM

## 2023-03-16 DIAGNOSIS — E78.5 DYSLIPIDEMIA: ICD-10-CM

## 2023-03-16 DIAGNOSIS — I48.11 LONGSTANDING PERSISTENT ATRIAL FIBRILLATION: ICD-10-CM

## 2023-03-16 DIAGNOSIS — I25.810 CORONARY ARTERY DISEASE INVOLVING CORONARY BYPASS GRAFT OF NATIVE HEART WITHOUT ANGINA PECTORIS: ICD-10-CM

## 2023-03-16 DIAGNOSIS — R01.1 HEART MURMUR: Primary | ICD-10-CM

## 2023-03-16 DIAGNOSIS — I10 ESSENTIAL HYPERTENSION: ICD-10-CM

## 2023-03-16 DIAGNOSIS — Z98.890 S/P LEFT ATRIAL APPENDAGE LIGATION: ICD-10-CM

## 2023-03-16 PROCEDURE — 3078F DIAST BP <80 MM HG: CPT | Performed by: INTERNAL MEDICINE

## 2023-03-16 PROCEDURE — 1159F MED LIST DOCD IN RCRD: CPT | Performed by: INTERNAL MEDICINE

## 2023-03-16 PROCEDURE — 99214 OFFICE O/P EST MOD 30 MIN: CPT | Performed by: INTERNAL MEDICINE

## 2023-03-16 PROCEDURE — 3074F SYST BP LT 130 MM HG: CPT | Performed by: INTERNAL MEDICINE

## 2023-03-16 PROCEDURE — 93000 ELECTROCARDIOGRAM COMPLETE: CPT | Performed by: INTERNAL MEDICINE

## 2023-03-16 PROCEDURE — 1160F RVW MEDS BY RX/DR IN RCRD: CPT | Performed by: INTERNAL MEDICINE

## 2023-03-16 NOTE — PROGRESS NOTES
Subjective:     Encounter Date:03/16/2023      Patient ID: Mindi Quinteros is a 82 y.o. female.    Chief Complaint and history of present illness:     Follow-up for CAD, CABG, A. fib, valvular heart disease     History of Present Illness  :     Ms. Mindi Quinteros  has PMH of     # CAD, cardiac cath 2/7/18  calcified 50% proximal 70% mid LAD and 70% mid LCX, normal LVEF, cardiac cath 5/17/2022 revealed severe two-vessel disease in LAD and LCx.  Echo revealed valvular heart disease with severe AR and MR.  Patient underwent CABG x2 with LIMA to LAD and SVG to lateral marginal and AVR and mitral valve repair and maze and left atrial appendage occlusion 5/19/2022  #CABG x2 with LIMA to LAD and SVG to lateral marginal 5/19/2020    #Valvular heart disease with 5/19/2022 aortic valve replacement with #21 magna pericardial prosthesis and mitral valve repair with #26 physeal ring, left atrial appendage endocardial closure and right and left cryo maze  #Paroxysmal atrial fibrillation, s/p left and right cryo maze and left atrial appendage endocardial closure  [id number] SCORE   No data recorded       #  Diabetes  #  Hypertension,  #  Hyperlipidemia  #  ulcerative colitis  #  allergy to aspertane  #  hemorrhoidectomy, hysterectomy, bladder repair, left ankle surgery,      Here for  follow-up.  Patient is complaining of palpitations when she lays on the left side gets better with supine or right-sided position.  Patient has anemia has been seeing hematology.  Patient has a heart murmur on exam today.        Patient's arterial blood pressure is  118/75, heart rate 83 bpm..        Review of records revealed that patient presented through emergency room 5/15/2022 with complaint of nocturnal dyspnea 2 days prior to admission with cough which is resolved but has increasing pedal edema, has chronic diarrhea secondary to ulcerative colitis and fatigue.  Work-up here revealed elevated proBNP of 2099 and glucose 158.  Chest x-ray showing  pulmonary edema and small bilateral pleural effusions and new onset A. Fib.  Echocardiogram 5/15/2022 reveals LV dysfunction EF of 46 to 50% with severe eccentric MR and diastolic dysfunction   Patient was in new onset heart failure on admission. Echocardiogram showed borderline EF, diastolic dysfunction, Severe MR,  Mod-severe AR.  Patient underwent cardiac cath and BEST     5/19/2022 patient underwent valve surgery with aortic valve replacement and mitral valve repair and two-vessel CABG and maze procedure.  5/20/2022 cardioverted to normal sinus rhythm  Echo 5/24/2022 reveals EF of 60 to 65% with biatrial enlargement PA systolic pressure 56 mmHg.  MIKE 9/29/2022 were normal..        5/27/2022: Glucose 201, ALT 83, AST 62, bilirubin 1.4, hemoglobin 10.2  5/31/2022: Glucose 49, ALT 40 AST 50, potassium 3.3 magnesium 1.8, hgb 9.4 .6/1/2022: glucose 168, potassium 4.2, bun 6 creatinine 0.55, ALT 34, AST 48 hgb 8.6.6/3/2022: Glucose 152, potassium 3.8, hemoglobin 11.4..    Labs from 1/29/2023 reveal normal BMP CBC with a hemoglobin of 9.7.        ASSESSMENT:        #Palpitations  #Anemia  #Heart murmur  #CAD, CABG  # paroxysmal atrial fibrillation, s/p maze, left atrial appendage closure  #Chronic HFrEF due to   systolic and diastolic dysfunction from ischemic cardiomyopathy and valvular heart disease and A. fib.  #Mitral regurgitation, s/p mitral valve repair  #Aortic regurgitation, status post tissue aortic valve replacement  #Cardiomyopathy, possibly due to MR, ischemic cardiomyopathy and A. fib with RVR  #CABG x2 with LIMA to LAD, SVG to lateral marginal, AVR with #21 magna pericardial prosthesis, mitral valve repair with #26 physio ll ring annuloplasty, Maze procedure, CRAIG ligation  #Loss of balance/ataxia/B12 deficiency  #Impaired memory/difficulty finding words  #  hyperlipidemia  #  diabetes   # hypertension, dyslipidemia     PLAN:    Reviewed EKG results with patient.  Patient's palpitations underlying  left-sided are probably related to either hiatal hernia or stomach pushing on her stomach.  Follow-up with PMD.  We will check an echocardiogram to assess murmur and palpitations and valvular heart disease.  Follow-up with hematology for anemia.  Patient has A-fib and high [id number] score, he is having anemia need to do a BEST to evaluate appendage if it is close completely or not to see if she will need watchman.  We will follow-up and consider the same..       Continue medical management with Eliquis, aspirin, atorvastatin, furosemide, Aldactone, metoprolol to help with CAD, valvular heart disease, CHF, hypertension, atrial fibrillation, dyslipidemia as tolerated     Patient has high [id number] score due to female gender, age over 75, hypertension and diabetes making it 5, will benefit from long-term anticoagulation given severity we will continue Eliquis as tolerated.     Patient underwent cardiac cath 5/17/2022 which revealed severe two-vessel disease  Patient underwent BEST which revealed severe AR.     Patient underwent two-vessel CABG, maze, aortic valve replacement and mitral valve repair by  5/19/2022             ECG 12 Lead    Date/Time: 3/16/2023 1:11 PM  Performed by: Natan Terrazas MD  Authorized by: Natan Terrazas MD   Comparison: compared with previous ECG from 5/28/2022  Comparison to previous ECG: EKG done today reviewed/interpreted by me this atrial fibrillation at the rate of 83 bpm, no new change compared EKG from 5/28/2022              Copied text in this portion of the note has been reviewed and is accurate as of 3/19/2023  The following portions of the patient's history were reviewed and updated as appropriate: allergies, current medications, past family history, past medical history, past social history, past surgical history and problem list.    Assessment:         MDM     Diagnosis Plan   1. Heart murmur  Adult Transthoracic Echo Complete W/ Cont if  Necessary Per Protocol      2. S/P AVR (aortic valve replacement)  Adult Transthoracic Echo Complete W/ Cont if Necessary Per Protocol      3. S/P MVR (mitral valve repair)  Adult Transthoracic Echo Complete W/ Cont if Necessary Per Protocol      4. Longstanding persistent atrial fibrillation (HCC)  Adult Transthoracic Echo Complete W/ Cont if Necessary Per Protocol      5. Long term (current) use of anticoagulants  Adult Transthoracic Echo Complete W/ Cont if Necessary Per Protocol      6. S/P left atrial appendage ligation  Adult Transthoracic Echo Complete W/ Cont if Necessary Per Protocol      7. Coronary artery disease involving coronary bypass graft of native heart without angina pectoris  Adult Transthoracic Echo Complete W/ Cont if Necessary Per Protocol      8. Essential hypertension  Adult Transthoracic Echo Complete W/ Cont if Necessary Per Protocol      9. Dyslipidemia  Adult Transthoracic Echo Complete W/ Cont if Necessary Per Protocol      10. Palpitations               Plan:               Past Medical History:  Past Medical History:   Diagnosis Date   • Acute congestive heart failure, unspecified heart failure type (HCC) 05/15/2022   • Age-related osteoporosis without current pathological fracture     prolia([id number], 9/12/2019), restarted prolia(11/3/20)   • Allergic    • Anemia    • Anxiety    • Arthritis    • CAD (coronary artery disease)    • Depression    • Diabetes (HCC)    • Elevated cholesterol    • HTN (hypertension)    • Hyperlipemia    • Injury of back    • Sinusitis    • Type II diabetes mellitus (HCC)      Past Surgical History:  Past Surgical History:   Procedure Laterality Date   • ANKLE ARTHROSCOPY     • AORTIC VALVE REPAIR/REPLACEMENT MITRAL VALVE REPAIR/REPLACEMENT N/A 5/19/2022    Procedure: AORTIC VALVE REPAIR/REPLACEMENT MITRAL VALVE REPAIR/REPLACEMENT;  Surgeon: Lane Okeefe MD;  Location[facility];  Service: Cardiothoracic;  Laterality: N/A;  Mitral Valve repair with  26mm Physio II ring. Aortic   Valve Replacement with 21mm Magna Ease valve.   • BELPHAROPTOSIS REPAIR     • CARDIAC CATHETERIZATION     • CARDIAC CATHETERIZATION N/A 5/17/2022    Procedure: Left Heart Cath, possible pci;  Surgeon: Natan Terrazas MD;  Location: [facility] CATH INVASIVE LOCATION;  Service: Cardiovascular;  Laterality: N/A;   • CATARACT EXTRACTION     • CHOLECYSTECTOMY N/A 10/14/2019    Procedure: Laparoscopic cholecystectomy, possible open;  Surgeon: Vijay Guerra DO;  Location: [facility] MAIN OR;  Service: General   • COLONOSCOPY     • CORONARY ARTERY BYPASS GRAFT N/A 5/19/2022    Procedure: CORONARY ARTERY BYPASS GRAFTING;  Surgeon: Lane Okeefe MD;  Location: [facility] CVOR;  Service: Cardiothoracic;  Laterality: N/A;  CABG X 2 (1 Vein graft, 1 LIMA graft)   • COSMETIC SURGERY     • ENDOSCOPY     • HEMORROIDECTOMY     • HYSTERECTOMY     • MAZE PROCEDURE N/A 5/19/2022    Procedure: MAZE PROCEDURE;  Surgeon: Lane Okeefe MD;  Location: [facility] CVOR;  Service: Cardiothoracic;  Laterality: N/A;      Allergies:  Allergies   Allergen Reactions   • Asacol [Mesalamine] Swelling   • Diclofenac Swelling     Gums swell only per patient   • Penicillins Other (See Comments)     Gums swelling per patient.      Home Meds:  Current Meds:     Current Outpatient Medications:   •  acetaminophen (TYLENOL) 325 MG tablet, Take 2 tablets by mouth Every 6 (Six) Hours As Needed for Mild Pain ., Disp: 30 tablet, Rfl: 0  •  aspirin (aspirin) 81 MG EC tablet, Take 1 tablet by mouth Daily., Disp: , Rfl:   •  atorvastatin (LIPITOR) 40 MG tablet, Take 1 tablet by mouth every night at bedtime., Disp: 90 tablet, Rfl: 3  •  Calcium Carbonate (CALCIUM 500 PO), Take 2 tablets by mouth Daily., Disp: , Rfl:   •  Cholecalciferol (VITAMIN D3) 2000 units capsule, Take 1 capsule by mouth Every Evening., Disp: , Rfl:   •  colestipol (COLESTID) 1 g tablet, Take 1 tablet by mouth As Needed., Disp: , Rfl:   •  cyanocobalamin 1000  MCG/ML injection, Inject 1 mL into the appropriate muscle as directed by prescriber Every 30 (Thirty) Days., Disp: , Rfl:   •  dicyclomine (BENTYL) 10 MG capsule, Take 1 capsule by mouth Daily., Disp: , Rfl:   •  fenofibrate 160 MG tablet, Take 1 tablet by mouth Daily., Disp: , Rfl:   •  ferrous sulfate 325 (65 FE) MG tablet, Take 1 tablet by mouth Daily With Breakfast., Disp: , Rfl:   •  folic acid-pyridoxine-cyanocobalamin (FOLBIC) 2.5-25-2 MG tablet tablet, Take  by mouth Daily., Disp: , Rfl:   •  glimepiride (AMARYL) 2 MG tablet, Take 1 mg by mouth Every Morning Before Breakfast., Disp: , Rfl:   •  METAMUCIL FIBER PO, Take  by mouth., Disp: , Rfl:   •  metFORMIN ER (GLUCOPHAGE-XR) 500 MG 24 hr tablet, Take 4 tablets by mouth Daily With Breakfast. 4 pills a day, Disp: , Rfl:   •  metoprolol succinate XL (TOPROL-XL) 25 MG 24 hr tablet, Take 0.5 tablets by mouth Daily., Disp: 45 tablet, Rfl: 3  •  multivitamin with minerals tablet tablet, Take 1 tablet by mouth Daily., Disp: , Rfl:   •  nitroglycerin (NITROSTAT) 0.4 MG SL tablet, Place 1 tablet under the tongue Every 5 (Five) Minutes As Needed for Chest Pain. Take no more than 3 doses in 15 minutes., Disp: , Rfl:   •  spironolactone (ALDACTONE) 25 MG tablet, Take 1 tablet by mouth Daily., Disp: 90 tablet, Rfl: 1  •  apixaban (ELIQUIS) 2.5 MG tablet tablet, Take 1 tablet by mouth Every 12 (Twelve) Hours. Indications: Atrial Fibrillation, Atrial Fibrillation, Disp: 180 tablet, Rfl: 2  •  clindamycin (Cleocin) 300 MG capsule, Take 2 capsules by mouth Take As Directed. Take 2 capsules by mouth 30 to 60 minutes prior to dental procedure. (Patient not taking: Reported on 3/16/2023), Disp: 2 capsule, Rfl: 0  •  furosemide (LASIX) 40 MG tablet, Take 1 tablet by mouth Daily. (Patient taking differently: Take 20 mg by mouth Daily. 20 mg in the am, 10 mg in the pm), Disp: 90 tablet, Rfl: 1    Current Facility-Administered Medications:   •  denosumab (PROLIA) syringe 60 mg, 60  "mg, Subcutaneous, Q6 Months, Elisabeth Royal PA, 60 mg at 11/10/22 1021  Social History:   Social History     Tobacco Use   • Smoking status: Never   • Smokeless tobacco: Never   Substance Use Topics   • Alcohol use: No      Family History:  Family History   Problem Relation Age of Onset   • Diabetes Mother    • Heart disease Father    • Heart disease Brother    • Diabetes Brother    • Osteoporosis Paternal Grandmother               ROS  All other systems are negative         Objective:     Physical Exam  /75   Pulse 83   Ht 160 cm (63\")   Wt 50.8 kg (112 lb)   BMI 19.84 kg/m²   General:  Appears in no acute distress  Eyes: Sclera is anicteric,  conjunctiva is clear   HEENT:  No JVD.  No carotid bruits  Respiratory: Respirations regular and unlabored at rest.  Clear to auscultation  Cardiovascular: S1,S2 Regular rate and rhythm.  2/6 systolic ejection murmur at the base, no  rub or gallop auscultated.   Extremities: No digital clubbing or cyanosis, no edema  Skin: Color pink. Skin warm and dry to touch. No rashes  No xanthoma  Neuro: Alert and awake.    Lab Reviewed:         Natan Terrazas MD  3/19/2023 13:11 EDT      EMR Dragon/Transcription:   \"Dictated utilizing Dragon dictation\".        "

## 2023-03-17 ENCOUNTER — APPOINTMENT (OUTPATIENT)
Dept: CARDIAC REHAB | Facility: HOSPITAL | Age: 83
End: 2023-03-17
Payer: MEDICARE

## 2023-03-20 ENCOUNTER — TREATMENT (OUTPATIENT)
Dept: CARDIAC REHAB | Facility: HOSPITAL | Age: 83
End: 2023-03-20
Payer: MEDICARE

## 2023-03-20 DIAGNOSIS — Z95.2 S/P AVR: Primary | ICD-10-CM

## 2023-03-20 DIAGNOSIS — Z95.1 S/P CABG (CORONARY ARTERY BYPASS GRAFT): ICD-10-CM

## 2023-03-20 DIAGNOSIS — Z98.890 S/P MVR (MITRAL VALVE REPAIR): ICD-10-CM

## 2023-03-20 PROCEDURE — 93798 PHYS/QHP OP CAR RHAB W/ECG: CPT

## 2023-03-23 ENCOUNTER — TREATMENT (OUTPATIENT)
Dept: CARDIAC REHAB | Facility: HOSPITAL | Age: 83
End: 2023-03-23
Payer: MEDICARE

## 2023-03-23 DIAGNOSIS — Z95.2 S/P AVR: Primary | ICD-10-CM

## 2023-03-23 DIAGNOSIS — Z95.1 S/P CABG (CORONARY ARTERY BYPASS GRAFT): ICD-10-CM

## 2023-03-23 PROCEDURE — 93798 PHYS/QHP OP CAR RHAB W/ECG: CPT

## 2023-03-28 ENCOUNTER — TREATMENT (OUTPATIENT)
Dept: CARDIAC REHAB | Facility: HOSPITAL | Age: 83
End: 2023-03-28
Payer: MEDICARE

## 2023-03-28 DIAGNOSIS — Z95.1 S/P CABG (CORONARY ARTERY BYPASS GRAFT): ICD-10-CM

## 2023-03-28 DIAGNOSIS — Z95.2 S/P AVR: Primary | ICD-10-CM

## 2023-03-28 DIAGNOSIS — Z98.890 S/P MVR (MITRAL VALVE REPAIR): ICD-10-CM

## 2023-03-28 PROCEDURE — 93798 PHYS/QHP OP CAR RHAB W/ECG: CPT

## 2023-03-30 ENCOUNTER — TREATMENT (OUTPATIENT)
Dept: CARDIAC REHAB | Facility: HOSPITAL | Age: 83
End: 2023-03-30
Payer: MEDICARE

## 2023-03-30 DIAGNOSIS — Z95.2 S/P AVR: Primary | ICD-10-CM

## 2023-03-30 PROCEDURE — 93798 PHYS/QHP OP CAR RHAB W/ECG: CPT

## 2023-04-03 ENCOUNTER — TREATMENT (OUTPATIENT)
Dept: CARDIAC REHAB | Facility: HOSPITAL | Age: 83
End: 2023-04-03
Payer: MEDICARE

## 2023-04-03 ENCOUNTER — APPOINTMENT (OUTPATIENT)
Dept: CARDIAC REHAB | Facility: HOSPITAL | Age: 83
End: 2023-04-03
Payer: MEDICARE

## 2023-04-03 DIAGNOSIS — Z98.890 S/P MVR (MITRAL VALVE REPAIR): ICD-10-CM

## 2023-04-03 DIAGNOSIS — Z95.1 S/P CABG (CORONARY ARTERY BYPASS GRAFT): ICD-10-CM

## 2023-04-03 DIAGNOSIS — Z95.2 S/P AVR: Primary | ICD-10-CM

## 2023-04-03 PROCEDURE — 93798 PHYS/QHP OP CAR RHAB W/ECG: CPT

## 2023-04-04 NOTE — TELEPHONE ENCOUNTER
Rx Refill Note  Requested Prescriptions      No prescriptions requested or ordered in this encounter      Last office visit with prescribing clinician: 3/16/2023   Last telemedicine visit with prescribing clinician: 9/20/2023   Next office visit with prescribing clinician: 9/20/2023                         Would you like a call back once the refill request has been completed: [] Yes [] No    If the office needs to give you a call back, can they leave a voicemail: [] Yes [] No    Tatum Hammer MA  04/04/23, 09:37 EDT

## 2023-04-06 ENCOUNTER — OFFICE (OUTPATIENT)
Dept: URBAN - METROPOLITAN AREA CLINIC 64 | Facility: CLINIC | Age: 83
End: 2023-04-06

## 2023-04-06 ENCOUNTER — TREATMENT (OUTPATIENT)
Dept: CARDIAC REHAB | Facility: HOSPITAL | Age: 83
End: 2023-04-06
Payer: MEDICARE

## 2023-04-06 ENCOUNTER — APPOINTMENT (OUTPATIENT)
Dept: CARDIAC REHAB | Facility: HOSPITAL | Age: 83
End: 2023-04-06
Payer: MEDICARE

## 2023-04-06 VITALS
WEIGHT: 115.4 LBS | HEART RATE: 107 BPM | DIASTOLIC BLOOD PRESSURE: 74 MMHG | HEIGHT: 64 IN | SYSTOLIC BLOOD PRESSURE: 109 MMHG

## 2023-04-06 DIAGNOSIS — Z79.82 LONG TERM (CURRENT) USE OF ASPIRIN: ICD-10-CM

## 2023-04-06 DIAGNOSIS — E53.8 DEFICIENCY OF OTHER SPECIFIED B GROUP VITAMINS: ICD-10-CM

## 2023-04-06 DIAGNOSIS — E73.9 LACTOSE INTOLERANCE, UNSPECIFIED: ICD-10-CM

## 2023-04-06 DIAGNOSIS — Z95.1 S/P CABG (CORONARY ARTERY BYPASS GRAFT): ICD-10-CM

## 2023-04-06 DIAGNOSIS — Z79.01 LONG TERM (CURRENT) USE OF ANTICOAGULANTS: ICD-10-CM

## 2023-04-06 DIAGNOSIS — R19.4 CHANGE IN BOWEL HABIT: ICD-10-CM

## 2023-04-06 DIAGNOSIS — I48.91 UNSPECIFIED ATRIAL FIBRILLATION: ICD-10-CM

## 2023-04-06 DIAGNOSIS — R15.9 FULL INCONTINENCE OF FECES: ICD-10-CM

## 2023-04-06 DIAGNOSIS — Z98.890 S/P MVR (MITRAL VALVE REPAIR): ICD-10-CM

## 2023-04-06 DIAGNOSIS — Z95.2 S/P AVR: Primary | ICD-10-CM

## 2023-04-06 DIAGNOSIS — I25.10 ATHEROSCLEROTIC HEART DISEASE OF NATIVE CORONARY ARTERY WITH: ICD-10-CM

## 2023-04-06 DIAGNOSIS — K51.90 ULCERATIVE COLITIS, UNSPECIFIED, WITHOUT COMPLICATIONS: ICD-10-CM

## 2023-04-06 PROCEDURE — 93798 PHYS/QHP OP CAR RHAB W/ECG: CPT

## 2023-04-06 PROCEDURE — 99213 OFFICE O/P EST LOW 20 MIN: CPT | Performed by: NURSE PRACTITIONER

## 2023-04-10 ENCOUNTER — HOSPITAL ENCOUNTER (OUTPATIENT)
Dept: CARDIOLOGY | Facility: HOSPITAL | Age: 83
Discharge: HOME | End: 2023-04-10
Admitting: INTERNAL MEDICINE
Payer: MEDICARE

## 2023-04-10 ENCOUNTER — TREATMENT (OUTPATIENT)
Dept: CARDIAC REHAB | Facility: HOSPITAL | Age: 83
End: 2023-04-10
Payer: MEDICARE

## 2023-04-10 ENCOUNTER — APPOINTMENT (OUTPATIENT)
Dept: CARDIAC REHAB | Facility: HOSPITAL | Age: 83
End: 2023-04-10
Payer: MEDICARE

## 2023-04-10 DIAGNOSIS — Z95.2 S/P AVR: Primary | ICD-10-CM

## 2023-04-10 DIAGNOSIS — R01.1 HEART MURMUR: ICD-10-CM

## 2023-04-10 DIAGNOSIS — Z98.890 S/P LEFT ATRIAL APPENDAGE LIGATION: ICD-10-CM

## 2023-04-10 DIAGNOSIS — E78.5 DYSLIPIDEMIA: ICD-10-CM

## 2023-04-10 DIAGNOSIS — Z98.890 S/P MVR (MITRAL VALVE REPAIR): ICD-10-CM

## 2023-04-10 DIAGNOSIS — I48.11 LONGSTANDING PERSISTENT ATRIAL FIBRILLATION: ICD-10-CM

## 2023-04-10 DIAGNOSIS — Z95.2 S/P AVR (AORTIC VALVE REPLACEMENT): ICD-10-CM

## 2023-04-10 DIAGNOSIS — Z79.01 LONG TERM (CURRENT) USE OF ANTICOAGULANTS: ICD-10-CM

## 2023-04-10 DIAGNOSIS — I10 ESSENTIAL HYPERTENSION: ICD-10-CM

## 2023-04-10 DIAGNOSIS — I25.810 CORONARY ARTERY DISEASE INVOLVING CORONARY BYPASS GRAFT OF NATIVE HEART WITHOUT ANGINA PECTORIS: ICD-10-CM

## 2023-04-10 LAB
BH CV ECHO MEAS - AO MAX PG: 16.5 MMHG
BH CV ECHO MEAS - AO MEAN PG: 10.9 MMHG
BH CV ECHO MEAS - AO ROOT DIAM: 3.2 CM
BH CV ECHO MEAS - AO V2 MAX: 201.2 CM/SEC
BH CV ECHO MEAS - AO V2 VTI: 43.4 CM
BH CV ECHO MEAS - AVA(I,D): 0.62 CM2
BH CV ECHO MEAS - EDV(CUBED): 35 ML
BH CV ECHO MEAS - EDV(MOD-SP4): 28.8 ML
BH CV ECHO MEAS - EF(MOD-BP): 42 %
BH CV ECHO MEAS - EF(MOD-SP4): 41.6 %
BH CV ECHO MEAS - ESV(CUBED): 21.8 ML
BH CV ECHO MEAS - ESV(MOD-SP4): 16.8 ML
BH CV ECHO MEAS - FS: 14.6 %
BH CV ECHO MEAS - IVS/LVPW: 0.49 CM
BH CV ECHO MEAS - IVSD: 0.66 CM
BH CV ECHO MEAS - LA DIMENSION: 4.5 CM
BH CV ECHO MEAS - LV DIASTOLIC VOL/BSA (35-75): 19 CM2
BH CV ECHO MEAS - LV MASS(C)D: 93.9 GRAMS
BH CV ECHO MEAS - LV MAX PG: 2.47 MMHG
BH CV ECHO MEAS - LV MEAN PG: 1.34 MMHG
BH CV ECHO MEAS - LV SYSTOLIC VOL/BSA (12-30): 11.1 CM2
BH CV ECHO MEAS - LV V1 MAX: 78.6 CM/SEC
BH CV ECHO MEAS - LV V1 VTI: 15.7 CM
BH CV ECHO MEAS - LVIDD: 3.3 CM
BH CV ECHO MEAS - LVIDS: 2.8 CM
BH CV ECHO MEAS - LVOT AREA: 1.72 CM2
BH CV ECHO MEAS - LVOT DIAM: 1.48 CM
BH CV ECHO MEAS - LVPWD: 1.34 CM
BH CV ECHO MEAS - MV E MAX VEL: 157.5 CM/SEC
BH CV ECHO MEAS - MV MAX PG: 12.1 MMHG
BH CV ECHO MEAS - MV MEAN PG: 4.8 MMHG
BH CV ECHO MEAS - MV V2 VTI: 24.3 CM
BH CV ECHO MEAS - MVA(VTI): 1.11 CM2
BH CV ECHO MEAS - PA ACC TIME: 0.1 SEC
BH CV ECHO MEAS - PA PR(ACCEL): 35.4 MMHG
BH CV ECHO MEAS - PULM DIAS VEL: 62.4 CM/SEC
BH CV ECHO MEAS - PULM S/D: 0.54
BH CV ECHO MEAS - PULM SYS VEL: 33.6 CM/SEC
BH CV ECHO MEAS - RAP SYSTOLE: 15 MMHG
BH CV ECHO MEAS - RV MAX PG: 1.61 MMHG
BH CV ECHO MEAS - RV V1 MAX: 63.5 CM/SEC
BH CV ECHO MEAS - RV V1 VTI: 11.6 CM
BH CV ECHO MEAS - RVDD: 4.2 CM
BH CV ECHO MEAS - RVSP: 45.4 MMHG
BH CV ECHO MEAS - SI(MOD-SP4): 7.9 ML/M2
BH CV ECHO MEAS - SV(LVOT): 27 ML
BH CV ECHO MEAS - SV(MOD-SP4): 12 ML
BH CV ECHO MEAS - TR MAX PG: 30.4 MMHG
BH CV ECHO MEAS - TR MAX VEL: 274.8 CM/SEC
MAXIMAL PREDICTED HEART RATE: 138 BPM
STRESS TARGET HR: 117 BPM

## 2023-04-10 PROCEDURE — 93306 TTE W/DOPPLER COMPLETE: CPT | Performed by: INTERNAL MEDICINE

## 2023-04-10 PROCEDURE — 93798 PHYS/QHP OP CAR RHAB W/ECG: CPT

## 2023-04-10 PROCEDURE — 93306 TTE W/DOPPLER COMPLETE: CPT

## 2023-04-11 ENCOUNTER — TELEPHONE (OUTPATIENT)
Dept: CARDIOLOGY | Facility: CLINIC | Age: 83
End: 2023-04-11
Payer: MEDICARE

## 2023-04-13 ENCOUNTER — APPOINTMENT (OUTPATIENT)
Dept: CARDIAC REHAB | Facility: HOSPITAL | Age: 83
End: 2023-04-13
Payer: MEDICARE

## 2023-04-13 ENCOUNTER — TREATMENT (OUTPATIENT)
Dept: CARDIAC REHAB | Facility: HOSPITAL | Age: 83
End: 2023-04-13
Payer: MEDICARE

## 2023-04-13 DIAGNOSIS — Z95.2 S/P AVR: Primary | ICD-10-CM

## 2023-04-13 PROCEDURE — 93798 PHYS/QHP OP CAR RHAB W/ECG: CPT

## 2023-04-14 ENCOUNTER — TELEPHONE (OUTPATIENT)
Dept: CARDIOLOGY | Facility: CLINIC | Age: 83
End: 2023-04-14
Payer: MEDICARE

## 2023-04-14 NOTE — TELEPHONE ENCOUNTER
Called patient back to give echocardiogram results.  Discussed moderate to severe TR, pulmonary htn.     Is she having some edema. Advised patient to increase lasix to 40mg BID (She was taking 40 in am 20 in pm) for up 3-5 days.  If worsening edema or shortness of breath please call and make an appointment

## 2023-04-14 NOTE — TELEPHONE ENCOUNTER
Caller: Mindi Quinteros A    Relationship: Self    Best call back number: [phone] (CELL)  [phone] (HOME)    What is the best time to reach you: ANY     Who are you requesting to speak with (clinical staff, provider,  specific staff member): CLINICAL    What was the call regarding: PT STATES THAT THEY RECEIVED A CALL FROM A THIRD PARTY STATING THAT SHE NEEDED TO SET UP AN APPT WITH DR. PARDO. HOWEVER THE PT JUST SPOKE WITH THE OFFICE. PLEASE REACH OUT TO THE PT FOR CLARIFICATION ON ANY ISSUES SHE IS HAVING.     Do you require a callback: YES

## 2023-04-17 ENCOUNTER — APPOINTMENT (OUTPATIENT)
Dept: CARDIAC REHAB | Facility: HOSPITAL | Age: 83
End: 2023-04-17
Payer: MEDICARE

## 2023-04-18 ENCOUNTER — TREATMENT (OUTPATIENT)
Dept: CARDIAC REHAB | Facility: HOSPITAL | Age: 83
End: 2023-04-18
Payer: MEDICARE

## 2023-04-18 DIAGNOSIS — Z95.2 S/P AVR: Primary | ICD-10-CM

## 2023-04-18 PROCEDURE — 93798 PHYS/QHP OP CAR RHAB W/ECG: CPT

## 2023-04-20 ENCOUNTER — TREATMENT (OUTPATIENT)
Dept: CARDIAC REHAB | Facility: HOSPITAL | Age: 83
End: 2023-04-20
Payer: MEDICARE

## 2023-04-20 ENCOUNTER — APPOINTMENT (OUTPATIENT)
Dept: CARDIAC REHAB | Facility: HOSPITAL | Age: 83
End: 2023-04-20
Payer: MEDICARE

## 2023-04-20 DIAGNOSIS — Z95.1 S/P CABG (CORONARY ARTERY BYPASS GRAFT): ICD-10-CM

## 2023-04-20 DIAGNOSIS — Z95.2 S/P AVR: Primary | ICD-10-CM

## 2023-04-20 DIAGNOSIS — Z98.890 S/P MVR (MITRAL VALVE REPAIR): ICD-10-CM

## 2023-04-20 PROCEDURE — 93798 PHYS/QHP OP CAR RHAB W/ECG: CPT

## 2023-04-24 ENCOUNTER — APPOINTMENT (OUTPATIENT)
Dept: CARDIAC REHAB | Facility: HOSPITAL | Age: 83
End: 2023-04-24
Payer: MEDICARE

## 2023-04-24 ENCOUNTER — TREATMENT (OUTPATIENT)
Dept: CARDIAC REHAB | Facility: HOSPITAL | Age: 83
End: 2023-04-24
Payer: MEDICARE

## 2023-04-24 DIAGNOSIS — Z95.1 S/P CABG (CORONARY ARTERY BYPASS GRAFT): ICD-10-CM

## 2023-04-24 DIAGNOSIS — Z95.2 S/P AVR: Primary | ICD-10-CM

## 2023-04-24 PROCEDURE — 93798 PHYS/QHP OP CAR RHAB W/ECG: CPT

## 2023-04-26 ENCOUNTER — TELEPHONE (OUTPATIENT)
Dept: CARDIOLOGY | Facility: CLINIC | Age: 83
End: 2023-04-26
Payer: MEDICARE

## 2023-04-26 DIAGNOSIS — R60.0 EDEMA LEG: Primary | ICD-10-CM

## 2023-04-26 RX ORDER — CLINDAMYCIN HYDROCHLORIDE 300 MG/1
600 CAPSULE ORAL TAKE AS DIRECTED
Qty: 2 CAPSULE | Refills: 0 | Status: SHIPPED | OUTPATIENT
Start: 2023-04-26

## 2023-04-26 NOTE — TELEPHONE ENCOUNTER
Caller: Mindi Quinteros     Relationship: PATIENT    Best call back number: [phone] FROM 8AM TO 1PM OR MOBILE [phone] FROM 1PM TO 5PM.     What is your medical concern? EDEMA IN LEFT LEG AND FOOT, SOME EDEMA IN RIGHT FOOT.  PATIENT TOOK FUROSAMIDE AT DIRECTED BY LIANA WEST.  PER LIANA WEST PATIENT TO CALL IF NOT BETTER.     How long has this issue been going on? 2 WEEKS    Is your provider already aware of this issue? YES    Have you been treated for this issue? YES

## 2023-04-26 NOTE — TELEPHONE ENCOUNTER
Caller: Mindi Quinteros    Relationship: Self    Best call back number: [phone] FROM 8AM TO 1PM AND MOBILE [phone] FROM 1PM TO 5PM    Requested Prescriptions:   Requested Prescriptions     Pending Prescriptions Disp Refills   • clindamycin (Cleocin) 300 MG capsule 2 capsule 0     Sig: Take 2 capsules by mouth Take As Directed. Take 2 capsules by mouth 30 to 60 minutes prior to dental procedure.        Pharmacy where request should be sent:  [address], [place]    Last office visit with prescribing clinician: 3/16/2023   Last telemedicine visit with prescribing clinician: 9/20/2023   Next office visit with prescribing clinician: 9/20/2023     Additional details provided by patient: PATIENT HAS DENTIST APPOINTMENT ON 5/2/23 AND HAS BEEN ADVISED BY DENTIST TO TAKE CLINDAMYCIN PRIOR TO APPOINTMENT.      Does the patient have less than a 3 day supply:  [x] Yes  [] No    Would you like a call back once the refill request has been completed: [x] Yes [x] No    If the office needs to give you a call back, can they leave a voicemail: [x] Yes [] No    Desi Small Rep   04/26/23 16:46 EDT

## 2023-04-27 ENCOUNTER — APPOINTMENT (OUTPATIENT)
Dept: CARDIAC REHAB | Facility: HOSPITAL | Age: 83
End: 2023-04-27
Payer: MEDICARE

## 2023-04-27 ENCOUNTER — TREATMENT (OUTPATIENT)
Dept: CARDIAC REHAB | Facility: HOSPITAL | Age: 83
End: 2023-04-27
Payer: MEDICARE

## 2023-04-27 DIAGNOSIS — Z95.2 S/P AVR: Primary | ICD-10-CM

## 2023-04-27 PROCEDURE — 93798 PHYS/QHP OP CAR RHAB W/ECG: CPT

## 2023-05-01 ENCOUNTER — TREATMENT (OUTPATIENT)
Dept: CARDIAC REHAB | Facility: HOSPITAL | Age: 83
End: 2023-05-01
Payer: MEDICARE

## 2023-05-01 ENCOUNTER — APPOINTMENT (OUTPATIENT)
Dept: CARDIAC REHAB | Facility: HOSPITAL | Age: 83
End: 2023-05-01
Payer: MEDICARE

## 2023-05-01 DIAGNOSIS — Z95.2 S/P AVR: Primary | ICD-10-CM

## 2023-05-01 PROCEDURE — 93798 PHYS/QHP OP CAR RHAB W/ECG: CPT

## 2023-05-04 ENCOUNTER — APPOINTMENT (OUTPATIENT)
Dept: CARDIAC REHAB | Facility: HOSPITAL | Age: 83
End: 2023-05-04
Payer: MEDICARE

## 2023-05-08 ENCOUNTER — APPOINTMENT (OUTPATIENT)
Dept: CARDIAC REHAB | Facility: HOSPITAL | Age: 83
End: 2023-05-08
Payer: MEDICARE

## 2023-05-08 ENCOUNTER — TREATMENT (OUTPATIENT)
Dept: CARDIAC REHAB | Facility: HOSPITAL | Age: 83
End: 2023-05-08
Payer: MEDICARE

## 2023-05-08 DIAGNOSIS — Z95.2 S/P AVR: Primary | ICD-10-CM

## 2023-05-08 PROCEDURE — 93798 PHYS/QHP OP CAR RHAB W/ECG: CPT

## 2023-05-10 ENCOUNTER — LAB (OUTPATIENT)
Dept: LAB | Facility: HOSPITAL | Age: 83
End: 2023-05-10
Payer: MEDICARE

## 2023-05-10 DIAGNOSIS — R60.0 EDEMA LEG: ICD-10-CM

## 2023-05-10 LAB
ANION GAP SERPL CALCULATED.3IONS-SCNC: 15.4 MMOL/L (ref 5–15)
BUN SERPL-MCNC: 14 MG/DL (ref 8–23)
BUN/CREAT SERPL: 15.4 (ref 7–25)
CALCIUM SPEC-SCNC: 10 MG/DL (ref 8.6–10.5)
CHLORIDE SERPL-SCNC: 99 MMOL/L (ref 98–107)
CO2 SERPL-SCNC: 22.6 MMOL/L (ref 22–29)
CREAT SERPL-MCNC: 0.91 MG/DL (ref 0.57–1)
EGFRCR SERPLBLD CKD-EPI 2021: 63.1 ML/MIN/1.73
GLUCOSE SERPL-MCNC: 290 MG/DL (ref 65–99)
POTASSIUM SERPL-SCNC: 4 MMOL/L (ref 3.5–5.2)
SODIUM SERPL-SCNC: 137 MMOL/L (ref 136–145)

## 2023-05-10 PROCEDURE — 80048 BASIC METABOLIC PNL TOTAL CA: CPT

## 2023-05-10 PROCEDURE — 36415 COLL VENOUS BLD VENIPUNCTURE: CPT

## 2023-05-10 NOTE — PROGRESS NOTES
Subjective:     Encounter Date:05/12/2023      Patient ID: Mindi Quinteros is a 82 y.o. female.    Chief Complaint: Acute visit for Edema.     History of Present Illness       Ms. Mindi Quinteros  has PMH of     # CAD, cardiac cath 2/7/18  calcified 50% proximal 70% mid LAD and 70% mid LCX, normal LVEF, cardiac cath 5/17/2022 revealed severe two-vessel disease in LAD and LCx.  Echo revealed valvular heart disease with severe AR and MR.  Patient underwent CABG x2 with LIMA to LAD and SVG to lateral marginal and AVR and mitral valve repair and maze and left atrial appendage occlusion 5/19/2022  #CABG x2 with LIMA to LAD and SVG to lateral marginal 5/19/2020  Chronic HFpEF secondary to valvular disease and pulmonary hypertension  New moderate to severe TR  #Valvular heart disease with 5/19/2022 aortic valve replacement with #21 magna pericardial prosthesis and mitral valve repair with #26 physeal ring, left atrial appendage endocardial closure and right and left cryo maze  #Paroxysmal atrial fibrillation, s/p left and right cryo maze and left atrial appendage endocardial closure  [id number] SCORE   [id number] Score: 7 (5/18/2023 12:27 PM)    #  Diabetes  #  Hypertension,  #  Hyperlipidemia  #  ulcerative colitis  #  allergy to aspertane Diflucan neck penicillin mesalamine  #  hemorrhoidectomy, hysterectomy, bladder repair, left ankle surgery    Is here for an acute visit with edema.  Patient continue to report edema in her left lower extremity (shin to ankle).  Patient had called in previously and we increased her spironolactone to 25mg BID.   Patient reports improvement since then but edema still present.  This is clearly fluid retention; her leg is not hot red therefore I do not feel like this is a DVT.  DVT is less likely as patient is already on Eliquis.  Her right leg has trace edema.  Patient is wearing ankle socks which is also causing further pitting.  Advised patient to wear knee-high compression socks.       Pressure today 110/43 heart rate 67 oxygen 96% on room air weight 111    Lab Review:     Labs from 5/4/2023 LDL 42 HDL 23 potassium 4.3 function normal hemoglobin 9.8 hemoglobin A1c 7.5  Labs from 5/10/2023 glucose 290 renal function normal    4/10/2023 echocardiogram  Normal LV size and contractility EF of 60 to 65%  Moderate to severe right ventricular enlargement seen  Normal atrial size  Pulmonic valve is not well visualized.  Aortic valve is not well-visualized.  Appears to have good cusp separation in limited views visualized.  Dopplers did not reveal any abnormality..  Mitral valve prosthesis appears to be functioning well.  Tricuspid valve appears structurally normal, moderate to severe tricuspid regurgitation seen.  Calculated RV systolic pressure of 50 mmHg consistent with pulmonary hypertension seen.  Plethoric IVC consistent with high right atrial pressure seen..  No pericardial effusion seen.  Proximal aorta appears normal in size.         The following portions of the patient's history were reviewed and updated as appropriate: allergies, current medications, past family history, past medical history, past social history, past surgical history and problem list.    Review of Systems   Constitutional: Positive for malaise/fatigue.   Cardiovascular: Positive for leg swelling. Negative for chest pain and dyspnea on exertion.   Respiratory: Negative for shortness of breath.    Neurological: Positive for light-headedness (Intermittently).   All other systems reviewed and are negative.    Past Medical History:   Diagnosis Date   • Acute congestive heart failure, unspecified heart failure type 05/15/2022   • Age-related osteoporosis without current pathological fracture     prolia([id number], 9/12/2019), restarted prolia(11/3/20)   • Allergic    • Anemia    • Anxiety    • Arthritis    • CAD (coronary artery disease)    • Depression    • Diabetes    • Elevated cholesterol    • HTN (hypertension)    •  "Hyperlipemia    • Injury of back    • Sinusitis    • Type II diabetes mellitus      Past Surgical History:   Procedure Laterality Date   • ANKLE ARTHROSCOPY     • AORTIC VALVE REPAIR/REPLACEMENT MITRAL VALVE REPAIR/REPLACEMENT N/A 5/19/2022    Procedure: AORTIC VALVE REPAIR/REPLACEMENT MITRAL VALVE REPAIR/REPLACEMENT;  Surgeon: Lane Okeefe MD;  Location: [facility];  Service: Cardiothoracic;  Laterality: N/A;  Mitral Valve repair with 26mm Physio II ring. Aortic   Valve Replacement with 21mm Magna Ease valve.   • BELPHAROPTOSIS REPAIR     • CARDIAC CATHETERIZATION     • CARDIAC CATHETERIZATION N/A 5/17/2022    Procedure: Left Heart Cath, possible pci;  Surgeon: Natan Terrazas MD;  Location: [facility] CATH INVASIVE LOCATION;  Service: Cardiovascular;  Laterality: N/A;   • CATARACT EXTRACTION     • CHOLECYSTECTOMY N/A 10/14/2019    Procedure: Laparoscopic cholecystectomy, possible open;  Surgeon: Vijay Guerra DO;  Location: [facility] MAIN OR;  Service: General   • COLONOSCOPY     • CORONARY ARTERY BYPASS GRAFT N/A 5/19/2022    Procedure: CORONARY ARTERY BYPASS GRAFTING;  Surgeon: Lane Okeefe MD;  Location: [facility];  Service: Cardiothoracic;  Laterality: N/A;  CABG X 2 (1 Vein graft, 1 LIMA graft)   • COSMETIC SURGERY     • ENDOSCOPY     • HEMORROIDECTOMY     • HYSTERECTOMY     • MAZE PROCEDURE N/A 5/19/2022    Procedure: MAZE PROCEDURE;  Surgeon: Lane Okeefe MD;  Location: [facility];  Service: Cardiothoracic;  Laterality: N/A;     /43   Pulse 67   Ht 160 cm (63\")   Wt 50.3 kg (111 lb)   SpO2 96%   BMI 19.66 kg/m²   Family History   Problem Relation Age of Onset   • Diabetes Mother    • Heart disease Father    • Heart disease Brother    • Diabetes Brother    • Osteoporosis Paternal Grandmother        Current Outpatient Medications:   •  acetaminophen (TYLENOL) 325 MG tablet, Take 2 tablets by mouth Every 6 (Six) Hours As Needed for Mild Pain ., Disp: 30 tablet, Rfl: 0  •  " apixaban (ELIQUIS) 2.5 MG tablet tablet, Take 1 tablet by mouth Every 12 (Twelve) Hours. Indications: Atrial Fibrillation, Atrial Fibrillation, Disp: 180 tablet, Rfl: 2  •  aspirin (aspirin) 81 MG EC tablet, Take 1 tablet by mouth Daily., Disp: , Rfl:   •  atorvastatin (LIPITOR) 40 MG tablet, Take 1 tablet by mouth every night at bedtime., Disp: 90 tablet, Rfl: 3  •  Calcium Carbonate (CALCIUM 500 PO), Take 2 tablets by mouth Daily., Disp: , Rfl:   •  Cholecalciferol (VITAMIN D3) 2000 units capsule, Take 1 capsule by mouth Every Evening., Disp: , Rfl:   •  colestipol (COLESTID) 1 g tablet, Take 1 tablet by mouth As Needed., Disp: , Rfl:   •  cyanocobalamin 1000 MCG/ML injection, Inject 1 mL into the appropriate muscle as directed by prescriber Every 30 (Thirty) Days., Disp: , Rfl:   •  dicyclomine (BENTYL) 10 MG capsule, Take 1 capsule by mouth Daily., Disp: , Rfl:   •  fenofibrate 160 MG tablet, Take 1 tablet by mouth Daily., Disp: , Rfl:   •  ferrous sulfate 325 (65 FE) MG tablet, Take 1 tablet by mouth Daily With Breakfast., Disp: , Rfl:   •  folic acid-pyridoxine-cyanocobalamin (FOLBIC) 2.5-25-2 MG tablet tablet, Take  by mouth Daily., Disp: , Rfl:   •  furosemide (LASIX) 40 MG tablet, Take 1 tablet by mouth Daily. (Patient taking differently: Take 20 mg by mouth Daily. 20 mg in the am, 10 mg in the pm), Disp: 90 tablet, Rfl: 1  •  glimepiride (AMARYL) 2 MG tablet, Take 1 mg by mouth Every Morning Before Breakfast., Disp: , Rfl:   •  METAMUCIL FIBER PO, Take  by mouth., Disp: , Rfl:   •  metFORMIN ER (GLUCOPHAGE-XR) 500 MG 24 hr tablet, Take 4 tablets by mouth Daily With Breakfast. 4 pills a day, Disp: , Rfl:   •  metoprolol succinate XL (TOPROL-XL) 25 MG 24 hr tablet, Take 0.5 tablets by mouth Daily., Disp: 45 tablet, Rfl: 3  •  multivitamin with minerals tablet tablet, Take 1 tablet by mouth Daily., Disp: , Rfl:   •  nitroglycerin (NITROSTAT) 0.4 MG SL tablet, Place 1 tablet under the tongue Every 5 (Five)  Minutes As Needed for Chest Pain. Take no more than 3 doses in 15 minutes., Disp: , Rfl:   •  spironolactone (ALDACTONE) 25 MG tablet, Take 1 tablet by mouth 2 (Two) Times a Day., Disp: 90 tablet, Rfl: 1  •  empagliflozin (Jardiance) 10 MG tablet tablet, Take 1 tablet by mouth Daily., Disp: 30 tablet, Rfl: 3    Current Facility-Administered Medications:   •  denosumab (PROLIA) syringe 60 mg, 60 mg, Subcutaneous, Q6 Months, Elisabeth Royal PA, 60 mg at 11/10/22 1021  Allergies   Allergen Reactions   • Asacol [Mesalamine] Swelling   • Diclofenac Swelling     Gums swell only per patient   • Penicillins Other (See Comments)     Gums swelling per patient.      Social History     Socioeconomic History   • Marital status:    Tobacco Use   • Smoking status: Never   • Smokeless tobacco: Never   Vaping Use   • Vaping Use: Never used   Substance and Sexual Activity   • Alcohol use: No   • Drug use: No   • Sexual activity: Defer                Objective:     Vitals reviewed.   Constitutional:       Appearance: Not in distress. Frail.   Neck:      Vascular: No JVR. JVD normal.   Pulmonary:      Effort: Pulmonary effort is normal.      Breath sounds: Normal breath sounds. No wheezing. No rhonchi. No rales.   Chest:      Chest wall: Not tender to palpatation.   Cardiovascular:      PMI at left midclavicular line. Normal rate. Irregularly irregular rhythm. Normal S1. Normal S2.      Murmurs:  Positive for murmur      No gallop. No click. No rub.   Pulses:     Intact distal pulses.   Edema:     Peripheral edema present.     Comments: Trace edema to right lower extremity  Left lower extremity with edema and a fluidlike pocket from shin to ankle (not a blister)   Abdominal:      General: Bowel sounds are normal.      Palpations: Abdomen is soft.      Tenderness: There is no abdominal tenderness.   Musculoskeletal: Normal range of motion.         General: No tenderness. Skin:     General: Skin is warm and dry.   Neurological:       General: No focal deficit present.      Mental Status: Alert and oriented to person, place and time.       Procedures                  Assessment:     MDM     Diagnosis Plan   1. Chronic heart failure with preserved ejection fraction (HFpEF)  Basic Metabolic Panel      2. Primary hypertension        3. Mixed hyperlipidemia        4. S/P CABG x 2        5. S/P AVR (aortic valve replacement)        6. S/P MVR (mitral valve repair)        7. S/P Maze operation for atrial fibrillation        8. S/P left atrial appendage ligation        9. Tricuspid valve regurgitation, nonrheumatic        10. Pulmonary hypertension                       Plan:   Chart reviewed  Labs reviewed  Heart failure reviewed  Medications reviewed  - Daily weight:  same time every day, same clothing   - Low Salt (sodium) diet   - Watch fluid intake                 Advised patient to increase her Lasix to 40 mg twice daily for 3 days continue spironolactone 25 mg twice daily  This information was written out on AVS    HFpEF secondary to valvular disease, afib and pulmonary hypertension   LVEF: 60 to 65%  NYHA class: II-III  Volume status: +   BB: Metoprolol succinate 12.5 mg daily  ACEi/ARB/ARNI: Blood pressure is marginal to add ACE ARB or Arni at this time  Spironalactone: Continue spironolactone 25 mg twice daily  SGLT2i: Added Jardiance 10 mg daily    Advised patient that we will add Jardiance to help with her glucose as well as heart failure vies patient that she needs to monitor her glucose closely as she is also on glimepiride and metformin we will recheck labs in 1 month and follow-up    Advised patient to continue cardiac rehab as tolerated    Electronically signed by DAKOTA Brody, 05/18/23, 12:49 PM EDT.          This document is intended for medical expert use only.  Reading of this document by patients and/or patient's family without participating medical staff guidance may result in misinterpretation and unintended morbidity.  Any interpretation of such data is the responsibility of the patient and/or family member responsible for the patient in concert with their primary or specialist providers, not to be left for sources of online search as such as [employer], [employer] or similar queries.  Relying on these approaches to knowledge may result in misinterpretation, misguided goals of care and even death should patient or family members try recommendations outside of the realm of professional medical care in a supervised inpatient environment.

## 2023-05-11 ENCOUNTER — APPOINTMENT (OUTPATIENT)
Dept: CARDIAC REHAB | Facility: HOSPITAL | Age: 83
End: 2023-05-11
Payer: MEDICARE

## 2023-05-12 ENCOUNTER — OFFICE VISIT (OUTPATIENT)
Dept: CARDIOLOGY | Facility: CLINIC | Age: 83
End: 2023-05-12
Payer: MEDICARE

## 2023-05-12 VITALS
SYSTOLIC BLOOD PRESSURE: 110 MMHG | BODY MASS INDEX: 19.67 KG/M2 | WEIGHT: 111 LBS | OXYGEN SATURATION: 96 % | DIASTOLIC BLOOD PRESSURE: 43 MMHG | HEART RATE: 67 BPM | HEIGHT: 63 IN

## 2023-05-12 DIAGNOSIS — Z86.79 S/P MAZE OPERATION FOR ATRIAL FIBRILLATION: ICD-10-CM

## 2023-05-12 DIAGNOSIS — I50.32 CHRONIC HEART FAILURE WITH PRESERVED EJECTION FRACTION (HFPEF): Primary | ICD-10-CM

## 2023-05-12 DIAGNOSIS — Z98.890 S/P MAZE OPERATION FOR ATRIAL FIBRILLATION: ICD-10-CM

## 2023-05-12 DIAGNOSIS — Z98.890 S/P LEFT ATRIAL APPENDAGE LIGATION: ICD-10-CM

## 2023-05-12 DIAGNOSIS — I36.1 TRICUSPID VALVE REGURGITATION, NONRHEUMATIC: ICD-10-CM

## 2023-05-12 DIAGNOSIS — Z95.1 S/P CABG X 2: ICD-10-CM

## 2023-05-12 DIAGNOSIS — E78.2 MIXED HYPERLIPIDEMIA: Chronic | ICD-10-CM

## 2023-05-12 DIAGNOSIS — I10 PRIMARY HYPERTENSION: Chronic | ICD-10-CM

## 2023-05-12 DIAGNOSIS — Z95.2 S/P AVR (AORTIC VALVE REPLACEMENT): ICD-10-CM

## 2023-05-12 DIAGNOSIS — Z98.890 S/P MVR (MITRAL VALVE REPAIR): ICD-10-CM

## 2023-05-12 DIAGNOSIS — I27.20 PULMONARY HYPERTENSION: ICD-10-CM

## 2023-05-12 RX ORDER — SPIRONOLACTONE 25 MG/1
25 TABLET ORAL 2 TIMES DAILY
Qty: 90 TABLET | Refills: 1
Start: 2023-05-12

## 2023-05-12 RX ORDER — FUROSEMIDE 40 MG/1
40 TABLET ORAL 2 TIMES DAILY
Qty: 90 TABLET | Refills: 1 | Status: CANCELLED | OUTPATIENT
Start: 2023-05-12

## 2023-05-12 NOTE — PATIENT INSTRUCTIONS
- Daily weight:  same time every day, same clothing   - Low Salt (sodium) diet   - Watch fluid intake  (no more than 64oz per day)      Take lasix (furosemide)  40mg twice daily for 3 days   Continue spironolactone 25mg twice daily   ADD jardiance 10mg daily for diabetes and heart failure

## 2023-05-15 ENCOUNTER — TREATMENT (OUTPATIENT)
Dept: CARDIAC REHAB | Facility: HOSPITAL | Age: 83
End: 2023-05-15
Payer: MEDICARE

## 2023-05-15 DIAGNOSIS — Z95.2 S/P AVR: Primary | ICD-10-CM

## 2023-05-15 PROCEDURE — 93798 PHYS/QHP OP CAR RHAB W/ECG: CPT

## 2023-05-17 ENCOUNTER — TREATMENT (OUTPATIENT)
Dept: CARDIAC REHAB | Facility: HOSPITAL | Age: 83
End: 2023-05-17
Payer: MEDICARE

## 2023-05-17 DIAGNOSIS — Z95.1 S/P CABG (CORONARY ARTERY BYPASS GRAFT): ICD-10-CM

## 2023-05-17 DIAGNOSIS — Z98.890 S/P MVR (MITRAL VALVE REPAIR): ICD-10-CM

## 2023-05-17 DIAGNOSIS — Z95.2 S/P AVR: Primary | ICD-10-CM

## 2023-05-17 PROCEDURE — 93798 PHYS/QHP OP CAR RHAB W/ECG: CPT

## 2023-05-18 ENCOUNTER — APPOINTMENT (OUTPATIENT)
Dept: CARDIAC REHAB | Facility: HOSPITAL | Age: 83
End: 2023-05-18
Payer: MEDICARE

## 2023-05-22 ENCOUNTER — TREATMENT (OUTPATIENT)
Dept: CARDIAC REHAB | Facility: HOSPITAL | Age: 83
End: 2023-05-22
Payer: MEDICARE

## 2023-05-22 DIAGNOSIS — Z95.2 S/P AVR: Primary | ICD-10-CM

## 2023-05-22 PROCEDURE — 93798 PHYS/QHP OP CAR RHAB W/ECG: CPT

## 2023-05-25 ENCOUNTER — TELEPHONE (OUTPATIENT)
Dept: CARDIOLOGY | Facility: CLINIC | Age: 83
End: 2023-05-25
Payer: MEDICARE

## 2023-05-25 ENCOUNTER — TREATMENT (OUTPATIENT)
Dept: CARDIAC REHAB | Facility: HOSPITAL | Age: 83
End: 2023-05-25
Payer: MEDICARE

## 2023-05-25 DIAGNOSIS — Z98.890 S/P MVR (MITRAL VALVE REPAIR): ICD-10-CM

## 2023-05-25 DIAGNOSIS — Z95.1 S/P CABG (CORONARY ARTERY BYPASS GRAFT): ICD-10-CM

## 2023-05-25 DIAGNOSIS — Z95.2 S/P AVR: Primary | ICD-10-CM

## 2023-05-25 PROCEDURE — 93798 PHYS/QHP OP CAR RHAB W/ECG: CPT

## 2023-05-25 RX ORDER — SPIRONOLACTONE 25 MG/1
25 TABLET ORAL 2 TIMES DAILY
Qty: 180 TABLET | Refills: 1
Start: 2023-05-25 | End: 2023-05-25

## 2023-05-25 RX ORDER — SPIRONOLACTONE 25 MG/1
25 TABLET ORAL 2 TIMES DAILY
Qty: 180 TABLET | Refills: 1 | Status: SHIPPED | OUTPATIENT
Start: 2023-05-25

## 2023-05-25 RX ORDER — FUROSEMIDE 20 MG/1
20 TABLET ORAL DAILY
Qty: 135 TABLET | Refills: 1 | Status: SHIPPED | OUTPATIENT
Start: 2023-05-25

## 2023-05-25 NOTE — TELEPHONE ENCOUNTER
Rx Refill Note  Requested Prescriptions     Signed Prescriptions Disp Refills   • spironolactone (ALDACTONE) 25 MG tablet 180 tablet 1     Sig: Take 1 tablet by mouth 2 (Two) Times a Day.     Authorizing Provider: XAVIER PARDO     Ordering User: AKANKSHA BRITO      Last office visit with prescribing clinician: 3/16/2023   Last telemedicine visit with prescribing clinician: Visit date not found   Next office visit with prescribing clinician: 9/20/2023                         Would you like a call back once the refill request has been completed: [] Yes [] No    If the office needs to give you a call back, can they leave a voicemail: [] Yes [] No    Akanksha Brito MA  05/25/23, 15:30 EDT

## 2023-05-29 ENCOUNTER — APPOINTMENT (OUTPATIENT)
Dept: CARDIAC REHAB | Facility: HOSPITAL | Age: 83
End: 2023-05-29
Payer: MEDICARE

## 2023-05-30 DIAGNOSIS — I50.32 CHRONIC HEART FAILURE WITH PRESERVED EJECTION FRACTION (HFPEF): ICD-10-CM

## 2023-05-31 ENCOUNTER — TELEPHONE (OUTPATIENT)
Dept: CARDIOLOGY | Facility: CLINIC | Age: 83
End: 2023-05-31

## 2023-05-31 DIAGNOSIS — Z86.79 S/P MAZE OPERATION FOR ATRIAL FIBRILLATION: Primary | ICD-10-CM

## 2023-05-31 DIAGNOSIS — Z98.890 S/P MAZE OPERATION FOR ATRIAL FIBRILLATION: Primary | ICD-10-CM

## 2023-05-31 NOTE — TELEPHONE ENCOUNTER
Patient would like to speak to Brittany or MA. Has been lightheaded for 5 days. Stool is solid. Has been drinking 30-50 ounces of water daily. Usually the dizziness goes away, but today it has not. She has been dizzy since she got up this morning.

## 2023-06-01 ENCOUNTER — TREATMENT (OUTPATIENT)
Dept: CARDIAC REHAB | Facility: HOSPITAL | Age: 83
End: 2023-06-01

## 2023-06-01 ENCOUNTER — LAB (OUTPATIENT)
Dept: LAB | Facility: HOSPITAL | Age: 83
End: 2023-06-01
Payer: MEDICARE

## 2023-06-01 DIAGNOSIS — Z95.1 S/P CABG (CORONARY ARTERY BYPASS GRAFT): ICD-10-CM

## 2023-06-01 DIAGNOSIS — Z98.890 S/P MVR (MITRAL VALVE REPAIR): ICD-10-CM

## 2023-06-01 DIAGNOSIS — Z95.2 S/P AVR: Primary | ICD-10-CM

## 2023-06-01 LAB
ANION GAP SERPL CALCULATED.3IONS-SCNC: 9.1 MMOL/L (ref 5–15)
BUN SERPL-MCNC: 13 MG/DL (ref 8–23)
BUN/CREAT SERPL: 14.9 (ref 7–25)
CALCIUM SPEC-SCNC: 10.5 MG/DL (ref 8.6–10.5)
CHLORIDE SERPL-SCNC: 98 MMOL/L (ref 98–107)
CO2 SERPL-SCNC: 26.9 MMOL/L (ref 22–29)
CREAT SERPL-MCNC: 0.87 MG/DL (ref 0.57–1)
EGFRCR SERPLBLD CKD-EPI 2021: 66.6 ML/MIN/1.73
GLUCOSE SERPL-MCNC: 210 MG/DL (ref 65–99)
POTASSIUM SERPL-SCNC: 4.3 MMOL/L (ref 3.5–5.2)
SODIUM SERPL-SCNC: 134 MMOL/L (ref 136–145)

## 2023-06-01 PROCEDURE — 93798 PHYS/QHP OP CAR RHAB W/ECG: CPT

## 2023-06-01 PROCEDURE — 36415 COLL VENOUS BLD VENIPUNCTURE: CPT

## 2023-06-01 PROCEDURE — 80048 BASIC METABOLIC PNL TOTAL CA: CPT | Performed by: NURSE PRACTITIONER

## 2023-06-02 RX ORDER — ATORVASTATIN CALCIUM 40 MG/1
40 TABLET, FILM COATED ORAL
Qty: 90 TABLET | Refills: 3 | Status: SHIPPED | OUTPATIENT
Start: 2023-06-02

## 2023-06-02 NOTE — TELEPHONE ENCOUNTER
Rx Refill Note  Requested Prescriptions     Pending Prescriptions Disp Refills   • atorvastatin (LIPITOR) 40 MG tablet 90 tablet 3     Sig: Take 1 tablet by mouth every night at bedtime.      Last office visit with prescribing clinician: 3/16/2023   Last telemedicine visit with prescribing clinician: Visit date not found   Next office visit with prescribing clinician: 9/20/2023                         Would you like a call back once the refill request has been completed: [] Yes [] No    If the office needs to give you a call back, can they leave a voicemail: [] Yes [] No    Luz Marina Melchor MA  06/02/23, 11:09 EDT

## 2023-06-02 NOTE — TELEPHONE ENCOUNTER
Has this loss of taste been ongoing or is it new???  If new and still dizzy, lower blood pressure then she should get checked for COVID.

## 2023-06-02 NOTE — TELEPHONE ENCOUNTER
Asked pt if loss of taste been ongoing or is it new? Pt stated loss of taste has started in recent weeks. Advised pt per DAKOTA Odonnell If new and still dizzy, lower blood pressure then she should get checked for COVID. Pt voiced understanding, and will call us back once she has tested for COVID.

## 2023-06-02 NOTE — TELEPHONE ENCOUNTER
Advised pt per DAKOTA Farrell to decrease lasix back to once a day. Pt voiced understanding. Pt also wanted to inform DAKOTA Farrell one of her medications is causing pt to not be able to taste foods.

## 2023-06-05 ENCOUNTER — OFFICE VISIT (OUTPATIENT)
Dept: CARDIAC REHAB | Facility: HOSPITAL | Age: 83
End: 2023-06-05

## 2023-06-05 DIAGNOSIS — Z95.2 S/P AVR: Primary | ICD-10-CM

## 2023-06-05 DIAGNOSIS — Z95.1 S/P CABG (CORONARY ARTERY BYPASS GRAFT): ICD-10-CM

## 2023-06-05 DIAGNOSIS — Z98.890 S/P MVR (MITRAL VALVE REPAIR): ICD-10-CM

## 2023-06-07 NOTE — PROGRESS NOTES
Subjective:     Encounter Date:06/09/2023      Patient ID: Midni Quinteros is a 82 y.o. female.    Chief Complaint: one month follow up for edema  and labs     History of Present Illness       Ms. Mindi Quinteros  has PMH of     # CAD, cardiac cath 2/7/18  calcified 50% proximal 70% mid LAD and 70% mid LCX, normal LVEF, cardiac cath 5/17/2022 revealed severe two-vessel disease in LAD and LCx.  Echo revealed valvular heart disease with severe AR and MR.  Patient underwent CABG x2 with LIMA to LAD and SVG to lateral marginal and AVR and mitral valve repair and maze and left atrial appendage occlusion 5/19/2022  #CABG x2 with LIMA to LAD and SVG to lateral marginal 5/19/2022  Chronic HFpEF secondary to valvular disease and pulmonary hypertension  New moderate to severe TR  #Valvular heart disease with 5/19/2022 aortic valve replacement with #21 magna pericardial prosthesis and mitral valve repair with #26 physeal ring, left atrial appendage endocardial closure and right and left cryo maze  #Paroxysmal atrial fibrillation, s/p left and right cryo maze and left atrial appendage endocardial closure  [id number] SCORE   No data recorded    #  Diabetes  #  Hypertension,  #  Hyperlipidemia  #  ulcerative colitis  #  allergy to aspertane Diflucan neck penicillin mesalamine  #  hemorrhoidectomy, hysterectomy, bladder repair, left ankle surgery      Here for one month follow up and labs.  Patient reports her edema has improved. Patient called in earlier last week stating she had dizziness and decreased taste.  Covid tests were negative.  She thought maybe it was one of her medications.  The only new medication is jardiance.     Blood pressure 107/71  HR 77 oxygen 97% on room air.           Lab Review:     Labs from 5/4/2023 LDL 42 HDL 23 potassium 4.3 function normal hemoglobin 9.8 hemoglobin A1c 7.5  Labs from 5/10/2023 glucose 290 renal function normal    4/10/2023 echocardiogram  Normal LV size and contractility EF of 60 to  65%  Moderate to severe right ventricular enlargement seen  Normal atrial size  Pulmonic valve is not well visualized.  Aortic valve is not well-visualized.  Appears to have good cusp separation in limited views visualized.  Dopplers did not reveal any abnormality..  Mitral valve prosthesis appears to be functioning well.  Tricuspid valve appears structurally normal, moderate to severe tricuspid regurgitation seen.  Calculated RV systolic pressure of 50 mmHg consistent with pulmonary hypertension seen.  Plethoric IVC consistent with high right atrial pressure seen..  No pericardial effusion seen.  Proximal aorta appears normal in size.       Labs from 6/1/2023 sodium 134 and glucose elevated otherwise unremarkable       The following portions of the patient's history were reviewed and updated as appropriate: allergies, current medications, past family history, past medical history, past social history, past surgical history and problem list.    Review of Systems   Constitutional: Negative for malaise/fatigue.   Cardiovascular:  Negative for chest pain, dyspnea on exertion and leg swelling (improved significantly from last visit).   Respiratory:  Negative for shortness of breath.    Neurological:  Negative for light-headedness (Intermittently).   All other systems reviewed and are negative.  Past Medical History:   Diagnosis Date    Acute congestive heart failure, unspecified heart failure type 05/15/2022    Age-related osteoporosis without current pathological fracture     prolia([id number], 9/12/2019), restarted prolia(11/3/20)    Allergic     Anemia     Anxiety     Arthritis     CAD (coronary artery disease)     Depression     Diabetes     Elevated cholesterol     HTN (hypertension)     Hyperlipemia     Injury of back     Sinusitis     Type II diabetes mellitus      Past Surgical History:   Procedure Laterality Date    ANKLE ARTHROSCOPY      AORTIC VALVE REPAIR/REPLACEMENT MITRAL VALVE REPAIR/REPLACEMENT N/A  "5/19/2022    Procedure: AORTIC VALVE REPAIR/REPLACEMENT MITRAL VALVE REPAIR/REPLACEMENT;  Surgeon: Lane Okeefe MD;  Location: [facility];  Service: Cardiothoracic;  Laterality: N/A;  Mitral Valve repair with 26mm Physio II ring. Aortic   Valve Replacement with 21mm Magna Ease valve.    BELPHAROPTOSIS REPAIR      CARDIAC CATHETERIZATION      CARDIAC CATHETERIZATION N/A 5/17/2022    Procedure: Left Heart Cath, possible pci;  Surgeon: Natan Terrazas MD;  Location: [facility] CATH INVASIVE LOCATION;  Service: Cardiovascular;  Laterality: N/A;    CATARACT EXTRACTION      CHOLECYSTECTOMY N/A 10/14/2019    Procedure: Laparoscopic cholecystectomy, possible open;  Surgeon: Vijay Guerra DO;  Location: [facility] MAIN OR;  Service: General    COLONOSCOPY      CORONARY ARTERY BYPASS GRAFT N/A 5/19/2022    Procedure: CORONARY ARTERY BYPASS GRAFTING;  Surgeon: Lane Okeefe MD;  Location: [facility];  Service: Cardiothoracic;  Laterality: N/A;  CABG X 2 (1 Vein graft, 1 LIMA graft)    COSMETIC SURGERY      ENDOSCOPY      HEMORROIDECTOMY      HYSTERECTOMY      MAZE PROCEDURE N/A 5/19/2022    Procedure: MAZE PROCEDURE;  Surgeon: Lane Okeefe MD;  Location: [facility];  Service: Cardiothoracic;  Laterality: N/A;     /71   Pulse 77   Ht 160 cm (63\")   Wt 47.6 kg (105 lb)   SpO2 97%   BMI 18.60 kg/m²   Family History   Problem Relation Age of Onset    Diabetes Mother     Heart disease Father     Heart disease Brother     Diabetes Brother     Osteoporosis Paternal Grandmother        Current Outpatient Medications:     acetaminophen (TYLENOL) 325 MG tablet, Take 2 tablets by mouth Every 6 (Six) Hours As Needed for Mild Pain ., Disp: 30 tablet, Rfl: 0    apixaban (ELIQUIS) 2.5 MG tablet tablet, Take 1 tablet by mouth Every 12 (Twelve) Hours. Indications: Atrial Fibrillation, Atrial Fibrillation, Disp: 180 tablet, Rfl: 2    aspirin (aspirin) 81 MG EC tablet, Take 1 tablet by mouth Daily., Disp: , Rfl: "     atorvastatin (LIPITOR) 40 MG tablet, Take 1 tablet by mouth every night at bedtime., Disp: 90 tablet, Rfl: 3    Calcium Carbonate (CALCIUM 500 PO), Take 2 tablets by mouth Daily., Disp: , Rfl:     Cholecalciferol (VITAMIN D3) 2000 units capsule, Take 1 capsule by mouth Every Evening., Disp: , Rfl:     colestipol (COLESTID) 1 g tablet, Take 1 tablet by mouth As Needed., Disp: , Rfl:     cyanocobalamin 1000 MCG/ML injection, Inject 1 mL into the appropriate muscle as directed by prescriber Every 30 (Thirty) Days., Disp: , Rfl:     dicyclomine (BENTYL) 10 MG capsule, Take 1 capsule by mouth Daily., Disp: , Rfl:     fenofibrate 160 MG tablet, Take 1 tablet by mouth Daily., Disp: , Rfl:     ferrous sulfate 325 (65 FE) MG tablet, Take 1 tablet by mouth Daily With Breakfast., Disp: , Rfl:     folic acid-pyridoxine-cyanocobalamin (FOLBIC) 2.5-25-2 MG tablet tablet, Take  by mouth Daily., Disp: , Rfl:     furosemide (LASIX) 20 MG tablet, Take 1 tablet by mouth Daily. 20 mg in the am, 10 mg in the pm, Disp: 135 tablet, Rfl: 1    glimepiride (AMARYL) 2 MG tablet, Take 1 mg by mouth Every Morning Before Breakfast., Disp: , Rfl:     Lactobacillus (PROBIOTIC ACIDOPHILUS PO), Take  by mouth., Disp: , Rfl:     Magnesium 500 MG tablet, Take  by mouth., Disp: , Rfl:     METAMUCIL FIBER PO, Take  by mouth., Disp: , Rfl:     metFORMIN ER (GLUCOPHAGE-XR) 500 MG 24 hr tablet, Take 4 tablets by mouth Daily With Breakfast. 4 pills a day, Disp: , Rfl:     metoprolol succinate XL (TOPROL-XL) 25 MG 24 hr tablet, Take 0.5 tablets by mouth Daily., Disp: 45 tablet, Rfl: 3    multivitamin with minerals tablet tablet, Take 1 tablet by mouth Daily., Disp: , Rfl:     nitroglycerin (NITROSTAT) 0.4 MG SL tablet, Place 1 tablet under the tongue Every 5 (Five) Minutes As Needed for Chest Pain. Take no more than 3 doses in 15 minutes., Disp: , Rfl:     spironolactone (ALDACTONE) 25 MG tablet, Take 1 tablet by mouth 2 (Two) Times a Day., Disp: 180  tablet, Rfl: 1    Current Facility-Administered Medications:     denosumab (PROLIA) syringe 60 mg, 60 mg, Subcutaneous, Q6 Months, Elisabeth Royal PA, 60 mg at 11/10/22 1021  Allergies   Allergen Reactions    Asacol [Mesalamine] Swelling    Diclofenac Swelling     Gums swell only per patient    Penicillins Other (See Comments)     Gums swelling per patient.      Social History     Socioeconomic History    Marital status:    Tobacco Use    Smoking status: Never    Smokeless tobacco: Never   Vaping Use    Vaping Use: Never used   Substance and Sexual Activity    Alcohol use: No    Drug use: No    Sexual activity: Defer                Objective:     Vitals reviewed.   Constitutional:       Appearance: Not in distress. Frail.   Neck:      Vascular: No JVR. JVD normal.   Pulmonary:      Effort: Pulmonary effort is normal.      Breath sounds: Normal breath sounds. No wheezing. No rhonchi. No rales.   Chest:      Chest wall: Not tender to palpatation.   Cardiovascular:      PMI at left midclavicular line. Normal rate. Irregularly irregular rhythm. Normal S1. Normal S2.       Murmurs:  Positive for murmur      No gallop.  No click. No rub.   Pulses:     Intact distal pulses.   Edema:     Peripheral edema present.     Comments: Trace edema left greater than right     Abdominal:      General: Bowel sounds are normal.      Palpations: Abdomen is soft.      Tenderness: There is no abdominal tenderness.   Musculoskeletal: Normal range of motion.         General: No tenderness. Skin:     General: Skin is warm and dry.   Neurological:      General: No focal deficit present.      Mental Status: Alert and oriented to person, place and time.     Procedures                  Assessment:     [facility]       Diagnosis Plan   1. Chronic heart failure with preserved ejection fraction        2. S/P CABG x 2        3. S/P AVR (aortic valve replacement)        4. S/P MVR (mitral valve repair)        5. S/P left atrial appendage ligation         6. S/P Maze operation for atrial fibrillation        7. Paroxysmal atrial fibrillation                       Plan:   Chart reviewed  Labs reviewed  Heart failure reviewed reviewed pulmonary hypertension   Medications reviewed        - Daily weight:  same time every day, same clothing   - Low Salt (sodium) diet   - Watch fluid intake         HFpEF secondary to valvular disease, afib and pulmonary hypertension   LVEF: 60 to 65%  NYHA class: II  Volume status: euvolemic    BB: Metoprolol succinate 12.5 mg daily  ACEi/ARB/ARNI: Blood pressure is marginal to add ACE ARB or Arni at this time  Spironalactone: Continue spironolactone 25 mg twice daily  SGLT2i: stopped Jardiance 10 mg daily due to yeast infection     Advised patient to continue cardiac rehab as tolerated    Keep appointment with Dr. Terrazas in September, call if need to be seen sooner.     [employer]ler monitor ordered by DAKOTA Baez on 5/31/2023  Will have Dr. Terrazas review when available.       Electronically signed by DAKOTA Brody, 06/19/23, 4:16 PM EDT.            This document is intended for medical expert use only.  Reading of this document by patients and/or patient's family without participating medical staff guidance may result in misinterpretation and unintended morbidity. Any interpretation of such data is the responsibility of the patient and/or family member responsible for the patient in concert with their primary or specialist providers, not to be left for sources of online search as such as [employer], myhomemove or similar queries.  Relying on these approaches to knowledge may result in misinterpretation, misguided goals of care and even death should patient or family members try recommendations outside of the realm of professional medical care in a supervised inpatient environment.

## 2023-06-08 ENCOUNTER — HOSPITAL ENCOUNTER (OUTPATIENT)
Dept: RESPIRATORY THERAPY | Facility: HOSPITAL | Age: 83
Discharge: HOME | End: 2023-06-08
Payer: MEDICARE

## 2023-06-08 DIAGNOSIS — Z98.890 S/P MAZE OPERATION FOR ATRIAL FIBRILLATION: ICD-10-CM

## 2023-06-08 DIAGNOSIS — Z86.79 S/P MAZE OPERATION FOR ATRIAL FIBRILLATION: ICD-10-CM

## 2023-06-08 PROCEDURE — 93225 XTRNL ECG REC<48 HRS REC: CPT

## 2023-06-09 ENCOUNTER — OFFICE VISIT (OUTPATIENT)
Dept: CARDIOLOGY | Facility: CLINIC | Age: 83
End: 2023-06-09
Payer: MEDICARE

## 2023-06-09 VITALS
HEIGHT: 63 IN | SYSTOLIC BLOOD PRESSURE: 107 MMHG | BODY MASS INDEX: 18.61 KG/M2 | DIASTOLIC BLOOD PRESSURE: 71 MMHG | WEIGHT: 105 LBS | OXYGEN SATURATION: 97 % | HEART RATE: 77 BPM

## 2023-06-09 DIAGNOSIS — I48.0 PAROXYSMAL ATRIAL FIBRILLATION: ICD-10-CM

## 2023-06-09 DIAGNOSIS — Z98.890 S/P MAZE OPERATION FOR ATRIAL FIBRILLATION: ICD-10-CM

## 2023-06-09 DIAGNOSIS — Z98.890 S/P MVR (MITRAL VALVE REPAIR): ICD-10-CM

## 2023-06-09 DIAGNOSIS — Z95.2 S/P AVR (AORTIC VALVE REPLACEMENT): ICD-10-CM

## 2023-06-09 DIAGNOSIS — Z95.1 S/P CABG X 2: ICD-10-CM

## 2023-06-09 DIAGNOSIS — I50.32 CHRONIC HEART FAILURE WITH PRESERVED EJECTION FRACTION: Primary | ICD-10-CM

## 2023-06-09 DIAGNOSIS — Z98.890 S/P LEFT ATRIAL APPENDAGE LIGATION: ICD-10-CM

## 2023-06-09 DIAGNOSIS — Z86.79 S/P MAZE OPERATION FOR ATRIAL FIBRILLATION: ICD-10-CM

## 2023-06-09 PROCEDURE — 1159F MED LIST DOCD IN RCRD: CPT | Performed by: NURSE PRACTITIONER

## 2023-06-09 PROCEDURE — 99213 OFFICE O/P EST LOW 20 MIN: CPT | Performed by: NURSE PRACTITIONER

## 2023-06-09 PROCEDURE — 3078F DIAST BP <80 MM HG: CPT | Performed by: NURSE PRACTITIONER

## 2023-06-09 PROCEDURE — 1160F RVW MEDS BY RX/DR IN RCRD: CPT | Performed by: NURSE PRACTITIONER

## 2023-06-09 PROCEDURE — 3074F SYST BP LT 130 MM HG: CPT | Performed by: NURSE PRACTITIONER

## 2023-06-09 RX ORDER — THIAMINE HCL 100 MG
TABLET ORAL
COMMUNITY

## 2023-06-09 NOTE — PATIENT INSTRUCTIONS
STOP JARDIANCE DUE to possible yeast infection.       - Daily weight:  same time every day, same clothing   - Low Salt (sodium) diet   - Watch fluid intake      Heart Failure Action Plan  A heart failure action plan helps you understand what to do when you have symptoms of heart failure. Your action plan is a color-coded plan that lists the symptoms to watch for and indicates what actions to take.  If you have symptoms in the red zone, you need medical care right away.  If you have symptoms in the yellow zone, you are having problems.  If you have symptoms in the green zone, you are doing well.  Follow the plan that was created by you and your health care provider. Review your plan each time you visit your health care provider.  Red zone  These signs and symptoms mean you should get medical help right away:  You have trouble breathing when resting.  You have a dry cough that is getting worse.  You have swelling or pain in your legs or abdomen that is getting worse.  You suddenly gain more than 2-3 lb (0.9-1.4 kg) in 24 hours, or more than 5 lb (2.3 kg) in a week. This amount may be more or less depending on your condition.  You have trouble staying awake or you feel confused.  You have chest pain.  You do not have an appetite.  You pass out.  You have worsening sadness or depression.  If you have any of these symptoms, call your local emergency services (911 in the [place] right away. Do not drive yourself to the hospital.  Yellow zone  These signs and symptoms mean your condition may be getting worse and you should make some changes:  You have trouble breathing when you are active, or you need to sleep with your head raised on extra pillows to help you breathe.  You have swelling in your legs or abdomen.  You gain 2-3 lb (0.9-1.4 kg) in 24 hours, or 5 lb (2.3 kg) in a week. This amount may be more or less depending on your condition.  You get tired easily.  You have trouble sleeping.  You have a dry cough.  If you  have any of these symptoms:  Contact your health care provider within the next day.  Your health care provider may adjust your medicines.  Green zone  These signs mean you are doing well and can continue what you are doing:  You do not have shortness of breath.  You have very little swelling or no new swelling.  Your weight is stable (no gain or loss).  You have a normal activity level.  You do not have chest pain or any other new symptoms.  Follow these instructions at home:  Take over-the-counter and prescription medicines only as told by your health care provider.  Weigh yourself daily. Your target weight is __________ lb (__________ kg).  Call your health care provider if you gain more than __________ lb (__________ kg) in 24 hours, or more than __________ lb (__________ kg) in a week.  Health care provider name: _____________________________________________________  Health care provider phone number: _____________________________________________________  Eat a heart-healthy diet. Work with a diet and nutrition specialist (dietitian) to create an eating plan that is best for you.  Keep all follow-up visits. This is important.  Where to find more information  American Heart Association: www.heart.org  Summary  A heart failure action plan helps you understand what to do when you have symptoms of heart failure.  Follow the action plan that was created by you and your health care provider.  Get help right away if you have any symptoms in the red zone.  This information is not intended to replace advice given to you by your health care provider. Make sure you discuss any questions you have with your health care provider.  Document Revised: 08/02/2021 Document Reviewed: 08/02/2021  Elsevier Patient Education © 2022 [employer]

## 2023-06-13 ENCOUNTER — APPOINTMENT (OUTPATIENT)
Dept: CARDIAC REHAB | Facility: HOSPITAL | Age: 83
End: 2023-06-13

## 2023-06-19 PROBLEM — I50.32 CHRONIC HEART FAILURE WITH PRESERVED EJECTION FRACTION: Status: ACTIVE | Noted: 2022-05-15

## 2023-07-25 ENCOUNTER — APPOINTMENT (OUTPATIENT)
Dept: CARDIAC REHAB | Facility: HOSPITAL | Age: 83
End: 2023-07-25
Payer: MEDICARE

## 2023-08-01 ENCOUNTER — APPOINTMENT (OUTPATIENT)
Dept: CARDIAC REHAB | Facility: HOSPITAL | Age: 83
End: 2023-08-01
Payer: MEDICARE

## 2023-08-09 ENCOUNTER — APPOINTMENT (OUTPATIENT)
Dept: CARDIAC REHAB | Facility: HOSPITAL | Age: 83
End: 2023-08-09
Payer: MEDICARE

## 2023-08-15 ENCOUNTER — OFFICE VISIT (OUTPATIENT)
Dept: CARDIAC REHAB | Facility: HOSPITAL | Age: 83
End: 2023-08-15
Payer: MEDICARE

## 2023-08-15 DIAGNOSIS — Z95.2 S/P AVR: Primary | ICD-10-CM

## 2023-08-21 ENCOUNTER — OFFICE (OUTPATIENT)
Dept: URBAN - METROPOLITAN AREA CLINIC 64 | Facility: CLINIC | Age: 83
End: 2023-08-21

## 2023-08-21 VITALS
WEIGHT: 111 LBS | SYSTOLIC BLOOD PRESSURE: 112 MMHG | DIASTOLIC BLOOD PRESSURE: 64 MMHG | HEART RATE: 69 BPM | HEIGHT: 64 IN

## 2023-08-21 DIAGNOSIS — R15.9 FULL INCONTINENCE OF FECES: ICD-10-CM

## 2023-08-21 DIAGNOSIS — R43.2 PARAGEUSIA: ICD-10-CM

## 2023-08-21 DIAGNOSIS — K59.1 FUNCTIONAL DIARRHEA: ICD-10-CM

## 2023-08-21 DIAGNOSIS — K51.00 ULCERATIVE (CHRONIC) PANCOLITIS WITHOUT COMPLICATIONS: ICD-10-CM

## 2023-08-21 PROCEDURE — 99214 OFFICE O/P EST MOD 30 MIN: CPT | Performed by: NURSE PRACTITIONER

## 2023-08-22 ENCOUNTER — APPOINTMENT (OUTPATIENT)
Dept: CARDIAC REHAB | Facility: HOSPITAL | Age: 83
End: 2023-08-22
Payer: MEDICARE

## 2023-08-29 ENCOUNTER — OFFICE VISIT (OUTPATIENT)
Dept: CARDIAC REHAB | Facility: HOSPITAL | Age: 83
End: 2023-08-29
Payer: MEDICARE

## 2023-08-29 DIAGNOSIS — Z95.2 S/P AVR: Primary | ICD-10-CM

## 2023-09-05 ENCOUNTER — APPOINTMENT (OUTPATIENT)
Dept: CARDIAC REHAB | Facility: HOSPITAL | Age: 83
End: 2023-09-05

## 2023-09-13 ENCOUNTER — APPOINTMENT (OUTPATIENT)
Dept: CARDIAC REHAB | Facility: HOSPITAL | Age: 83
End: 2023-09-13

## 2023-09-19 ENCOUNTER — APPOINTMENT (OUTPATIENT)
Dept: CARDIAC REHAB | Facility: HOSPITAL | Age: 83
End: 2023-09-19

## 2023-09-20 ENCOUNTER — OFFICE VISIT (OUTPATIENT)
Dept: CARDIOLOGY | Facility: CLINIC | Age: 83
End: 2023-09-20
Payer: MEDICARE

## 2023-09-20 ENCOUNTER — APPOINTMENT (OUTPATIENT)
Dept: CARDIAC REHAB | Facility: HOSPITAL | Age: 83
End: 2023-09-20

## 2023-09-20 VITALS
DIASTOLIC BLOOD PRESSURE: 71 MMHG | WEIGHT: 109 LBS | SYSTOLIC BLOOD PRESSURE: 121 MMHG | HEART RATE: 88 BPM | HEIGHT: 63 IN | BODY MASS INDEX: 19.31 KG/M2 | OXYGEN SATURATION: 98 %

## 2023-09-20 DIAGNOSIS — Z98.890 S/P MVR (MITRAL VALVE REPAIR): ICD-10-CM

## 2023-09-20 DIAGNOSIS — E78.5 DYSLIPIDEMIA: ICD-10-CM

## 2023-09-20 DIAGNOSIS — R06.09 DYSPNEA ON EXERTION: ICD-10-CM

## 2023-09-20 DIAGNOSIS — Z95.1 S/P CABG X 2: Primary | ICD-10-CM

## 2023-09-20 DIAGNOSIS — I10 ESSENTIAL HYPERTENSION: ICD-10-CM

## 2023-09-20 DIAGNOSIS — Z79.01 LONG TERM (CURRENT) USE OF ANTICOAGULANTS: ICD-10-CM

## 2023-09-20 DIAGNOSIS — Z95.2 S/P AVR (AORTIC VALVE REPLACEMENT): ICD-10-CM

## 2023-09-20 DIAGNOSIS — I48.0 PAROXYSMAL ATRIAL FIBRILLATION: ICD-10-CM

## 2023-09-20 DIAGNOSIS — I47.29 NSVT (NONSUSTAINED VENTRICULAR TACHYCARDIA): ICD-10-CM

## 2023-09-20 RX ORDER — HYDROXYCHLOROQUINE SULFATE 200 MG/1
TABLET, FILM COATED ORAL
COMMUNITY
Start: 2023-09-19

## 2023-09-20 NOTE — PROGRESS NOTES
Subjective:     Encounter Date:09/20/2023      Patient ID: Mindi Quinteros is a 82 y.o. female.    Chief Complaint and history of present illness:     Follow-up for CAD, CABG, A. fib, valvular heart disease     History of Present Illness  :     Ms. Mindi Quinteros  has PMH of     # CAD, cardiac cath 2/7/18  calcified 50% proximal 70% mid LAD and 70% mid LCX, normal LVEF, cardiac cath 5/17/2022 revealed severe two-vessel disease in LAD and LCx.  Echo revealed valvular heart disease with severe AR and MR.  Patient underwent CABG x2 with LIMA to LAD and SVG to lateral marginal and AVR and mitral valve repair and maze and left atrial appendage occlusion 5/19/2022  #CABG x2 with LIMA to LAD and SVG to lateral marginal 5/19/2020     #Valvular heart disease with 5/19/2022 aortic valve replacement with #21 magna pericardial prosthesis and mitral valve repair with #26 physeal ring, left atrial appendage endocardial closure and right and left cryo maze  #Paroxysmal atrial fibrillation, s/p left and right cryo maze and left atrial appendage endocardial closure  [id number] SCORE greater than 75, female gender, CHF, hypertension, vascular disease, diabetes  [id number] Score: 7 (9/24/2023 12:41 PM)           #  Diabetes  #  Hypertension,  #  Hyperlipidemia  #  ulcerative colitis  #  allergy to aspertane  #  hemorrhoidectomy, hysterectomy, bladder repair, left ankle surgery,      Here for  follow-up.  Patient is complaining of palpitations when she lays on the left side gets better with supine or right-sided position.  Patient has anemia has been seeing hematology.  Patient has a heart murmur on exam today.  Patient is complaining of shooting leg left lower extremity leg pain with crossing of legs.  Has question about Plaquenil.        Patient's arterial blood pressure is  121/71, heart rate 88 bpm..  O2 sat of 98% on room air.        Review of records revealed that patient presented through emergency room 5/15/2022 with complaint  of nocturnal dyspnea 2 days prior to admission with cough which is resolved but has increasing pedal edema, has chronic diarrhea secondary to ulcerative colitis and fatigue.  Work-up here revealed elevated proBNP of 2099 and glucose 158.  Chest x-ray showing pulmonary edema and small bilateral pleural effusions and new onset A. Fib.  Echocardiogram 5/15/2022 reveals LV dysfunction EF of 46 to 50% with severe eccentric MR and diastolic dysfunction   Patient was in new onset heart failure on admission. Echocardiogram showed borderline EF, diastolic dysfunction, Severe MR,  Mod-severe AR.  Patient underwent cardiac cath and BEST     5/19/2022 patient underwent valve surgery with aortic valve replacement and mitral valve repair and two-vessel CABG and maze procedure.  5/20/2022 cardioverted to normal sinus rhythm  Echo 5/24/2022 reveals EF of 60 to 65% with biatrial enlargement PA systolic pressure 56 mmHg.  MIKE 9/29/2022 were normal..           5/27/2022: Glucose 201, ALT 83, AST 62, bilirubin 1.4, hemoglobin 10.2  5/31/2022: Glucose 49, ALT 40 AST 50, potassium 3.3 magnesium 1.8, hgb 9.4 .6/1/2022: glucose 168, potassium 4.2, bun 6 creatinine 0.55, ALT 34, AST 48 hgb 8.6.6/3/2022: Glucose 152, potassium 3.8, hemoglobin 11.4..  Labs from 6/1/2023 reveal BMP with elevated glucose.     Labs from 1/29/2023 reveal normal BMP CBC with a hemoglobin of 9.7.  And underwent Holter monitor 6/8/2023 which revealed nonsustained VT.      ASSESSMENT:        #Palpitations  #Nonsustained VT  #Anemia  #Heart murmur  #CAD, CABG  # paroxysmal atrial fibrillation, s/p maze, left atrial appendage closure  #Chronic HFrEF due to   systolic and diastolic dysfunction from ischemic cardiomyopathy and valvular heart disease and A. fib.  #Mitral regurgitation, s/p mitral valve repair  #Aortic regurgitation, status post tissue aortic valve replacement  #Cardiomyopathy, possibly due to MR, ischemic cardiomyopathy and A. fib with RVR  #CABG x2 with  LIMA to LAD, SVG to lateral marginal, AVR with #21 magna pericardial prosthesis, mitral valve repair with #26 physio ll ring annuloplasty, Maze procedure, CRAIG ligation  #Loss of balance/ataxia/B12 deficiency  #Impaired memory/difficulty finding words  #  hyperlipidemia  #  diabetes   # hypertension, dyslipidemia     PLAN:     Planing of palpitations when she lies on left side Holter monitor is revealing nonsustained VT.  We will schedule a stress test to evaluate for ischemia.  Patient cannot walk due to deconditioning and fatigue.  We will do Lexiscan Cardiolite if she cannot walk.  Follow-up with hematology for anemia.  Patient has A-fib and high [id number] score, he is having anemia need to do a BEST to evaluate appendage if it is close completely or not to see if she will need watchman.  We will follow-up and consider the same..  We will check labs before visit.     Continue medical management with Eliquis, aspirin, atorvastatin, furosemide, Aldactone, metoprolol to help with CAD, valvular heart disease, CHF, hypertension, atrial fibrillation, dyslipidemia as tolerated     Patient has high [id number] score due to female gender, age over 75, hypertension and diabetes making it 5, will benefit from long-term anticoagulation given severity we will continue Eliquis as tolerated.     Patient underwent cardiac cath 5/17/2022 which revealed severe two-vessel disease  Patient underwent BEST which revealed severe AR.     Patient underwent two-vessel CABG, maze, aortic valve replacement and mitral valve repair by  5/19/2022        Procedures    EKG from 3/16/2023 reviewed/interpreted by me reveals sinus rhythm with rate of 83 bpm    Copied text in this portion of the note has been reviewed and is accurate as of 9/24/2023  The following portions of the patient's history were reviewed and updated as appropriate: allergies, current medications, past family history, past medical history, past social history, past  surgical history and problem list.    Assessment:         [facility]       Diagnosis Plan   1. S/P CABG x 2  Comprehensive Metabolic Panel    Lipid Panel    Stress Test With Myocardial Perfusion One Day      2. S/P MVR (mitral valve repair)  Comprehensive Metabolic Panel    Lipid Panel    Stress Test With Myocardial Perfusion One Day      3. S/P AVR (aortic valve replacement)  Comprehensive Metabolic Panel    Lipid Panel    Stress Test With Myocardial Perfusion One Day      4. Paroxysmal atrial fibrillation  Comprehensive Metabolic Panel    Lipid Panel    Stress Test With Myocardial Perfusion One Day      5. Long term (current) use of anticoagulants  Comprehensive Metabolic Panel    Lipid Panel    Stress Test With Myocardial Perfusion One Day      6. Essential hypertension  Comprehensive Metabolic Panel    Lipid Panel    Stress Test With Myocardial Perfusion One Day      7. Dyslipidemia  Comprehensive Metabolic Panel    Lipid Panel    Stress Test With Myocardial Perfusion One Day      8. Dyspnea on exertion  Stress Test With Myocardial Perfusion One Day      9. NSVT (nonsustained ventricular tachycardia)  Stress Test With Myocardial Perfusion One Day             Plan:               Past Medical History:  Past Medical History:   Diagnosis Date    Acute congestive heart failure, unspecified heart failure type 05/15/2022    Age-related osteoporosis without current pathological fracture     prolia([id number], 9/12/2019), restarted prolia(11/3/20)    Allergic     Anemia     Anxiety     Arthritis     CAD (coronary artery disease)     Depression     Diabetes     Elevated cholesterol     HTN (hypertension)     Hyperlipemia     Injury of back     Sinusitis     Type II diabetes mellitus      Past Surgical History:  Past Surgical History:   Procedure Laterality Date    ANKLE ARTHROSCOPY      AORTIC VALVE REPAIR/REPLACEMENT MITRAL VALVE REPAIR/REPLACEMENT N/A 5/19/2022    Procedure: AORTIC VALVE REPAIR/REPLACEMENT MITRAL VALVE  REPAIR/REPLACEMENT;  Surgeon: Lane Okeefe MD;  Location: [facility];  Service: Cardiothoracic;  Laterality: N/A;  Mitral Valve repair with 26mm Physio II ring. Aortic   Valve Replacement with 21mm Magna Ease valve.    BELPHAROPTOSIS REPAIR      CARDIAC CATHETERIZATION      CARDIAC CATHETERIZATION N/A 5/17/2022    Procedure: Left Heart Cath, possible pci;  Surgeon: Natan Terrazas MD;  Location: [facility] CATH INVASIVE LOCATION;  Service: Cardiovascular;  Laterality: N/A;    CATARACT EXTRACTION      CHOLECYSTECTOMY N/A 10/14/2019    Procedure: Laparoscopic cholecystectomy, possible open;  Surgeon: Vijay Guerra DO;  Location: [facility] MAIN OR;  Service: General    COLONOSCOPY      CORONARY ARTERY BYPASS GRAFT N/A 5/19/2022    Procedure: CORONARY ARTERY BYPASS GRAFTING;  Surgeon: Lane Okeefe MD;  Location: [facility];  Service: Cardiothoracic;  Laterality: N/A;  CABG X 2 (1 Vein graft, 1 LIMA graft)    COSMETIC SURGERY      ENDOSCOPY      HEMORROIDECTOMY      HYSTERECTOMY      MAZE PROCEDURE N/A 5/19/2022    Procedure: MAZE PROCEDURE;  Surgeon: Lane Okeefe MD;  Location: [facility];  Service: Cardiothoracic;  Laterality: N/A;      Allergies:  Allergies   Allergen Reactions    Asacol [Mesalamine] Swelling    Diclofenac Swelling     Gums swell only per patient    Penicillins Other (See Comments)     Gums swelling per patient.      Home Meds:  Current Meds:     Current Outpatient Medications:     apixaban (ELIQUIS) 2.5 MG tablet tablet, Take 1 tablet by mouth Every 12 (Twelve) Hours. Indications: Atrial Fibrillation, Atrial Fibrillation, Disp: 180 tablet, Rfl: 2    aspirin (aspirin) 81 MG EC tablet, Take 1 tablet by mouth Daily., Disp: , Rfl:     atorvastatin (LIPITOR) 40 MG tablet, Take 1 tablet by mouth every night at bedtime., Disp: 90 tablet, Rfl: 3    Calcium Carbonate (CALCIUM 500 PO), Take 2 tablets by mouth Daily., Disp: , Rfl:     Cholecalciferol (VITAMIN D3) 2000 units capsule, Take  1 capsule by mouth Every Evening., Disp: , Rfl:     colestipol (COLESTID) 1 g tablet, Take 1 tablet by mouth As Needed., Disp: , Rfl:     cyanocobalamin 1000 MCG/ML injection, Inject 1 mL into the appropriate muscle as directed by prescriber Every 30 (Thirty) Days., Disp: , Rfl:     Denosumab (PROLIA SC), Inject  under the skin into the appropriate area as directed., Disp: , Rfl:     dicyclomine (BENTYL) 10 MG capsule, Take 1 capsule by mouth Daily., Disp: , Rfl:     fenofibrate 160 MG tablet, Take 1 tablet by mouth Daily., Disp: , Rfl:     ferrous sulfate 325 (65 FE) MG tablet, Take 1 tablet by mouth Daily With Breakfast., Disp: , Rfl:     folic acid-pyridoxine-cyanocobalamin (FOLBIC) 2.5-25-2 MG tablet tablet, Take  by mouth Daily., Disp: , Rfl:     furosemide (LASIX) 20 MG tablet, Take 1 tablet by mouth Daily. 20 mg in the am, 10 mg in the pm (Patient taking differently: Take 2 tablets by mouth Daily. 40mg in the morning, 1/2 as needed in the evening), Disp: 135 tablet, Rfl: 1    glimepiride (AMARYL) 2 MG tablet, Take 0.5 tablets by mouth Every Morning Before Breakfast., Disp: , Rfl:     hydroxychloroquine (PLAQUENIL) 200 MG tablet, , Disp: , Rfl:     METAMUCIL FIBER PO, Take  by mouth., Disp: , Rfl:     metFORMIN ER (GLUCOPHAGE-XR) 500 MG 24 hr tablet, Take 4 tablets by mouth Daily With Breakfast. 4 pills a day, Disp: , Rfl:     metoprolol succinate XL (TOPROL-XL) 25 MG 24 hr tablet, Take 0.5 tablets by mouth Daily., Disp: 45 tablet, Rfl: 3    multivitamin with minerals tablet tablet, Take 1 tablet by mouth Daily., Disp: , Rfl:     nitroglycerin (NITROSTAT) 0.4 MG SL tablet, Place 1 tablet under the tongue Every 5 (Five) Minutes As Needed for Chest Pain. Take no more than 3 doses in 15 minutes., Disp: , Rfl:     spironolactone (ALDACTONE) 25 MG tablet, Take 1 tablet by mouth 2 (Two) Times a Day., Disp: 180 tablet, Rfl: 1    acetaminophen (TYLENOL) 325 MG tablet, Take 2 tablets by mouth Every 6 (Six) Hours As  "Needed for Mild Pain . (Patient not taking: Reported on 9/20/2023), Disp: 30 tablet, Rfl: 0    Lactobacillus (PROBIOTIC ACIDOPHILUS PO), Take  by mouth., Disp: , Rfl:     Magnesium 500 MG tablet, Take  by mouth., Disp: , Rfl:     Current Facility-Administered Medications:     denosumab (PROLIA) syringe 60 mg, 60 mg, Subcutaneous, Q6 Months, Elisabeth Royal PA, 60 mg at 11/10/22 1021  Social History:   Social History     Tobacco Use    Smoking status: Never    Smokeless tobacco: Never   Substance Use Topics    Alcohol use: No      Family History:  Family History   Problem Relation Age of Onset    Diabetes Mother     Heart disease Father     Heart disease Brother     Diabetes Brother     Osteoporosis Paternal Grandmother               Review of Systems   Constitutional: Negative for malaise/fatigue.   Cardiovascular:  Positive for leg swelling. Negative for chest pain and palpitations.   Respiratory:  Negative for shortness of breath.    Skin:  Negative for rash.   Neurological:  Negative for dizziness, light-headedness and numbness.   All other systems are negative         Objective:     Physical Exam  /71   Pulse 88   Ht 160 cm (63\")   Wt 49.4 kg (109 lb)   SpO2 98%   BMI 19.31 kg/m²   General:  Appears in no acute distress  Eyes: Sclera is anicteric,  conjunctiva is clear   HEENT:  No JVD.  No carotid bruits  Respiratory: Respirations regular and unlabored at rest.  Clear to auscultation  Cardiovascular: S1,S2 Regular rate and rhythm. No murmur, rub or gallop auscultated.   Extremities: No digital clubbing or cyanosis, no edema  Skin: Color pink. Skin warm and dry to touch. No rashes  No xanthoma  Neuro: Alert and awake.    Lab Reviewed:         Natan Terrazas MD  9/24/2023 12:42 EDT      EMR Dragon/Transcription:   \"Dictated utilizing Dragon dictation\".        "

## 2023-09-26 ENCOUNTER — APPOINTMENT (OUTPATIENT)
Dept: CARDIAC REHAB | Facility: HOSPITAL | Age: 83
End: 2023-09-26

## 2023-09-28 DIAGNOSIS — I71.20 THORACIC AORTIC ANEURYSM WITHOUT RUPTURE, UNSPECIFIED PART: Primary | ICD-10-CM

## 2023-10-03 ENCOUNTER — OFFICE VISIT (OUTPATIENT)
Dept: CARDIAC REHAB | Facility: HOSPITAL | Age: 83
End: 2023-10-03

## 2023-10-03 DIAGNOSIS — Z95.2 S/P AVR: Primary | ICD-10-CM

## 2023-10-07 ENCOUNTER — HOSPITAL ENCOUNTER (OUTPATIENT)
Dept: CT IMAGING | Facility: HOSPITAL | Age: 83
Discharge: HOME | End: 2023-10-07
Admitting: NURSE PRACTITIONER
Payer: MEDICARE

## 2023-10-07 DIAGNOSIS — I71.20 THORACIC AORTIC ANEURYSM WITHOUT RUPTURE, UNSPECIFIED PART: ICD-10-CM

## 2023-10-07 PROCEDURE — 71250 CT THORAX DX C-: CPT

## 2023-10-10 ENCOUNTER — TELEPHONE (OUTPATIENT)
Dept: CARDIAC SURGERY | Facility: CLINIC | Age: 83
End: 2023-10-10
Payer: MEDICARE

## 2023-10-10 NOTE — TELEPHONE ENCOUNTER
Called and left a voicemail for patient to return call to schedule follow up appointment. She is on the recall list.    Alert and oriented to person, place and time

## 2023-10-11 ENCOUNTER — OFFICE VISIT (OUTPATIENT)
Dept: CARDIAC REHAB | Facility: HOSPITAL | Age: 83
End: 2023-10-11

## 2023-10-11 DIAGNOSIS — Z95.2 S/P AVR: Primary | ICD-10-CM

## 2023-10-18 ENCOUNTER — OFFICE VISIT (OUTPATIENT)
Dept: CARDIAC REHAB | Facility: HOSPITAL | Age: 83
End: 2023-10-18

## 2023-10-18 DIAGNOSIS — Z95.2 S/P AVR: Primary | ICD-10-CM

## 2023-10-23 ENCOUNTER — OFFICE (OUTPATIENT)
Dept: URBAN - METROPOLITAN AREA CLINIC 64 | Facility: CLINIC | Age: 83
End: 2023-10-23

## 2023-10-23 VITALS
WEIGHT: 111.6 LBS | HEIGHT: 64 IN | DIASTOLIC BLOOD PRESSURE: 74 MMHG | HEART RATE: 66 BPM | SYSTOLIC BLOOD PRESSURE: 127 MMHG

## 2023-10-23 DIAGNOSIS — R43.2 PARAGEUSIA: ICD-10-CM

## 2023-10-23 DIAGNOSIS — K51.90 ULCERATIVE COLITIS, UNSPECIFIED, WITHOUT COMPLICATIONS: ICD-10-CM

## 2023-10-23 DIAGNOSIS — R15.9 FULL INCONTINENCE OF FECES: ICD-10-CM

## 2023-10-23 DIAGNOSIS — R19.4 CHANGE IN BOWEL HABIT: ICD-10-CM

## 2023-10-23 PROCEDURE — 99213 OFFICE O/P EST LOW 20 MIN: CPT | Performed by: NURSE PRACTITIONER

## 2023-10-24 ENCOUNTER — APPOINTMENT (OUTPATIENT)
Dept: CARDIAC REHAB | Facility: HOSPITAL | Age: 83
End: 2023-10-24

## 2023-10-31 ENCOUNTER — OFFICE VISIT (OUTPATIENT)
Dept: CARDIAC REHAB | Facility: HOSPITAL | Age: 83
End: 2023-10-31

## 2023-10-31 DIAGNOSIS — Z95.2 S/P AVR: Primary | ICD-10-CM

## 2023-11-03 ENCOUNTER — OFFICE VISIT (OUTPATIENT)
Dept: CARDIAC SURGERY | Facility: CLINIC | Age: 83
End: 2023-11-03
Payer: MEDICARE

## 2023-11-03 VITALS
TEMPERATURE: 97.8 F | WEIGHT: 110 LBS | SYSTOLIC BLOOD PRESSURE: 144 MMHG | DIASTOLIC BLOOD PRESSURE: 73 MMHG | BODY MASS INDEX: 20.24 KG/M2 | HEART RATE: 70 BPM | HEIGHT: 62 IN | OXYGEN SATURATION: 99 % | RESPIRATION RATE: 20 BRPM

## 2023-11-03 DIAGNOSIS — Z98.890 S/P MAZE OPERATION FOR ATRIAL FIBRILLATION: ICD-10-CM

## 2023-11-03 DIAGNOSIS — Z95.2 S/P AVR (AORTIC VALVE REPLACEMENT): ICD-10-CM

## 2023-11-03 DIAGNOSIS — Z95.1 S/P CABG X 2: ICD-10-CM

## 2023-11-03 DIAGNOSIS — Z98.890 S/P LEFT ATRIAL APPENDAGE LIGATION: ICD-10-CM

## 2023-11-03 DIAGNOSIS — I77.819 AORTIC ECTASIA: Primary | ICD-10-CM

## 2023-11-03 DIAGNOSIS — Z86.79 S/P MAZE OPERATION FOR ATRIAL FIBRILLATION: ICD-10-CM

## 2023-11-03 PROBLEM — I71.21 ANEURYSM OF ASCENDING AORTA WITHOUT RUPTURE: Status: ACTIVE | Noted: 2023-11-03

## 2023-11-03 PROBLEM — I71.21 ANEURYSM OF ASCENDING AORTA WITHOUT RUPTURE: Status: RESOLVED | Noted: 2023-11-03 | Resolved: 2023-11-03

## 2023-11-03 PROCEDURE — 99214 OFFICE O/P EST MOD 30 MIN: CPT | Performed by: NURSE PRACTITIONER

## 2023-11-03 PROCEDURE — 1160F RVW MEDS BY RX/DR IN RCRD: CPT | Performed by: NURSE PRACTITIONER

## 2023-11-03 PROCEDURE — 1159F MED LIST DOCD IN RCRD: CPT | Performed by: NURSE PRACTITIONER

## 2023-11-03 PROCEDURE — 3077F SYST BP >= 140 MM HG: CPT | Performed by: NURSE PRACTITIONER

## 2023-11-03 PROCEDURE — 3078F DIAST BP <80 MM HG: CPT | Performed by: NURSE PRACTITIONER

## 2023-11-03 RX ORDER — CALCIUM CARBONATE/VITAMIN D3 500-10/5ML
LIQUID (ML) ORAL DAILY
COMMUNITY

## 2023-11-03 RX ORDER — BUDESONIDE 3 MG/1
6 CAPSULE, COATED PELLETS ORAL 3 TIMES DAILY
COMMUNITY

## 2023-11-03 NOTE — PROGRESS NOTES
11/3/2023      Subjective:      Dayana Tipton MD    Chief Complaint: Follow-up ascending aortic ectasia    History of Present Illness:       Dear Dayana Rodriguez MD and Colleagues,    It was nice to see Mindi Quinteros in [facility] for follow-up. She is a 82 y.o. female with known hx of ascending aortic ectasia, severe AI, moderate MR, CAD, atrial fib, HF, HTN, HLD, type 2 DM, ulcerative colitis, and frailty.  She underwent tissue AVR/ MV repair/ CABG x2 with LIMA/ maze procedure with left atrial appendage ligation by Dr. Okeefe in May 2022.  Her ascending aortic ectasia was originally discovered with a CT scan of the chest while hospitalized after her cardiac surgery.  She comes in today for routine follow-up for aneurysm surveillence.  The CT of chest without contrast on 10/7/2023 was reviewed.  The aortic root measures 3 cm, ascending aorta measures 3.9 cm, and DTA measures 2.6 cm.  These measurements are stable.  Transthoracic echo 4/2023 showed EF 60-65%, no mention of VHD, and RVSP 50.  She denies shortness of breath, chest/back pain, numbness/tingling/pain of extremities.  No vascular deficiencies or hyperextensible or hypermobile joints are noted on exam.  The patient's family history is negative for aneurysms or dissections, negative for connective tissue disease, and negative for coronary disease in first degree family members.  She has never smoked.  She is recently .  Her  had been bed riddened for approximately 1.5 years. Her daughter is living with her now.  She continues to drive.  She is working [employer] while waiting in the office.  She reports she has been seeing Dr. Kenney for approx one year for anemia and was then referred to rheumatology d/t abnormal blood work and is now on Plaquenil.  I have no records to review for these specialities.  She continues to do cardiac rehab weekly. Her blood pressure today is 144/73.  She is alone for her appt  today.        Patient Active Problem List   Diagnosis    Age-related osteoporosis without current pathological fracture    Angina pectoris    CAD (coronary artery disease)    Dyspnea on exertion    Hyperlipidemia    Hypertension    Diabetes mellitus with coincident hypertension    Vitamin D deficiency    Cholelithiasis and acute cholecystitis with obstruction    Family hx osteoporosis    Ulcerative colitis    Chronic heart failure with preserved ejection fraction    Acute congestive heart failure, unspecified heart failure type    Mitral valve insufficiency    Nonrheumatic aortic valve insufficiency    Hypomagnesemia    Acute on chronic systolic heart failure    S/P CABG x 2    S/P AVR (aortic valve replacement)    S/P MVR (mitral valve repair)    S/P Maze operation for atrial fibrillation    S/P left atrial appendage ligation    Acute kidney failure, unspecified    Acute myocardial infarction, unspecified    Acute on chronic diastolic (congestive) heart failure    Anxiety disorder, unspecified    Atherosclerosis of autologous vein coronary artery bypass graft(s) with unstable angina pectoris    Bradycardia, unspecified    Chronic sinusitis, unspecified    Chronic thromboembolic pulmonary hypertension    Depression, unspecified    Difficulty in walking, not elsewhere classified    Generalized muscle weakness    Heart failure, unspecified    Pleural effusion in other conditions classified elsewhere    Presence of aortocoronary bypass graft    Unspecified atrial fibrillation    Unspecified osteoarthritis, unspecified site    Unsteadiness on feet    Weakness    Cardiomegaly    Ascending aortic ectasia       Past Medical History:   Diagnosis Date    Acute congestive heart failure, unspecified heart failure type 05/15/2022    Age-related osteoporosis without current pathological fracture     prolia([id number], 9/12/2019), restarted prolia(11/3/20)    Allergic     Anemia     Anxiety     Arthritis     CAD (coronary artery  disease)     Depression     Diabetes     Elevated cholesterol     HTN (hypertension)     Hyperlipemia     Injury of back     Sinusitis     Type II diabetes mellitus        Past Surgical History:   Procedure Laterality Date    ANKLE ARTHROSCOPY      AORTIC VALVE REPAIR/REPLACEMENT MITRAL VALVE REPAIR/REPLACEMENT N/A 5/19/2022    Procedure: AORTIC VALVE REPAIR/REPLACEMENT MITRAL VALVE REPAIR/REPLACEMENT;  Surgeon: Lane Okeefe MD;  Location: [facility];  Service: Cardiothoracic;  Laterality: N/A;  Mitral Valve repair with 26mm Physio II ring. Aortic   Valve Replacement with 21mm Magna Ease valve.    BELPHAROPTOSIS REPAIR      CARDIAC CATHETERIZATION      CARDIAC CATHETERIZATION N/A 5/17/2022    Procedure: Left Heart Cath, possible pci;  Surgeon: Natan Terrazas MD;  Location: [facility] CATH INVASIVE LOCATION;  Service: Cardiovascular;  Laterality: N/A;    CATARACT EXTRACTION      CHOLECYSTECTOMY N/A 10/14/2019    Procedure: Laparoscopic cholecystectomy, possible open;  Surgeon: Vijay Guerra DO;  Location: [facility] MAIN OR;  Service: General    COLONOSCOPY      CORONARY ARTERY BYPASS GRAFT N/A 5/19/2022    Procedure: CORONARY ARTERY BYPASS GRAFTING;  Surgeon: Lane Okeefe MD;  Location: [facility];  Service: Cardiothoracic;  Laterality: N/A;  CABG X 2 (1 Vein graft, 1 LIMA graft)    COSMETIC SURGERY      ENDOSCOPY      HEMORROIDECTOMY      HYSTERECTOMY      MAZE PROCEDURE N/A 5/19/2022    Procedure: MAZE PROCEDURE;  Surgeon: Lane Okeefe MD;  Location: [facility];  Service: Cardiothoracic;  Laterality: N/A;       Allergies   Allergen Reactions    Asacol [Mesalamine] Swelling    Diclofenac Swelling     Gums swell only per patient    Penicillins Other (See Comments)     Gums swelling per patient.          Current Outpatient Medications:     acetaminophen (TYLENOL) 325 MG tablet, Take 2 tablets by mouth Every 6 (Six) Hours As Needed for Mild Pain ., Disp: 30 tablet, Rfl: 0    apixaban (ELIQUIS)  2.5 MG tablet tablet, Take 1 tablet by mouth Every 12 (Twelve) Hours. Indications: Atrial Fibrillation, Atrial Fibrillation, Disp: 180 tablet, Rfl: 2    aspirin (aspirin) 81 MG EC tablet, Take 1 tablet by mouth Daily., Disp: , Rfl:     atorvastatin (LIPITOR) 40 MG tablet, Take 1 tablet by mouth every night at bedtime., Disp: 90 tablet, Rfl: 3    Budesonide (ENTOCORT EC) 3 MG 24 hr capsule, Take 2 capsules by mouth 3 (Three) Times a Day., Disp: , Rfl:     Calcium Carbonate (CALCIUM 500 PO), Take 2 tablets by mouth Daily., Disp: , Rfl:     Cholecalciferol (VITAMIN D3) 2000 units capsule, Take 1 capsule by mouth Every Evening., Disp: , Rfl:     colestipol (COLESTID) 1 g tablet, Take 1 tablet by mouth As Needed., Disp: , Rfl:     cyanocobalamin 1000 MCG/ML injection, Inject 1 mL into the appropriate muscle as directed by prescriber Every 30 (Thirty) Days., Disp: , Rfl:     Denosumab (PROLIA SC), Inject  under the skin into the appropriate area as directed., Disp: , Rfl:     dicyclomine (BENTYL) 10 MG capsule, Take 1 capsule by mouth Daily., Disp: , Rfl:     fenofibrate 160 MG tablet, Take 1 tablet by mouth Daily., Disp: , Rfl:     ferrous sulfate 325 (65 FE) MG tablet, Take 1 tablet by mouth Daily With Breakfast., Disp: , Rfl:     folic acid-pyridoxine-cyanocobalamin (FOLBIC) 2.5-25-2 MG tablet tablet, Take  by mouth Daily., Disp: , Rfl:     furosemide (LASIX) 20 MG tablet, Take 1 tablet by mouth Daily. 20 mg in the am, 10 mg in the pm (Patient taking differently: Take 2 tablets by mouth Daily. 40mg in the morning, 1/2 as needed in the evening), Disp: 135 tablet, Rfl: 1    glimepiride (AMARYL) 2 MG tablet, Take 0.5 tablets by mouth Every Morning Before Breakfast., Disp: , Rfl:     hydroxychloroquine (PLAQUENIL) 200 MG tablet, , Disp: , Rfl:     Lactobacillus (PROBIOTIC ACIDOPHILUS PO), Take  by mouth., Disp: , Rfl:     Magnesium 500 MG tablet, Take  by mouth., Disp: , Rfl:     METAMUCIL FIBER PO, Take  by mouth., Disp:  , Rfl:     metFORMIN ER (GLUCOPHAGE-XR) 500 MG 24 hr tablet, Take 4 tablets by mouth Daily With Breakfast. 4 pills a day, Disp: , Rfl:     metoprolol succinate XL (TOPROL-XL) 25 MG 24 hr tablet, Take 0.5 tablets by mouth Daily., Disp: 45 tablet, Rfl: 3    multivitamin with minerals tablet tablet, Take 1 tablet by mouth Daily., Disp: , Rfl:     nitroglycerin (NITROSTAT) 0.4 MG SL tablet, Place 1 tablet under the tongue Every 5 (Five) Minutes As Needed for Chest Pain. Take no more than 3 doses in 15 minutes., Disp: , Rfl:     spironolactone (ALDACTONE) 25 MG tablet, Take 1 tablet by mouth 2 (Two) Times a Day., Disp: 180 tablet, Rfl: 1    Zinc 30 MG capsule, Take  by mouth Daily., Disp: , Rfl:     Current Facility-Administered Medications:     denosumab (PROLIA) syringe 60 mg, 60 mg, Subcutaneous, Q6 Months, Elisabeth Royal PA, 60 mg at 11/10/22 1021    Social History     Socioeconomic History    Marital status:    Tobacco Use    Smoking status: Never    Smokeless tobacco: Never   Vaping Use    Vaping Use: Never used   Substance and Sexual Activity    Alcohol use: No    Drug use: No    Sexual activity: Defer       Family History   Problem Relation Age of Onset    Diabetes Mother     Heart disease Father     Heart disease Brother     Diabetes Brother     Osteoporosis Paternal Grandmother            Review of Systems:  Review of Systems   Respiratory:  Positive for shortness of breath.    Cardiovascular:  Negative for chest pain, palpitations and leg swelling.   Neurological:  Negative for dizziness, weakness and light-headedness.   Psychiatric/Behavioral:          Reports memory issues       Physical Exam:    Vital Signs:  Weight: 49.9 kg (110 lb)   Body mass index is 20.12 kg/m².  Temp: 97.8 °F (36.6 °C)   Heart Rate: 70   BP: 144/73     Physical Exam  Vitals and nursing note reviewed.   Constitutional:       General: She is awake.      Appearance: Normal appearance. She is well-developed and well-groomed.    HENT:      Head: Normocephalic and atraumatic.      Nose: Nose normal.      Mouth/Throat:      Lips: Pink.      Mouth: Mucous membranes are moist.      Pharynx: Uvula midline.   Eyes:      General: Lids are normal. No scleral icterus.     Extraocular Movements: Extraocular movements intact.      Conjunctiva/sclera: Conjunctivae normal.      Pupils: Pupils are equal, round, and reactive to light.   Neck:      Thyroid: No thyroid mass or thyromegaly.      Vascular: Normal carotid pulses. No carotid bruit, hepatojugular reflux or JVD.      Trachea: Trachea normal.   Cardiovascular:      Rate and Rhythm: Normal rate and regular rhythm.      Pulses:           Carotid pulses are 2+ on the right side and 2+ on the left side.       Radial pulses are 2+ on the right side and 2+ on the left side.        Femoral pulses are 2+ on the right side and 2+ on the left side.       Popliteal pulses are 2+ on the right side and 2+ on the left side.        Dorsalis pedis pulses are 2+ on the right side and 2+ on the left side.        Posterior tibial pulses are 2+ on the right side and 2+ on the left side.      Heart sounds: Murmur heard.      Systolic murmur is present with a grade of 2/6.   Pulmonary:      Effort: Pulmonary effort is normal.      Breath sounds: Normal breath sounds.   Abdominal:      General: Bowel sounds are normal. There is no distension.      Palpations: Abdomen is soft.      Tenderness: There is no abdominal tenderness.   Musculoskeletal:      Cervical back: Neck supple.      Right lower leg: No edema.      Left lower leg: No edema.      Comments: Gait steady and strong without use of assistive devices   Lymphadenopathy:      Cervical: No cervical adenopathy.      Upper Body:      Right upper body: No supraclavicular adenopathy.      Left upper body: No supraclavicular adenopathy.   Skin:     General: Skin is warm and dry.      Capillary Refill: Capillary refill takes less than 2 seconds.      Findings: No  erythema or rash.      Nails: There is no clubbing.             Comments: Sternotomy/SVHS well healed.   Neurological:      Mental Status: She is alert and oriented to person, place, and time.      GCS: GCS eye subscore is 4. GCS verbal subscore is 5. GCS motor subscore is 6.   Psychiatric:         Attention and Perception: Attention and perception normal.         Mood and Affect: Mood and affect normal.         Speech: Speech normal.         Behavior: Behavior normal. Behavior is cooperative.         Thought Content: Thought content normal.         Cognition and Memory: Cognition and memory normal.         Judgment: Judgment normal.          Assessment:     Ascending aortic ectasia, 3.9 cm--stable  VHD, aortic insufficiency/mitral regurgitation --s/p AVR with 21 mm Magna pericardial prosthesis/ mitral valve repair with 26 mm physio II ring annuloplasty ([facility] [place], 5/2022)  CAD--s/p CABG x2 with LIMA to mid LAD, SVG to lateral marginal ([facility] [place], 5/2022)  PAF--s/p right/ left cryo-maze with left atrial appendage ligation ([facility] [place], 5/2022)  Anemia--followed by Dr. Kenney    Recommendation/Plan:       Continued risk factor modification of hypertension with a goal blood pressure less than 130/80, hyperlipidemia optimization, and continued avoidance of tobacco/nicotine products.    We discussed the importance of avoidance of over the counter decongestants and stimulants, specifically pseudoephedrine, for hypertension and aneurysm management.    Initiation of low aerobic exercise is indicated to help reduce body habitus, assist with blood pressure and cholesterol control.       Although risk of rupture is low, emergency department presentation is warranted for acute chest, back, or abdominal pain, syncope, or stroke like symptoms.    Follow up in [facility] in one year(s) with CT scan of chest without contrast.  Her aorta is stable.  She continues to be spry.  I did not see any memory deficit today.  In fact, she asked very  detailed questions and recalled testing she had completed in May for follow-up of atrial fib.  Her BP is slightly elevated today but not to the point I think we would need to adjust her medications.    I spent approximately 30 minutes total in evaluating the data, examining the patient, discussing the options and counseling.  Independent interpretation of imaging.  Imaging shown to pt.     Thank you for allowing us to participate in her care.    Regards,    Clover Beck, APRN

## 2023-11-03 NOTE — LETTER
November 3, 2023     Natan Terrazas MD  [address] [facility] [place] [address]    Patient: Mindi Quinteros   YOB: 1940   Date of Visit: 11/3/2023     Dear Natan Terrazas MD:       Thank you for referring Mindi Quinteros to me for evaluation. Below are the relevant portions of my assessment and plan of care.    If you have questions, please do not hesitate to call me. I look forward to following Mindi along with you.         Sincerely,        DAKOTA Ren        CC: No Recipients    Clover Beck APRN  11/03/23 1413  Sign when Signing Visit  11/3/2023      Subjective:      Dayana Tipton MD    Chief Complaint: Follow-up ascending aortic ectasia    History of Present Illness:       Dear Dayana Rodriguez MD and Colleagues,    It was nice to see Mindi Quinteros in [facility] for follow-up. She is a 82 y.o. female with known hx of ascending aortic ectasia, severe AI, moderate MR, CAD, atrial fib, HF, HTN, HLD, type 2 DM, ulcerative colitis, and frailty.  She underwent tissue AVR/ MV repair/ CABG x2 with LIMA/ maze procedure with left atrial appendage ligation by Dr. Okeefe in May 2022.  Her ascending aortic ectasia was originally discovered with a CT scan of the chest while hospitalized after her cardiac surgery.  She comes in today for routine follow-up for aneurysm surveillence.  The CT of chest without contrast on 10/7/2023 was reviewed.  The aortic root measures 3 cm, ascending aorta measures 3.9 cm, and DTA measures 2.6 cm.  These measurements are stable.  Transthoracic echo 4/2023 showed EF 60-65%, no mention of VHD, and RVSP 50.  She denies shortness of breath, chest/back pain, numbness/tingling/pain of extremities.  No vascular deficiencies or hyperextensible or hypermobile joints are noted on exam.  The patient's family history is negative for aneurysms or dissections, negative for connective tissue disease, and negative for coronary disease in  first degree family members.  She has never smoked.  She is recently .  Her  had been bed riddened for approximately 1.5 years. Her daughter is living with her now.  She continues to drive.  She is working [employer] while waiting in the office.  She reports she has been seeing Dr. Kenney for approx one year for anemia and was then referred to rheumatology d/t abnormal blood work and is now on Plaquenil.  I have no records to review for these specialities.  She continues to do cardiac rehab weekly. Her blood pressure today is 144/73.  She is alone for her appt today.        Patient Active Problem List   Diagnosis   • Age-related osteoporosis without current pathological fracture   • Angina pectoris   • CAD (coronary artery disease)   • Dyspnea on exertion   • Hyperlipidemia   • Hypertension   • Diabetes mellitus with coincident hypertension   • Vitamin D deficiency   • Cholelithiasis and acute cholecystitis with obstruction   • Family hx osteoporosis   • Ulcerative colitis   • Chronic heart failure with preserved ejection fraction   • Acute congestive heart failure, unspecified heart failure type   • Mitral valve insufficiency   • Nonrheumatic aortic valve insufficiency   • Hypomagnesemia   • Acute on chronic systolic heart failure   • S/P CABG x 2   • S/P AVR (aortic valve replacement)   • S/P MVR (mitral valve repair)   • S/P Maze operation for atrial fibrillation   • S/P left atrial appendage ligation   • Acute kidney failure, unspecified   • Acute myocardial infarction, unspecified   • Acute on chronic diastolic (congestive) heart failure   • Anxiety disorder, unspecified   • Atherosclerosis of autologous vein coronary artery bypass graft(s) with unstable angina pectoris   • Bradycardia, unspecified   • Chronic sinusitis, unspecified   • Chronic thromboembolic pulmonary hypertension   • Depression, unspecified   • Difficulty in walking, not elsewhere classified   • Generalized muscle  weakness   • Heart failure, unspecified   • Pleural effusion in other conditions classified elsewhere   • Presence of aortocoronary bypass graft   • Unspecified atrial fibrillation   • Unspecified osteoarthritis, unspecified site   • Unsteadiness on feet   • Weakness   • Cardiomegaly   • Ascending aortic ectasia       Past Medical History:   Diagnosis Date   • Acute congestive heart failure, unspecified heart failure type 05/15/2022   • Age-related osteoporosis without current pathological fracture     prolia([id number], 9/12/2019), restarted prolia(11/3/20)   • Allergic    • Anemia    • Anxiety    • Arthritis    • CAD (coronary artery disease)    • Depression    • Diabetes    • Elevated cholesterol    • HTN (hypertension)    • Hyperlipemia    • Injury of back    • Sinusitis    • Type II diabetes mellitus        Past Surgical History:   Procedure Laterality Date   • ANKLE ARTHROSCOPY     • AORTIC VALVE REPAIR/REPLACEMENT MITRAL VALVE REPAIR/REPLACEMENT N/A 5/19/2022    Procedure: AORTIC VALVE REPAIR/REPLACEMENT MITRAL VALVE REPAIR/REPLACEMENT;  Surgeon: Lane Okeefe MD;  Location: [facility];  Service: Cardiothoracic;  Laterality: N/A;  Mitral Valve repair with 26mm Physio II ring. Aortic   Valve Replacement with 21mm Magna Ease valve.   • BELPHAROPTOSIS REPAIR     • CARDIAC CATHETERIZATION     • CARDIAC CATHETERIZATION N/A 5/17/2022    Procedure: Left Heart Cath, possible pci;  Surgeon: Natan Terrazas MD;  Location: [facility] CATH INVASIVE LOCATION;  Service: Cardiovascular;  Laterality: N/A;   • CATARACT EXTRACTION     • CHOLECYSTECTOMY N/A 10/14/2019    Procedure: Laparoscopic cholecystectomy, possible open;  Surgeon: Vijay Guerra DO;  Location: [facility] MAIN OR;  Service: General   • COLONOSCOPY     • CORONARY ARTERY BYPASS GRAFT N/A 5/19/2022    Procedure: CORONARY ARTERY BYPASS GRAFTING;  Surgeon: Lane Okeefe MD;  Location: [facility];  Service: Cardiothoracic;  Laterality: N/A;  CABG X 2  (1 Vein graft, 1 LIMA graft)   • COSMETIC SURGERY     • ENDOSCOPY     • HEMORROIDECTOMY     • HYSTERECTOMY     • MAZE PROCEDURE N/A 5/19/2022    Procedure: MAZE PROCEDURE;  Surgeon: Lane Okeefe MD;  Location: [facility];  Service: Cardiothoracic;  Laterality: N/A;       Allergies   Allergen Reactions   • Asacol [Mesalamine] Swelling   • Diclofenac Swelling     Gums swell only per patient   • Penicillins Other (See Comments)     Gums swelling per patient.          Current Outpatient Medications:   •  acetaminophen (TYLENOL) 325 MG tablet, Take 2 tablets by mouth Every 6 (Six) Hours As Needed for Mild Pain ., Disp: 30 tablet, Rfl: 0  •  apixaban (ELIQUIS) 2.5 MG tablet tablet, Take 1 tablet by mouth Every 12 (Twelve) Hours. Indications: Atrial Fibrillation, Atrial Fibrillation, Disp: 180 tablet, Rfl: 2  •  aspirin (aspirin) 81 MG EC tablet, Take 1 tablet by mouth Daily., Disp: , Rfl:   •  atorvastatin (LIPITOR) 40 MG tablet, Take 1 tablet by mouth every night at bedtime., Disp: 90 tablet, Rfl: 3  •  Budesonide (ENTOCORT EC) 3 MG 24 hr capsule, Take 2 capsules by mouth 3 (Three) Times a Day., Disp: , Rfl:   •  Calcium Carbonate (CALCIUM 500 PO), Take 2 tablets by mouth Daily., Disp: , Rfl:   •  Cholecalciferol (VITAMIN D3) 2000 units capsule, Take 1 capsule by mouth Every Evening., Disp: , Rfl:   •  colestipol (COLESTID) 1 g tablet, Take 1 tablet by mouth As Needed., Disp: , Rfl:   •  cyanocobalamin 1000 MCG/ML injection, Inject 1 mL into the appropriate muscle as directed by prescriber Every 30 (Thirty) Days., Disp: , Rfl:   •  Denosumab (PROLIA SC), Inject  under the skin into the appropriate area as directed., Disp: , Rfl:   •  dicyclomine (BENTYL) 10 MG capsule, Take 1 capsule by mouth Daily., Disp: , Rfl:   •  fenofibrate 160 MG tablet, Take 1 tablet by mouth Daily., Disp: , Rfl:   •  ferrous sulfate 325 (65 FE) MG tablet, Take 1 tablet by mouth Daily With Breakfast., Disp: , Rfl:   •  folic  acid-pyridoxine-cyanocobalamin (FOLBIC) 2.5-25-2 MG tablet tablet, Take  by mouth Daily., Disp: , Rfl:   •  furosemide (LASIX) 20 MG tablet, Take 1 tablet by mouth Daily. 20 mg in the am, 10 mg in the pm (Patient taking differently: Take 2 tablets by mouth Daily. 40mg in the morning, 1/2 as needed in the evening), Disp: 135 tablet, Rfl: 1  •  glimepiride (AMARYL) 2 MG tablet, Take 0.5 tablets by mouth Every Morning Before Breakfast., Disp: , Rfl:   •  hydroxychloroquine (PLAQUENIL) 200 MG tablet, , Disp: , Rfl:   •  Lactobacillus (PROBIOTIC ACIDOPHILUS PO), Take  by mouth., Disp: , Rfl:   •  Magnesium 500 MG tablet, Take  by mouth., Disp: , Rfl:   •  METAMUCIL FIBER PO, Take  by mouth., Disp: , Rfl:   •  metFORMIN ER (GLUCOPHAGE-XR) 500 MG 24 hr tablet, Take 4 tablets by mouth Daily With Breakfast. 4 pills a day, Disp: , Rfl:   •  metoprolol succinate XL (TOPROL-XL) 25 MG 24 hr tablet, Take 0.5 tablets by mouth Daily., Disp: 45 tablet, Rfl: 3  •  multivitamin with minerals tablet tablet, Take 1 tablet by mouth Daily., Disp: , Rfl:   •  nitroglycerin (NITROSTAT) 0.4 MG SL tablet, Place 1 tablet under the tongue Every 5 (Five) Minutes As Needed for Chest Pain. Take no more than 3 doses in 15 minutes., Disp: , Rfl:   •  spironolactone (ALDACTONE) 25 MG tablet, Take 1 tablet by mouth 2 (Two) Times a Day., Disp: 180 tablet, Rfl: 1  •  Zinc 30 MG capsule, Take  by mouth Daily., Disp: , Rfl:     Current Facility-Administered Medications:   •  denosumab (PROLIA) syringe 60 mg, 60 mg, Subcutaneous, Q6 Months, Elisabeth Royal PA, 60 mg at 11/10/22 1021    Social History     Socioeconomic History   • Marital status:    Tobacco Use   • Smoking status: Never   • Smokeless tobacco: Never   Vaping Use   • Vaping Use: Never used   Substance and Sexual Activity   • Alcohol use: No   • Drug use: No   • Sexual activity: Defer       Family History   Problem Relation Age of Onset   • Diabetes Mother    • Heart disease Father     • Heart disease Brother    • Diabetes Brother    • Osteoporosis Paternal Grandmother            Review of Systems:  Review of Systems   Respiratory:  Positive for shortness of breath.    Cardiovascular:  Negative for chest pain, palpitations and leg swelling.   Neurological:  Negative for dizziness, weakness and light-headedness.   Psychiatric/Behavioral:          Reports memory issues       Physical Exam:    Vital Signs:  Weight: 49.9 kg (110 lb)   Body mass index is 20.12 kg/m².  Temp: 97.8 °F (36.6 °C)   Heart Rate: 70   BP: 144/73     Physical Exam  Vitals and nursing note reviewed.   Constitutional:       General: She is awake.      Appearance: Normal appearance. She is well-developed and well-groomed.   HENT:      Head: Normocephalic and atraumatic.      Nose: Nose normal.      Mouth/Throat:      Lips: Pink.      Mouth: Mucous membranes are moist.      Pharynx: Uvula midline.   Eyes:      General: Lids are normal. No scleral icterus.     Extraocular Movements: Extraocular movements intact.      Conjunctiva/sclera: Conjunctivae normal.      Pupils: Pupils are equal, round, and reactive to light.   Neck:      Thyroid: No thyroid mass or thyromegaly.      Vascular: Normal carotid pulses. No carotid bruit, hepatojugular reflux or JVD.      Trachea: Trachea normal.   Cardiovascular:      Rate and Rhythm: Normal rate and regular rhythm.      Pulses:           Carotid pulses are 2+ on the right side and 2+ on the left side.       Radial pulses are 2+ on the right side and 2+ on the left side.        Femoral pulses are 2+ on the right side and 2+ on the left side.       Popliteal pulses are 2+ on the right side and 2+ on the left side.        Dorsalis pedis pulses are 2+ on the right side and 2+ on the left side.        Posterior tibial pulses are 2+ on the right side and 2+ on the left side.      Heart sounds: Murmur heard.      Systolic murmur is present with a grade of 2/6.   Pulmonary:      Effort: Pulmonary  effort is normal.      Breath sounds: Normal breath sounds.   Abdominal:      General: Bowel sounds are normal. There is no distension.      Palpations: Abdomen is soft.      Tenderness: There is no abdominal tenderness.   Musculoskeletal:      Cervical back: Neck supple.      Right lower leg: No edema.      Left lower leg: No edema.      Comments: Gait steady and strong without use of assistive devices   Lymphadenopathy:      Cervical: No cervical adenopathy.      Upper Body:      Right upper body: No supraclavicular adenopathy.      Left upper body: No supraclavicular adenopathy.   Skin:     General: Skin is warm and dry.      Capillary Refill: Capillary refill takes less than 2 seconds.      Findings: No erythema or rash.      Nails: There is no clubbing.             Comments: Sternotomy/SVHS well healed.   Neurological:      Mental Status: She is alert and oriented to person, place, and time.      GCS: GCS eye subscore is 4. GCS verbal subscore is 5. GCS motor subscore is 6.   Psychiatric:         Attention and Perception: Attention and perception normal.         Mood and Affect: Mood and affect normal.         Speech: Speech normal.         Behavior: Behavior normal. Behavior is cooperative.         Thought Content: Thought content normal.         Cognition and Memory: Cognition and memory normal.         Judgment: Judgment normal.          Assessment:     Ascending aortic ectasia, 3.9 cm--stable  VHD, aortic insufficiency/mitral regurgitation --s/p AVR with 21 mm Magna pericardial prosthesis/ mitral valve repair with 26 mm physio II ring annuloplasty ([facility], 5/2022)  CAD--s/p CABG x2 with LIMA to mid LAD, SVG to lateral marginal ([facility], 5/2022)  PAF--s/p right/ left cryo-maze with left atrial appendage ligation ([facility], 5/2022)  Anemia--followed by Dr. Kenney    Recommendation/Plan:       Continued risk factor modification of hypertension with a goal blood pressure less than 130/80, hyperlipidemia  optimization, and continued avoidance of tobacco/nicotine products.    We discussed the importance of avoidance of over the counter decongestants and stimulants, specifically pseudoephedrine, for hypertension and aneurysm management.    Initiation of low aerobic exercise is indicated to help reduce body habitus, assist with blood pressure and cholesterol control.       Although risk of rupture is low, emergency department presentation is warranted for acute chest, back, or abdominal pain, syncope, or stroke like symptoms.    Follow up in [facility] in one year(s) with CT scan of chest without contrast.  Her aorta is stable.  She continues to be spry.  I did not see any memory deficit today.  In fact, she asked very detailed questions and recalled testing she had completed in May for follow-up of atrial fib.  Her BP is slightly elevated today but not to the point I think we would need to adjust her medications.    I spent approximately 30 minutes total in evaluating the data, examining the patient, discussing the options and counseling.  Independent interpretation of imaging.  Imaging shown to pt.     Thank you for allowing us to participate in her care.    Regards,    DAKOTA Ren

## 2023-11-07 ENCOUNTER — APPOINTMENT (OUTPATIENT)
Dept: CARDIAC REHAB | Facility: HOSPITAL | Age: 83
End: 2023-11-07

## 2023-11-11 NOTE — PROGRESS NOTES
Subjective:     Encounter Date:11/13/2023      Patient ID: Mindi Quinteros is a 82 y.o. female.    Chief Complaint and history of present illness:       Follow-up for CAD, CABG, A. fib, valvular heart disease     History of Present Illness  :     Ms. Mindi Quinteros  has PMH of     # CAD, cardiac cath 2/7/18  calcified 50% proximal 70% mid LAD and 70% mid LCX, normal LVEF, cardiac cath 5/17/2022 revealed severe two-vessel disease in LAD and LCx.  Echo revealed valvular heart disease with severe AR and MR.  Patient underwent CABG x2 with LIMA to LAD and SVG to lateral marginal and AVR and mitral valve repair and maze and left atrial appendage occlusion 5/19/2022  #CABG x2 with LIMA to LAD and SVG to lateral marginal 5/19/2020     #Valvular heart disease with 5/19/2022 aortic valve replacement with #21 magna pericardial prosthesis and mitral valve repair with #26 physeal ring, left atrial appendage endocardial closure and right and left cryo maze  #Paroxysmal atrial fibrillation, s/p left and right cryo maze and left atrial appendage endocardial closure  [id number] SCORE greater than 75, female gender, CHF, hypertension, vascular disease, diabetes  [id number] Score: 7 (9/24/2023 12:41 PM)           #  Diabetes  #  Hypertension,  #  Hyperlipidemia  #  ulcerative colitis  #. Chronic left ankle edema due to trauma and surgery.  #  allergy to aspertane  #  hemorrhoidectomy, hysterectomy, bladder repair, left ankle surgery,      Here for  follow-up.  Patient is complaining of palpitations when she lays on the left side gets better with supine or right-sided position.  Patient had a Holter monitor which showed nonsustained VT therefore underwent stress test is here for follow-up.        Patient's arterial blood pressure is 142/78, heart rate 76 bpm.        Review of records revealed that patient presented through emergency room 5/15/2022 with complaint of nocturnal dyspnea 2 days prior to admission with cough which is  resolved but has increasing pedal edema, has chronic diarrhea secondary to ulcerative colitis and fatigue.  Work-up here revealed elevated proBNP of 2099 and glucose 158.  Chest x-ray showing pulmonary edema and small bilateral pleural effusions and new onset A. Fib.  Echocardiogram 5/15/2022 reveals LV dysfunction EF of 46 to 50% with severe eccentric MR and diastolic dysfunction   Patient was in new onset heart failure on admission. Echocardiogram showed borderline EF, diastolic dysfunction, Severe MR,  Mod-severe AR.  Patient underwent cardiac cath and BEST     5/19/2022 patient underwent valve surgery with aortic valve replacement and mitral valve repair and two-vessel CABG and maze procedure.  5/20/2022 cardioverted to normal sinus rhythm  Echo 5/24/2022 reveals EF of 60 to 65% with biatrial enlargement PA systolic pressure 56 mmHg.  MIKE 9/29/2022 were normal..           5/27/2022: Glucose 201, ALT 83, AST 62, bilirubin 1.4, hemoglobin 10.2  5/31/2022: Glucose 49, ALT 40 AST 50, potassium 3.3 magnesium 1.8, hgb 9.4 .6/1/2022: glucose 168, potassium 4.2, bun 6 creatinine 0.55, ALT 34, AST 48 hgb 8.6.6/3/2022: Glucose 152, potassium 3.8, hemoglobin 11.4..  Labs from 6/1/2023 reveal BMP with elevated glucose.     Labs from 1/29/2023 reveal normal BMP CBC with a hemoglobin of 9.7.  And underwent Holter monitor 6/8/2023 which revealed nonsustained VT.      ASSESSMENT:        #Palpitations  #Nonsustained VT  #Anemia  #Heart murmur  #CAD, CABG  # paroxysmal atrial fibrillation, s/p maze, left atrial appendage closure  #Chronic HFrEF due to   systolic and diastolic dysfunction from ischemic cardiomyopathy and valvular heart disease and A. fib.  #Mitral regurgitation, s/p mitral valve repair  #Aortic regurgitation, status post tissue aortic valve replacement  #Cardiomyopathy, possibly due to MR, ischemic cardiomyopathy and A. fib with RVR  #CABG x2 with LIMA to LAD, SVG to lateral marginal, AVR with #21 magna pericardial  prosthesis, mitral valve repair with #26 physio ll ring annuloplasty, Maze procedure, CRAIG ligation  #Loss of balance/ataxia/B12 deficiency  #Impaired memory/difficulty finding words  #  hyperlipidemia  #  diabetes   # hypertension, dyslipidemia     PLAN:     Planing of palpitations when she lies on left side Holter monitor is revealing nonsustained VT.  Patient underwent Lexiscan Cardiolite 11/13/2023 which is negative for ischemia and EF is over 70%.  We will continue medical management and monitor symptoms.  Follow-up with hematology for anemia.  Patient has A-fib and high FKT3AE0-PHVa score, he is having anemia need to do a BEST to evaluate appendage if it is close completely or not to see if she will need watchman.  We will follow-up and consider the same..    Patient has chronic left leg edema advised her to take increased doses of as needed Lasix as needed.  Currently takes 40 mg in the morning and Aldactone.  Will increase that she takes half a tablet at 2:00 in the afternoon.     Continue medical management with Eliquis, aspirin, atorvastatin, furosemide, Aldactone, metoprolol to help with CAD, valvular heart disease, CHF, hypertension, atrial fibrillation, dyslipidemia as tolerated     Patient has high [id number]-LVRd score due to female gender, age over 75, hypertension and diabetes making it 5, will benefit from long-term anticoagulation given severity we will continue Eliquis as tolerated.     Patient underwent cardiac cath 5/17/2022 which revealed severe two-vessel disease  Patient underwent BEST which revealed severe AR.     Patient underwent two-vessel CABG, maze, aortic valve replacement and mitral valve repair by  5/19/2022           Procedures     EKG from 3/16/2023 reviewed/interpreted by me reveals sinus rhythm with rate of 83 bpm      Procedures    Lexiscan Cardiolite performed 11/13/2023 reviewed/interpreted by me reveals normal perfusion, negative for ischemia, EF over 70%    Copied text in  this portion of the note has been reviewed and is accurate as of 11/13/2023  The following portions of the patient's history were reviewed and updated as appropriate: allergies, current medications, past family history, past medical history, past social history, past surgical history and problem list.    Assessment:         [facility]       Diagnosis Plan   1. Paroxysmal atrial fibrillation        2. Long term (current) use of anticoagulants        3. S/P AVR (aortic valve replacement)        4. S/P CABG x 2        5. Essential hypertension        6. Dyslipidemia               Plan:               Past Medical History:  Past Medical History:   Diagnosis Date    Acute congestive heart failure, unspecified heart failure type 05/15/2022    Age-related osteoporosis without current pathological fracture     prolia([id number], 9/12/2019), restarted prolia(11/3/20)    Allergic     Anemia     Anxiety     Arthritis     CAD (coronary artery disease)     Depression     Diabetes     Elevated cholesterol     HTN (hypertension)     Hyperlipemia     Injury of back     Sinusitis     Type II diabetes mellitus      Past Surgical History:  Past Surgical History:   Procedure Laterality Date    ANKLE ARTHROSCOPY      AORTIC VALVE REPAIR/REPLACEMENT MITRAL VALVE REPAIR/REPLACEMENT N/A 5/19/2022    Procedure: AORTIC VALVE REPAIR/REPLACEMENT MITRAL VALVE REPAIR/REPLACEMENT;  Surgeon: Lane Okeefe MD;  Location: [facility] CVOR;  Service: Cardiothoracic;  Laterality: N/A;  Mitral Valve repair with 26mm Physio II ring. Aortic   Valve Replacement with 21mm Magna Ease valve.    BELPHAROPTOSIS REPAIR      CARDIAC CATHETERIZATION      CARDIAC CATHETERIZATION N/A 5/17/2022    Procedure: Left Heart Cath, possible pci;  Surgeon: Natan Terrazas MD;  Location: [facility] CATH INVASIVE LOCATION;  Service: Cardiovascular;  Laterality: N/A;    CATARACT EXTRACTION      CHOLECYSTECTOMY N/A 10/14/2019    Procedure: Laparoscopic cholecystectomy, possible  open;  Surgeon: Vijay Guerra DO;  Location: [facility] MAIN OR;  Service: General    COLONOSCOPY      CORONARY ARTERY BYPASS GRAFT N/A 5/19/2022    Procedure: CORONARY ARTERY BYPASS GRAFTING;  Surgeon: Lane Okeefe MD;  Location: [facility];  Service: Cardiothoracic;  Laterality: N/A;  CABG X 2 (1 Vein graft, 1 LIMA graft)    COSMETIC SURGERY      ENDOSCOPY      HEMORROIDECTOMY      HYSTERECTOMY      MAZE PROCEDURE N/A 5/19/2022    Procedure: MAZE PROCEDURE;  Surgeon: Lane Okeefe MD;  Location: [facility];  Service: Cardiothoracic;  Laterality: N/A;      Allergies:  Allergies   Allergen Reactions    Asacol [Mesalamine] Swelling    Diclofenac Swelling     Gums swell only per patient    Penicillins Other (See Comments)     Gums swelling per patient.      Home Meds:  Current Meds:     Current Outpatient Medications:     acetaminophen (TYLENOL) 325 MG tablet, Take 2 tablets by mouth Every 6 (Six) Hours As Needed for Mild Pain ., Disp: 30 tablet, Rfl: 0    apixaban (ELIQUIS) 2.5 MG tablet tablet, Take 1 tablet by mouth Every 12 (Twelve) Hours. Indications: Atrial Fibrillation, Atrial Fibrillation, Disp: 180 tablet, Rfl: 2    aspirin (aspirin) 81 MG EC tablet, Take 1 tablet by mouth Daily., Disp: , Rfl:     atorvastatin (LIPITOR) 40 MG tablet, Take 1 tablet by mouth every night at bedtime., Disp: 90 tablet, Rfl: 3    Budesonide (ENTOCORT EC) 3 MG 24 hr capsule, Take 2 capsules by mouth 3 (Three) Times a Day., Disp: , Rfl:     Calcium Carbonate (CALCIUM 500 PO), Take 2 tablets by mouth Daily., Disp: , Rfl:     Cholecalciferol (VITAMIN D3) 2000 units capsule, Take 1 capsule by mouth Every Evening., Disp: , Rfl:     colestipol (COLESTID) 1 g tablet, Take 1 tablet by mouth As Needed., Disp: , Rfl:     cyanocobalamin 1000 MCG/ML injection, Inject 1 mL into the appropriate muscle as directed by prescriber Every 30 (Thirty) Days., Disp: , Rfl:     Denosumab (PROLIA SC), Inject  under the skin into the appropriate  area as directed., Disp: , Rfl:     dicyclomine (BENTYL) 10 MG capsule, Take 1 capsule by mouth Daily., Disp: , Rfl:     fenofibrate 160 MG tablet, Take 1 tablet by mouth Daily., Disp: , Rfl:     ferrous sulfate 325 (65 FE) MG tablet, Take 1 tablet by mouth Daily With Breakfast., Disp: , Rfl:     folic acid-pyridoxine-cyanocobalamin (FOLBIC) 2.5-25-2 MG tablet tablet, Take  by mouth Daily., Disp: , Rfl:     furosemide (LASIX) 20 MG tablet, Take 1 tablet by mouth Daily. 20 mg in the am, 10 mg in the pm (Patient taking differently: Take 2 tablets by mouth Daily. 40mg in the morning, 1/2 as needed in the evening), Disp: 135 tablet, Rfl: 1    glimepiride (AMARYL) 2 MG tablet, Take 0.5 tablets by mouth Every Morning Before Breakfast., Disp: , Rfl:     hydroxychloroquine (PLAQUENIL) 200 MG tablet, , Disp: , Rfl:     Lactobacillus (PROBIOTIC ACIDOPHILUS PO), Take  by mouth., Disp: , Rfl:     Magnesium 500 MG tablet, Take  by mouth., Disp: , Rfl:     METAMUCIL FIBER PO, Take  by mouth., Disp: , Rfl:     metFORMIN ER (GLUCOPHAGE-XR) 500 MG 24 hr tablet, Take 4 tablets by mouth Daily With Breakfast. 4 pills a day, Disp: , Rfl:     metoprolol succinate XL (TOPROL-XL) 25 MG 24 hr tablet, Take 0.5 tablets by mouth Daily., Disp: 45 tablet, Rfl: 3    multivitamin with minerals tablet tablet, Take 1 tablet by mouth Daily., Disp: , Rfl:     nitroglycerin (NITROSTAT) 0.4 MG SL tablet, Place 1 tablet under the tongue Every 5 (Five) Minutes As Needed for Chest Pain. Take no more than 3 doses in 15 minutes., Disp: , Rfl:     spironolactone (ALDACTONE) 25 MG tablet, Take 1 tablet by mouth 2 (Two) Times a Day., Disp: 180 tablet, Rfl: 1    Zinc 30 MG capsule, Take  by mouth Daily., Disp: , Rfl:     Current Facility-Administered Medications:     denosumab (PROLIA) syringe 60 mg, 60 mg, Subcutaneous, Q6 Months, Elisabeth Royal PA, 60 mg at 11/10/22 1021  Social History:   Social History     Tobacco Use    Smoking status: Never     "Smokeless tobacco: Never   Substance Use Topics    Alcohol use: No      Family History:  Family History   Problem Relation Age of Onset    Diabetes Mother     Heart disease Father     Heart disease Brother     Diabetes Brother     Osteoporosis Paternal Grandmother               ROS  All other systems are negative         Objective:     Physical Exam  /78   Pulse 76   Ht 157.5 cm (62\")   Wt 49.9 kg (110 lb)   BMI 20.12 kg/m²   General:  Appears in no acute distress  Eyes: Sclera is anicteric,  conjunctiva is clear   HEENT:  No JVD.  No carotid bruits  Respiratory: Respirations regular and unlabored at rest.  Clear to auscultation  Cardiovascular: S1,S2 Regular rate and rhythm. .   Extremities: No digital clubbing or cyanosis.  Left ankle edema present.  Stasis changes seen.  Skin: Color pink. Skin warm and dry to touch. No rashes  No xanthoma  Neuro: Alert and awake.    Lab Reviewed:         Natan Terrazas MD  11/13/2023 15:05 EST      EMR Dragon/Transcription:   \"Dictated utilizing Dragon dictation\".        "

## 2023-11-13 ENCOUNTER — HOSPITAL ENCOUNTER (OUTPATIENT)
Dept: CARDIOLOGY | Facility: HOSPITAL | Age: 83
Discharge: HOME | End: 2023-11-13
Payer: MEDICARE

## 2023-11-13 ENCOUNTER — OFFICE VISIT (OUTPATIENT)
Dept: CARDIOLOGY | Facility: CLINIC | Age: 83
End: 2023-11-13
Payer: MEDICARE

## 2023-11-13 VITALS
HEART RATE: 76 BPM | HEIGHT: 62 IN | DIASTOLIC BLOOD PRESSURE: 78 MMHG | SYSTOLIC BLOOD PRESSURE: 142 MMHG | WEIGHT: 110 LBS | BODY MASS INDEX: 20.24 KG/M2

## 2023-11-13 DIAGNOSIS — Z79.01 LONG TERM (CURRENT) USE OF ANTICOAGULANTS: ICD-10-CM

## 2023-11-13 DIAGNOSIS — I10 ESSENTIAL HYPERTENSION: ICD-10-CM

## 2023-11-13 DIAGNOSIS — R06.09 DYSPNEA ON EXERTION: ICD-10-CM

## 2023-11-13 DIAGNOSIS — Z98.890 S/P MVR (MITRAL VALVE REPAIR): ICD-10-CM

## 2023-11-13 DIAGNOSIS — E78.5 DYSLIPIDEMIA: ICD-10-CM

## 2023-11-13 DIAGNOSIS — I48.0 PAROXYSMAL ATRIAL FIBRILLATION: ICD-10-CM

## 2023-11-13 DIAGNOSIS — I47.29 NSVT (NONSUSTAINED VENTRICULAR TACHYCARDIA): ICD-10-CM

## 2023-11-13 DIAGNOSIS — Z95.1 S/P CABG X 2: ICD-10-CM

## 2023-11-13 DIAGNOSIS — Z95.2 S/P AVR (AORTIC VALVE REPLACEMENT): ICD-10-CM

## 2023-11-13 DIAGNOSIS — I48.0 PAROXYSMAL ATRIAL FIBRILLATION: Primary | ICD-10-CM

## 2023-11-13 PROCEDURE — 99214 OFFICE O/P EST MOD 30 MIN: CPT | Performed by: INTERNAL MEDICINE

## 2023-11-13 PROCEDURE — 0 TECHNETIUM SESTAMIBI: Performed by: INTERNAL MEDICINE

## 2023-11-13 PROCEDURE — 25010000002 REGADENOSON 0.4 MG/5ML SOLUTION: Performed by: INTERNAL MEDICINE

## 2023-11-13 PROCEDURE — 93017 CV STRESS TEST TRACING ONLY: CPT

## 2023-11-13 PROCEDURE — 1159F MED LIST DOCD IN RCRD: CPT | Performed by: INTERNAL MEDICINE

## 2023-11-13 PROCEDURE — 78452 HT MUSCLE IMAGE SPECT MULT: CPT

## 2023-11-13 PROCEDURE — 1160F RVW MEDS BY RX/DR IN RCRD: CPT | Performed by: INTERNAL MEDICINE

## 2023-11-13 PROCEDURE — 3077F SYST BP >= 140 MM HG: CPT | Performed by: INTERNAL MEDICINE

## 2023-11-13 PROCEDURE — A9500 TC99M SESTAMIBI: HCPCS | Performed by: INTERNAL MEDICINE

## 2023-11-13 PROCEDURE — 93018 CV STRESS TEST I&R ONLY: CPT | Performed by: INTERNAL MEDICINE

## 2023-11-13 PROCEDURE — 3078F DIAST BP <80 MM HG: CPT | Performed by: INTERNAL MEDICINE

## 2023-11-13 PROCEDURE — 78452 HT MUSCLE IMAGE SPECT MULT: CPT | Performed by: INTERNAL MEDICINE

## 2023-11-13 RX ORDER — REGADENOSON 0.08 MG/ML
0.4 INJECTION, SOLUTION INTRAVENOUS
Status: COMPLETED | OUTPATIENT
Start: 2023-11-13 | End: 2023-11-13

## 2023-11-13 RX ADMIN — REGADENOSON 0.4 MG: 0.08 INJECTION, SOLUTION INTRAVENOUS at 13:46

## 2023-11-13 RX ADMIN — TECHNETIUM TC 99M SESTAMIBI 1 DOSE: 1 INJECTION INTRAVENOUS at 13:45

## 2023-11-13 RX ADMIN — TECHNETIUM TC 99M SESTAMIBI 1 DOSE: 1 INJECTION INTRAVENOUS at 12:10

## 2023-11-14 LAB
BH CV REST NUCLEAR ISOTOPE DOSE: 10.4 MCI
BH CV STRESS COMMENTS STAGE 1: NORMAL
BH CV STRESS DOSE REGADENOSON STAGE 1: 0.4
BH CV STRESS DURATION MIN STAGE 1: 0
BH CV STRESS DURATION SEC STAGE 1: 10
BH CV STRESS NUCLEAR ISOTOPE DOSE: 32.5 MCI
BH CV STRESS PROTOCOL 1: NORMAL
BH CV STRESS RECOVERY BP: NORMAL MMHG
BH CV STRESS RECOVERY HR: 69 BPM
BH CV STRESS STAGE 1: 1
MAXIMAL PREDICTED HEART RATE: 138 BPM
STRESS BASELINE BP: NORMAL MMHG
STRESS BASELINE HR: 75 BPM
STRESS TARGET HR: 117 BPM

## 2023-11-14 RX ORDER — SPIRONOLACTONE 25 MG/1
TABLET ORAL
Qty: 135 TABLET | Refills: 2 | Status: SHIPPED | OUTPATIENT
Start: 2023-11-14

## 2023-11-14 NOTE — TELEPHONE ENCOUNTER
Rx Refill Note  Requested Prescriptions     Pending Prescriptions Disp Refills    spironolactone (ALDACTONE) 25 MG tablet [Pharmacy Med Name: SPIRONOLACTONE TABS 25MG] 180 tablet 3     Sig: TAKE 1 TABLET TWICE A DAY      Last office visit with prescribing clinician: 11/13/2023   Last telemedicine visit with prescribing clinician: Visit date not found   Next office visit with prescribing clinician: 11/11/2024                         Would you like a call back once the refill request has been completed: [] Yes [] No    If the office needs to give you a call back, can they leave a voicemail: [] Yes [] No    Tatum Hammer MA  11/14/23, 09:29 EST

## 2023-11-15 ENCOUNTER — OFFICE VISIT (OUTPATIENT)
Dept: CARDIAC REHAB | Facility: HOSPITAL | Age: 83
End: 2023-11-15
Payer: MEDICARE

## 2023-11-15 DIAGNOSIS — Z95.2 S/P AVR: Primary | ICD-10-CM

## 2023-11-28 ENCOUNTER — APPOINTMENT (OUTPATIENT)
Dept: CARDIAC REHAB | Facility: HOSPITAL | Age: 83
End: 2023-11-28

## 2023-12-06 ENCOUNTER — APPOINTMENT (OUTPATIENT)
Dept: CARDIAC REHAB | Facility: HOSPITAL | Age: 83
End: 2023-12-06

## 2023-12-12 ENCOUNTER — APPOINTMENT (OUTPATIENT)
Dept: CARDIAC REHAB | Facility: HOSPITAL | Age: 83
End: 2023-12-12

## 2023-12-19 ENCOUNTER — APPOINTMENT (OUTPATIENT)
Dept: CARDIAC REHAB | Facility: HOSPITAL | Age: 83
End: 2023-12-19

## 2023-12-27 ENCOUNTER — APPOINTMENT (OUTPATIENT)
Dept: CARDIAC REHAB | Facility: HOSPITAL | Age: 83
End: 2023-12-27

## 2024-01-02 ENCOUNTER — APPOINTMENT (OUTPATIENT)
Dept: CARDIAC REHAB | Facility: HOSPITAL | Age: 84
End: 2024-01-02
Payer: MEDICARE

## 2024-01-08 ENCOUNTER — TELEPHONE (OUTPATIENT)
Dept: CARDIOLOGY | Facility: CLINIC | Age: 84
End: 2024-01-08
Payer: MEDICARE

## 2024-01-08 RX ORDER — METOPROLOL SUCCINATE 25 MG/1
12.5 TABLET, EXTENDED RELEASE ORAL
Qty: 45 TABLET | Refills: 3 | Status: SHIPPED | OUTPATIENT
Start: 2024-01-08

## 2024-01-08 NOTE — TELEPHONE ENCOUNTER
Rx Refill Note  Requested Prescriptions     Pending Prescriptions Disp Refills    metoprolol succinate XL (TOPROL-XL) 25 MG 24 hr tablet 45 tablet 3     Sig: Take 0.5 tablets by mouth Daily.      Last office visit with prescribing clinician: 11/13/2023   Last telemedicine visit with prescribing clinician: Visit date not found   Next office visit with prescribing clinician: 11/11/2024                         Would you like a call back once the refill request has been completed: [] Yes [] No    If the office needs to give you a call back, can they leave a voicemail: [] Yes [] No    Alicia Hoskins MA  01/08/24, 14:43 EST

## 2024-01-08 NOTE — TELEPHONE ENCOUNTER
Incoming Refill Request      Medication requested (name and dose): metoprolol succ 25 mg    Pharmacy where request should be sent: [facility]    Additional details provided by patient: 90    Best call back number: [phone]    Does the patient have less than a 3 day supply:  [x] Yes  [] No    Desi Matthew Rep  01/08/24, 14:22 EST

## 2024-01-09 ENCOUNTER — APPOINTMENT (OUTPATIENT)
Dept: CARDIAC REHAB | Facility: HOSPITAL | Age: 84
End: 2024-01-09
Payer: MEDICARE

## 2024-01-23 ENCOUNTER — APPOINTMENT (OUTPATIENT)
Dept: CARDIAC REHAB | Facility: HOSPITAL | Age: 84
End: 2024-01-23
Payer: MEDICARE

## 2024-01-25 ENCOUNTER — TELEPHONE (OUTPATIENT)
Dept: CARDIOLOGY | Facility: CLINIC | Age: 84
End: 2024-01-25
Payer: MEDICARE

## 2024-01-25 NOTE — TELEPHONE ENCOUNTER
Caller: Mindi Quinteros A    Relationship: Self    Best call back number: [phone]      What is the best time to reach you: BEFORE 10 AM OR AFTER 12 NOON TODAY    Who are you requesting to speak with (clinical staff, provider,  specific staff member): CLINICAL        What was the call regarding: PT STARTED SWELLING ABOUT 3 WEEKS AGO, BUT IS NOW WORSE  SHE IS HAVING SWELLING IN FEET,ANKLES AND UP TO KNEE.  LEFT SIDE IS MORE SWOLLEN THAT RIGHT.  SHE IS TAKING FUROSEMIDE AND SPIRONOLACTONE 1 1/2 DAILY.  PLEASE CALL TO ADVISE WHAT SHE NEED TO DO    Is it okay if the provider responds through MyChart: NO

## 2024-01-25 NOTE — TELEPHONE ENCOUNTER
Called pt to discuss   She takes Lasix 1.5 pills daily   And   Spironolactone 1.5 pills daily     She will take whole pill tonight of the Lasix instead of half a pill

## 2024-01-25 NOTE — TELEPHONE ENCOUNTER
Patient called in. Made apt 1/26 @ 10:30. She had question about lasix. Transferred her to medical assistants.

## 2024-01-25 NOTE — PROGRESS NOTES
Subjective:     Encounter Date:1/26/2024      Patient ID: Mindi Quinteros is a 83 y.o. female.    Chief Complaint: acute visit for edema      History of Present Illness       MsBenita Quinteros  has PMH of     # CAD, cardiac cath 2/7/18  calcified 50% proximal 70% mid LAD and 70% mid LCX, normal LVEF, cardiac cath 5/17/2022 revealed severe two-vessel disease in LAD and LCx.  Echo revealed valvular heart disease with severe AR and MR.  Patient underwent CABG x2 with LIMA to LAD and SVG to lateral marginal and AVR and mitral valve repair and maze and left atrial appendage occlusion 5/19/2022  #CABG x2 with LIMA to LAD and SVG to lateral marginal 5/19/2022  Chronic HFpEF secondary to valvular disease and pulmonary hypertension  New moderate to severe TR  #Valvular heart disease with 5/19/2022 aortic valve replacement with #21 magna pericardial prosthesis and mitral valve repair with #26 physeal ring, left atrial appendage endocardial closure and right and left cryo maze  #Paroxysmal atrial fibrillation, s/p left and right cryo maze and left atrial appendage endocardial closure  [id number] SCORE   No data recorded    #  Diabetes  #  Hypertension,  #  Hyperlipidemia  #  ulcerative colitis  #  allergy to aspertane Diflucan neck penicillin mesalamine  #  hemorrhoidectomy, hysterectomy, bladder repair, left ankle surgery      Here for an acute visit for edema.  Patient reports edema to both lower extremities however the left is worse than the right.  She is lasix 20mg in am and 10mg in pm.    She reports she is doing better decreasing her salt intake.  She has stopped drinking soft drinks, however did drop a bottle on her foot and it is bruised.     She denies any chest pain or shortness of breath.       Blood pressure today is 119/53  HR 79  oxygen 99% on room air.  Weight 116lbs up from 110 in November.           Lab Review:     Labs from 5/4/2023 LDL 42 HDL 23 potassium 4.3 function normal hemoglobin 9.8 hemoglobin A1c  7.5  Labs from 5/10/2023 glucose 290 renal function normal    4/10/2023 echocardiogram  Normal LV size and contractility EF of 60 to 65%  Moderate to severe right ventricular enlargement seen  Normal atrial size  Pulmonic valve is not well visualized.  Aortic valve is not well-visualized.  Appears to have good cusp separation in limited views visualized.  Dopplers did not reveal any abnormality..  Mitral valve prosthesis appears to be functioning well.  Tricuspid valve appears structurally normal, moderate to severe tricuspid regurgitation seen.  Calculated RV systolic pressure of 50 mmHg consistent with pulmonary hypertension seen.  Plethoric IVC consistent with high right atrial pressure seen..  No pericardial effusion seen.  Proximal aorta appears normal in size.       Labs from 6/1/2023 sodium 134 and glucose elevated otherwise unremarkable     Stress test in November 2023 negative for ischemia       The following portions of the patient's history were reviewed and updated as appropriate: allergies, current medications, past family history, past medical history, past social history, past surgical history and problem list.    Review of Systems   Constitutional: Negative for malaise/fatigue.   Cardiovascular:  Positive for leg swelling (improved significantly from last visit). Negative for chest pain and dyspnea on exertion.   Respiratory:  Negative for shortness of breath.    Neurological:  Negative for light-headedness (Intermittently).   All other systems reviewed and are negative.    Past Medical History:   Diagnosis Date    Acute congestive heart failure, unspecified heart failure type 05/15/2022    Age-related osteoporosis without current pathological fracture     prolia([id number], 9/12/2019), restarted prolia(11/3/20)    Allergic     Anemia     Anxiety     Arthritis     CAD (coronary artery disease)     Depression     Diabetes     Elevated cholesterol     HTN (hypertension)     Hyperlipemia     Injury of  "back     Sinusitis     Type II diabetes mellitus      Past Surgical History:   Procedure Laterality Date    ANKLE ARTHROSCOPY      AORTIC VALVE REPAIR/REPLACEMENT MITRAL VALVE REPAIR/REPLACEMENT N/A 5/19/2022    Procedure: AORTIC VALVE REPAIR/REPLACEMENT MITRAL VALVE REPAIR/REPLACEMENT;  Surgeon: Lane Okeefe MD;  Location: [facility];  Service: Cardiothoracic;  Laterality: N/A;  Mitral Valve repair with 26mm Physio II ring. Aortic   Valve Replacement with 21mm Magna Ease valve.    BELPHAROPTOSIS REPAIR      CARDIAC CATHETERIZATION      CARDIAC CATHETERIZATION N/A 5/17/2022    Procedure: Left Heart Cath, possible pci;  Surgeon: Natan Terrazas MD;  Location: [facility] CATH INVASIVE LOCATION;  Service: Cardiovascular;  Laterality: N/A;    CATARACT EXTRACTION      CHOLECYSTECTOMY N/A 10/14/2019    Procedure: Laparoscopic cholecystectomy, possible open;  Surgeon: Vijay Guerra DO;  Location: [facility] MAIN OR;  Service: General    COLONOSCOPY      CORONARY ARTERY BYPASS GRAFT N/A 5/19/2022    Procedure: CORONARY ARTERY BYPASS GRAFTING;  Surgeon: Lane Okeefe MD;  Location: [facility];  Service: Cardiothoracic;  Laterality: N/A;  CABG X 2 (1 Vein graft, 1 LIMA graft)    COSMETIC SURGERY      ENDOSCOPY      HEMORROIDECTOMY      HYSTERECTOMY      MAZE PROCEDURE N/A 5/19/2022    Procedure: MAZE PROCEDURE;  Surgeon: Lane Okeefe MD;  Location: [facility];  Service: Cardiothoracic;  Laterality: N/A;     /53 (BP Location: Right arm, Patient Position: Sitting, Cuff Size: Adult)   Pulse 79   Ht 157.5 cm (62\")   Wt 52.6 kg (116 lb)   SpO2 99%   BMI 21.22 kg/m²   Family History   Problem Relation Age of Onset    Diabetes Mother     Heart disease Father     Heart disease Brother     Diabetes Brother     Osteoporosis Paternal Grandmother        Current Outpatient Medications:     acetaminophen (TYLENOL) 325 MG tablet, Take 2 tablets by mouth Every 6 (Six) Hours As Needed for Mild Pain ., Disp: 30 " tablet, Rfl: 0    apixaban (ELIQUIS) 2.5 MG tablet tablet, Take 1 tablet by mouth Every 12 (Twelve) Hours. Indications: Atrial Fibrillation, Atrial Fibrillation, Disp: 180 tablet, Rfl: 2    aspirin (aspirin) 81 MG EC tablet, Take 1 tablet by mouth Daily., Disp: , Rfl:     atorvastatin (LIPITOR) 40 MG tablet, Take 1 tablet by mouth every night at bedtime., Disp: 90 tablet, Rfl: 3    Budesonide (ENTOCORT EC) 3 MG 24 hr capsule, Take 2 capsules by mouth 3 (Three) Times a Day., Disp: , Rfl:     Calcium Carbonate (CALCIUM 500 PO), Take 2 tablets by mouth Daily., Disp: , Rfl:     Cholecalciferol (VITAMIN D3) 2000 units capsule, Take 1 capsule by mouth Every Evening., Disp: , Rfl:     colestipol (COLESTID) 1 g tablet, Take 1 tablet by mouth As Needed., Disp: , Rfl:     cyanocobalamin 1000 MCG/ML injection, Inject 1 mL into the appropriate muscle as directed by prescriber Every 30 (Thirty) Days., Disp: , Rfl:     Denosumab (PROLIA SC), Inject  under the skin into the appropriate area as directed., Disp: , Rfl:     dicyclomine (BENTYL) 10 MG capsule, Take 1 capsule by mouth Daily., Disp: , Rfl:     fenofibrate 160 MG tablet, Take 1 tablet by mouth Daily., Disp: , Rfl:     ferrous sulfate 325 (65 FE) MG tablet, Take 1 tablet by mouth Daily With Breakfast., Disp: , Rfl:     folic acid-pyridoxine-cyanocobalamin (FOLBIC) 2.5-25-2 MG tablet tablet, Take  by mouth Daily., Disp: , Rfl:     furosemide (LASIX) 40 MG tablet, Take 1 tablet by mouth 2 (Two) Times a Day. Take 40mg in am and 20mg in pm, Disp: 30 tablet, Rfl: 3    glimepiride (AMARYL) 2 MG tablet, Take 0.5 tablets by mouth Every Morning Before Breakfast., Disp: , Rfl:     hydroxychloroquine (PLAQUENIL) 200 MG tablet, , Disp: , Rfl:     Lactobacillus (PROBIOTIC ACIDOPHILUS PO), Take  by mouth., Disp: , Rfl:     Magnesium 500 MG tablet, Take  by mouth., Disp: , Rfl:     METAMUCIL FIBER PO, Take  by mouth., Disp: , Rfl:     metFORMIN ER (GLUCOPHAGE-XR) 500 MG 24 hr tablet,  Take 4 tablets by mouth Daily With Breakfast. 4 pills a day, Disp: , Rfl:     metoprolol succinate XL (TOPROL-XL) 25 MG 24 hr tablet, Take 0.5 tablets by mouth Daily., Disp: 45 tablet, Rfl: 3    multivitamin with minerals tablet tablet, Take 1 tablet by mouth Daily., Disp: , Rfl:     nitroglycerin (NITROSTAT) 0.4 MG SL tablet, Place 1 tablet under the tongue Every 5 (Five) Minutes As Needed for Chest Pain. Take no more than 3 doses in 15 minutes., Disp: , Rfl:     spironolactone (ALDACTONE) 25 MG tablet, Take one in the morning and a half tablet at 2:00 pm, Disp: 135 tablet, Rfl: 2    Zinc 30 MG capsule, Take  by mouth Daily., Disp: , Rfl:     Current Facility-Administered Medications:     denosumab (PROLIA) syringe 60 mg, 60 mg, Subcutaneous, Q6 Months, Elisabeth Royal PA, 60 mg at 11/10/22 1021  Allergies   Allergen Reactions    Asacol [Mesalamine] Swelling    Diclofenac Swelling     Gums swell only per patient    Penicillins Other (See Comments)     Gums swelling per patient.      Social History     Socioeconomic History    Marital status:    Tobacco Use    Smoking status: Never    Smokeless tobacco: Never   Vaping Use    Vaping Use: Never used   Substance and Sexual Activity    Alcohol use: No    Drug use: No    Sexual activity: Defer                Objective:     Vitals reviewed.   Constitutional:       Appearance: Not in distress. Frail.   Neck:      Vascular: No JVR. JVD normal.   Pulmonary:      Effort: Pulmonary effort is normal.      Breath sounds: Normal breath sounds. No wheezing. No rhonchi. No rales.   Chest:      Chest wall: Not tender to palpatation.   Cardiovascular:      PMI at left midclavicular line. Normal rate. Irregularly irregular rhythm. Normal S1. Normal S2.       Murmurs:  Positive for murmur      No gallop.  No click. No rub.   Pulses:     Intact distal pulses.   Edema:     Peripheral edema present.     Pretibial: 2+ edema of the left pretibial area and 1+ edema of the right  pretibial area.     Ankle: 2+ edema of the left ankle and 1+ edema of the right ankle.     Feet: 2+ edema of the left foot and 1+ edema of the right foot.     Comments: Trace edema left greater than right     Abdominal:      General: Bowel sounds are normal.      Palpations: Abdomen is soft.      Tenderness: There is no abdominal tenderness.   Musculoskeletal: Normal range of motion.         General: No tenderness. Skin:     General: Skin is warm and dry.      Comments: Bruising left foot,  weeping small wound on left leg    Neurological:      General: No focal deficit present.      Mental Status: Alert and oriented to person, place and time.       Procedures                  Assessment:     [facility]       Diagnosis Plan   1. Chronic heart failure with preserved ejection fraction  Basic Metabolic Panel      2. S/P CABG x 2        3. S/P AVR (aortic valve replacement)        4. S/P MVR (mitral valve repair)        5. S/P Maze operation for atrial fibrillation        6. S/P left atrial appendage ligation                         Plan:   Chart reviewed  Labs reviewed  Heart failure reviewed reviewed pulmonary hypertension   Medications reviewed        - Daily weight:  same time every day, same clothing   - Low Salt (sodium) diet   - Watch fluid intake         HFpEF secondary to valvular disease, afib and pulmonary hypertension   LVEF: 60 to 65%  NYHA class: II  Volume status: Volume overloaded   BB: Metoprolol succinate 12.5 mg daily  ACEi/ARB/ARNI: Blood pressure is marginal to add ACE ARB or Arni at this time  Spironalactone: Continue spironolactone 25 mg twice daily  SGLT2i: stopped Jardiance 10 mg daily due to yeast infection     Advised patient to continue cardiac rehab as tolerated      Will increase lasix to 40mg in am and 20mg in pm   Repeat labs in 2 weeks and will follow up at that time.   If no improvement please call.     Electronically signed by DAKOTA Brody, 02/02/24, 2:36 PM EST.              This  document is intended for medical expert use only.  Reading of this document by patients and/or patient's family without participating medical staff guidance may result in misinterpretation and unintended morbidity. Any interpretation of such data is the responsibility of the patient and/or family member responsible for the patient in concert with their primary or specialist providers, not to be left for sources of online search as such as [employer], [employer] or similar queries.  Relying on these approaches to knowledge may result in misinterpretation, misguided goals of care and even death should patient or family members try recommendations outside of the realm of professional medical care in a supervised inpatient environment.

## 2024-01-26 ENCOUNTER — OFFICE VISIT (OUTPATIENT)
Dept: CARDIOLOGY | Facility: CLINIC | Age: 84
End: 2024-01-26
Payer: MEDICARE

## 2024-01-26 VITALS
HEART RATE: 79 BPM | BODY MASS INDEX: 21.35 KG/M2 | OXYGEN SATURATION: 99 % | SYSTOLIC BLOOD PRESSURE: 119 MMHG | DIASTOLIC BLOOD PRESSURE: 53 MMHG | WEIGHT: 116 LBS | HEIGHT: 62 IN

## 2024-01-26 DIAGNOSIS — Z98.890 S/P LEFT ATRIAL APPENDAGE LIGATION: ICD-10-CM

## 2024-01-26 DIAGNOSIS — Z86.79 S/P MAZE OPERATION FOR ATRIAL FIBRILLATION: ICD-10-CM

## 2024-01-26 DIAGNOSIS — Z95.2 S/P AVR (AORTIC VALVE REPLACEMENT): ICD-10-CM

## 2024-01-26 DIAGNOSIS — Z98.890 S/P MVR (MITRAL VALVE REPAIR): ICD-10-CM

## 2024-01-26 DIAGNOSIS — Z98.890 S/P MAZE OPERATION FOR ATRIAL FIBRILLATION: ICD-10-CM

## 2024-01-26 DIAGNOSIS — Z95.1 S/P CABG X 2: ICD-10-CM

## 2024-01-26 DIAGNOSIS — I50.32 CHRONIC HEART FAILURE WITH PRESERVED EJECTION FRACTION: Primary | ICD-10-CM

## 2024-01-26 PROCEDURE — 3074F SYST BP LT 130 MM HG: CPT | Performed by: NURSE PRACTITIONER

## 2024-01-26 PROCEDURE — 1159F MED LIST DOCD IN RCRD: CPT | Performed by: NURSE PRACTITIONER

## 2024-01-26 PROCEDURE — 3078F DIAST BP <80 MM HG: CPT | Performed by: NURSE PRACTITIONER

## 2024-01-26 PROCEDURE — 99214 OFFICE O/P EST MOD 30 MIN: CPT | Performed by: NURSE PRACTITIONER

## 2024-01-26 PROCEDURE — 1160F RVW MEDS BY RX/DR IN RCRD: CPT | Performed by: NURSE PRACTITIONER

## 2024-01-26 RX ORDER — FUROSEMIDE 40 MG/1
40 TABLET ORAL 2 TIMES DAILY
Qty: 30 TABLET | Refills: 3 | Status: SHIPPED | OUTPATIENT
Start: 2024-01-26

## 2024-01-29 ENCOUNTER — OFFICE (OUTPATIENT)
Dept: URBAN - METROPOLITAN AREA CLINIC 64 | Facility: CLINIC | Age: 84
End: 2024-01-29

## 2024-01-29 VITALS
WEIGHT: 117 LBS | HEIGHT: 64 IN | DIASTOLIC BLOOD PRESSURE: 73 MMHG | SYSTOLIC BLOOD PRESSURE: 124 MMHG | HEART RATE: 76 BPM

## 2024-01-29 DIAGNOSIS — K51.90 ULCERATIVE COLITIS, UNSPECIFIED, WITHOUT COMPLICATIONS: ICD-10-CM

## 2024-01-29 DIAGNOSIS — R19.4 CHANGE IN BOWEL HABIT: ICD-10-CM

## 2024-01-29 DIAGNOSIS — R15.9 FULL INCONTINENCE OF FECES: ICD-10-CM

## 2024-01-29 PROCEDURE — 99213 OFFICE O/P EST LOW 20 MIN: CPT | Performed by: NURSE PRACTITIONER

## 2024-01-30 ENCOUNTER — APPOINTMENT (OUTPATIENT)
Dept: CARDIAC REHAB | Facility: HOSPITAL | Age: 84
End: 2024-01-30
Payer: MEDICARE

## 2024-02-06 ENCOUNTER — APPOINTMENT (OUTPATIENT)
Dept: CARDIAC REHAB | Facility: HOSPITAL | Age: 84
End: 2024-02-06

## 2024-02-09 ENCOUNTER — LAB (OUTPATIENT)
Dept: LAB | Facility: HOSPITAL | Age: 84
End: 2024-02-09
Payer: MEDICARE

## 2024-02-09 DIAGNOSIS — I50.32 CHRONIC HEART FAILURE WITH PRESERVED EJECTION FRACTION: ICD-10-CM

## 2024-02-09 PROCEDURE — 80048 BASIC METABOLIC PNL TOTAL CA: CPT

## 2024-02-09 PROCEDURE — 36415 COLL VENOUS BLD VENIPUNCTURE: CPT

## 2024-02-10 LAB
ANION GAP SERPL CALCULATED.3IONS-SCNC: 11.8 MMOL/L (ref 5–15)
BUN SERPL-MCNC: 17 MG/DL (ref 8–23)
BUN/CREAT SERPL: 17.7 (ref 7–25)
CALCIUM SPEC-SCNC: 10.6 MG/DL (ref 8.6–10.5)
CHLORIDE SERPL-SCNC: 95 MMOL/L (ref 98–107)
CO2 SERPL-SCNC: 29.2 MMOL/L (ref 22–29)
CREAT SERPL-MCNC: 0.96 MG/DL (ref 0.57–1)
EGFRCR SERPLBLD CKD-EPI 2021: 58.8 ML/MIN/1.73
GLUCOSE SERPL-MCNC: 90 MG/DL (ref 65–99)
POTASSIUM SERPL-SCNC: 3.9 MMOL/L (ref 3.5–5.2)
SODIUM SERPL-SCNC: 136 MMOL/L (ref 136–145)

## 2024-02-13 ENCOUNTER — TELEPHONE (OUTPATIENT)
Dept: CARDIOLOGY | Facility: CLINIC | Age: 84
End: 2024-02-13
Payer: MEDICARE

## 2024-02-14 ENCOUNTER — APPOINTMENT (OUTPATIENT)
Dept: CARDIAC REHAB | Facility: HOSPITAL | Age: 84
End: 2024-02-14

## 2024-02-19 ENCOUNTER — TELEPHONE (OUTPATIENT)
Dept: CARDIOLOGY | Facility: CLINIC | Age: 84
End: 2024-02-19
Payer: MEDICARE

## 2024-02-19 RX ORDER — APIXABAN 2.5 MG/1
TABLET, FILM COATED ORAL
Qty: 180 TABLET | Refills: 1 | Status: SHIPPED | OUTPATIENT
Start: 2024-02-19

## 2024-02-19 RX ORDER — ATORVASTATIN CALCIUM 40 MG/1
40 TABLET, FILM COATED ORAL
Qty: 90 TABLET | Refills: 1 | Status: SHIPPED | OUTPATIENT
Start: 2024-02-19

## 2024-02-19 RX ORDER — SPIRONOLACTONE 25 MG/1
TABLET ORAL
Qty: 135 TABLET | Refills: 1 | Status: SHIPPED | OUTPATIENT
Start: 2024-02-19

## 2024-02-19 NOTE — TELEPHONE ENCOUNTER
Incoming Refill Request      Medication requested (name and dose): ATORVASTATIN 40 MG  SPIRONOLACTONE 25 MG    Pharmacy where request should be sent: [facility]    Additional details provided by patient:     Best call back number: [phone]    Does the patient have less than a 3 day supply:  [] Yes  [x] No    Angeles Willams, RegSched Rep  02/19/24, 11:11 EST

## 2024-02-19 NOTE — TELEPHONE ENCOUNTER
Rx Refill Note  Requested Prescriptions     Pending Prescriptions Disp Refills    Eliquis 2.5 MG tablet tablet [Pharmacy Med Name: ELIQUIS 2.5 MG Tablet] 180 tablet 3     Sig: TAKE 1 TABLET EVERY 12 HOURS FOR ATRIAL FIBRILLATION      Last office visit with prescribing clinician: 11/13/2023   Last telemedicine visit with prescribing clinician: Visit date not found   Next office visit with prescribing clinician: 11/11/2024                         Would you like a call back once the refill request has been completed: [] Yes [] No    If the office needs to give you a call back, can they leave a voicemail: [] Yes [] No    Tatum Hammer MA  02/19/24, 09:48 EST

## 2024-02-19 NOTE — TELEPHONE ENCOUNTER
Rx Refill Note  Requested Prescriptions      No prescriptions requested or ordered in this encounter      Last office visit with prescribing clinician: 11/13/2023   Last telemedicine visit with prescribing clinician: Visit date not found   Next office visit with prescribing clinician: 11/11/2024                         Would you like a call back once the refill request has been completed: [] Yes [] No    If the office needs to give you a call back, can they leave a voicemail: [] Yes [] No    Tatum Hammer MA  02/19/24, 11:19 EST

## 2024-02-19 NOTE — TELEPHONE ENCOUNTER
Caller: RENETTA    Relationship: SELF    Best call back number: [phone]    Requested Prescriptions:   Requested Prescriptions     Pending Prescriptions Disp Refills    apixaban (Eliquis) 2.5 MG tablet tablet 180 tablet 1        Pharmacy where request should be sent: [facility] - [facility] [address] - [phone] PH - [phone] FX     Last office visit with prescribing clinician: 11/13/2023   Last telemedicine visit with prescribing clinician: Visit date not found   Next office visit with prescribing clinician: 11/11/2024     Additional details provided by patient: PT STATES PHARMACY WAS ASKING FOR A NEW PRESCRIPTION FOR 2024    Does the patient have less than a 3 day supply:  [] Yes  [x] No    Would you like a call back once the refill request has been completed: [] Yes [] No    If the office needs to give you a call back, can they leave a voicemail: [] Yes [] No    Desi Simons Rep   02/19/24 11:58 EST

## 2024-02-21 ENCOUNTER — APPOINTMENT (OUTPATIENT)
Dept: CARDIAC REHAB | Facility: HOSPITAL | Age: 84
End: 2024-02-21

## 2024-02-29 NOTE — PROGRESS NOTES
Subjective:     Encounter Date:03/01/2024       Patient ID: Mindi Quinteros is a 83 y.o. female.    Chief Complaint: one month Follow up  for HFpEF  with labs     History of Present Illness       Ms. Mindi Quinteros  has PMH of     # CAD, cardiac cath 2/7/18  calcified 50% proximal 70% mid LAD and 70% mid LCX, normal LVEF, cardiac cath 5/17/2022 revealed severe two-vessel disease in LAD and LCx.  Echo revealed valvular heart disease with severe AR and MR.  Patient underwent CABG x2 with LIMA to LAD and SVG to lateral marginal and AVR and mitral valve repair and maze and left atrial appendage occlusion 5/19/2022  #CABG x2 with LIMA to LAD and SVG to lateral marginal 5/19/2022  Chronic HFpEF secondary to valvular disease and pulmonary hypertension  New moderate to severe TR  #Valvular heart disease with 5/19/2022 aortic valve replacement with #21 magna pericardial prosthesis and mitral valve repair with #26 physeal ring, left atrial appendage endocardial closure and right and left cryo maze  #Paroxysmal atrial fibrillation, s/p left and right cryo maze and left atrial appendage endocardial closure  [id number] SCORE   [id number] Score: 7 (3/4/2024  1:22 PM)      #  Diabetes  #  Hypertension,  #  Hyperlipidemia  #  ulcerative colitis  #  allergy to aspertane Diflucan neck penicillin mesalamine  #  hemorrhoidectomy, hysterectomy, bladder repair, left ankle surgery        Here for one month follow up for lower extremity edema.  Patient was seen last month and I increased her lasix to 40mg am and 20mg pm along with spironolactone 25mg in am 12.5mg in pm.   She reports that her swelling had improved then about a week ago ate something salty and her swelling returned. She denies any chest pain or shortness of breath       Blood pressure today is 113/68 HR 73 oxygen 98% on room air.  Her weight is back down to 110lbs           Lab Review:     Labs from 5/4/2023 LDL 42 HDL 23 potassium 4.3 function normal hemoglobin 9.8  hemoglobin A1c 7.5  Labs from 5/10/2023 glucose 290 renal function normal    4/10/2023 echocardiogram  Normal LV size and contractility EF of 60 to 65%  Moderate to severe right ventricular enlargement seen  Normal atrial size  Pulmonic valve is not well visualized.  Aortic valve is not well-visualized.  Appears to have good cusp separation in limited views visualized.  Dopplers did not reveal any abnormality..  Mitral valve prosthesis appears to be functioning well.  Tricuspid valve appears structurally normal, moderate to severe tricuspid regurgitation seen.  Calculated RV systolic pressure of 50 mmHg consistent with pulmonary hypertension seen.  Plethoric IVC consistent with high right atrial pressure seen..  No pericardial effusion seen.  Proximal aorta appears normal in size.       Labs from 6/1/2023 sodium 134 and glucose elevated otherwise unremarkable     Stress test in November 2023 negative for ischemia     Labs from 2/9/2024 unremarkable except calcium 10.6       The following portions of the patient's history were reviewed and updated as appropriate: allergies, current medications, past family history, past medical history, past social history, past surgical history and problem list.    Review of Systems   Constitutional: Negative for malaise/fatigue.   Cardiovascular:  Positive for leg swelling (improving). Negative for chest pain and dyspnea on exertion.   Respiratory:  Negative for shortness of breath.    Neurological:  Negative for light-headedness.   All other systems reviewed and are negative.    Past Medical History:   Diagnosis Date    Acute congestive heart failure, unspecified heart failure type 05/15/2022    Age-related osteoporosis without current pathological fracture     prolia([id number], 9/12/2019), restarted prolia(11/3/20)    Allergic     Anemia     Anxiety     Arthritis     CAD (coronary artery disease)     Depression     Diabetes     Elevated cholesterol     HTN (hypertension)      "Hyperlipemia     Injury of back     Sinusitis     Type II diabetes mellitus      Past Surgical History:   Procedure Laterality Date    ANKLE ARTHROSCOPY      AORTIC VALVE REPAIR/REPLACEMENT MITRAL VALVE REPAIR/REPLACEMENT N/A 5/19/2022    Procedure: AORTIC VALVE REPAIR/REPLACEMENT MITRAL VALVE REPAIR/REPLACEMENT;  Surgeon: Lane Okeefe MD;  Location: [facility];  Service: Cardiothoracic;  Laterality: N/A;  Mitral Valve repair with 26mm Physio II ring. Aortic   Valve Replacement with 21mm Magna Ease valve.    BELPHAROPTOSIS REPAIR      CARDIAC CATHETERIZATION      CARDIAC CATHETERIZATION N/A 5/17/2022    Procedure: Left Heart Cath, possible pci;  Surgeon: Natan Terrazas MD;  Location: [facility] CATH INVASIVE LOCATION;  Service: Cardiovascular;  Laterality: N/A;    CATARACT EXTRACTION      CHOLECYSTECTOMY N/A 10/14/2019    Procedure: Laparoscopic cholecystectomy, possible open;  Surgeon: Vijay Guerra DO;  Location: [facility] MAIN OR;  Service: General    COLONOSCOPY      CORONARY ARTERY BYPASS GRAFT N/A 5/19/2022    Procedure: CORONARY ARTERY BYPASS GRAFTING;  Surgeon: Lane Okeefe MD;  Location: [facility];  Service: Cardiothoracic;  Laterality: N/A;  CABG X 2 (1 Vein graft, 1 LIMA graft)    COSMETIC SURGERY      ENDOSCOPY      HEMORROIDECTOMY      HYSTERECTOMY      MAZE PROCEDURE N/A 5/19/2022    Procedure: MAZE PROCEDURE;  Surgeon: Lane Okeefe MD;  Location: [facility];  Service: Cardiothoracic;  Laterality: N/A;     /68 (BP Location: Left arm, Patient Position: Sitting, Cuff Size: Adult)   Pulse 73   Ht 157.5 cm (62\")   Wt 50.3 kg (111 lb)   SpO2 98% Comment: RA  BMI 20.30 kg/m²   Family History   Problem Relation Age of Onset    Diabetes Mother     Heart disease Father     Heart disease Brother     Diabetes Brother     Osteoporosis Paternal Grandmother        Current Outpatient Medications:     acetaminophen (TYLENOL) 325 MG tablet, Take 2 tablets by mouth Every 6 (Six) " Hours As Needed for Mild Pain ., Disp: 30 tablet, Rfl: 0    apixaban (Eliquis) 2.5 MG tablet tablet, TAKE 1 TABLET EVERY 12 HOURS FOR ATRIAL FIBRILLATION, Disp: 180 tablet, Rfl: 1    aspirin (aspirin) 81 MG EC tablet, Take 1 tablet by mouth Daily., Disp: , Rfl:     atorvastatin (LIPITOR) 40 MG tablet, Take 1 tablet by mouth every night at bedtime., Disp: 90 tablet, Rfl: 1    Budesonide (ENTOCORT EC) 3 MG 24 hr capsule, Take 2 capsules by mouth 3 (Three) Times a Day., Disp: , Rfl:     Calcium Carbonate (CALCIUM 500 PO), Take 2 tablets by mouth Daily., Disp: , Rfl:     Cholecalciferol (VITAMIN D3) 2000 units capsule, Take 1 capsule by mouth Every Evening., Disp: , Rfl:     colestipol (COLESTID) 1 g tablet, Take 1 tablet by mouth As Needed., Disp: , Rfl:     cyanocobalamin 1000 MCG/ML injection, Inject 1 mL into the appropriate muscle as directed by prescriber Every 30 (Thirty) Days., Disp: , Rfl:     Denosumab (PROLIA SC), Inject  under the skin into the appropriate area as directed., Disp: , Rfl:     dicyclomine (BENTYL) 10 MG capsule, Take 1 capsule by mouth Daily., Disp: , Rfl:     fenofibrate 160 MG tablet, Take 1 tablet by mouth Daily., Disp: , Rfl:     ferrous sulfate 325 (65 FE) MG tablet, Take 1 tablet by mouth Daily With Breakfast., Disp: , Rfl:     folic acid-pyridoxine-cyanocobalamin (FOLBIC) 2.5-25-2 MG tablet tablet, Take  by mouth Daily., Disp: , Rfl:     furosemide (LASIX) 40 MG tablet, Take 1 tablet by mouth 2 (Two) Times a Day. Take 40mg in am and 40mg in pm, Disp: , Rfl:     glimepiride (AMARYL) 2 MG tablet, Take 0.5 tablets by mouth Every Morning Before Breakfast., Disp: , Rfl:     hydroxychloroquine (PLAQUENIL) 200 MG tablet, , Disp: , Rfl:     Lactobacillus (PROBIOTIC ACIDOPHILUS PO), Take  by mouth., Disp: , Rfl:     Magnesium 500 MG tablet, Take  by mouth., Disp: , Rfl:     METAMUCIL FIBER PO, Take  by mouth., Disp: , Rfl:     metFORMIN ER (GLUCOPHAGE-XR) 500 MG 24 hr tablet, Take 4 tablets by  mouth Daily With Breakfast. 4 pills a day, Disp: , Rfl:     metoprolol succinate XL (TOPROL-XL) 25 MG 24 hr tablet, Take 0.5 tablets by mouth Daily., Disp: 45 tablet, Rfl: 3    multivitamin with minerals tablet tablet, Take 1 tablet by mouth Daily., Disp: , Rfl:     nitroglycerin (NITROSTAT) 0.4 MG SL tablet, Place 1 tablet under the tongue Every 5 (Five) Minutes As Needed for Chest Pain. Take no more than 3 doses in 15 minutes., Disp: , Rfl:     spironolactone (ALDACTONE) 25 MG tablet, Take one in the morning and a half tablet at 2:00 pm, Disp: 135 tablet, Rfl: 1    Zinc 30 MG capsule, Take  by mouth Daily., Disp: , Rfl:     Current Facility-Administered Medications:     denosumab (PROLIA) syringe 60 mg, 60 mg, Subcutaneous, Q6 Months, Elisabeth Royal PA, 60 mg at 11/10/22 1021  Allergies   Allergen Reactions    Asacol [Mesalamine] Swelling    Diclofenac Swelling     Gums swell only per patient    Penicillins Other (See Comments)     Gums swelling per patient.      Social History     Socioeconomic History    Marital status:    Tobacco Use    Smoking status: Never    Smokeless tobacco: Never   Vaping Use    Vaping status: Never Used   Substance and Sexual Activity    Alcohol use: No    Drug use: No    Sexual activity: Defer                Objective:     Vitals reviewed.   Constitutional:       Appearance: Not in distress. Frail.   Neck:      Vascular: No JVR. JVD normal.   Pulmonary:      Effort: Pulmonary effort is normal.      Breath sounds: Normal breath sounds. No wheezing. No rhonchi. No rales.   Chest:      Chest wall: Not tender to palpatation.   Cardiovascular:      PMI at left midclavicular line. Normal rate. Irregularly irregular rhythm. Normal S1. Normal S2.       Murmurs:  Positive for murmur      No gallop.  No click. No rub.   Pulses:     Intact distal pulses.   Edema:     Peripheral edema present.     Pretibial: 2+ edema of the left pretibial area and 1+ edema of the right pretibial area.      Ankle: 2+ edema of the left ankle and 1+ edema of the right ankle.     Feet: 2+ edema of the left foot and 1+ edema of the right foot.     Comments:     Abdominal:      General: Bowel sounds are normal.      Palpations: Abdomen is soft.      Tenderness: There is no abdominal tenderness.   Musculoskeletal: Normal range of motion.         General: No tenderness. Skin:     General: Skin is warm and dry.      Comments: Bruising left foot improved    Neurological:      General: No focal deficit present.      Mental Status: Alert and oriented to person, place and time.       Procedures                  Assessment:     [facility]       Diagnosis Plan   1. S/P AVR (aortic valve replacement)  Adult Transthoracic Echo Complete W/ Cont if Necessary Per Protocol      2. S/P MVR (mitral valve repair)  Adult Transthoracic Echo Complete W/ Cont if Necessary Per Protocol      3. Chronic heart failure with preserved ejection fraction  Adult Transthoracic Echo Complete W/ Cont if Necessary Per Protocol      4. Moderate tricuspid regurgitation  Adult Transthoracic Echo Complete W/ Cont if Necessary Per Protocol      5. Lower extremity edema        6. Primary hypertension        7. S/P CABG x 2        8. S/P left atrial appendage ligation                           Plan:   Chart reviewed  Labs reviewed  Heart failure reviewed reviewed pulmonary hypertension   Medications reviewed   Will increase lasix to 40mg twice daily   Continue spironolactone 25mg daily, 12.5mg in pm         - Daily weight:  same time every day, same clothing   - Low Salt (sodium) diet   - Watch fluid intake         HFpEF secondary to valvular disease, afib and pulmonary hypertension   LVEF: 60 to 65%  NYHA class: II  Volume status: Volume overloaded leg only   BB: Metoprolol succinate 12.5 mg daily  ACEi/ARB/ARNI: Blood pressure is marginal to add ACE ARB or Arni at this time  Spironalactone: Continue spironolactone 25 mg am  12.5mg in pm   SGLT2i: stopped Jardiance 10  mg daily due to yeast infection/patient reports she did not tolerate jardiance due to nausea     Advised patient to continue cardiac rehab as tolerated        Will check echocardiogram as it has been one year post AVR and MVR.  I suspect patient's pulmonary hypertension has gotten worse.   She will follow up the same day as echocardiogram with Dr. Terrazas.       Electronically signed by DAKOTA Brody, 03/04/24, 1:39 PM EST.                This document is intended for medical expert use only.  Reading of this document by patients and/or patient's family without participating medical staff guidance may result in misinterpretation and unintended morbidity. Any interpretation of such data is the responsibility of the patient and/or family member responsible for the patient in concert with their primary or specialist providers, not to be left for sources of online search as such as [employer] ([employer]), [employer] or similar queries.  Relying on these approaches to knowledge may result in misinterpretation, misguided goals of care and even death should patient or family members try recommendations outside of the realm of professional medical care in a supervised inpatient environment.

## 2024-03-01 ENCOUNTER — OFFICE VISIT (OUTPATIENT)
Dept: CARDIOLOGY | Facility: CLINIC | Age: 84
End: 2024-03-01
Payer: MEDICARE

## 2024-03-01 VITALS
OXYGEN SATURATION: 98 % | WEIGHT: 111 LBS | SYSTOLIC BLOOD PRESSURE: 113 MMHG | HEIGHT: 62 IN | DIASTOLIC BLOOD PRESSURE: 68 MMHG | BODY MASS INDEX: 20.43 KG/M2 | HEART RATE: 73 BPM

## 2024-03-01 DIAGNOSIS — R60.0 LOWER EXTREMITY EDEMA: ICD-10-CM

## 2024-03-01 DIAGNOSIS — I10 PRIMARY HYPERTENSION: Chronic | ICD-10-CM

## 2024-03-01 DIAGNOSIS — I07.1 MODERATE TRICUSPID REGURGITATION: ICD-10-CM

## 2024-03-01 DIAGNOSIS — I50.32 CHRONIC HEART FAILURE WITH PRESERVED EJECTION FRACTION: ICD-10-CM

## 2024-03-01 DIAGNOSIS — Z98.890 S/P LEFT ATRIAL APPENDAGE LIGATION: ICD-10-CM

## 2024-03-01 DIAGNOSIS — Z95.2 S/P AVR (AORTIC VALVE REPLACEMENT): Primary | ICD-10-CM

## 2024-03-01 DIAGNOSIS — Z95.1 S/P CABG X 2: ICD-10-CM

## 2024-03-01 DIAGNOSIS — Z98.890 S/P MVR (MITRAL VALVE REPAIR): ICD-10-CM

## 2024-03-01 PROCEDURE — 3078F DIAST BP <80 MM HG: CPT | Performed by: NURSE PRACTITIONER

## 2024-03-01 PROCEDURE — 1160F RVW MEDS BY RX/DR IN RCRD: CPT | Performed by: NURSE PRACTITIONER

## 2024-03-01 PROCEDURE — 99214 OFFICE O/P EST MOD 30 MIN: CPT | Performed by: NURSE PRACTITIONER

## 2024-03-01 PROCEDURE — 1159F MED LIST DOCD IN RCRD: CPT | Performed by: NURSE PRACTITIONER

## 2024-03-01 PROCEDURE — 3074F SYST BP LT 130 MM HG: CPT | Performed by: NURSE PRACTITIONER

## 2024-03-01 RX ORDER — FUROSEMIDE 40 MG/1
40 TABLET ORAL 2 TIMES DAILY
Start: 2024-03-01

## 2024-03-01 NOTE — PATIENT INSTRUCTIONS
Increase furosemide to 40mg twice daily   Spironolactone 25 in am 12.5 pm       - Daily weight:  same time every day, same clothing   - Low Salt (sodium) diet   less than 1500mg per day   - Watch fluid intake  no more than 64 oz per day     Increase protein (examples chicken, high protein yogurt, boost or ensure)

## 2024-03-04 PROBLEM — I50.23 ACUTE ON CHRONIC SYSTOLIC HEART FAILURE: Status: RESOLVED | Noted: 2022-06-03 | Resolved: 2024-03-04

## 2024-03-04 PROBLEM — R00.1 BRADYCARDIA, UNSPECIFIED: Status: RESOLVED | Noted: 2022-06-04 | Resolved: 2024-03-04

## 2024-03-04 PROBLEM — I50.9 HEART FAILURE, UNSPECIFIED: Status: RESOLVED | Noted: 2022-06-03 | Resolved: 2024-03-04

## 2024-03-05 ENCOUNTER — APPOINTMENT (OUTPATIENT)
Dept: CARDIAC REHAB | Facility: HOSPITAL | Age: 84
End: 2024-03-05

## 2024-03-08 ENCOUNTER — APPOINTMENT (OUTPATIENT)
Dept: CARDIAC REHAB | Facility: HOSPITAL | Age: 84
End: 2024-03-08

## 2024-03-12 ENCOUNTER — APPOINTMENT (OUTPATIENT)
Dept: CARDIAC REHAB | Facility: HOSPITAL | Age: 84
End: 2024-03-12

## 2024-03-15 ENCOUNTER — APPOINTMENT (OUTPATIENT)
Dept: CARDIAC REHAB | Facility: HOSPITAL | Age: 84
End: 2024-03-15

## 2024-03-19 ENCOUNTER — APPOINTMENT (OUTPATIENT)
Dept: CARDIAC REHAB | Facility: HOSPITAL | Age: 84
End: 2024-03-19

## 2024-03-21 NOTE — PROGRESS NOTES
Hematology/Oncology Outpatient Consultation    Patient name: Mindi Quinteros  : 1940  MRN: [id number]  Primary Care Physician: Dayana Tipton MD  Referring Physician: Dayana Tipton MD  Reason For Consult:     Chief Complaint   Patient presents with    Appointment     Anemia       History of Present Illness:    This is an 83-year-old female who has been referred secondary to anemia.  Review of her CBCs indicate that she has had anemia since  with hemoglobin ranging between 8 and 9 g per DL normal white count and normal platelets.  Her differential is a 78% neutrophils there is no lymphocytosis eosinophilia or basophilia.  She has a history of B12 deficiency and receives B12 injections.  She has normal renal function with BUN of 17, creatinine 0.9    She has been  oral b12 supplements.  Denies any rectal bleeding or blood in her urine or vaginal bleeding.    She was on oral iron tablets . She saw Dr Kenney  in the past and received IV iron as well.    She is on Folbic  She is on Eliquis due to afib    Patient lives with her daughter  Patient does not smoke  She does not drink alcohol    Past Medical History:   Diagnosis Date    Acute congestive heart failure, unspecified heart failure type 05/15/2022    Age-related osteoporosis without current pathological fracture     prolia(0507-0604, 2019), restarted prolia(11/3/20)    Allergic     Anemia     Anxiety     Arthritis     CAD (coronary artery disease)     Depression     Diabetes     Elevated cholesterol     HTN (hypertension)     Hyperlipemia     Injury of back     Sinusitis     Type II diabetes mellitus        Past Surgical History:   Procedure Laterality Date    ANKLE ARTHROSCOPY      AORTIC VALVE REPAIR/REPLACEMENT MITRAL VALVE REPAIR/REPLACEMENT N/A 2022    Procedure: AORTIC VALVE REPAIR/REPLACEMENT MITRAL VALVE REPAIR/REPLACEMENT;  Surgeon: Lane Okeefe MD;  Location: [facility];  Service: Cardiothoracic;  Laterality: N/A;   Mitral Valve repair with 26mm Physio II ring. Aortic   Valve Replacement with 21mm Magna Ease valve.    BELPHAROPTOSIS REPAIR      CARDIAC CATHETERIZATION      CARDIAC CATHETERIZATION N/A 5/17/2022    Procedure: Left Heart Cath, possible pci;  Surgeon: Natan Terrazas MD;  Location: [facility] CATH INVASIVE LOCATION;  Service: Cardiovascular;  Laterality: N/A;    CATARACT EXTRACTION      CHOLECYSTECTOMY N/A 10/14/2019    Procedure: Laparoscopic cholecystectomy, possible open;  Surgeon: Vijay Guerra DO;  Location: [facility] MAIN OR;  Service: General    COLONOSCOPY      CORONARY ARTERY BYPASS GRAFT N/A 5/19/2022    Procedure: CORONARY ARTERY BYPASS GRAFTING;  Surgeon: Lane Okeefe MD;  Location: [facility] CVOR;  Service: Cardiothoracic;  Laterality: N/A;  CABG X 2 (1 Vein graft, 1 LIMA graft)    COSMETIC SURGERY      ENDOSCOPY      HEMORROIDECTOMY      HYSTERECTOMY      MAZE PROCEDURE N/A 5/19/2022    Procedure: MAZE PROCEDURE;  Surgeon: Lane Okeefe MD;  Location: [facility] CVOR;  Service: Cardiothoracic;  Laterality: N/A;         Current Outpatient Medications:     Lotilaner (Xdemvy) 0.25 % solution, instill 1 drop by ophthalmic route  2 times a day, both eyes for 6 weeks, Disp: , Rfl:     acetaminophen (TYLENOL) 325 MG tablet, Take 2 tablets by mouth Every 6 (Six) Hours As Needed for Mild Pain ., Disp: 30 tablet, Rfl: 0    apixaban (Eliquis) 2.5 MG tablet tablet, TAKE 1 TABLET EVERY 12 HOURS FOR ATRIAL FIBRILLATION, Disp: 180 tablet, Rfl: 1    aspirin (aspirin) 81 MG EC tablet, Take 1 tablet by mouth Daily., Disp: , Rfl:     atorvastatin (LIPITOR) 40 MG tablet, Take 1 tablet by mouth every night at bedtime., Disp: 90 tablet, Rfl: 1    Budesonide (ENTOCORT EC) 3 MG 24 hr capsule, Take 2 capsules by mouth 3 (Three) Times a Day., Disp: , Rfl:     Calcium Carbonate (CALCIUM 500 PO), Take 2 tablets by mouth Daily., Disp: , Rfl:     Cholecalciferol (VITAMIN D3) 2000 units capsule, Take 1 capsule by mouth  Every Evening., Disp: , Rfl:     colestipol (COLESTID) 1 g tablet, Take 1 tablet by mouth As Needed., Disp: , Rfl:     cyanocobalamin 1000 MCG/ML injection, Inject 1 mL into the appropriate muscle as directed by prescriber Every 30 (Thirty) Days., Disp: , Rfl:     Denosumab (PROLIA SC), Inject  under the skin into the appropriate area as directed., Disp: , Rfl:     dicyclomine (BENTYL) 10 MG capsule, Take 1 capsule by mouth Daily., Disp: , Rfl:     fenofibrate 160 MG tablet, Take 1 tablet by mouth Daily., Disp: , Rfl:     ferrous sulfate 325 (65 FE) MG tablet, Take 1 tablet by mouth Daily With Breakfast., Disp: , Rfl:     folic acid-pyridoxine-cyanocobalamin (FOLBIC) 2.5-25-2 MG tablet tablet, Take  by mouth Daily., Disp: , Rfl:     furosemide (LASIX) 40 MG tablet, Take 1 tablet by mouth 2 (Two) Times a Day. Take 40mg in am and 40mg in pm, Disp: , Rfl:     glimepiride (AMARYL) 2 MG tablet, Take 0.5 tablets by mouth Every Morning Before Breakfast., Disp: , Rfl:     hydroxychloroquine (PLAQUENIL) 200 MG tablet, , Disp: , Rfl:     Lactobacillus (PROBIOTIC ACIDOPHILUS PO), Take  by mouth., Disp: , Rfl:     Magnesium 500 MG tablet, Take  by mouth., Disp: , Rfl:     METAMUCIL FIBER PO, Take  by mouth., Disp: , Rfl:     metFORMIN ER (GLUCOPHAGE-XR) 500 MG 24 hr tablet, Take 4 tablets by mouth Daily With Breakfast. 4 pills a day, Disp: , Rfl:     metoprolol succinate XL (TOPROL-XL) 25 MG 24 hr tablet, Take 0.5 tablets by mouth Daily., Disp: 45 tablet, Rfl: 3    multivitamin with minerals tablet tablet, Take 1 tablet by mouth Daily., Disp: , Rfl:     nitroglycerin (NITROSTAT) 0.4 MG SL tablet, Place 1 tablet under the tongue Every 5 (Five) Minutes As Needed for Chest Pain. Take no more than 3 doses in 15 minutes., Disp: , Rfl:     spironolactone (ALDACTONE) 25 MG tablet, Take one in the morning and a half tablet at 2:00 pm, Disp: 135 tablet, Rfl: 1    Zinc 30 MG capsule, Take  by mouth Daily., Disp: , Rfl:     Current  Facility-Administered Medications:     denosumab (PROLIA) syringe 60 mg, 60 mg, Subcutaneous, Q6 Months, Elisabeth Royal PA, 60 mg at 11/10/22 1021    Allergies   Allergen Reactions    Asacol [Mesalamine] Swelling    Diclofenac Swelling     Gums swell only per patient    Penicillins Other (See Comments)     Gums swelling per patient.        Immunization History   Administered Date(s) Administered    COVID-19 (PFIZER) Purple Cap Monovalent 01/23/2021, 02/13/2021    COVID-19 (UNSPECIFIED) 06/03/2022, 06/08/2022, 06/10/2022, 06/15/2022    Influenza Seasonal Injectable 11/17/2021    Pneumococcal Conjugate 20-Valent (PCV20) 06/26/2015    TST, UF 06/03/2022, 06/10/2022       Family History   Problem Relation Age of Onset    Diabetes Mother     Heart disease Father     Heart disease Brother     Diabetes Brother     Osteoporosis Paternal Grandmother        Cancer-related family history is not on file.    Social History     Tobacco Use    Smoking status: Never    Smokeless tobacco: Never   Vaping Use    Vaping status: Never Used   Substance Use Topics    Alcohol use: No    Drug use: No       ROS:    Review of Systems   Constitutional:  Positive for fatigue. Negative for chills and fever.   HENT:  Negative for congestion, drooling, ear discharge, rhinorrhea, sinus pressure and tinnitus.    Eyes:  Negative for photophobia, pain and discharge.   Respiratory:  Negative for apnea, choking and stridor.    Cardiovascular:  Negative for palpitations.   Gastrointestinal:  Negative for abdominal distention, abdominal pain and anal bleeding.   Endocrine: Negative for polydipsia and polyphagia.   Genitourinary:  Negative for decreased urine volume, flank pain and genital sores.   Musculoskeletal:  Negative for gait problem, neck pain and neck stiffness.   Skin:  Negative for color change, rash and wound.   Neurological:  Positive for weakness. Negative for tremors, seizures, syncope, facial asymmetry and speech difficulty.  "  Hematological:  Negative for adenopathy.   Psychiatric/Behavioral:  Negative for agitation, confusion, hallucinations and self-injury. The patient is not hyperactive.        Objective:    Vitals:    03/22/24 1137   Resp: 18   Temp: 98 °F (36.7 °C)   TempSrc: Infrared   Weight: 49 kg (108 lb)   Height: 157.5 cm (62\")   PainSc: 0-No pain     Body mass index is 19.75 kg/m².    ECOG    (1) Restricted in physically strenuous activity, ambulatory and able to do work of light nature    Physical Exam:  Physical Exam  Vitals and nursing note reviewed.   Constitutional:       General: She is not in acute distress.     Appearance: She is not diaphoretic.   HENT:      Head: Normocephalic and atraumatic.   Eyes:      General: No scleral icterus.        Right eye: No discharge.         Left eye: No discharge.      Conjunctiva/sclera: Conjunctivae normal.   Neck:      Thyroid: No thyromegaly.   Cardiovascular:      Rate and Rhythm: Normal rate and regular rhythm.      Heart sounds: Normal heart sounds.      No friction rub. No gallop.   Pulmonary:      Effort: Pulmonary effort is normal. No respiratory distress.      Breath sounds: No stridor. No wheezing.   Abdominal:      General: Bowel sounds are normal.      Palpations: Abdomen is soft. There is no mass.      Tenderness: There is no abdominal tenderness. There is no guarding or rebound.   Musculoskeletal:         General: No tenderness. Normal range of motion.      Cervical back: Normal range of motion and neck supple.      Right lower leg: Edema present.      Left lower leg: Edema present.      Comments: Chronic    Lymphadenopathy:      Cervical: No cervical adenopathy.   Skin:     General: Skin is warm.      Findings: No erythema or rash.   Neurological:      Mental Status: She is alert and oriented to person, place, and time.      Motor: No abnormal muscle tone.   Psychiatric:         Behavior: Behavior normal.         RECENT LABS  WBC   Date Value Ref Range Status "   03/22/2024 5.08 3.40 - 10.80 10*3/mm3 Final     RBC   Date Value Ref Range Status   03/22/2024 3.41 (L) 3.77 - 5.28 10*6/mm3 Final     Hemoglobin   Date Value Ref Range Status   03/22/2024 9.7 (L) 12.0 - 15.9 g/dL Final     Hematocrit   Date Value Ref Range Status   03/22/2024 31.6 (L) 34.0 - 46.6 % Final     MCV   Date Value Ref Range Status   03/22/2024 92.7 79.0 - 97.0 fL Final     MCH   Date Value Ref Range Status   03/22/2024 28.4 26.6 - 33.0 pg Final     MCHC   Date Value Ref Range Status   03/22/2024 30.7 (L) 31.5 - 35.7 g/dL Final     RDW   Date Value Ref Range Status   03/22/2024 14.6 12.3 - 15.4 % Final     RDW-SD   Date Value Ref Range Status   03/22/2024 47.2 37.0 - 54.0 fl Final     MPV   Date Value Ref Range Status   03/22/2024 9.4 6.0 - 12.0 fL Final     Platelets   Date Value Ref Range Status   03/22/2024 198 140 - 450 10*3/mm3 Final     Neutrophil %   Date Value Ref Range Status   03/22/2024 78.9 (H) 42.7 - 76.0 % Final     Lymphocyte %   Date Value Ref Range Status   03/22/2024 9.8 (L) 19.6 - 45.3 % Final     Monocyte %   Date Value Ref Range Status   03/22/2024 8.9 5.0 - 12.0 % Final     Eosinophil %   Date Value Ref Range Status   03/22/2024 1.8 0.3 - 6.2 % Final     Basophil %   Date Value Ref Range Status   03/22/2024 0.6 0.0 - 1.5 % Final     Immature Grans %   Date Value Ref Range Status   10/05/2020 0.4 0.0 - 0.5 % Final     Neutrophils Absolute   Date Value Ref Range Status   07/04/2022 2.6 1.7 - 6.0 x10(3)/ul Final     Neutrophils, Absolute   Date Value Ref Range Status   03/22/2024 4.01 1.70 - 7.00 10*3/mm3 Final     Lymphocytes, Absolute   Date Value Ref Range Status   03/22/2024 0.50 (L) 0.70 - 3.10 10*3/mm3 Final     Monocytes, Absolute   Date Value Ref Range Status   03/22/2024 0.45 0.10 - 0.90 10*3/mm3 Final     Eosinophils Absolute   Date Value Ref Range Status   07/04/2022 0.0 0.0 - 0.6 x10(3)/ul Final     Eosinophils, Absolute   Date Value Ref Range Status   03/22/2024 0.09 0.00  - 0.40 10*3/mm3 Final     Basophils Absolute   Date Value Ref Range Status   07/04/2022 0.0 0.0 - 0.3 x10(3)/ul Final     Basophils, Absolute   Date Value Ref Range Status   03/22/2024 0.03 0.00 - 0.20 10*3/mm3 Final     Immature Grans, Absolute   Date Value Ref Range Status   10/05/2020 0.02 0.00 - 0.05 10*3/mm3 Final     nRBC   Date Value Ref Range Status   01/29/2023 0.0 0.0 - 0.2 /100 WBC Final       Lab Results   Component Value Date    GLUCOSE 90 02/09/2024    BUN 17 02/09/2024    CREATININE 0.96 02/09/2024    EGFRIFNONA 91 10/23/2020    BCR 17.7 02/09/2024    K 3.9 02/09/2024    CO2 29.2 (H) 02/09/2024    CALCIUM 10.6 (H) 02/09/2024    ALBUMIN 4.00 09/13/2022    LABIL2 1.6 08/01/2018    AST 20 09/13/2022    ALT 14 09/13/2022         Assessment & Plan   Anemia, unspecified type  - CBC & Differential  - Ferritin  - Folate  - Haptoglobin  - Iron Profile  - Reticulocytes  - Vitamin B12  - Protein Elec + Interp, Serum  - Lactate Dehydrogenase  - Soluble Transferrin Receptor  - Methylmalonic Acid, Serum  - Urinalysis With Culture If Indicated - Urine, Clean Catch      Chronic anemia, normocytic  History of iron deficiency anemia status post IV iron in the past  B12 deficiency  Autoimmune disease: Connective tissue disorder. Seen  Zachary.      Plans    Anemia workup  Include serum transferrin receptor assay  Call Dr. Kenney's office for records  Call Dr. Valencia's office for records  Follow-up 3 weeks  All questions answered            I spent 45 total minutes, face-to-face, caring for Mindi today. 90% of this time involved counseling and/or coordination of care as documented within this note.

## 2024-03-22 ENCOUNTER — LAB (OUTPATIENT)
Dept: LAB | Facility: HOSPITAL | Age: 84
End: 2024-03-22
Payer: MEDICARE

## 2024-03-22 ENCOUNTER — APPOINTMENT (OUTPATIENT)
Dept: CARDIAC REHAB | Facility: HOSPITAL | Age: 84
End: 2024-03-22

## 2024-03-22 ENCOUNTER — CONSULT (OUTPATIENT)
Dept: ONCOLOGY | Facility: CLINIC | Age: 84
End: 2024-03-22
Payer: MEDICARE

## 2024-03-22 VITALS — TEMPERATURE: 98 F | RESPIRATION RATE: 18 BRPM | HEIGHT: 62 IN | WEIGHT: 108 LBS | BODY MASS INDEX: 19.88 KG/M2

## 2024-03-22 DIAGNOSIS — D64.9 ANEMIA, UNSPECIFIED TYPE: Primary | ICD-10-CM

## 2024-03-22 DIAGNOSIS — Z95.1 S/P CABG X 2: ICD-10-CM

## 2024-03-22 DIAGNOSIS — Z79.01 LONG TERM (CURRENT) USE OF ANTICOAGULANTS: ICD-10-CM

## 2024-03-22 DIAGNOSIS — I48.0 PAROXYSMAL ATRIAL FIBRILLATION: ICD-10-CM

## 2024-03-22 DIAGNOSIS — Z98.890 S/P MVR (MITRAL VALVE REPAIR): ICD-10-CM

## 2024-03-22 DIAGNOSIS — D64.9 ANEMIA, UNSPECIFIED TYPE: ICD-10-CM

## 2024-03-22 DIAGNOSIS — Z95.2 S/P AVR (AORTIC VALVE REPLACEMENT): ICD-10-CM

## 2024-03-22 DIAGNOSIS — E78.5 DYSLIPIDEMIA: ICD-10-CM

## 2024-03-22 DIAGNOSIS — I10 ESSENTIAL HYPERTENSION: ICD-10-CM

## 2024-03-22 PROBLEM — R19.4 ALTERED BOWEL HABITS: Status: ACTIVE | Noted: 2024-03-22

## 2024-03-22 PROBLEM — Z86.0100 PERSONAL HISTORY OF COLONIC POLYPS: Status: ACTIVE | Noted: 2024-03-22

## 2024-03-22 PROBLEM — K59.1 FUNCTIONAL DIARRHEA: Status: ACTIVE | Noted: 2024-03-22

## 2024-03-22 PROBLEM — I24.0 ACUTE CORONARY THROMBOSIS NOT RESULTING IN MYOCARDIAL INFARCTION: Status: ACTIVE | Noted: 2024-03-22

## 2024-03-22 PROBLEM — Z51.89 ENCOUNTER FOR BLOOD TRANSFUSION: Status: ACTIVE | Noted: 2024-03-22

## 2024-03-22 PROBLEM — K63.5 COLON POLYPS: Status: ACTIVE | Noted: 2024-03-22

## 2024-03-22 PROBLEM — R43.2 ALTERED TASTE: Status: ACTIVE | Noted: 2024-03-22

## 2024-03-22 PROBLEM — R00.8 OTHER ABNORMALITIES OF HEART BEAT: Status: ACTIVE | Noted: 2024-03-22

## 2024-03-22 PROBLEM — K64.9 HEMORRHOIDS: Status: ACTIVE | Noted: 2024-03-22

## 2024-03-22 PROBLEM — Z86.010 PERSONAL HISTORY OF COLONIC POLYPS: Status: ACTIVE | Noted: 2024-03-22

## 2024-03-22 PROBLEM — R15.9 FECAL INCONTINENCE: Status: ACTIVE | Noted: 2024-03-22

## 2024-03-22 LAB
ALBUMIN SERPL-MCNC: 4.3 G/DL (ref 3.5–5.2)
ALBUMIN/GLOB SERPL: 1.6 G/DL
ALP SERPL-CCNC: 49 U/L (ref 39–117)
ALT SERPL W P-5'-P-CCNC: 15 U/L (ref 1–33)
ANION GAP SERPL CALCULATED.3IONS-SCNC: 10 MMOL/L (ref 5–15)
AST SERPL-CCNC: 30 U/L (ref 1–32)
BASOPHILS # BLD AUTO: 0.03 10*3/MM3 (ref 0–0.2)
BASOPHILS NFR BLD AUTO: 0.6 % (ref 0–1.5)
BILIRUB SERPL-MCNC: 0.7 MG/DL (ref 0–1.2)
BUN SERPL-MCNC: 22 MG/DL (ref 8–23)
BUN/CREAT SERPL: 22.2 (ref 7–25)
CALCIUM SPEC-SCNC: 9.7 MG/DL (ref 8.6–10.5)
CHLORIDE SERPL-SCNC: 98 MMOL/L (ref 98–107)
CHOLEST SERPL-MCNC: 89 MG/DL (ref 0–200)
CO2 SERPL-SCNC: 29 MMOL/L (ref 22–29)
CREAT SERPL-MCNC: 0.99 MG/DL (ref 0.57–1)
DEPRECATED RDW RBC AUTO: 47.2 FL (ref 37–54)
EGFRCR SERPLBLD CKD-EPI 2021: 56.7 ML/MIN/1.73
EOSINOPHIL # BLD AUTO: 0.09 10*3/MM3 (ref 0–0.4)
EOSINOPHIL NFR BLD AUTO: 1.8 % (ref 0.3–6.2)
ERYTHROCYTE [DISTWIDTH] IN BLOOD BY AUTOMATED COUNT: 14.6 % (ref 12.3–15.4)
FERRITIN SERPL-MCNC: 219.7 NG/ML (ref 13–150)
FOLATE SERPL-MCNC: >20 NG/ML (ref 4.78–24.2)
GLOBULIN UR ELPH-MCNC: 2.7 GM/DL
GLUCOSE SERPL-MCNC: 205 MG/DL (ref 65–99)
HAPTOGLOB SERPL-MCNC: 107 MG/DL (ref 30–200)
HCT VFR BLD AUTO: 31.6 % (ref 34–46.6)
HDLC SERPL-MCNC: 27 MG/DL (ref 40–60)
HGB BLD-MCNC: 9.7 G/DL (ref 12–15.9)
IRON 24H UR-MRATE: 46 MCG/DL (ref 37–145)
IRON SATN MFR SERPL: 9 % (ref 20–50)
LDH SERPL-CCNC: 201 U/L (ref 135–214)
LDLC SERPL CALC-MCNC: 45 MG/DL (ref 0–100)
LDLC/HDLC SERPL: 1.7 {RATIO}
LYMPHOCYTES # BLD AUTO: 0.5 10*3/MM3 (ref 0.7–3.1)
LYMPHOCYTES NFR BLD AUTO: 9.8 % (ref 19.6–45.3)
MCH RBC QN AUTO: 28.4 PG (ref 26.6–33)
MCHC RBC AUTO-ENTMCNC: 30.7 G/DL (ref 31.5–35.7)
MCV RBC AUTO: 92.7 FL (ref 79–97)
MONOCYTES # BLD AUTO: 0.45 10*3/MM3 (ref 0.1–0.9)
MONOCYTES NFR BLD AUTO: 8.9 % (ref 5–12)
NEUTROPHILS NFR BLD AUTO: 4.01 10*3/MM3 (ref 1.7–7)
NEUTROPHILS NFR BLD AUTO: 78.9 % (ref 42.7–76)
PLATELET # BLD AUTO: 198 10*3/MM3 (ref 140–450)
PMV BLD AUTO: 9.4 FL (ref 6–12)
POTASSIUM SERPL-SCNC: 3.9 MMOL/L (ref 3.5–5.2)
PROT SERPL-MCNC: 7 G/DL (ref 6–8.5)
RBC # BLD AUTO: 3.41 10*6/MM3 (ref 3.77–5.28)
RETICS # AUTO: 0.06 10*6/MM3 (ref 0.02–0.13)
RETICS/RBC NFR AUTO: 1.81 % (ref 0.7–1.9)
SODIUM SERPL-SCNC: 137 MMOL/L (ref 136–145)
TIBC SERPL-MCNC: 527 MCG/DL (ref 298–536)
TRANSFERRIN SERPL-MCNC: 354 MG/DL (ref 200–360)
TRIGL SERPL-MCNC: 81 MG/DL (ref 0–150)
VIT B12 BLD-MCNC: >2000 PG/ML (ref 211–946)
VLDLC SERPL-MCNC: 17 MG/DL (ref 5–40)
WBC NRBC COR # BLD AUTO: 5.08 10*3/MM3 (ref 3.4–10.8)

## 2024-03-22 PROCEDURE — 36415 COLL VENOUS BLD VENIPUNCTURE: CPT

## 2024-03-22 PROCEDURE — 82728 ASSAY OF FERRITIN: CPT | Performed by: INTERNAL MEDICINE

## 2024-03-22 PROCEDURE — 84466 ASSAY OF TRANSFERRIN: CPT | Performed by: INTERNAL MEDICINE

## 2024-03-22 PROCEDURE — 80061 LIPID PANEL: CPT | Performed by: INTERNAL MEDICINE

## 2024-03-22 PROCEDURE — 82746 ASSAY OF FOLIC ACID SERUM: CPT | Performed by: INTERNAL MEDICINE

## 2024-03-22 PROCEDURE — 85045 AUTOMATED RETICULOCYTE COUNT: CPT | Performed by: INTERNAL MEDICINE

## 2024-03-22 PROCEDURE — 80053 COMPREHEN METABOLIC PANEL: CPT | Performed by: INTERNAL MEDICINE

## 2024-03-22 PROCEDURE — 83615 LACTATE (LD) (LDH) ENZYME: CPT | Performed by: INTERNAL MEDICINE

## 2024-03-22 PROCEDURE — 83540 ASSAY OF IRON: CPT | Performed by: INTERNAL MEDICINE

## 2024-03-22 PROCEDURE — 82607 VITAMIN B-12: CPT | Performed by: INTERNAL MEDICINE

## 2024-03-22 PROCEDURE — 83010 ASSAY OF HAPTOGLOBIN QUANT: CPT | Performed by: INTERNAL MEDICINE

## 2024-03-22 PROCEDURE — 85025 COMPLETE CBC W/AUTO DIFF WBC: CPT

## 2024-03-22 RX ORDER — LOTILANER OPHTHALMIC SOLUTION 2.5 MG/ML
SOLUTION/ DROPS OPHTHALMIC
COMMUNITY
Start: 2024-03-05 | End: 2024-04-15

## 2024-03-22 RX ORDER — METOPROLOL SUCCINATE 25 MG/1
12.5 TABLET, EXTENDED RELEASE ORAL
Qty: 45 TABLET | Refills: 3 | Status: SHIPPED | OUTPATIENT
Start: 2024-03-22

## 2024-03-22 NOTE — TELEPHONE ENCOUNTER
Rx Refill Note  Requested Prescriptions     Pending Prescriptions Disp Refills    metoprolol succinate XL (TOPROL-XL) 25 MG 24 hr tablet 45 tablet 3     Sig: Take 0.5 tablets by mouth Daily.      Last office visit with prescribing clinician: 11/13/2023   Last telemedicine visit with prescribing clinician: Visit date not found   Next office visit with prescribing clinician: 4/17/2024                         Would you like a call back once the refill request has been completed: [] Yes [] No    If the office needs to give you a call back, can they leave a voicemail: [] Yes [] No    Carolynn Faria MA  03/22/24, 15:33 EDT

## 2024-03-22 NOTE — TELEPHONE ENCOUNTER
Caller: Mindi Quinteros    Relationship: Self    Best call back number:  [phone]    Requested Prescriptions:   Requested Prescriptions     Pending Prescriptions Disp Refills    metoprolol succinate XL (TOPROL-XL) 25 MG 24 hr tablet 45 tablet 3     Sig: Take 0.5 tablets by mouth Daily.        Pharmacy where request should be sent: [address] - [phone] [place] [phone]      Last office visit with prescribing clinician: 11/13/2023   Last telemedicine visit with prescribing clinician: Visit date not found   Next office visit with prescribing clinician: 4/17/2024     Additional details provided by patient:      Does the patient have less than a 3 day supply:  [] Yes  [] No    Would you like a call back once the refill request has been completed: [] Yes [] No    If the office needs to give you a call back, can they leave a voicemail: [] Yes [] No    Desi Cagle Rep   03/22/24 15:23 EDT

## 2024-03-25 LAB
ALBUMIN SERPL ELPH-MCNC: 3.7 G/DL (ref 2.9–4.4)
ALBUMIN/GLOB SERPL: 1.2 {RATIO} (ref 0.7–1.7)
ALPHA1 GLOB SERPL ELPH-MCNC: 0.4 G/DL (ref 0–0.4)
ALPHA2 GLOB SERPL ELPH-MCNC: 0.7 G/DL (ref 0.4–1)
B-GLOBULIN SERPL ELPH-MCNC: 1.2 G/DL (ref 0.7–1.3)
GAMMA GLOB SERPL ELPH-MCNC: 0.9 G/DL (ref 0.4–1.8)
GLOBULIN SER CALC-MCNC: 3.2 G/DL (ref 2.2–3.9)
LABORATORY COMMENT REPORT: NORMAL
M PROTEIN SERPL ELPH-MCNC: NORMAL G/DL
PROT PATTERN SERPL ELPH-IMP: NORMAL
PROT SERPL-MCNC: 6.9 G/DL (ref 6–8.5)
STFR SERPL-SCNC: 19.3 NMOL/L (ref 12.2–27.3)

## 2024-03-26 ENCOUNTER — APPOINTMENT (OUTPATIENT)
Dept: CARDIAC REHAB | Facility: HOSPITAL | Age: 84
End: 2024-03-26

## 2024-03-28 ENCOUNTER — TELEPHONE (OUTPATIENT)
Dept: CARDIOLOGY | Facility: CLINIC | Age: 84
End: 2024-03-28
Payer: MEDICARE

## 2024-03-28 RX ORDER — FUROSEMIDE 40 MG/1
40 TABLET ORAL 2 TIMES DAILY
Qty: 180 TABLET | Refills: 1 | Status: SHIPPED | OUTPATIENT
Start: 2024-03-28

## 2024-03-28 NOTE — TELEPHONE ENCOUNTER
Rx Refill Note  Requested Prescriptions      No prescriptions requested or ordered in this encounter      Last office visit with prescribing clinician: 11/13/2023   Last telemedicine visit with prescribing clinician: Visit date not found   Next office visit with prescribing clinician: 4/17/2024                         Would you like a call back once the refill request has been completed: [] Yes [] No    If the office needs to give you a call back, can they leave a voicemail: [] Yes [] No    Tatum Hammer MA  03/28/24, 15:12 EDT

## 2024-03-28 NOTE — TELEPHONE ENCOUNTER
Pt contacted the office stating that [facility] filled a 20 mg tablet of furosemide. Pt made aware that Furosemide 40 mg BID, has been sent in.

## 2024-03-29 ENCOUNTER — TELEPHONE (OUTPATIENT)
Dept: CARDIOLOGY | Facility: CLINIC | Age: 84
End: 2024-03-29
Payer: MEDICARE

## 2024-03-29 LAB — METHYLMALONATE SERPL-SCNC: 399 NMOL/L (ref 0–378)

## 2024-03-29 NOTE — TELEPHONE ENCOUNTER
Received fax today from [facility] pharmacy requesting a refill of furosemide 40mg. Patient was sent refill yesterday 3/28/24. Refill is inappropriate at this time.

## 2024-04-02 ENCOUNTER — APPOINTMENT (OUTPATIENT)
Dept: CARDIAC REHAB | Facility: HOSPITAL | Age: 84
End: 2024-04-02

## 2024-04-12 ENCOUNTER — TELEPHONE (OUTPATIENT)
Dept: ONCOLOGY | Facility: CLINIC | Age: 84
End: 2024-04-12

## 2024-04-12 NOTE — TELEPHONE ENCOUNTER
Caller: Mindi Quinteros    Relationship: Self    Best call back number: [phone]    Who are you requesting to speak with (clinical staff, provider,  specific staff member): scheduling    Do you know the name of the person who called: patient    What was the call regarding: pt would like to confirm that medical records from dr. contreras has been rec'd prior to her appt with dr olvera on 04/18

## 2024-04-16 ENCOUNTER — APPOINTMENT (OUTPATIENT)
Dept: CARDIAC REHAB | Facility: HOSPITAL | Age: 84
End: 2024-04-16

## 2024-04-17 ENCOUNTER — HOSPITAL ENCOUNTER (OUTPATIENT)
Dept: CARDIOLOGY | Facility: HOSPITAL | Age: 84
Discharge: HOME | End: 2024-04-17
Admitting: NURSE PRACTITIONER
Payer: MEDICARE

## 2024-04-17 ENCOUNTER — OFFICE VISIT (OUTPATIENT)
Dept: CARDIOLOGY | Facility: CLINIC | Age: 84
End: 2024-04-17
Payer: MEDICARE

## 2024-04-17 VITALS
HEART RATE: 92 BPM | SYSTOLIC BLOOD PRESSURE: 125 MMHG | HEIGHT: 62 IN | BODY MASS INDEX: 20.06 KG/M2 | WEIGHT: 109 LBS | OXYGEN SATURATION: 94 % | DIASTOLIC BLOOD PRESSURE: 60 MMHG

## 2024-04-17 DIAGNOSIS — I10 ESSENTIAL HYPERTENSION: ICD-10-CM

## 2024-04-17 DIAGNOSIS — Z95.2 S/P AVR (AORTIC VALVE REPLACEMENT): ICD-10-CM

## 2024-04-17 DIAGNOSIS — Z98.890 S/P MVR (MITRAL VALVE REPAIR): ICD-10-CM

## 2024-04-17 DIAGNOSIS — I07.1 MODERATE TRICUSPID REGURGITATION: ICD-10-CM

## 2024-04-17 DIAGNOSIS — Z79.01 LONG TERM (CURRENT) USE OF ANTICOAGULANTS: ICD-10-CM

## 2024-04-17 DIAGNOSIS — Z86.79 S/P MAZE OPERATION FOR ATRIAL FIBRILLATION: ICD-10-CM

## 2024-04-17 DIAGNOSIS — Z98.890 S/P MAZE OPERATION FOR ATRIAL FIBRILLATION: ICD-10-CM

## 2024-04-17 DIAGNOSIS — I25.810 CORONARY ARTERY DISEASE INVOLVING CORONARY BYPASS GRAFT OF NATIVE HEART WITHOUT ANGINA PECTORIS: ICD-10-CM

## 2024-04-17 DIAGNOSIS — I50.32 CHRONIC HEART FAILURE WITH PRESERVED EJECTION FRACTION: ICD-10-CM

## 2024-04-17 DIAGNOSIS — I50.812 CHRONIC RIGHT-SIDED HEART FAILURE: ICD-10-CM

## 2024-04-17 DIAGNOSIS — I36.1 NONRHEUMATIC TRICUSPID VALVE REGURGITATION: Primary | ICD-10-CM

## 2024-04-17 DIAGNOSIS — I48.0 PAROXYSMAL ATRIAL FIBRILLATION: ICD-10-CM

## 2024-04-17 DIAGNOSIS — Z98.890 S/P LEFT ATRIAL APPENDAGE LIGATION: ICD-10-CM

## 2024-04-17 DIAGNOSIS — Z95.1 S/P CABG X 2: ICD-10-CM

## 2024-04-17 LAB
BH CV ECHO MEAS - ACS: 1.33 CM
BH CV ECHO MEAS - AO MAX PG: 17.5 MMHG
BH CV ECHO MEAS - AO MEAN PG: 10.6 MMHG
BH CV ECHO MEAS - AO ROOT DIAM: 2.9 CM
BH CV ECHO MEAS - AO V2 MAX: 209 CM/SEC
BH CV ECHO MEAS - AO V2 VTI: 39 CM
BH CV ECHO MEAS - AVA(I,D): 1.13 CM2
BH CV ECHO MEAS - EDV(CUBED): 62.1 ML
BH CV ECHO MEAS - EDV(MOD-SP4): 31.6 ML
BH CV ECHO MEAS - EF(MOD-BP): 54 %
BH CV ECHO MEAS - EF(MOD-SP4): 53.7 %
BH CV ECHO MEAS - ESV(CUBED): 20.7 ML
BH CV ECHO MEAS - ESV(MOD-SP4): 14.6 ML
BH CV ECHO MEAS - FS: 30.7 %
BH CV ECHO MEAS - IVS/LVPW: 0.8 CM
BH CV ECHO MEAS - IVSD: 1 CM
BH CV ECHO MEAS - LA DIMENSION: 4.5 CM
BH CV ECHO MEAS - LV MASS(C)D: 148 GRAMS
BH CV ECHO MEAS - LV MAX PG: 3.4 MMHG
BH CV ECHO MEAS - LV MEAN PG: 1.71 MMHG
BH CV ECHO MEAS - LV V1 MAX: 92.1 CM/SEC
BH CV ECHO MEAS - LV V1 VTI: 16.6 CM
BH CV ECHO MEAS - LVIDD: 4 CM
BH CV ECHO MEAS - LVIDS: 2.7 CM
BH CV ECHO MEAS - LVOT AREA: 2.7 CM2
BH CV ECHO MEAS - LVOT DIAM: 1.84 CM
BH CV ECHO MEAS - LVPWD: 1.25 CM
BH CV ECHO MEAS - MV E MAX VEL: 164 CM/SEC
BH CV ECHO MEAS - MV MAX PG: 13.2 MMHG
BH CV ECHO MEAS - MV MEAN PG: 5.9 MMHG
BH CV ECHO MEAS - MV V2 VTI: 35.5 CM
BH CV ECHO MEAS - MVA(VTI): 1.24 CM2
BH CV ECHO MEAS - PA ACC TIME: 0.09 SEC
BH CV ECHO MEAS - PA V2 MAX: 109.2 CM/SEC
BH CV ECHO MEAS - PI END-D VEL: 139.1 CM/SEC
BH CV ECHO MEAS - RAP SYSTOLE: 15 MMHG
BH CV ECHO MEAS - RV MAX PG: 1.78 MMHG
BH CV ECHO MEAS - RV V1 MAX: 66.7 CM/SEC
BH CV ECHO MEAS - RV V1 VTI: 11.1 CM
BH CV ECHO MEAS - RVSP: 53.6 MMHG
BH CV ECHO MEAS - SV(LVOT): 44.1 ML
BH CV ECHO MEAS - SV(MOD-SP4): 17 ML
BH CV ECHO MEAS - TR MAX PG: 38.6 MMHG
BH CV ECHO MEAS - TR MAX VEL: 310.6 CM/SEC

## 2024-04-17 PROCEDURE — 93306 TTE W/DOPPLER COMPLETE: CPT

## 2024-04-17 PROCEDURE — 93306 TTE W/DOPPLER COMPLETE: CPT | Performed by: INTERNAL MEDICINE

## 2024-04-17 RX ORDER — L.ACID,CASEI/B.ANIMAL/S.THERMO 16B CELL
1 CAPSULE ORAL DAILY
COMMUNITY

## 2024-04-17 NOTE — PROGRESS NOTES
Subjective:     Encounter Date:04/17/2024      Patient ID: Mindi Quinteros is a 83 y.o. female.    Chief Complaint and history of present illness:     Follow-up for CAD, CABG, A. fib, valvular heart disease     History of Present Illness  :     Ms. Mindi Quinteros  has PMH of     # CAD, cardiac cath 2/7/18  calcified 50% proximal 70% mid LAD and 70% mid LCX, normal LVEF, cardiac cath 5/17/2022 revealed severe two-vessel disease in LAD and LCx.  Echo revealed valvular heart disease with severe AR and MR.  Patient underwent CABG x2 with LIMA to LAD and SVG to lateral marginal and AVR and mitral valve repair and maze and left atrial appendage occlusion 5/19/2022  #CABG x2 with LIMA to LAD and SVG to lateral marginal 5/19/2020     #Valvular heart disease with 5/19/2022 aortic valve replacement with #21 magna pericardial prosthesis and mitral valve repair with #26 physeal ring, left atrial appendage endocardial closure and right and left cryo maze  #Paroxysmal atrial fibrillation, s/p left and right cryo maze and left atrial appendage endocardial closure  [id number] SCORE   [id number] Score: 7 (4/17/2024 11:21 AM)     [id number] Score: 7 (9/24/2023 12:41 PM)   age greater than 75, female gender, CHF, hypertension, vascular disease, diabetes        #  Diabetes  #  Hypertension,  #  Hyperlipidemia  #  ulcerative colitis  #. Chronic left ankle edema due to trauma and surgery.  #  allergy to aspertane  #  hemorrhoidectomy, hysterectomy, bladder repair, left ankle surgery,      Here for  follow-up.  Patient denies any chest pain.  Is complaining of fatigue weakness and edema.      Patient's arterial blood pressure is 125/60, heart rate 92 bpm, O2 sat of 94% on room air..         Review of records revealed that patient presented through emergency room 5/15/2022 with complaint of nocturnal dyspnea 2 days prior to admission with cough which is resolved but has increasing pedal edema, has chronic diarrhea secondary to  ulcerative colitis and fatigue.  Work-up here revealed elevated proBNP of 2099 and glucose 158.  Chest x-ray showing pulmonary edema and small bilateral pleural effusions and new onset A. Fib.  Echocardiogram 5/15/2022 reveals LV dysfunction EF of 46 to 50% with severe eccentric MR and diastolic dysfunction   Patient was in new onset heart failure on admission. Echocardiogram showed borderline EF, diastolic dysfunction, Severe MR,  Mod-severe AR.  Patient underwent cardiac cath and BEST     5/19/2022 patient underwent valve surgery with aortic valve replacement and mitral valve repair and two-vessel CABG and maze procedure.  5/20/2022 cardioverted to normal sinus rhythm  Echo 5/24/2022 reveals EF of 60 to 65% with biatrial enlargement PA systolic pressure 56 mmHg.  MIKE 9/29/2022 were normal..           5/27/2022: Glucose 201, ALT 83, AST 62, bilirubin 1.4, hemoglobin 10.2  5/31/2022: Glucose 49, ALT 40 AST 50, potassium 3.3 magnesium 1.8, hgb 9.4 .6/1/2022: glucose 168, potassium 4.2, bun 6 creatinine 0.55, ALT 34, AST 48 hgb 8.6.6/3/2022: Glucose 152, potassium 3.8, hemoglobin 11.4..  Labs from 6/1/2023 reveal BMP with elevated glucose.     Labs from 1/29/2023 reveal normal BMP CBC with a hemoglobin of 9.7.  And underwent Holter monitor 6/8/2023 which revealed nonsustained VT.    Labs from 3/22/2024 reveal lipid profile with cholesterol 89, triglycerides 81, HDL low at 27, LDL 45.  CMP with a glucose of 205.      ASSESSMENT:        # Chronic right heart failure  # Severe TR, pulmonary hypertension, RV enlargement  #Anemia  #CAD, CABG  # paroxysmal atrial fibrillation, s/p maze, left atrial appendage closure  #Chronic HFrEF due to   systolic and diastolic dysfunction from ischemic cardiomyopathy and valvular heart disease and A. fib.  #Mitral regurgitation, s/p mitral valve repair  #Aortic regurgitation, status post tissue aortic valve replacement  #Cardiomyopathy, possibly due to MR, ischemic cardiomyopathy and A.  fib with RVR  #CABG x2 with LIMA to LAD, SVG to lateral marginal, AVR with #21 magna pericardial prosthesis, mitral valve repair with #26 physio ll ring annuloplasty, Maze procedure, CRAIG ligation  #Loss of balance/ataxia/B12 deficiency  #Impaired memory/difficulty finding words  #  hyperlipidemia  #  diabetes   # hypertension, dyslipidemia     PLAN:     Reviewed echo results with patient.  Will send her to pulmonary.  Patient has severe TR already had mitral and aortic valve surgery.  He is not a candidate for redo surgery at the advanced age of 83.  Will continue medical management with aspirin, Eliquis, atorvastatin, furosemide, metoprolol, Aldactone as tolerated to help with right heart failure, CAD, valvular heart disease, hypertension, A-fib, dyslipidemia.  Patient underwent Lexiscan Cardiolite 11/13/2023 which is negative for ischemia and EF is over 70%.  We will continue medical management and monitor symptoms.     Patient has high [id number]-[id number] score due to female gender, age over 75, hypertension and diabetes making it 5, will benefit from long-term anticoagulation to prevent thromboembolic events.  Will continue Eliquis for now and monitor anemia.  Follow-up with hematology for anemia.    Will follow-up in nurse practitioner [facility] and follow-up with me.       Procedures:  Cardiac cath 5/17/2022 which revealed severe two-vessel disease  BEST which revealed severe AR.  Patient underwent two-vessel CABG, maze, aortic valve replacement and mitral valve repair by  5/19/2022      Procedures     EKG from 3/16/2023 reviewed/interpreted by me reveals sinus rhythm with rate of 83 bpm        Procedures     Lexiscan Cardiolite performed 11/13/2023 reviewed/interpreted by me reveals normal perfusion, negative for ischemia, EF over 70%      Procedures  Echocardiogram 4/17/2024 reviewed/interpreted by me reveals normal LV systolic function with RV enlargement RA enlargement severe tricuspid  regurgitation.  And pulmonary hypertension    Copied text in this portion of the note has been reviewed and is accurate as of 4/17/2024  The following portions of the patient's history were reviewed and updated as appropriate: allergies, current medications, past family history, past medical history, past social history, past surgical history and problem list.    Assessment:         [facility]       Diagnosis Plan   1. Nonrheumatic tricuspid valve regurgitation  Ambulatory Referral to Pulmonology      2. Chronic right-sided heart failure  Ambulatory Referral to Pulmonology      3. S/P AVR (aortic valve replacement)        4. S/P MVR (mitral valve repair)        5. Essential hypertension        6. S/P CABG x 2        7. S/P Maze operation for atrial fibrillation        8. S/P left atrial appendage ligation        9. Paroxysmal atrial fibrillation        10. Long term (current) use of anticoagulants        11. Coronary artery disease involving coronary bypass graft of native heart without angina pectoris               Plan:               Past Medical History:  Past Medical History:   Diagnosis Date    Acute congestive heart failure, unspecified heart failure type 05/15/2022    Age-related osteoporosis without current pathological fracture     prolia(8490-1959, 9/12/2019), restarted prolia(11/3/20)    Allergic     Anemia     Anxiety     Arthritis     CAD (coronary artery disease)     Depression     Diabetes     Elevated cholesterol     HTN (hypertension)     Hyperlipemia     Injury of back     Sinusitis     Type II diabetes mellitus      Past Surgical History:  Past Surgical History:   Procedure Laterality Date    ANKLE ARTHROSCOPY      AORTIC VALVE REPAIR/REPLACEMENT MITRAL VALVE REPAIR/REPLACEMENT N/A 5/19/2022    Procedure: AORTIC VALVE REPAIR/REPLACEMENT MITRAL VALVE REPAIR/REPLACEMENT;  Surgeon: Lane Okeefe MD;  Location: [facility];  Service: Cardiothoracic;  Laterality: N/A;  Mitral Valve repair with 26mm Physio  II ring. Aortic   Valve Replacement with 21mm Magna Ease valve.    BELPHAROPTOSIS REPAIR      CARDIAC CATHETERIZATION      CARDIAC CATHETERIZATION N/A 5/17/2022    Procedure: Left Heart Cath, possible pci;  Surgeon: Natan Terrazas MD;  Location: [facility] CATH INVASIVE LOCATION;  Service: Cardiovascular;  Laterality: N/A;    CATARACT EXTRACTION      CHOLECYSTECTOMY N/A 10/14/2019    Procedure: Laparoscopic cholecystectomy, possible open;  Surgeon: Vijay Guerra DO;  Location: [facility] MAIN OR;  Service: General    COLONOSCOPY      CORONARY ARTERY BYPASS GRAFT N/A 5/19/2022    Procedure: CORONARY ARTERY BYPASS GRAFTING;  Surgeon: Lane Okeefe MD;  Location: [facility] CVOR;  Service: Cardiothoracic;  Laterality: N/A;  CABG X 2 (1 Vein graft, 1 LIMA graft)    COSMETIC SURGERY      ENDOSCOPY      HEMORROIDECTOMY      HYSTERECTOMY      MAZE PROCEDURE N/A 5/19/2022    Procedure: MAZE PROCEDURE;  Surgeon: Lane Okeefe MD;  Location: [facility] CVOR;  Service: Cardiothoracic;  Laterality: N/A;      Allergies:  Allergies   Allergen Reactions    Asacol [Mesalamine] Swelling    Diclofenac Swelling     Gums swell only per patient    Penicillins Other (See Comments)     Gums swelling per patient.      Home Meds:  Current Meds:     Current Outpatient Medications:     apixaban (Eliquis) 2.5 MG tablet tablet, TAKE 1 TABLET EVERY 12 HOURS FOR ATRIAL FIBRILLATION, Disp: 180 tablet, Rfl: 1    aspirin (aspirin) 81 MG EC tablet, Take 1 tablet by mouth Daily., Disp: , Rfl:     atorvastatin (LIPITOR) 40 MG tablet, Take 1 tablet by mouth every night at bedtime., Disp: 90 tablet, Rfl: 1    Calcium Carbonate (CALCIUM 500 PO), Take 2 tablets by mouth Daily., Disp: , Rfl:     Cholecalciferol (VITAMIN D3) 2000 units capsule, Take 1 capsule by mouth Every Evening., Disp: , Rfl:     colestipol (COLESTID) 1 g tablet, Take 1 tablet by mouth As Needed., Disp: , Rfl:     cyanocobalamin 1000 MCG/ML injection, Inject 1 mL into the  appropriate muscle as directed by prescriber Every 30 (Thirty) Days., Disp: , Rfl:     Denosumab (PROLIA SC), Inject  under the skin into the appropriate area as directed., Disp: , Rfl:     fenofibrate 160 MG tablet, Take 1 tablet by mouth Daily., Disp: , Rfl:     ferrous sulfate 325 (65 FE) MG tablet, Take 1 tablet by mouth Daily With Breakfast., Disp: , Rfl:     folic acid-pyridoxine-cyanocobalamin (FOLBIC) 2.5-25-2 MG tablet tablet, Take  by mouth Daily., Disp: , Rfl:     furosemide (LASIX) 40 MG tablet, Take 1 tablet by mouth 2 (Two) Times a Day. Take 40mg in am and 40mg in pm, Disp: 180 tablet, Rfl: 1    glimepiride (AMARYL) 2 MG tablet, Take 0.5 tablets by mouth Every Morning Before Breakfast., Disp: , Rfl:     Magnesium 500 MG tablet, Take  by mouth., Disp: , Rfl:     METAMUCIL FIBER PO, Take  by mouth., Disp: , Rfl:     metFORMIN ER (GLUCOPHAGE-XR) 500 MG 24 hr tablet, Take 4 tablets by mouth Daily With Breakfast. 4 pills a day, Disp: , Rfl:     metoprolol succinate XL (TOPROL-XL) 25 MG 24 hr tablet, Take 0.5 tablets by mouth Daily., Disp: 45 tablet, Rfl: 3    multivitamin with minerals tablet tablet, Take 1 tablet by mouth Daily., Disp: , Rfl:     nitroglycerin (NITROSTAT) 0.4 MG SL tablet, Place 1 tablet under the tongue Every 5 (Five) Minutes As Needed for Chest Pain. Take no more than 3 doses in 15 minutes., Disp: , Rfl:     Probiotic Product (Risaquad-2) capsule capsule, Take 1 capsule by mouth Daily., Disp: , Rfl:     spironolactone (ALDACTONE) 25 MG tablet, Take one in the morning and a half tablet at 2:00 pm, Disp: 135 tablet, Rfl: 1    Zinc 30 MG capsule, Take  by mouth Daily., Disp: , Rfl:     acetaminophen (TYLENOL) 325 MG tablet, Take 2 tablets by mouth Every 6 (Six) Hours As Needed for Mild Pain ., Disp: 30 tablet, Rfl: 0    Budesonide (ENTOCORT EC) 3 MG 24 hr capsule, Take 2 capsules by mouth 3 (Three) Times a Day., Disp: , Rfl:     dicyclomine (BENTYL) 10 MG capsule, Take 1 capsule by mouth  "Daily., Disp: , Rfl:     hydroxychloroquine (PLAQUENIL) 200 MG tablet, , Disp: , Rfl:     Lactobacillus (PROBIOTIC ACIDOPHILUS PO), Take  by mouth., Disp: , Rfl:     Current Facility-Administered Medications:     denosumab (PROLIA) syringe 60 mg, 60 mg, Subcutaneous, Q6 Months, Elisabeth Royal PA, 60 mg at 11/10/22 1021  Social History:   Social History     Tobacco Use    Smoking status: Never    Smokeless tobacco: Never   Substance Use Topics    Alcohol use: No      Family History:  Family History   Problem Relation Age of Onset    Diabetes Mother     Heart disease Father     Heart disease Brother     Diabetes Brother     Osteoporosis Paternal Grandmother               Review of Systems   Constitutional: Positive for malaise/fatigue.   Cardiovascular:  Positive for leg swelling. Negative for chest pain and palpitations.   Respiratory:  Negative for shortness of breath.    Skin:  Negative for rash.   Neurological:  Negative for dizziness, light-headedness and numbness.     All other systems are negative         Objective:     Physical Exam  /60   Pulse 92   Ht 157.5 cm (62\")   Wt 49.4 kg (109 lb)   SpO2 94%   BMI 19.94 kg/m²   General:  Appears in no acute distress  Eyes: Sclera is anicteric,  conjunctiva is clear   HEENT:  No JVD.  No carotid bruits  Respiratory: Respirations regular and unlabored at rest.  Clear to auscultation  Cardiovascular: S1,S2 Regular rate and rhythm.  3/6 holosystolic murmur.   Extremities: No digital clubbing or cyanosis, no edema  Skin: Color pink. Skin warm and dry to touch. No rashes  No xanthoma  Neuro: Alert and awake.    Lab Reviewed:         Natan Terrazas MD  4/17/2024 15:18 EDT      EMR Dragon/Transcription:   \"Dictated utilizing Dragon dictation\".        "

## 2024-04-18 ENCOUNTER — LAB (OUTPATIENT)
Dept: LAB | Facility: HOSPITAL | Age: 84
End: 2024-04-18
Payer: MEDICARE

## 2024-04-18 ENCOUNTER — OFFICE VISIT (OUTPATIENT)
Dept: ONCOLOGY | Facility: CLINIC | Age: 84
End: 2024-04-18
Payer: MEDICARE

## 2024-04-18 VITALS
OXYGEN SATURATION: 95 % | RESPIRATION RATE: 18 BRPM | BODY MASS INDEX: 19.69 KG/M2 | HEART RATE: 98 BPM | WEIGHT: 107 LBS | SYSTOLIC BLOOD PRESSURE: 118 MMHG | DIASTOLIC BLOOD PRESSURE: 79 MMHG | HEIGHT: 62 IN | TEMPERATURE: 98 F

## 2024-04-18 DIAGNOSIS — D64.9 ANEMIA, UNSPECIFIED TYPE: Primary | ICD-10-CM

## 2024-04-18 DIAGNOSIS — N17.9 ACUTE RENAL FAILURE, UNSPECIFIED ACUTE RENAL FAILURE TYPE: ICD-10-CM

## 2024-04-18 DIAGNOSIS — E78.2 MIXED HYPERLIPIDEMIA: Chronic | ICD-10-CM

## 2024-04-18 LAB
BASOPHILS # BLD AUTO: 0.01 10*3/MM3 (ref 0–0.2)
BASOPHILS NFR BLD AUTO: 0.2 % (ref 0–1.5)
BILIRUB UR QL STRIP: NEGATIVE
CLARITY UR: CLEAR
COLOR UR: YELLOW
DEPRECATED RDW RBC AUTO: 48.5 FL (ref 37–54)
EOSINOPHIL # BLD AUTO: 0.08 10*3/MM3 (ref 0–0.4)
EOSINOPHIL NFR BLD AUTO: 1.7 % (ref 0.3–6.2)
ERYTHROCYTE [DISTWIDTH] IN BLOOD BY AUTOMATED COUNT: 15.5 % (ref 12.3–15.4)
GLUCOSE UR STRIP-MCNC: ABNORMAL MG/DL
HCT VFR BLD AUTO: 31.6 % (ref 34–46.6)
HGB BLD-MCNC: 10.1 G/DL (ref 12–15.9)
HGB UR QL STRIP.AUTO: NEGATIVE
HOLD SPECIMEN: NORMAL
HOLD SPECIMEN: NORMAL
KETONES UR QL STRIP: NEGATIVE
LEUKOCYTE ESTERASE UR QL STRIP.AUTO: NEGATIVE
LYMPHOCYTES # BLD AUTO: 0.64 10*3/MM3 (ref 0.7–3.1)
LYMPHOCYTES NFR BLD AUTO: 13.6 % (ref 19.6–45.3)
MCH RBC QN AUTO: 28.5 PG (ref 26.6–33)
MCHC RBC AUTO-ENTMCNC: 32 G/DL (ref 31.5–35.7)
MCV RBC AUTO: 89.3 FL (ref 79–97)
MONOCYTES # BLD AUTO: 0.41 10*3/MM3 (ref 0.1–0.9)
MONOCYTES NFR BLD AUTO: 8.7 % (ref 5–12)
NEUTROPHILS NFR BLD AUTO: 3.55 10*3/MM3 (ref 1.7–7)
NEUTROPHILS NFR BLD AUTO: 75.8 % (ref 42.7–76)
NITRITE UR QL STRIP: NEGATIVE
PH UR STRIP.AUTO: 7 [PH] (ref 5–8)
PLATELET # BLD AUTO: 192 10*3/MM3 (ref 140–450)
PMV BLD AUTO: 10.1 FL (ref 6–12)
PROT UR QL STRIP: NEGATIVE
RBC # BLD AUTO: 3.54 10*6/MM3 (ref 3.77–5.28)
SP GR UR STRIP: 1.02 (ref 1–1.03)
UROBILINOGEN UR QL STRIP: ABNORMAL
WBC NRBC COR # BLD AUTO: 4.69 10*3/MM3 (ref 3.4–10.8)

## 2024-04-18 PROCEDURE — 85025 COMPLETE CBC W/AUTO DIFF WBC: CPT

## 2024-04-18 PROCEDURE — 36415 COLL VENOUS BLD VENIPUNCTURE: CPT

## 2024-04-18 PROCEDURE — 81003 URINALYSIS AUTO W/O SCOPE: CPT

## 2024-04-18 RX ORDER — CYANOCOBALAMIN/FOLIC AC/VIT B6 1-2.5-25MG
1 TABLET ORAL DAILY
Qty: 30 TABLET | Refills: 11 | Status: SHIPPED | OUTPATIENT
Start: 2024-04-18

## 2024-04-18 NOTE — PROGRESS NOTES
Hematology/Oncology Outpatient Follow Up    PATIENT NAME:Mindi Quinteros  :1940  MRN: [id number]  PRIMARY CARE PHYSICIAN: Dayana Tipton MD  REFERRING PHYSICIAN: No ref. provider found    Chief Complaint   Patient presents with    Follow-up     Anemia, unspecified type        HISTORY OF PRESENT ILLNESS:     This is an 83-year-old female who has been referred secondary to anemia.  Review of her CBCs indicate that she has had anemia since  with hemoglobin ranging between 8 and 9 g per DL normal white count and normal platelets.  Her differential is a 78% neutrophils there is no lymphocytosis eosinophilia or basophilia.  She has a history of B12 deficiency and receives B12 injections.  She has normal renal function with BUN of 17, creatinine 0.9     She has been  oral b12 supplements.  Denies any rectal bleeding or blood in her urine or vaginal bleeding.     She was on oral iron tablets . She saw Dr Kenney  in the past and received IV iron as well.     She is on Folbic  She is on Eliquis due to afib     Patient lives with her daughter  Patient does not smoke  She does not drink alcohol    2023: Patient is here today for routine follow-up.      Past Medical History:   Diagnosis Date    Acute congestive heart failure, unspecified heart failure type 05/15/2022    Age-related osteoporosis without current pathological fracture     prolia([id number], 2019), restarted prolia(11/3/20)    Allergic     Anemia     Anxiety     Arthritis     CAD (coronary artery disease)     Depression     Diabetes     Elevated cholesterol     HTN (hypertension)     Hyperlipemia     Injury of back     Sinusitis     Type II diabetes mellitus        Past Surgical History:   Procedure Laterality Date    ANKLE ARTHROSCOPY      AORTIC VALVE REPAIR/REPLACEMENT MITRAL VALVE REPAIR/REPLACEMENT N/A 2022    Procedure: AORTIC VALVE REPAIR/REPLACEMENT MITRAL VALVE REPAIR/REPLACEMENT;  Surgeon: Lane Okeefe MD;  Location: [facility]  CVOR;  Service: Cardiothoracic;  Laterality: N/A;  Mitral Valve repair with 26mm Physio II ring. Aortic   Valve Replacement with 21mm Magna Ease valve.    BELPHAROPTOSIS REPAIR      CARDIAC CATHETERIZATION      CARDIAC CATHETERIZATION N/A 5/17/2022    Procedure: Left Heart Cath, possible pci;  Surgeon: Natan Terrazas MD;  Location: [facility] CATH INVASIVE LOCATION;  Service: Cardiovascular;  Laterality: N/A;    CATARACT EXTRACTION      CHOLECYSTECTOMY N/A 10/14/2019    Procedure: Laparoscopic cholecystectomy, possible open;  Surgeon: Vijay Guerra DO;  Location: [facility] MAIN OR;  Service: General    COLONOSCOPY      CORONARY ARTERY BYPASS GRAFT N/A 5/19/2022    Procedure: CORONARY ARTERY BYPASS GRAFTING;  Surgeon: Lane Okeefe MD;  Location: [facility] CVOR;  Service: Cardiothoracic;  Laterality: N/A;  CABG X 2 (1 Vein graft, 1 LIMA graft)    COSMETIC SURGERY      ENDOSCOPY      HEMORROIDECTOMY      HYSTERECTOMY      MAZE PROCEDURE N/A 5/19/2022    Procedure: MAZE PROCEDURE;  Surgeon: Lane Okeefe MD;  Location: [facility] CVOR;  Service: Cardiothoracic;  Laterality: N/A;         Current Outpatient Medications:     acetaminophen (TYLENOL) 325 MG tablet, Take 2 tablets by mouth Every 6 (Six) Hours As Needed for Mild Pain ., Disp: 30 tablet, Rfl: 0    apixaban (Eliquis) 2.5 MG tablet tablet, TAKE 1 TABLET EVERY 12 HOURS FOR ATRIAL FIBRILLATION, Disp: 180 tablet, Rfl: 1    aspirin (aspirin) 81 MG EC tablet, Take 1 tablet by mouth Daily., Disp: , Rfl:     atorvastatin (LIPITOR) 40 MG tablet, Take 1 tablet by mouth every night at bedtime., Disp: 90 tablet, Rfl: 1    Budesonide (ENTOCORT EC) 3 MG 24 hr capsule, Take 2 capsules by mouth 3 (Three) Times a Day., Disp: , Rfl:     Calcium Carbonate (CALCIUM 500 PO), Take 2 tablets by mouth Daily., Disp: , Rfl:     Cholecalciferol (VITAMIN D3) 2000 units capsule, Take 1 capsule by mouth Every Evening., Disp: , Rfl:     colestipol (COLESTID) 1 g tablet, Take 1 tablet  by mouth As Needed., Disp: , Rfl:     cyanocobalamin 1000 MCG/ML injection, Inject 1 mL into the appropriate muscle as directed by prescriber Every 30 (Thirty) Days., Disp: , Rfl:     Denosumab (PROLIA SC), Inject  under the skin into the appropriate area as directed., Disp: , Rfl:     dicyclomine (BENTYL) 10 MG capsule, Take 1 capsule by mouth Daily., Disp: , Rfl:     fenofibrate 160 MG tablet, Take 1 tablet by mouth Daily., Disp: , Rfl:     ferrous sulfate 325 (65 FE) MG tablet, Take 1 tablet by mouth Daily With Breakfast., Disp: , Rfl:     folic acid-pyridoxine-cyanocobalamin (FOLBIC) 2.5-25-2 MG tablet tablet, Take  by mouth Daily., Disp: , Rfl:     furosemide (LASIX) 40 MG tablet, Take 1 tablet by mouth 2 (Two) Times a Day. Take 40mg in am and 40mg in pm, Disp: 180 tablet, Rfl: 1    glimepiride (AMARYL) 2 MG tablet, Take 0.5 tablets by mouth Every Morning Before Breakfast., Disp: , Rfl:     hydroxychloroquine (PLAQUENIL) 200 MG tablet, , Disp: , Rfl:     Lactobacillus (PROBIOTIC ACIDOPHILUS PO), Take  by mouth., Disp: , Rfl:     Magnesium 500 MG tablet, Take  by mouth., Disp: , Rfl:     METAMUCIL FIBER PO, Take  by mouth., Disp: , Rfl:     metFORMIN ER (GLUCOPHAGE-XR) 500 MG 24 hr tablet, Take 4 tablets by mouth Daily With Breakfast. 4 pills a day, Disp: , Rfl:     metoprolol succinate XL (TOPROL-XL) 25 MG 24 hr tablet, Take 0.5 tablets by mouth Daily., Disp: 45 tablet, Rfl: 3    multivitamin with minerals tablet tablet, Take 1 tablet by mouth Daily., Disp: , Rfl:     nitroglycerin (NITROSTAT) 0.4 MG SL tablet, Place 1 tablet under the tongue Every 5 (Five) Minutes As Needed for Chest Pain. Take no more than 3 doses in 15 minutes., Disp: , Rfl:     Probiotic Product (Risaquad-2) capsule capsule, Take 1 capsule by mouth Daily., Disp: , Rfl:     spironolactone (ALDACTONE) 25 MG tablet, Take one in the morning and a half tablet at 2:00 pm, Disp: 135 tablet, Rfl: 1    Zinc 30 MG capsule, Take  by mouth Daily.,  Disp: , Rfl:     Current Facility-Administered Medications:     denosumab (PROLIA) syringe 60 mg, 60 mg, Subcutaneous, Q6 Months, Elisabeth Royal PA, 60 mg at 11/10/22 1021    Allergies   Allergen Reactions    Asacol [Mesalamine] Swelling    Diclofenac Swelling     Gums swell only per patient    Penicillins Other (See Comments)     Gums swelling per patient.        Family History   Problem Relation Age of Onset    Diabetes Mother     Heart disease Father     Heart disease Brother     Diabetes Brother     Osteoporosis Paternal Grandmother        Cancer-related family history is not on file.    Social History     Tobacco Use    Smoking status: Never    Smokeless tobacco: Never   Vaping Use    Vaping status: Never Used   Substance Use Topics    Alcohol use: No    Drug use: No       I have reviewed and confirmed the accuracy of the patient's history: Chief complaint, HPI, ROS, and Subjective as entered by the MA/LPN/RN. Paula Nguyen MD 04/18/24      SUBJECTIVE:      She is here    REVIEW OF SYSTEMS:  Review of Systems   Constitutional:  Negative for chills, fatigue and fever.   HENT:  Negative for congestion, drooling, ear discharge, rhinorrhea, sinus pressure and tinnitus.    Eyes:  Negative for photophobia, pain and discharge.   Respiratory:  Negative for apnea, choking and stridor.    Cardiovascular:  Negative for palpitations.   Gastrointestinal:  Negative for abdominal distention, abdominal pain and anal bleeding.   Endocrine: Negative for polydipsia and polyphagia.   Genitourinary:  Negative for decreased urine volume, flank pain and genital sores.   Musculoskeletal:  Negative for gait problem, neck pain and neck stiffness.   Skin:  Negative for color change, rash and wound.   Neurological:  Negative for tremors, seizures, syncope, facial asymmetry and speech difficulty.   Hematological:  Negative for adenopathy.   Psychiatric/Behavioral:  Negative for agitation, confusion, hallucinations and  "self-injury. The patient is not hyperactive.        OBJECTIVE:    Vitals:    04/18/24 1540   BP: 118/79   Pulse: 98   Resp: 18   Temp: 98 °F (36.7 °C)   TempSrc: Infrared   SpO2: 95%   Weight: 48.5 kg (107 lb)   Height: 157.5 cm (62\")   PainSc: 0-No pain     Body mass index is 19.57 kg/m².    ECOG  (1) Restricted in physically strenuous activity, ambulatory and able to do work of light nature    Physical Exam    No changes    RECENT LABS    WBC   Date Value Ref Range Status   04/18/2024 4.69 3.40 - 10.80 10*3/mm3 Final     RBC   Date Value Ref Range Status   04/18/2024 3.54 (L) 3.77 - 5.28 10*6/mm3 Final     Hemoglobin   Date Value Ref Range Status   04/18/2024 10.1 (L) 12.0 - 15.9 g/dL Final     Hematocrit   Date Value Ref Range Status   04/18/2024 31.6 (L) 34.0 - 46.6 % Final     MCV   Date Value Ref Range Status   04/18/2024 89.3 79.0 - 97.0 fL Final     MCH   Date Value Ref Range Status   04/18/2024 28.5 26.6 - 33.0 pg Final     MCHC   Date Value Ref Range Status   04/18/2024 32.0 31.5 - 35.7 g/dL Final     RDW   Date Value Ref Range Status   04/18/2024 15.5 (H) 12.3 - 15.4 % Final     RDW-SD   Date Value Ref Range Status   04/18/2024 48.5 37.0 - 54.0 fl Final     MPV   Date Value Ref Range Status   04/18/2024 10.1 6.0 - 12.0 fL Final     Platelets   Date Value Ref Range Status   04/18/2024 192 140 - 450 10*3/mm3 Final     Neutrophil %   Date Value Ref Range Status   04/18/2024 75.8 42.7 - 76.0 % Final     Lymphocyte %   Date Value Ref Range Status   04/18/2024 13.6 (L) 19.6 - 45.3 % Final     Monocyte %   Date Value Ref Range Status   04/18/2024 8.7 5.0 - 12.0 % Final     Eosinophil %   Date Value Ref Range Status   04/18/2024 1.7 0.3 - 6.2 % Final     Basophil %   Date Value Ref Range Status   04/18/2024 0.2 0.0 - 1.5 % Final     Immature Grans %   Date Value Ref Range Status   10/05/2020 0.4 0.0 - 0.5 % Final     Neutrophils Absolute   Date Value Ref Range Status   07/04/2022 2.6 1.7 - 6.0 x10(3)/ul Final "     Neutrophils, Absolute   Date Value Ref Range Status   04/18/2024 3.55 1.70 - 7.00 10*3/mm3 Final     Lymphocytes, Absolute   Date Value Ref Range Status   04/18/2024 0.64 (L) 0.70 - 3.10 10*3/mm3 Final     Monocytes, Absolute   Date Value Ref Range Status   04/18/2024 0.41 0.10 - 0.90 10*3/mm3 Final     Eosinophils Absolute   Date Value Ref Range Status   07/04/2022 0.0 0.0 - 0.6 x10(3)/ul Final     Eosinophils, Absolute   Date Value Ref Range Status   04/18/2024 0.08 0.00 - 0.40 10*3/mm3 Final     Basophils Absolute   Date Value Ref Range Status   07/04/2022 0.0 0.0 - 0.3 x10(3)/ul Final     Basophils, Absolute   Date Value Ref Range Status   04/18/2024 0.01 0.00 - 0.20 10*3/mm3 Final     Immature Grans, Absolute   Date Value Ref Range Status   10/05/2020 0.02 0.00 - 0.05 10*3/mm3 Final     nRBC   Date Value Ref Range Status   01/29/2023 0.0 0.0 - 0.2 /100 WBC Final       Lab Results   Component Value Date    GLUCOSE 205 (H) 03/22/2024    BUN 22 03/22/2024    CREATININE 0.99 03/22/2024    EGFRIFNONA 91 10/23/2020    BCR 22.2 03/22/2024    K 3.9 03/22/2024    CO2 29.0 03/22/2024    CALCIUM 9.7 03/22/2024    PROTENTOTREF 6.9 03/22/2024    ALBUMIN 4.3 03/22/2024    ALBUMIN 3.7 03/22/2024    LABIL2 1.2 03/22/2024    AST 30 03/22/2024    ALT 15 03/22/2024         Assessment & Plan     Anemia, unspecified type  - CBC & Differential      ASSESSMENT:    Anemia, unspecified type          Chronic anemia, normocytic.  Her workup is essentially unremarkable March 2024  History of iron deficiency anemia status post IV iron in the past  Pernicious anemia on B12 injections received through her PCP office.  Patient will continue  Ulcerative colitis: She will follow-up with GI  Iron malabsorption.  She will need IV iron replacement if she becomes low on iron  MTHFR heterozygote mutation for 677 gene.  History of hyperhomocysteinemia.  On Folbee.  Prescription sent to her pharmacy  Leukopenia    Autoimmune disease: Connective  tissue disorder. Seen  Zachary.  Patient is off  Plaquenil.  She has an appointment coming up soon        Plans     Anemia workup completed in March 2024 does not show any vitamin deficiencies.  Serum transferrin receptor assay was normal eliminating iron deficiency.  Also noted that she has a normal creatinine  Hemoglobin has improved to 10 g per DL today 4/18/2024  Include serum transferrin receptor assay  Reviewed records from Dr. Kneney's office  Follow-up 3 months with CBC fasting mecysteine level done a week before the appointment  All questions answered               I spent 30 total minutes, face-to-face, caring for Mindi today. 90% of this time involved counseling and/or coordination of care as documented within this note.

## 2024-04-19 ENCOUNTER — APPOINTMENT (OUTPATIENT)
Dept: CARDIAC REHAB | Facility: HOSPITAL | Age: 84
End: 2024-04-19

## 2024-04-22 RX ORDER — CYANOCOBALAMIN/FOLIC AC/VIT B6 1-2.5-25MG
1 TABLET ORAL DAILY
Qty: 30 TABLET | Refills: 11 | Status: CANCELLED | OUTPATIENT
Start: 2024-04-22

## 2024-04-22 NOTE — TELEPHONE ENCOUNTER
Caller: Mindi Quinteros    Relationship: Self    Best call back number: [phone]    Requested Prescriptions:   Requested Prescriptions     Pending Prescriptions Disp Refills    folic acid-vit B6-vit B12 (Folbee) 2.5-25-1 MG tablet tablet 30 tablet 11     Sig: Take 1 tablet by mouth Daily.        Pharmacy where request should be sent: [id number] [address] [facility] - [phone] [facility] [phone] FX     Last office visit with prescribing clinician: 4/18/2024   Last telemedicine visit with prescribing clinician: Visit date not found   Next office visit with prescribing clinician: 7/19/2024     Additional details provided by patient: PLEASE SEND FOR GENERIC - FOLBEE. PATIENT HAS BEEN TAKING THIS TWICE DAILY, SHOULD SHE TAKE ONCE OR TWICE DAILY?     Does the patient have less than a 3 day supply:  [x] Yes  [] No    Would you like a call back once the refill request has been completed: [x] Yes [] No    If the office needs to give you a call back, can they leave a voicemail: [x] Yes [] No

## 2024-04-23 ENCOUNTER — APPOINTMENT (OUTPATIENT)
Dept: CARDIAC REHAB | Facility: HOSPITAL | Age: 84
End: 2024-04-23

## 2024-04-24 RX ORDER — CYANOCOBALAMIN/FOLIC AC/VIT B6 1-2.5-25MG
1 TABLET ORAL 2 TIMES DAILY
Qty: 60 TABLET | Refills: 6 | Status: SHIPPED | OUTPATIENT
Start: 2024-04-24

## 2024-04-26 ENCOUNTER — APPOINTMENT (OUTPATIENT)
Dept: CARDIAC REHAB | Facility: HOSPITAL | Age: 84
End: 2024-04-26

## 2024-04-29 ENCOUNTER — OFFICE (OUTPATIENT)
Dept: URBAN - METROPOLITAN AREA CLINIC 64 | Facility: CLINIC | Age: 84
End: 2024-04-29

## 2024-04-29 VITALS
WEIGHT: 107 LBS | HEART RATE: 67 BPM | DIASTOLIC BLOOD PRESSURE: 70 MMHG | SYSTOLIC BLOOD PRESSURE: 122 MMHG | HEIGHT: 64 IN

## 2024-04-29 DIAGNOSIS — R15.9 FULL INCONTINENCE OF FECES: ICD-10-CM

## 2024-04-29 DIAGNOSIS — K51.90 ULCERATIVE COLITIS, UNSPECIFIED, WITHOUT COMPLICATIONS: ICD-10-CM

## 2024-04-29 DIAGNOSIS — R19.4 CHANGE IN BOWEL HABIT: ICD-10-CM

## 2024-04-29 PROCEDURE — 99213 OFFICE O/P EST LOW 20 MIN: CPT | Performed by: NURSE PRACTITIONER

## 2024-04-30 ENCOUNTER — OFFICE VISIT (OUTPATIENT)
Dept: CARDIAC REHAB | Facility: HOSPITAL | Age: 84
End: 2024-04-30

## 2024-04-30 DIAGNOSIS — Z95.2 S/P AVR: Primary | ICD-10-CM

## 2024-05-03 ENCOUNTER — APPOINTMENT (OUTPATIENT)
Dept: CARDIAC REHAB | Facility: HOSPITAL | Age: 84
End: 2024-05-03

## 2024-05-06 ENCOUNTER — APPOINTMENT (OUTPATIENT)
Dept: CARDIAC REHAB | Facility: HOSPITAL | Age: 84
End: 2024-05-06

## 2024-05-07 ENCOUNTER — APPOINTMENT (OUTPATIENT)
Dept: CARDIAC REHAB | Facility: HOSPITAL | Age: 84
End: 2024-05-07

## 2024-05-10 ENCOUNTER — APPOINTMENT (OUTPATIENT)
Dept: CARDIAC REHAB | Facility: HOSPITAL | Age: 84
End: 2024-05-10

## 2024-05-14 ENCOUNTER — APPOINTMENT (OUTPATIENT)
Dept: CARDIAC REHAB | Facility: HOSPITAL | Age: 84
End: 2024-05-14

## 2024-05-17 ENCOUNTER — APPOINTMENT (OUTPATIENT)
Dept: CARDIAC REHAB | Facility: HOSPITAL | Age: 84
End: 2024-05-17

## 2024-05-21 ENCOUNTER — APPOINTMENT (OUTPATIENT)
Dept: CARDIAC REHAB | Facility: HOSPITAL | Age: 84
End: 2024-05-21

## 2024-05-24 ENCOUNTER — APPOINTMENT (OUTPATIENT)
Dept: CARDIAC REHAB | Facility: HOSPITAL | Age: 84
End: 2024-05-24

## 2024-05-28 ENCOUNTER — APPOINTMENT (OUTPATIENT)
Dept: CARDIAC REHAB | Facility: HOSPITAL | Age: 84
End: 2024-05-28

## 2024-05-31 ENCOUNTER — APPOINTMENT (OUTPATIENT)
Dept: CARDIAC REHAB | Facility: HOSPITAL | Age: 84
End: 2024-05-31

## 2024-06-04 ENCOUNTER — APPOINTMENT (OUTPATIENT)
Dept: CARDIAC REHAB | Facility: HOSPITAL | Age: 84
End: 2024-06-04

## 2024-06-07 ENCOUNTER — APPOINTMENT (OUTPATIENT)
Dept: CARDIAC REHAB | Facility: HOSPITAL | Age: 84
End: 2024-06-07

## 2024-06-14 ENCOUNTER — OFFICE VISIT (OUTPATIENT)
Dept: CARDIAC REHAB | Facility: HOSPITAL | Age: 84
End: 2024-06-14

## 2024-06-14 DIAGNOSIS — Z95.2 S/P AVR: Primary | ICD-10-CM

## 2024-06-21 ENCOUNTER — OFFICE VISIT (OUTPATIENT)
Dept: CARDIAC REHAB | Facility: HOSPITAL | Age: 84
End: 2024-06-21

## 2024-06-21 DIAGNOSIS — Z95.2 S/P AVR: Primary | ICD-10-CM

## 2024-06-25 ENCOUNTER — APPOINTMENT (OUTPATIENT)
Dept: CARDIAC REHAB | Facility: HOSPITAL | Age: 84
End: 2024-06-25

## 2024-06-28 ENCOUNTER — APPOINTMENT (OUTPATIENT)
Dept: CARDIAC REHAB | Facility: HOSPITAL | Age: 84
End: 2024-06-28

## 2024-07-02 ENCOUNTER — APPOINTMENT (OUTPATIENT)
Dept: CARDIAC REHAB | Facility: HOSPITAL | Age: 84
End: 2024-07-02

## 2024-07-05 ENCOUNTER — APPOINTMENT (OUTPATIENT)
Dept: CARDIAC REHAB | Facility: HOSPITAL | Age: 84
End: 2024-07-05

## 2024-07-08 ENCOUNTER — APPOINTMENT (OUTPATIENT)
Dept: CARDIAC REHAB | Facility: HOSPITAL | Age: 84
End: 2024-07-08

## 2024-07-09 ENCOUNTER — APPOINTMENT (OUTPATIENT)
Dept: CARDIAC REHAB | Facility: HOSPITAL | Age: 84
End: 2024-07-09

## 2024-07-11 ENCOUNTER — LAB (OUTPATIENT)
Dept: LAB | Facility: HOSPITAL | Age: 84
End: 2024-07-11
Payer: MEDICARE

## 2024-07-11 DIAGNOSIS — D64.9 ANEMIA, UNSPECIFIED TYPE: ICD-10-CM

## 2024-07-11 DIAGNOSIS — E78.2 MIXED HYPERLIPIDEMIA: Chronic | ICD-10-CM

## 2024-07-11 DIAGNOSIS — N17.9 ACUTE RENAL FAILURE, UNSPECIFIED ACUTE RENAL FAILURE TYPE: ICD-10-CM

## 2024-07-11 LAB
BASOPHILS # BLD AUTO: 0.02 10*3/MM3 (ref 0–0.2)
BASOPHILS NFR BLD AUTO: 0.4 % (ref 0–1.5)
DEPRECATED RDW RBC AUTO: 52 FL (ref 37–54)
EOSINOPHIL # BLD AUTO: 0.06 10*3/MM3 (ref 0–0.4)
EOSINOPHIL NFR BLD AUTO: 1.2 % (ref 0.3–6.2)
ERYTHROCYTE [DISTWIDTH] IN BLOOD BY AUTOMATED COUNT: 15.4 % (ref 12.3–15.4)
HCT VFR BLD AUTO: 35.8 % (ref 34–46.6)
HCYS SERPL-MCNC: 22.7 UMOL/L (ref 0–15)
HGB BLD-MCNC: 10.9 G/DL (ref 12–15.9)
LYMPHOCYTES # BLD AUTO: 0.78 10*3/MM3 (ref 0.7–3.1)
LYMPHOCYTES NFR BLD AUTO: 15 % (ref 19.6–45.3)
MCH RBC QN AUTO: 29.1 PG (ref 26.6–33)
MCHC RBC AUTO-ENTMCNC: 30.4 G/DL (ref 31.5–35.7)
MCV RBC AUTO: 95.7 FL (ref 79–97)
MONOCYTES # BLD AUTO: 0.45 10*3/MM3 (ref 0.1–0.9)
MONOCYTES NFR BLD AUTO: 8.7 % (ref 5–12)
NEUTROPHILS NFR BLD AUTO: 3.88 10*3/MM3 (ref 1.7–7)
NEUTROPHILS NFR BLD AUTO: 74.7 % (ref 42.7–76)
PLATELET # BLD AUTO: 172 10*3/MM3 (ref 140–450)
PMV BLD AUTO: 9.8 FL (ref 6–12)
RBC # BLD AUTO: 3.74 10*6/MM3 (ref 3.77–5.28)
WBC NRBC COR # BLD AUTO: 5.19 10*3/MM3 (ref 3.4–10.8)

## 2024-07-11 PROCEDURE — 85025 COMPLETE CBC W/AUTO DIFF WBC: CPT

## 2024-07-11 PROCEDURE — 36415 COLL VENOUS BLD VENIPUNCTURE: CPT

## 2024-07-11 PROCEDURE — 83090 ASSAY OF HOMOCYSTEINE: CPT | Performed by: INTERNAL MEDICINE

## 2024-07-12 ENCOUNTER — APPOINTMENT (OUTPATIENT)
Dept: CARDIAC REHAB | Facility: HOSPITAL | Age: 84
End: 2024-07-12

## 2024-07-12 RX ORDER — FOLIC ACID 1 MG/1
1 TABLET ORAL DAILY
Qty: 30 TABLET | Refills: 6 | Status: SHIPPED | OUTPATIENT
Start: 2024-07-12

## 2024-07-16 ENCOUNTER — APPOINTMENT (OUTPATIENT)
Dept: CARDIAC REHAB | Facility: HOSPITAL | Age: 84
End: 2024-07-16

## 2024-07-18 NOTE — PROGRESS NOTES
"    Subjective:     Encounter Date:07/19/2024       Patient ID: Mindi Quinteros is a 83 y.o. female.    Chief Complaint: 3 month follow up    History of Present Illness       Ms. Mindi Quinteros  has PMH of     # CAD, cardiac cath 2/7/18  calcified 50% proximal 70% mid LAD and 70% mid LCX, normal LVEF, cardiac cath 5/17/2022 revealed severe two-vessel disease in LAD and LCx.  Echo revealed valvular heart disease with severe AR and MR.  Patient underwent CABG x2 with LIMA to LAD and SVG to lateral marginal and AVR and mitral valve repair and maze and left atrial appendage occlusion 5/19/2022  #CABG x2 with LIMA to LAD and SVG to lateral marginal 5/19/2022  Chronic HFpEF secondary to valvular disease and pulmonary hypertension  New moderate to severe TR  #Valvular heart disease with 5/19/2022 aortic valve replacement with #21 magna pericardial prosthesis and mitral valve repair with #26 physeal ring, left atrial appendage endocardial closure and right and left cryo maze  #Paroxysmal atrial fibrillation, s/p left and right cryo maze and left atrial appendage endocardial closure  [id number] SCORE   [id number] Score: 7 (7/28/2024  6:44 PM)      #  Diabetes  #  Hypertension,  #  Hyperlipidemia  #  ulcerative colitis  #  allergy to aspertane Diflucan neck penicillin mesalamine  #  hemorrhoidectomy, hysterectomy, bladder repair, left ankle surgery    Here for 3 month follow up.  Patient is here with her daughter today.  She has some left lower extremity edema, in which she states that she \"ate chips to experiment\".  She is current taking lasix 40mg BID and spironolactone 25mg in am and 12.5mg in afternoon.        Her blood pressure today is 103/67 HR 68 oxygen 97% on room air. Weight 111lbs         Lab Review:           4/10/2023 echocardiogram  Normal LV size and contractility EF of 60 to 65%  Moderate to severe right ventricular enlargement seen  Normal atrial size  Pulmonic valve is not well visualized.  Aortic valve is not " well-visualized.  Appears to have good cusp separation in limited views visualized.  Dopplers did not reveal any abnormality..  Mitral valve prosthesis appears to be functioning well.  Tricuspid valve appears structurally normal, moderate to severe tricuspid regurgitation seen.  Calculated RV systolic pressure of 50 mmHg consistent with pulmonary hypertension seen.  Plethoric IVC consistent with high right atrial pressure seen..  No pericardial effusion seen.  Proximal aorta appears normal in size.     Echocardiogram 4/17/2024  Normal LV size and contractility EF of 55 to 60%  Abnormal diastolic function with restrictive filling seen.  Severe RV enlargement and RA enlargement seen.  Severe left atrial enlargement  Mitral valve repair with annulus ring present.  Leaflets appear to have good cusp separation.  No significant MR seen.  Prosthetic aortic valve appears structurally normal with adequate cusp separation.  Tricuspid valve appears structurally normal.  Severe tricuspid regurgitation seen.  Plethoric IVC consistent with high right atrial pressure seen.  Calculated RV systolic pressure of 53 mmHg consistent with moderate to severe pulmonary hypertension seen.  Pulmonic valve is not well-visualized.     No pericardial effusion seen.  Proximal aorta appears normal in size.      Labs from 2/9/2024 unremarkable except calcium 10.6   3/22/2024 cmp unremarkable     The following portions of the patient's history were reviewed and updated as appropriate: allergies, current medications, past family history, past medical history, past social history, past surgical history and problem list.    Review of Systems   Constitutional: Positive for malaise/fatigue.   Cardiovascular:  Positive for leg swelling (improving). Negative for chest pain and dyspnea on exertion.   Respiratory:  Negative for shortness of breath.    Neurological:  Negative for light-headedness.   All other systems reviewed and are negative.    Past  "Medical History:   Diagnosis Date    Acute congestive heart failure, unspecified heart failure type 05/15/2022    Age-related osteoporosis without current pathological fracture     prolia([id number], 9/12/2019), restarted prolia(11/3/20)    Allergic     Anemia     Anxiety     Arthritis     CAD (coronary artery disease)     Depression     Diabetes     Elevated cholesterol     HTN (hypertension)     Hyperlipemia     Injury of back     Sinusitis     Type II diabetes mellitus      Past Surgical History:   Procedure Laterality Date    ANKLE ARTHROSCOPY      AORTIC VALVE REPAIR/REPLACEMENT MITRAL VALVE REPAIR/REPLACEMENT N/A 5/19/2022    Procedure: AORTIC VALVE REPAIR/REPLACEMENT MITRAL VALVE REPAIR/REPLACEMENT;  Surgeon: Lane Okeefe MD;  Location: [facility];  Service: Cardiothoracic;  Laterality: N/A;  Mitral Valve repair with 26mm Physio II ring. Aortic   Valve Replacement with 21mm Magna Ease valve.    BELPHAROPTOSIS REPAIR      CARDIAC CATHETERIZATION      CARDIAC CATHETERIZATION N/A 5/17/2022    Procedure: Left Heart Cath, possible pci;  Surgeon: Natan Terrazas MD;  Location: [facility] CATH INVASIVE LOCATION;  Service: Cardiovascular;  Laterality: N/A;    CATARACT EXTRACTION      CHOLECYSTECTOMY N/A 10/14/2019    Procedure: Laparoscopic cholecystectomy, possible open;  Surgeon: Vijay Guerra DO;  Location: [facility] MAIN OR;  Service: General    COLONOSCOPY      CORONARY ARTERY BYPASS GRAFT N/A 5/19/2022    Procedure: CORONARY ARTERY BYPASS GRAFTING;  Surgeon: Lane Okeefe MD;  Location: [facility];  Service: Cardiothoracic;  Laterality: N/A;  CABG X 2 (1 Vein graft, 1 LIMA graft)    COSMETIC SURGERY      ENDOSCOPY      HEMORROIDECTOMY      HYSTERECTOMY      MAZE PROCEDURE N/A 5/19/2022    Procedure: MAZE PROCEDURE;  Surgeon: Lane Okeefe MD;  Location: [facility];  Service: Cardiothoracic;  Laterality: N/A;     /67   Pulse 68   Ht 157.5 cm (62.01\")   Wt 50.3 kg (111 lb)   SpO2 " 97%   BMI 20.30 kg/m²   Family History   Problem Relation Age of Onset    Diabetes Mother     Heart disease Father     Heart disease Brother     Diabetes Brother     Osteoporosis Paternal Grandmother        Current Outpatient Medications:     acetaminophen (TYLENOL) 325 MG tablet, Take 2 tablets by mouth Every 6 (Six) Hours As Needed for Mild Pain ., Disp: 30 tablet, Rfl: 0    apixaban (Eliquis) 2.5 MG tablet tablet, TAKE 1 TABLET EVERY 12 HOURS FOR ATRIAL FIBRILLATION, Disp: 180 tablet, Rfl: 1    aspirin (aspirin) 81 MG EC tablet, Take 1 tablet by mouth Daily., Disp: , Rfl:     atorvastatin (LIPITOR) 40 MG tablet, Take 1 tablet by mouth every night at bedtime., Disp: 90 tablet, Rfl: 1    Calcium Carbonate (CALCIUM 500 PO), Take 2 tablets by mouth Daily., Disp: , Rfl:     cephalexin (KEFLEX) 500 MG capsule, Take 1 capsule by mouth 3 times a day., Disp: , Rfl:     Cholecalciferol (VITAMIN D3) 2000 units capsule, Take 1 capsule by mouth Every Evening., Disp: , Rfl:     clobetasol (TEMOVATE) 0.05 % ointment, 30, Disp: , Rfl:     colestipol (COLESTID) 1 g tablet, Take 1 tablet by mouth As Needed., Disp: , Rfl:     cyanocobalamin 1000 MCG/ML injection, Inject 1 mL into the appropriate muscle as directed by prescriber Every 30 (Thirty) Days., Disp: , Rfl:     Denosumab (PROLIA SC), Inject  under the skin into the appropriate area as directed., Disp: , Rfl:     dicyclomine (BENTYL) 10 MG capsule, Take 1 capsule by mouth Daily., Disp: , Rfl:     fenofibrate 160 MG tablet, Take 1 tablet by mouth Daily., Disp: , Rfl:     ferrous sulfate 325 (65 FE) MG tablet, Take 1 tablet by mouth Daily With Breakfast., Disp: , Rfl:     folic acid (FOLVITE) 1 MG tablet, Take 1 tablet by mouth Daily., Disp: 30 tablet, Rfl: 6    folic acid-pyridoxine-cyanocobalamin (FOLBIC) 2.5-25-2 MG tablet tablet, Take  by mouth Daily., Disp: , Rfl:     furosemide (LASIX) 40 MG tablet, Take 1 tablet by mouth 2 (Two) Times a Day. Take 40mg in am and 40mg in  pm, Disp: 180 tablet, Rfl: 1    glimepiride (AMARYL) 2 MG tablet, Take 0.5 tablets by mouth Every Morning Before Breakfast., Disp: , Rfl:     hydroxychloroquine (PLAQUENIL) 200 MG tablet, , Disp: , Rfl:     Lactobacillus (PROBIOTIC ACIDOPHILUS PO), Take  by mouth., Disp: , Rfl:     Magnesium 500 MG tablet, Take  by mouth., Disp: , Rfl:     METAMUCIL FIBER PO, Take  by mouth., Disp: , Rfl:     metFORMIN ER (GLUCOPHAGE-XR) 500 MG 24 hr tablet, Take 4 tablets by mouth Daily With Breakfast. 4 pills a day, Disp: , Rfl:     metoprolol succinate XL (TOPROL-XL) 25 MG 24 hr tablet, Take 0.5 tablets by mouth Daily., Disp: 45 tablet, Rfl: 3    multivitamin with minerals tablet tablet, Take 1 tablet by mouth Daily., Disp: , Rfl:     mupirocin (BACTROBAN) 2 % ointment, APPLY OINTMENT TOPICALLY THREE TIMES DAILY TO THE AFFECTED AREA, Disp: , Rfl:     nitroglycerin (NITROSTAT) 0.4 MG SL tablet, Place 1 tablet under the tongue Every 5 (Five) Minutes As Needed for Chest Pain. Take no more than 3 doses in 15 minutes., Disp: , Rfl:     Probiotic Product (Risaquad-2) capsule capsule, Take 1 capsule by mouth Daily., Disp: , Rfl:     spironolactone (ALDACTONE) 25 MG tablet, Take one in the morning and a half tablet at 2:00 pm, Disp: 135 tablet, Rfl: 1    sulfamethoxazole-trimethoprim (BACTRIM DS,SEPTRA DS) 800-160 MG per tablet, Take 1 tablet by mouth Every 12 (Twelve) Hours., Disp: , Rfl:     Zinc 30 MG capsule, Take  by mouth Daily., Disp: , Rfl:     Budesonide (ENTOCORT EC) 3 MG 24 hr capsule, Take 2 capsules by mouth 3 (Three) Times a Day. (Patient not taking: Reported on 7/22/2024), Disp: , Rfl:     empagliflozin (Jardiance) 10 MG tablet tablet, Take 1 tablet by mouth Daily., Disp: 30 tablet, Rfl: 2    folic acid-vit B6-vit B12 (Folbee) 2.5-25-1 MG tablet tablet, Take 1 tablet by mouth 2 (Two) Times a Day., Disp: 180 tablet, Rfl: 3    Current Facility-Administered Medications:     denosumab (PROLIA) syringe 60 mg, 60 mg,  Subcutaneous, Q6 Months, Elisabeth Royal PA, 60 mg at 11/10/22 1021  Allergies   Allergen Reactions    Asacol [Mesalamine] Swelling    Diclofenac Swelling     Gums swell only per patient    Penicillins Other (See Comments)     Gums swelling per patient.      Social History     Socioeconomic History    Marital status:    Tobacco Use    Smoking status: Never    Smokeless tobacco: Never   Vaping Use    Vaping status: Never Used   Substance and Sexual Activity    Alcohol use: No    Drug use: No    Sexual activity: Defer                Objective:     Vitals reviewed.   Constitutional:       Appearance: Not in distress. Frail.   Neck:      Vascular: No JVR. JVD normal.   Pulmonary:      Effort: Pulmonary effort is normal.      Breath sounds: Normal breath sounds. No wheezing. No rhonchi. No rales.   Chest:      Chest wall: Not tender to palpatation.   Cardiovascular:      PMI at left midclavicular line. Normal rate. Irregularly irregular rhythm. Normal S1. Normal S2.       Murmurs:  Positive for murmur      No gallop.  No click. No rub.   Pulses:     Intact distal pulses.   Edema:     Peripheral edema present.     Pretibial: 1+ edema of the left pretibial area and trace edema of the right pretibial area.     Ankle: 1+ edema of the left ankle and trace edema of the right ankle.     Feet: 1+ edema of the left foot and trace edema of the right foot.     Comments:     Abdominal:      General: Bowel sounds are normal.      Palpations: Abdomen is soft.      Tenderness: There is no abdominal tenderness.   Musculoskeletal: Normal range of motion.         General: No tenderness. Skin:     General: Skin is warm and dry.      Comments: Bruising left foot improved    Neurological:      General: No focal deficit present.      Mental Status: Alert and oriented to person, place and time.         ECG 12 Lead    Date/Time: 7/19/2024 10:20 AM  Performed by: Brittany Hutson APRN    Authorized by: Brittany Hutson APRN  Comparison:  compared with previous ECG from 3/16/2023  Similar to previous ECG  Rhythm: atrial fibrillation  BPM: 87  Conduction: right bundle branch block and left anterior fascicular block    Clinical impression: abnormal EKG                        Assessment:     [facility]       Diagnosis Plan   1. Persistent atrial fibrillation  ECG 12 Lead      2. Chronic heart failure with preserved ejection fraction        3. Severe pulmonary hypertension        4. S/P CABG x 2        5. S/P AVR (aortic valve replacement)        6. S/P MVR (mitral valve repair)        7. S/P Maze operation for atrial fibrillation        8. S/P left atrial appendage ligation                             Plan:   Chart reviewed  Labs reviewed  Heart failure reviewed reviewed pulmonary hypertension   Medications reviewed   Continue  lasix to 40mg twice daily   Continue spironolactone 25mg daily, 12.5mg in pm         - Daily weight:  same time every day, same clothing   - Low Salt (sodium) diet   - Watch fluid intake         HFpEF secondary to valvular disease, afib and pulmonary hypertension   LVEF: 60 to 65%  NYHA class: II  Volume status: Volume overloaded leg only   BB: Metoprolol succinate 12.5 mg daily  ACEi/ARB/ARNI: Blood pressure is marginal to add ACE ARB or Arni at this time  Spironalactone: Continue spironolactone 25 mg am  12.5mg in pm   SGLT2i: stopped Jardiance 10 mg daily due to yeast infection/patient reports she did not tolerate jardiance due to nausea   Restarted jardiance today.      Advised patient to continue cardiac rehab as tolerated    One week of samples given for Eliquis 2.5mg due to mail order being late.         Electronically signed by DAKOTA Brody, 07/28/24, 6:59 PM EDT.              This document is intended for medical expert use only.  Reading of this document by patients and/or patient's family without participating medical staff guidance may result in misinterpretation and unintended morbidity. Any interpretation of such  data is the responsibility of the patient and/or family member responsible for the patient in concert with their primary or specialist providers, not to be left for sources of online search as such as [employer], [employer] or similar queries.  Relying on these approaches to knowledge may result in misinterpretation, misguided goals of care and even death should patient or family members try recommendations outside of the realm of professional medical care in a supervised inpatient environment.

## 2024-07-19 ENCOUNTER — OFFICE VISIT (OUTPATIENT)
Dept: CARDIOLOGY | Facility: CLINIC | Age: 84
End: 2024-07-19
Payer: MEDICARE

## 2024-07-19 ENCOUNTER — OFFICE VISIT (OUTPATIENT)
Dept: ONCOLOGY | Facility: CLINIC | Age: 84
End: 2024-07-19
Payer: MEDICARE

## 2024-07-19 DIAGNOSIS — I27.20 SEVERE PULMONARY HYPERTENSION: ICD-10-CM

## 2024-07-19 DIAGNOSIS — Z98.890 S/P MVR (MITRAL VALVE REPAIR): ICD-10-CM

## 2024-07-19 DIAGNOSIS — I48.19 PERSISTENT ATRIAL FIBRILLATION: Primary | ICD-10-CM

## 2024-07-19 DIAGNOSIS — Z95.2 S/P AVR (AORTIC VALVE REPLACEMENT): ICD-10-CM

## 2024-07-19 DIAGNOSIS — Z95.1 S/P CABG X 2: ICD-10-CM

## 2024-07-19 DIAGNOSIS — D64.9 ANEMIA, UNSPECIFIED TYPE: Primary | ICD-10-CM

## 2024-07-19 DIAGNOSIS — Z86.79 S/P MAZE OPERATION FOR ATRIAL FIBRILLATION: ICD-10-CM

## 2024-07-19 DIAGNOSIS — Z98.890 S/P LEFT ATRIAL APPENDAGE LIGATION: ICD-10-CM

## 2024-07-19 DIAGNOSIS — Z98.890 S/P MAZE OPERATION FOR ATRIAL FIBRILLATION: ICD-10-CM

## 2024-07-19 DIAGNOSIS — I50.32 CHRONIC HEART FAILURE WITH PRESERVED EJECTION FRACTION: ICD-10-CM

## 2024-07-19 PROCEDURE — 3078F DIAST BP <80 MM HG: CPT | Performed by: NURSE PRACTITIONER

## 2024-07-19 PROCEDURE — 3074F SYST BP LT 130 MM HG: CPT | Performed by: NURSE PRACTITIONER

## 2024-07-19 PROCEDURE — 93000 ELECTROCARDIOGRAM COMPLETE: CPT | Performed by: NURSE PRACTITIONER

## 2024-07-19 PROCEDURE — 99214 OFFICE O/P EST MOD 30 MIN: CPT | Performed by: NURSE PRACTITIONER

## 2024-07-20 NOTE — PROGRESS NOTES
Hematology/Oncology Outpatient Follow Up    PATIENT NAME:Mindi Quinteros  :1940  MRN: [id number]  PRIMARY CARE PHYSICIAN: Dayana Tipton MD  REFERRING PHYSICIAN: No ref. provider found    No chief complaint on file.       HISTORY OF PRESENT ILLNESS:     This is an 83-year-old female who has been referred secondary to anemia.  Review of her CBCs indicate that she has had anemia since  with hemoglobin ranging between 8 and 9 g per DL normal white count and normal platelets.  Her differential is a 78% neutrophils there is no lymphocytosis eosinophilia or basophilia.  She has a history of B12 deficiency and receives B12 injections.  She has normal renal function with BUN of 17, creatinine 0.9     She has been  oral b12 supplements.  Denies any rectal bleeding or blood in her urine or vaginal bleeding.     She was on oral iron tablets . She saw Dr Kenney  in the past and received IV iron as well.     She is on Folbic  She is on Eliquis due to afib     Patient lives with her daughter  Patient does not smoke  She does not drink alcohol    2023: Patient is here today for routine follow-up.      Past Medical History:   Diagnosis Date    Acute congestive heart failure, unspecified heart failure type 05/15/2022    Age-related osteoporosis without current pathological fracture     prolia([id number], 2019), restarted prolia(11/3/20)    Allergic     Anemia     Anxiety     Arthritis     CAD (coronary artery disease)     Depression     Diabetes     Elevated cholesterol     HTN (hypertension)     Hyperlipemia     Injury of back     Sinusitis     Type II diabetes mellitus        Past Surgical History:   Procedure Laterality Date    ANKLE ARTHROSCOPY      AORTIC VALVE REPAIR/REPLACEMENT MITRAL VALVE REPAIR/REPLACEMENT N/A 2022    Procedure: AORTIC VALVE REPAIR/REPLACEMENT MITRAL VALVE REPAIR/REPLACEMENT;  Surgeon: Lane Okeefe MD;  Location: [facility];  Service: Cardiothoracic;  Laterality: N/A;   Mitral Valve repair with 26mm Physio II ring. Aortic   Valve Replacement with 21mm Magna Ease valve.    BELPHAROPTOSIS REPAIR      CARDIAC CATHETERIZATION      CARDIAC CATHETERIZATION N/A 5/17/2022    Procedure: Left Heart Cath, possible pci;  Surgeon: Natan Terraazs MD;  Location: [facility] CATH INVASIVE LOCATION;  Service: Cardiovascular;  Laterality: N/A;    CATARACT EXTRACTION      CHOLECYSTECTOMY N/A 10/14/2019    Procedure: Laparoscopic cholecystectomy, possible open;  Surgeon: Vijay Guerra DO;  Location: [facility] MAIN OR;  Service: General    COLONOSCOPY      CORONARY ARTERY BYPASS GRAFT N/A 5/19/2022    Procedure: CORONARY ARTERY BYPASS GRAFTING;  Surgeon: Lane Okeefe MD;  Location: [facility] CVOR;  Service: Cardiothoracic;  Laterality: N/A;  CABG X 2 (1 Vein graft, 1 LIMA graft)    COSMETIC SURGERY      ENDOSCOPY      HEMORROIDECTOMY      HYSTERECTOMY      MAZE PROCEDURE N/A 5/19/2022    Procedure: MAZE PROCEDURE;  Surgeon: Lane Okeefe MD;  Location: [facility] CVOR;  Service: Cardiothoracic;  Laterality: N/A;         Current Outpatient Medications:     acetaminophen (TYLENOL) 325 MG tablet, Take 2 tablets by mouth Every 6 (Six) Hours As Needed for Mild Pain ., Disp: 30 tablet, Rfl: 0    apixaban (Eliquis) 2.5 MG tablet tablet, TAKE 1 TABLET EVERY 12 HOURS FOR ATRIAL FIBRILLATION, Disp: 180 tablet, Rfl: 1    aspirin (aspirin) 81 MG EC tablet, Take 1 tablet by mouth Daily., Disp: , Rfl:     atorvastatin (LIPITOR) 40 MG tablet, Take 1 tablet by mouth every night at bedtime., Disp: 90 tablet, Rfl: 1    Budesonide (ENTOCORT EC) 3 MG 24 hr capsule, Take 2 capsules by mouth 3 (Three) Times a Day., Disp: , Rfl:     Calcium Carbonate (CALCIUM 500 PO), Take 2 tablets by mouth Daily., Disp: , Rfl:     Cholecalciferol (VITAMIN D3) 2000 units capsule, Take 1 capsule by mouth Every Evening., Disp: , Rfl:     colestipol (COLESTID) 1 g tablet, Take 1 tablet by mouth As Needed., Disp: , Rfl:      cyanocobalamin 1000 MCG/ML injection, Inject 1 mL into the appropriate muscle as directed by prescriber Every 30 (Thirty) Days., Disp: , Rfl:     Denosumab (PROLIA SC), Inject  under the skin into the appropriate area as directed., Disp: , Rfl:     dicyclomine (BENTYL) 10 MG capsule, Take 1 capsule by mouth Daily., Disp: , Rfl:     fenofibrate 160 MG tablet, Take 1 tablet by mouth Daily., Disp: , Rfl:     ferrous sulfate 325 (65 FE) MG tablet, Take 1 tablet by mouth Daily With Breakfast., Disp: , Rfl:     folic acid (FOLVITE) 1 MG tablet, Take 1 tablet by mouth Daily., Disp: 30 tablet, Rfl: 6    folic acid-pyridoxine-cyanocobalamin (FOLBIC) 2.5-25-2 MG tablet tablet, Take  by mouth Daily., Disp: , Rfl:     folic acid-vit B6-vit B12 (Folbee) 2.5-25-1 MG tablet tablet, Take 1 tablet by mouth Daily., Disp: 30 tablet, Rfl: 11    folic acid-vit B6-vit B12 (Folbee) 2.5-25-1 MG tablet tablet, Take 1 tablet by mouth 2 (Two) Times a Day., Disp: 60 tablet, Rfl: 6    furosemide (LASIX) 40 MG tablet, Take 1 tablet by mouth 2 (Two) Times a Day. Take 40mg in am and 40mg in pm, Disp: 180 tablet, Rfl: 1    glimepiride (AMARYL) 2 MG tablet, Take 0.5 tablets by mouth Every Morning Before Breakfast., Disp: , Rfl:     hydroxychloroquine (PLAQUENIL) 200 MG tablet, , Disp: , Rfl:     Lactobacillus (PROBIOTIC ACIDOPHILUS PO), Take  by mouth., Disp: , Rfl:     Magnesium 500 MG tablet, Take  by mouth., Disp: , Rfl:     METAMUCIL FIBER PO, Take  by mouth., Disp: , Rfl:     metFORMIN ER (GLUCOPHAGE-XR) 500 MG 24 hr tablet, Take 4 tablets by mouth Daily With Breakfast. 4 pills a day, Disp: , Rfl:     metoprolol succinate XL (TOPROL-XL) 25 MG 24 hr tablet, Take 0.5 tablets by mouth Daily., Disp: 45 tablet, Rfl: 3    multivitamin with minerals tablet tablet, Take 1 tablet by mouth Daily., Disp: , Rfl:     nitroglycerin (NITROSTAT) 0.4 MG SL tablet, Place 1 tablet under the tongue Every 5 (Five) Minutes As Needed for Chest Pain. Take no more than  3 doses in 15 minutes., Disp: , Rfl:     Probiotic Product (Risaquad-2) capsule capsule, Take 1 capsule by mouth Daily., Disp: , Rfl:     spironolactone (ALDACTONE) 25 MG tablet, Take one in the morning and a half tablet at 2:00 pm, Disp: 135 tablet, Rfl: 1    Zinc 30 MG capsule, Take  by mouth Daily., Disp: , Rfl:     Current Facility-Administered Medications:     denosumab (PROLIA) syringe 60 mg, 60 mg, Subcutaneous, Q6 Months, Elisabeth Royal PA, 60 mg at 11/10/22 1021    Allergies   Allergen Reactions    Asacol [Mesalamine] Swelling    Diclofenac Swelling     Gums swell only per patient    Penicillins Other (See Comments)     Gums swelling per patient.        Family History   Problem Relation Age of Onset    Diabetes Mother     Heart disease Father     Heart disease Brother     Diabetes Brother     Osteoporosis Paternal Grandmother        Cancer-related family history is not on file.    Social History     Tobacco Use    Smoking status: Never    Smokeless tobacco: Never   Vaping Use    Vaping status: Never Used   Substance Use Topics    Alcohol use: No    Drug use: No       I have reviewed and confirmed the accuracy of the patient's history: Chief complaint, HPI, ROS, and Subjective as entered by the MA/LPN/RN. Paulazaire Nguyen MD 07/20/24      SUBJECTIVE:     Patient is here today for routine follow-up and does not have any new issues.  She feels well.  She is on oral iron supplementation and seems to have more energy.    REVIEW OF SYSTEMS:  Review of Systems   Constitutional:  Negative for chills, fatigue and fever.   HENT:  Negative for congestion, drooling, ear discharge, rhinorrhea, sinus pressure and tinnitus.    Eyes:  Negative for photophobia, pain and discharge.   Respiratory:  Negative for apnea, choking and stridor.    Cardiovascular:  Negative for palpitations.   Gastrointestinal:  Negative for abdominal distention, abdominal pain and anal bleeding.   Endocrine: Negative for polydipsia and  polyphagia.   Genitourinary:  Negative for decreased urine volume, flank pain and genital sores.   Musculoskeletal:  Negative for gait problem, neck pain and neck stiffness.   Skin:  Negative for color change, rash and wound.   Neurological:  Negative for tremors, seizures, syncope, facial asymmetry and speech difficulty.   Hematological:  Negative for adenopathy.   Psychiatric/Behavioral:  Negative for agitation, confusion, hallucinations and self-injury. The patient is not hyperactive.        OBJECTIVE:    There were no vitals filed for this visit.    There is no height or weight on file to calculate BMI.    ECOG  (1) Restricted in physically strenuous activity, ambulatory and able to do work of light nature    Physical Exam  Vitals and nursing note reviewed.   Constitutional:       General: She is not in acute distress.     Appearance: She is not diaphoretic.   HENT:      Head: Normocephalic and atraumatic.   Eyes:      General: No scleral icterus.        Right eye: No discharge.         Left eye: No discharge.      Conjunctiva/sclera: Conjunctivae normal.   Neck:      Thyroid: No thyromegaly.   Cardiovascular:      Rate and Rhythm: Normal rate and regular rhythm.      Heart sounds: Normal heart sounds.      No friction rub. No gallop.   Pulmonary:      Effort: Pulmonary effort is normal. No respiratory distress.      Breath sounds: No stridor. No wheezing.   Abdominal:      General: Bowel sounds are normal.      Palpations: Abdomen is soft. There is no mass.      Tenderness: There is no abdominal tenderness. There is no guarding or rebound.   Musculoskeletal:         General: No tenderness. Normal range of motion.      Cervical back: Normal range of motion and neck supple.   Lymphadenopathy:      Cervical: No cervical adenopathy.   Skin:     General: Skin is warm.      Findings: No erythema or rash.   Neurological:      Mental Status: She is alert and oriented to person, place, and time.      Motor: No abnormal  muscle tone.   Psychiatric:         Behavior: Behavior normal.         No changes    RECENT LABS    WBC   Date Value Ref Range Status   07/11/2024 5.19 3.40 - 10.80 10*3/mm3 Final     RBC   Date Value Ref Range Status   07/11/2024 3.74 (L) 3.77 - 5.28 10*6/mm3 Final     Hemoglobin   Date Value Ref Range Status   07/11/2024 10.9 (L) 12.0 - 15.9 g/dL Final     Hematocrit   Date Value Ref Range Status   07/11/2024 35.8 34.0 - 46.6 % Final     MCV   Date Value Ref Range Status   07/11/2024 95.7 79.0 - 97.0 fL Final     MCH   Date Value Ref Range Status   07/11/2024 29.1 26.6 - 33.0 pg Final     MCHC   Date Value Ref Range Status   07/11/2024 30.4 (L) 31.5 - 35.7 g/dL Final     RDW   Date Value Ref Range Status   07/11/2024 15.4 12.3 - 15.4 % Final     RDW-SD   Date Value Ref Range Status   07/11/2024 52.0 37.0 - 54.0 fl Final     MPV   Date Value Ref Range Status   07/11/2024 9.8 6.0 - 12.0 fL Final     Platelets   Date Value Ref Range Status   07/11/2024 172 140 - 450 10*3/mm3 Final     Neutrophil %   Date Value Ref Range Status   07/11/2024 74.7 42.7 - 76.0 % Final     Lymphocyte %   Date Value Ref Range Status   07/11/2024 15.0 (L) 19.6 - 45.3 % Final     Monocyte %   Date Value Ref Range Status   07/11/2024 8.7 5.0 - 12.0 % Final     Eosinophil %   Date Value Ref Range Status   07/11/2024 1.2 0.3 - 6.2 % Final     Basophil %   Date Value Ref Range Status   07/11/2024 0.4 0.0 - 1.5 % Final     Immature Grans %   Date Value Ref Range Status   10/05/2020 0.4 0.0 - 0.5 % Final     Neutrophils Absolute   Date Value Ref Range Status   07/04/2022 2.6 1.7 - 6.0 x10(3)/ul Final     Neutrophils, Absolute   Date Value Ref Range Status   07/11/2024 3.88 1.70 - 7.00 10*3/mm3 Final     Lymphocytes, Absolute   Date Value Ref Range Status   07/11/2024 0.78 0.70 - 3.10 10*3/mm3 Final     Monocytes, Absolute   Date Value Ref Range Status   07/11/2024 0.45 0.10 - 0.90 10*3/mm3 Final     Eosinophils Absolute   Date Value Ref Range  Status   07/04/2022 0.0 0.0 - 0.6 x10(3)/ul Final     Eosinophils, Absolute   Date Value Ref Range Status   07/11/2024 0.06 0.00 - 0.40 10*3/mm3 Final     Basophils Absolute   Date Value Ref Range Status   07/04/2022 0.0 0.0 - 0.3 x10(3)/ul Final     Basophils, Absolute   Date Value Ref Range Status   07/11/2024 0.02 0.00 - 0.20 10*3/mm3 Final     Immature Grans, Absolute   Date Value Ref Range Status   10/05/2020 0.02 0.00 - 0.05 10*3/mm3 Final     nRBC   Date Value Ref Range Status   01/29/2023 0.0 0.0 - 0.2 /100 WBC Final       Lab Results   Component Value Date    GLUCOSE 205 (H) 03/22/2024    BUN 22 03/22/2024    CREATININE 0.99 03/22/2024    EGFRIFNONA 91 10/23/2020    BCR 22.2 03/22/2024    K 3.9 03/22/2024    CO2 29.0 03/22/2024    CALCIUM 9.7 03/22/2024    PROTENTOTREF 6.9 03/22/2024    ALBUMIN 4.3 03/22/2024    ALBUMIN 3.7 03/22/2024    LABIL2 1.2 03/22/2024    AST 30 03/22/2024    ALT 15 03/22/2024         Assessment & Plan     There are no diagnoses linked to this encounter.      ASSESSMENT:    Anemia, unspecified type          Chronic anemia, normocytic.  Her workup is essentially unremarkable March 2024  History of iron deficiency anemia status post IV iron in the past.    Pernicious anemia on B12 injections received through her PCP office.  Patient will continue  Ulcerative colitis: She will follow-up with GI.  She is established with Dr. Mcpherson  Iron malabsorption.  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  History of hyperhomocysteinemia.  On Folbee.  She also has elevated homocystine level therefore folic acid 1 mg was added.  Continue to monitor her levels  Leukopenia    Autoimmune disease: Connective tissue disorder. Seen  Ravanel.  Patient is off  Plaquenil.  She has an appointment coming up soon        Plans     Anemia workup completed in March 2024 does not show any vitamin deficiencies.  Serum  transferrin receptor assay was normal eliminating iron deficiency.  Also noted that she has a normal creatinine  CBC reviewed  Continue folic acid and Folbee tablets  Reviewed records from Dr. Kenney's office  Follow-up in 3 months  Follow-up with GI  Homocystine level will be due again January 2025  All questions answered               I spent 30 total minutes, face-to-face, caring for Mindi today. 90% of this time involved counseling and/or coordination of care as documented within this note.

## 2024-07-22 VITALS
HEIGHT: 62 IN | HEART RATE: 96 BPM | RESPIRATION RATE: 18 BRPM | DIASTOLIC BLOOD PRESSURE: 69 MMHG | TEMPERATURE: 98.3 F | OXYGEN SATURATION: 100 % | SYSTOLIC BLOOD PRESSURE: 117 MMHG | WEIGHT: 111 LBS | BODY MASS INDEX: 20.43 KG/M2

## 2024-07-22 VITALS
SYSTOLIC BLOOD PRESSURE: 103 MMHG | OXYGEN SATURATION: 97 % | HEIGHT: 62 IN | HEART RATE: 68 BPM | DIASTOLIC BLOOD PRESSURE: 67 MMHG | BODY MASS INDEX: 20.43 KG/M2 | WEIGHT: 111 LBS

## 2024-07-22 RX ORDER — SULFAMETHOXAZOLE AND TRIMETHOPRIM 800; 160 MG/1; MG/1
1 TABLET ORAL EVERY 12 HOURS SCHEDULED
COMMUNITY
Start: 2024-06-07

## 2024-07-22 RX ORDER — CYANOCOBALAMIN/FOLIC AC/VIT B6 1-2.5-25MG
1 TABLET ORAL 2 TIMES DAILY
Qty: 180 TABLET | Refills: 3 | Status: SHIPPED | OUTPATIENT
Start: 2024-07-22

## 2024-07-22 RX ORDER — CEPHALEXIN 500 MG/1
1 CAPSULE ORAL 3 TIMES DAILY
COMMUNITY
Start: 2024-06-07

## 2024-07-22 RX ORDER — CLOBETASOL PROPIONATE 0.5 MG/G
OINTMENT TOPICAL
COMMUNITY

## 2024-07-23 ENCOUNTER — TELEPHONE (OUTPATIENT)
Dept: ONCOLOGY | Facility: CLINIC | Age: 84
End: 2024-07-23
Payer: MEDICARE

## 2024-07-23 ENCOUNTER — APPOINTMENT (OUTPATIENT)
Dept: CARDIAC REHAB | Facility: HOSPITAL | Age: 84
End: 2024-07-23

## 2024-07-26 ENCOUNTER — APPOINTMENT (OUTPATIENT)
Dept: CARDIAC REHAB | Facility: HOSPITAL | Age: 84
End: 2024-07-26

## 2024-07-26 RX ORDER — CYANOCOBALAMIN/FOLIC AC/VIT B6 1-2.5-25MG
1 TABLET ORAL 2 TIMES DAILY
Qty: 180 TABLET | Refills: 3 | Status: SHIPPED | OUTPATIENT
Start: 2024-07-26

## 2024-07-26 NOTE — TELEPHONE ENCOUNTER
Caller: Mindi Quinteros    Relationship: Self    Best call back number: [phone]    Requested Prescriptions:   Requested Prescriptions     Pending Prescriptions Disp Refills    folic acid-vit B6-vit B12 (Folbee) 2.5-25-1 MG tablet tablet 180 tablet 3     Sig: Take 1 tablet by mouth 2 (Two) Times a Day.    NEEDS TO BE THE GENERIC FOLBEE    Pharmacy where request should be sent: [id number] - [facility] [address] - [phone]  - [phone] FX     Last office visit with prescribing clinician: 7/19/2024   Last telemedicine visit with prescribing clinician: Visit date not found   Next office visit with prescribing clinician: Visit date not found     Additional details provided by patient:  CANCEL SCRIPT SENT TO [facility] AND RESEND TO [facility]    Does the patient have less than a 3 day supply:  [] Yes  [x] No    Would you like a call back once the refill request has been completed: [] Yes [x] No    If the office needs to give you a call back, can they leave a voicemail: [] Yes [x] No    Desi Hawley Rep   07/26/24 12:36 EDT

## 2024-07-28 PROBLEM — I27.20 SEVERE PULMONARY HYPERTENSION: Status: ACTIVE | Noted: 2024-07-28

## 2024-07-30 ENCOUNTER — APPOINTMENT (OUTPATIENT)
Dept: CARDIAC REHAB | Facility: HOSPITAL | Age: 84
End: 2024-07-30

## 2024-08-02 ENCOUNTER — APPOINTMENT (OUTPATIENT)
Dept: CARDIAC REHAB | Facility: HOSPITAL | Age: 84
End: 2024-08-02

## 2024-08-05 ENCOUNTER — OFFICE (OUTPATIENT)
Dept: URBAN - METROPOLITAN AREA CLINIC 64 | Facility: CLINIC | Age: 84
End: 2024-08-05
Payer: MEDICARE

## 2024-08-05 VITALS
DIASTOLIC BLOOD PRESSURE: 68 MMHG | WEIGHT: 106 LBS | HEIGHT: 64 IN | HEART RATE: 97 BPM | SYSTOLIC BLOOD PRESSURE: 97 MMHG

## 2024-08-05 DIAGNOSIS — R15.9 FULL INCONTINENCE OF FECES: ICD-10-CM

## 2024-08-05 DIAGNOSIS — R19.4 CHANGE IN BOWEL HABIT: ICD-10-CM

## 2024-08-05 DIAGNOSIS — K51.90 ULCERATIVE COLITIS, UNSPECIFIED, WITHOUT COMPLICATIONS: ICD-10-CM

## 2024-08-05 DIAGNOSIS — R15.0 INCOMPLETE DEFECATION: ICD-10-CM

## 2024-08-05 PROCEDURE — 99213 OFFICE O/P EST LOW 20 MIN: CPT | Performed by: NURSE PRACTITIONER

## 2024-08-06 ENCOUNTER — APPOINTMENT (OUTPATIENT)
Dept: CARDIAC REHAB | Facility: HOSPITAL | Age: 84
End: 2024-08-06

## 2024-08-08 ENCOUNTER — TELEPHONE (OUTPATIENT)
Dept: ONCOLOGY | Facility: CLINIC | Age: 84
End: 2024-08-08
Payer: MEDICARE

## 2024-08-09 ENCOUNTER — APPOINTMENT (OUTPATIENT)
Dept: CARDIAC REHAB | Facility: HOSPITAL | Age: 84
End: 2024-08-09

## 2024-08-12 RX ORDER — ATORVASTATIN CALCIUM 40 MG/1
40 TABLET, FILM COATED ORAL
Qty: 90 TABLET | Refills: 2 | Status: SHIPPED | OUTPATIENT
Start: 2024-08-12

## 2024-08-12 NOTE — TELEPHONE ENCOUNTER
Rx Refill Note  Requested Prescriptions     Pending Prescriptions Disp Refills    atorvastatin (LIPITOR) 40 MG tablet [Pharmacy Med Name: Atorvastatin Calcium 40 MG Oral Tablet] 90 tablet 0     Sig: TAKE 1 TABLET BY MOUTH EVERY DAY AT BEDTIME      Last office visit with prescribing clinician: 4/17/2024   Last telemedicine visit with prescribing clinician: Visit date not found   Next office visit with prescribing clinician: 11/11/2024                         Would you like a call back once the refill request has been completed: [] Yes [] No    If the office needs to give you a call back, can they leave a voicemail: [] Yes [] No    Tatum Hammer MA  08/12/24, 08:57 EDT

## 2024-08-13 ENCOUNTER — APPOINTMENT (OUTPATIENT)
Dept: CARDIAC REHAB | Facility: HOSPITAL | Age: 84
End: 2024-08-13

## 2024-08-14 ENCOUNTER — APPOINTMENT (OUTPATIENT)
Dept: CARDIAC REHAB | Facility: HOSPITAL | Age: 84
End: 2024-08-14

## 2024-08-15 ENCOUNTER — OFFICE VISIT (OUTPATIENT)
Dept: PULMONOLOGY | Facility: HOSPITAL | Age: 84
End: 2024-08-15
Payer: MEDICARE

## 2024-08-15 VITALS
SYSTOLIC BLOOD PRESSURE: 119 MMHG | WEIGHT: 106 LBS | BODY MASS INDEX: 19.51 KG/M2 | HEIGHT: 62 IN | RESPIRATION RATE: 12 BRPM | HEART RATE: 91 BPM | OXYGEN SATURATION: 97 % | DIASTOLIC BLOOD PRESSURE: 72 MMHG

## 2024-08-15 DIAGNOSIS — I50.22 CHRONIC SYSTOLIC CHF (CONGESTIVE HEART FAILURE): Primary | ICD-10-CM

## 2024-08-15 PROCEDURE — G0463 HOSPITAL OUTPT CLINIC VISIT: HCPCS

## 2024-08-15 NOTE — PROGRESS NOTES
PULMONARY/ CRITICAL CARE/ SLEEP MEDICINE OUTPATIENT CONSULT/ FOLLOW UP NOTE        Patient Name:  Mindi Quinteros    :  1940    Medical Record:  [id number]    PRIMARY CARE PHYSICIAN     Dayana Tipton MD    REASON FOR CONSULTATION    Mindi Quinteros is a 83 y.o. female who is referred for consultation for Hypoxia  REVIEW OF SYSTEMS    Constitutional:  Denies fever or chills   Eyes:  Denies change in visual acuity   HENT:  Denies nasal congestion or sore throat   Respiratory:  Denies cough or shortness of breath   Cardiovascular:  Denies chest pain or edema   GI:  Denies abdominal pain, nausea, vomiting, bloody stools or diarrhea   :  Denies dysuria   Musculoskeletal:  Denies back pain or joint pain   Integument:  Denies rash   Neurologic:  Denies headache, focal weakness or sensory changes   Endocrine:  Denies polyuria or polydipsia   Lymphatic:  Denies swollen glands   Psychiatric:  Denies depression or anxiety     MEDICAL HISTORY    Past Medical History:   Diagnosis Date    Acute congestive heart failure, unspecified heart failure type 05/15/2022    Age-related osteoporosis without current pathological fracture     prolia([id number], 2019), restarted prolia(11/3/20)    Allergic     Anemia     Anxiety     Arthritis     CAD (coronary artery disease)     Depression     Diabetes     Elevated cholesterol     HTN (hypertension)     Hyperlipemia     Injury of back     Sinusitis     Type II diabetes mellitus         SURGICAL HISTORY    Past Surgical History:   Procedure Laterality Date    ANKLE ARTHROSCOPY      AORTIC VALVE REPAIR/REPLACEMENT MITRAL VALVE REPAIR/REPLACEMENT N/A 2022    Procedure: AORTIC VALVE REPAIR/REPLACEMENT MITRAL VALVE REPAIR/REPLACEMENT;  Surgeon: Lane Okeefe MD;  Location: [facility];  Service: Cardiothoracic;  Laterality: N/A;  Mitral Valve repair with 26mm Physio II ring. Aortic   Valve Replacement with 21mm Magna Ease valve.    BELPHAROPTOSIS REPAIR      CARDIAC  CATHETERIZATION      CARDIAC CATHETERIZATION N/A 5/17/2022    Procedure: Left Heart Cath, possible pci;  Surgeon: Natan Terrazas MD;  Location: [facility] CATH INVASIVE LOCATION;  Service: Cardiovascular;  Laterality: N/A;    CATARACT EXTRACTION      CHOLECYSTECTOMY N/A 10/14/2019    Procedure: Laparoscopic cholecystectomy, possible open;  Surgeon: Vijay Guerra DO;  Location: [facility] MAIN OR;  Service: General    COLONOSCOPY      CORONARY ARTERY BYPASS GRAFT N/A 5/19/2022    Procedure: CORONARY ARTERY BYPASS GRAFTING;  Surgeon: Lane Okeefe MD;  Location: [facility] CVOR;  Service: Cardiothoracic;  Laterality: N/A;  CABG X 2 (1 Vein graft, 1 LIMA graft)    COSMETIC SURGERY      ENDOSCOPY      HEMORROIDECTOMY      HYSTERECTOMY      MAZE PROCEDURE N/A 5/19/2022    Procedure: MAZE PROCEDURE;  Surgeon: Lane Okeefe MD;  Location: [facility];  Service: Cardiothoracic;  Laterality: N/A;        FAMILY HISTORY    Family History   Problem Relation Age of Onset    Diabetes Mother     Heart disease Father     Heart disease Brother     Diabetes Brother     Osteoporosis Paternal Grandmother        SOCIAL HISTORY    Social History     Tobacco Use    Smoking status: Never     Passive exposure: Never    Smokeless tobacco: Never   Substance Use Topics    Alcohol use: No        ALLERGIES    Allergies   Allergen Reactions    Asacol [Mesalamine] Swelling    Diclofenac Swelling     Gums swell only per patient    Penicillins Other (See Comments)     Gums swelling per patient.          MEDICATIONS    Current Outpatient Medications on File Prior to Visit   Medication Sig Dispense Refill    acetaminophen (TYLENOL) 325 MG tablet Take 2 tablets by mouth Every 6 (Six) Hours As Needed for Mild Pain . 30 tablet 0    apixaban (Eliquis) 2.5 MG tablet tablet TAKE 1 TABLET EVERY 12 HOURS FOR ATRIAL FIBRILLATION 180 tablet 1    aspirin (aspirin) 81 MG EC tablet Take 1 tablet by mouth Daily.      atorvastatin (LIPITOR) 40 MG tablet  TAKE 1 TABLET BY MOUTH EVERY DAY AT BEDTIME 90 tablet 2    Calcium Carbonate (CALCIUM 500 PO) Take 2 tablets by mouth Daily.      Cholecalciferol (VITAMIN D3) 2000 units capsule Take 1 capsule by mouth Every Evening.      clobetasol (TEMOVATE) 0.05 % ointment 30      colestipol (COLESTID) 1 g tablet Take 1 tablet by mouth As Needed.      cyanocobalamin 1000 MCG/ML injection Inject 1 mL into the appropriate muscle as directed by prescriber Every 30 (Thirty) Days.      Denosumab (PROLIA SC) Inject  under the skin into the appropriate area as directed.      dicyclomine (BENTYL) 10 MG capsule Take 1 capsule by mouth Daily.      empagliflozin (Jardiance) 10 MG tablet tablet Take 1 tablet by mouth Daily. 30 tablet 2    fenofibrate 160 MG tablet Take 1 tablet by mouth Daily.      ferrous sulfate 325 (65 FE) MG tablet Take 1 tablet by mouth Daily With Breakfast.      folic acid (FOLVITE) 1 MG tablet Take 1 tablet by mouth Daily. 30 tablet 6    folic acid-pyridoxine-cyanocobalamin (FOLBIC) 2.5-25-2 MG tablet tablet Take  by mouth Daily.      folic acid-vit B6-vit B12 (Folbee) 2.5-25-1 MG tablet tablet Take 1 tablet by mouth 2 (Two) Times a Day. 180 tablet 3    furosemide (LASIX) 40 MG tablet Take 1 tablet by mouth 2 (Two) Times a Day. Take 40mg in am and 40mg in pm 180 tablet 1    glimepiride (AMARYL) 2 MG tablet Take 0.5 tablets by mouth Every Morning Before Breakfast.      hydroxychloroquine (PLAQUENIL) 200 MG tablet       Lactobacillus (PROBIOTIC ACIDOPHILUS PO) Take  by mouth.      Magnesium 500 MG tablet Take  by mouth.      METAMUCIL FIBER PO Take  by mouth.      metFORMIN ER (GLUCOPHAGE-XR) 500 MG 24 hr tablet Take 4 tablets by mouth Daily With Breakfast. 4 pills a day      metoprolol succinate XL (TOPROL-XL) 25 MG 24 hr tablet Take 0.5 tablets by mouth Daily. 45 tablet 3    multivitamin with minerals tablet tablet Take 1 tablet by mouth Daily.      mupirocin (BACTROBAN) 2 % ointment APPLY OINTMENT TOPICALLY THREE  "TIMES DAILY TO THE AFFECTED AREA      nitroglycerin (NITROSTAT) 0.4 MG SL tablet Place 1 tablet under the tongue Every 5 (Five) Minutes As Needed for Chest Pain. Take no more than 3 doses in 15 minutes.      Probiotic Product (Risaquad-2) capsule capsule Take 1 capsule by mouth Daily.      spironolactone (ALDACTONE) 25 MG tablet Take one in the morning and a half tablet at 2:00 pm 135 tablet 1    Zinc 30 MG capsule Take  by mouth Daily.      [DISCONTINUED] cephalexin (KEFLEX) 500 MG capsule Take 1 capsule by mouth 3 times a day.      [DISCONTINUED] sulfamethoxazole-trimethoprim (BACTRIM DS,SEPTRA DS) 800-160 MG per tablet Take 1 tablet by mouth Every 12 (Twelve) Hours.      Budesonide (ENTOCORT EC) 3 MG 24 hr capsule Take 2 capsules by mouth 3 (Three) Times a Day. (Patient not taking: Reported on 7/22/2024)       Current Facility-Administered Medications on File Prior to Visit   Medication Dose Route Frequency Provider Last Rate Last Admin    [DISCONTINUED] denosumab (PROLIA) syringe 60 mg  60 mg Subcutaneous Q6 Months Elisabeth Royal PA   60 mg at 11/10/22 1021       PHYSICAL EXAM    Vitals:    08/15/24 1058   BP: 119/72   BP Location: Left arm   Patient Position: Sitting   Cuff Size: Small Adult   Pulse: 91   Resp: 12   SpO2: 97%   Weight: 48.1 kg (106 lb)   Height: 157.5 cm (62.01\")        Constitutional:  Well developed, well nourished, no acute distress, non-toxic appearance   Eyes:  PERRL, conjunctiva normal   HENT:  Atraumatic, external ears normal, nose normal, oropharynx moist, no pharyngeal exudates. mallampatti   Neck- normal range of motion, no tenderness, supple   Respiratory:  No respiratory distress, normal breath sounds, no rales, no wheezing   Cardiovascular:  Normal rate, normal rhythm, no murmurs, no gallops, no rubs   GI:  Soft, nondistended, normal bowel sounds, nontender, no organomegaly, no mass, no rebound, no guarding   :  No costovertebral angle tenderness   Musculoskeletal:  No edema, " no tenderness, no deformities. Back- no tenderness  Integument:  Well hydrated, no rash   Lymphatic:  No lymphadenopathy noted   Neurologic:  Alert & oriented x 3, CN 2-12 normal, normal motor function, normal sensory function, no focal deficits noted   Psychiatric:  Speech and behavior appropriate     No radiology results for the last 90 days.   Results for orders placed during the hospital encounter of 04/17/24    Adult Transthoracic Echo Complete W/ Cont if Necessary Per Protocol    Interpretation Summary    Left ventricular systolic function is normal. Calculated left ventricular EF = 54%    Left ventricular diastolic dysfunction is noted.    Moderately reduced right ventricular systolic function noted.    The right ventricular cavity is severely dilated.    The left atrial cavity is severely dilated.    The right atrial cavity is severely  dilated.    Severe tricuspid valve regurgitation is present.    Estimated right ventricular systolic pressure from tricuspid regurgitation is moderately elevated (45-55 mmHg).    Conclusion      Normal LV size and contractility EF of 55 to 60%  Abnormal diastolic function with restrictive filling seen.  Severe RV enlargement and RA enlargement seen.  Severe left atrial enlargement  Mitral valve repair with annulus ring present.  Leaflets appear to have good cusp separation.  No significant MR seen.  Prosthetic aortic valve appears structurally normal with adequate cusp separation.  Tricuspid valve appears structurally normal.  Severe tricuspid regurgitation seen.  Plethoric IVC consistent with high right atrial pressure seen.  Calculated RV systolic pressure of 53 mmHg consistent with moderate to severe pulmonary hypertension seen.  Pulmonic valve is not well-visualized.    No pericardial effusion seen.  Proximal aorta appears normal in size.      ASSESSMENT & PLAN:       83-year-old nonsmoker, CABG and tissue AVR and mitral valve repair and maze  dilated RV and LV, EF 30% to  35% referred to us because repeat echo showed RVSP 45 to 50, EF 50%, CT chest 10/07/2023 moderate hiatal hernia    congestive heart failure  Pulmonary edema  Coronary artery disease status post CABG and tissue AVR and mitral valve repair and maze  dilated RV and LV, EF 30% to 35%  Diabetes mellitus  Hypertension  Hyperlipidemia  Vitamin D deficiency  Ulcerative colitis  Osteoporosis    Plan  Overnight oximetry June 2024 showed nocturnal hypoxia patient spent 71 minutes with O2 sats below 89%  Order nocturnal oxygen 2 L during sleep    This document has been electronically signed by  Ceasar Dennison MD  11:27 EDT

## 2024-08-16 ENCOUNTER — APPOINTMENT (OUTPATIENT)
Dept: CARDIAC REHAB | Facility: HOSPITAL | Age: 84
End: 2024-08-16

## 2024-08-20 ENCOUNTER — APPOINTMENT (OUTPATIENT)
Dept: CARDIAC REHAB | Facility: HOSPITAL | Age: 84
End: 2024-08-20

## 2024-08-23 ENCOUNTER — APPOINTMENT (OUTPATIENT)
Dept: CARDIAC REHAB | Facility: HOSPITAL | Age: 84
End: 2024-08-23

## 2024-08-26 RX ORDER — SPIRONOLACTONE 25 MG/1
TABLET ORAL
Qty: 135 TABLET | Refills: 1 | Status: SHIPPED | OUTPATIENT
Start: 2024-08-26

## 2024-08-26 NOTE — TELEPHONE ENCOUNTER
Rx Refill Note  Requested Prescriptions     Pending Prescriptions Disp Refills    spironolactone (ALDACTONE) 25 MG tablet [Pharmacy Med Name: Spironolactone 25 MG Oral Tablet] 135 tablet 0     Sig: TAKE 1 TABLET BY MOUTH ONCE DAILY IN THE MORNING AND 1/2 (ONE-HALF) ONCE DAILY AT 2:00PM      Last office visit with prescribing clinician: 4/17/2024   Last telemedicine visit with prescribing clinician: Visit date not found   Next office visit with prescribing clinician: 11/11/2024                         Would you like a call back once the refill request has been completed: [] Yes [] No    If the office needs to give you a call back, can they leave a voicemail: [] Yes [] No    Tatum Hammer MA  08/26/24, 16:58 EDT

## 2024-08-27 ENCOUNTER — APPOINTMENT (OUTPATIENT)
Dept: CARDIAC REHAB | Facility: HOSPITAL | Age: 84
End: 2024-08-27

## 2024-08-30 ENCOUNTER — APPOINTMENT (OUTPATIENT)
Dept: CARDIAC REHAB | Facility: HOSPITAL | Age: 84
End: 2024-08-30

## 2024-09-03 ENCOUNTER — OFFICE VISIT (OUTPATIENT)
Dept: CARDIAC REHAB | Facility: HOSPITAL | Age: 84
End: 2024-09-03

## 2024-09-03 DIAGNOSIS — Z95.2 S/P AVR: Primary | ICD-10-CM

## 2024-09-06 ENCOUNTER — APPOINTMENT (OUTPATIENT)
Dept: CARDIAC REHAB | Facility: HOSPITAL | Age: 84
End: 2024-09-06

## 2024-09-13 ENCOUNTER — APPOINTMENT (OUTPATIENT)
Dept: CARDIAC REHAB | Facility: HOSPITAL | Age: 84
End: 2024-09-13

## 2024-09-17 ENCOUNTER — TELEPHONE (OUTPATIENT)
Dept: CARDIOLOGY | Facility: CLINIC | Age: 84
End: 2024-09-17
Payer: MEDICARE

## 2024-09-17 ENCOUNTER — APPOINTMENT (OUTPATIENT)
Dept: CARDIAC REHAB | Facility: HOSPITAL | Age: 84
End: 2024-09-17

## 2024-09-20 ENCOUNTER — APPOINTMENT (OUTPATIENT)
Dept: CARDIAC REHAB | Facility: HOSPITAL | Age: 84
End: 2024-09-20

## 2024-09-24 ENCOUNTER — APPOINTMENT (OUTPATIENT)
Dept: CARDIAC REHAB | Facility: HOSPITAL | Age: 84
End: 2024-09-24

## 2024-09-26 DIAGNOSIS — I71.20 THORACIC AORTIC ANEURYSM WITHOUT RUPTURE, UNSPECIFIED PART: Primary | ICD-10-CM

## 2024-09-27 ENCOUNTER — APPOINTMENT (OUTPATIENT)
Dept: CARDIAC REHAB | Facility: HOSPITAL | Age: 84
End: 2024-09-27

## 2024-10-01 ENCOUNTER — APPOINTMENT (OUTPATIENT)
Dept: CARDIAC REHAB | Facility: HOSPITAL | Age: 84
End: 2024-10-01

## 2024-10-04 ENCOUNTER — APPOINTMENT (OUTPATIENT)
Dept: CARDIAC REHAB | Facility: HOSPITAL | Age: 84
End: 2024-10-04

## 2024-10-08 ENCOUNTER — APPOINTMENT (OUTPATIENT)
Dept: CARDIAC REHAB | Facility: HOSPITAL | Age: 84
End: 2024-10-08

## 2024-10-09 ENCOUNTER — APPOINTMENT (OUTPATIENT)
Dept: CARDIAC REHAB | Facility: HOSPITAL | Age: 84
End: 2024-10-09

## 2024-10-11 ENCOUNTER — APPOINTMENT (OUTPATIENT)
Dept: CARDIAC REHAB | Facility: HOSPITAL | Age: 84
End: 2024-10-11

## 2024-10-14 ENCOUNTER — TELEPHONE (OUTPATIENT)
Dept: CARDIOLOGY | Facility: CLINIC | Age: 84
End: 2024-10-14
Payer: MEDICARE

## 2024-10-14 RX ORDER — EMPAGLIFLOZIN 10 MG/1
10 TABLET, FILM COATED ORAL DAILY
Qty: 30 TABLET | Refills: 0 | Status: SHIPPED | OUTPATIENT
Start: 2024-10-14 | End: 2024-10-17 | Stop reason: SDUPTHER

## 2024-10-14 NOTE — TELEPHONE ENCOUNTER
Rx Refill Note  Requested Prescriptions     Pending Prescriptions Disp Refills    empagliflozin (Jardiance) 10 MG tablet tablet [Pharmacy Med Name: Jardiance 10 MG Oral Tablet] 30 tablet 0     Sig: Take 1 tablet by mouth once daily      Last office visit with prescribing clinician: 7/19/2024   Last telemedicine visit with prescribing clinician: Visit date not found   Next office visit with prescribing clinician: Dr. Terrazas 11/14/2024                        Would you like a call back once the refill request has been completed: [] Yes [] No    If the office needs to give you a call back, can they leave a voicemail: [] Yes [] No    Alicia Hoskins MA  10/14/24, 16:28 EDT

## 2024-10-14 NOTE — TELEPHONE ENCOUNTER
Incoming Refill Request      Medication requested (name and dose): ELIQUIS 2.5 MG    Pharmacy where request should be sent: [facility]    Additional details provided by patient:     Best call back number: [phone]    Does the patient have less than a 3 day supply:  [] Yes  [x] No    Desi Ramsey Rep  10/14/24, 16:18 EDT

## 2024-10-15 ENCOUNTER — APPOINTMENT (OUTPATIENT)
Dept: CARDIAC REHAB | Facility: HOSPITAL | Age: 84
End: 2024-10-15

## 2024-10-17 ENCOUNTER — HOSPITAL ENCOUNTER (OUTPATIENT)
Dept: PET IMAGING | Facility: HOSPITAL | Age: 84
Discharge: HOME | End: 2024-10-17
Admitting: NURSE PRACTITIONER
Payer: MEDICARE

## 2024-10-17 DIAGNOSIS — I71.20 THORACIC AORTIC ANEURYSM WITHOUT RUPTURE, UNSPECIFIED PART: ICD-10-CM

## 2024-10-17 PROCEDURE — 71250 CT THORAX DX C-: CPT

## 2024-10-17 NOTE — TELEPHONE ENCOUNTER
Rx Refill Note  Requested Prescriptions     Pending Prescriptions Disp Refills    empagliflozin (Jardiance) 10 MG tablet tablet 30 tablet 0     Sig: Take 1 tablet by mouth Daily.      Last office visit with prescribing clinician: 7/19/2024   Last telemedicine visit with prescribing clinician: Visit date not found   Next office visit with prescribing clinician: Visit date not found                         Would you like a call back once the refill request has been completed: [] Yes [] No    If the office needs to give you a call back, can they leave a voicemail: [] Yes [] No    Alicia Hoskins MA  10/17/24, 08:50 EDT

## 2024-10-18 ENCOUNTER — APPOINTMENT (OUTPATIENT)
Dept: CARDIAC REHAB | Facility: HOSPITAL | Age: 84
End: 2024-10-18

## 2024-10-18 ENCOUNTER — TELEPHONE (OUTPATIENT)
Dept: CARDIOLOGY | Facility: CLINIC | Age: 84
End: 2024-10-18
Payer: MEDICARE

## 2024-10-18 NOTE — TELEPHONE ENCOUNTER
Called and spoke with patient - states she is not having any swelling and does not want to change meds.     Called and left vm with inna - advised on addition of medication and how patient does not want to start meds and claims she does not have any issues.

## 2024-10-18 NOTE — TELEPHONE ENCOUNTER
Dia from [facility] calling to see if Dr. Terrazas would like to increase Lasix due to uincreased left leg swelling and bilateral crackles at bases of the lungs.

## 2024-10-18 NOTE — TELEPHONE ENCOUNTER
Add metolazone 2.5mg for 2 days then may need extra doses pending labs.  Check BMP and BNP on Monday

## 2024-10-21 ENCOUNTER — TELEPHONE (OUTPATIENT)
Dept: CARDIOLOGY | Facility: CLINIC | Age: 84
End: 2024-10-21
Payer: MEDICARE

## 2024-10-21 NOTE — TELEPHONE ENCOUNTER
Patient is having trouble getting Eliquis 2.5 mg from [facility]. I show we called in medication 10/14/24.  [facility] telling patient we told them there was an issue calling in medication? Patient is running low on medication.  She is asking if we can reach out to pharmacy for her?   Can we send maybe 10 pills to [facility] for her?

## 2024-10-21 NOTE — TELEPHONE ENCOUNTER
Rx Refill Note  Requested Prescriptions     Pending Prescriptions Disp Refills    apixaban (Eliquis) 2.5 MG tablet tablet 180 tablet 3     Sig: Take 1 tablet by mouth Daily.      Last office visit with prescribing clinician: 4/17/2024   Last telemedicine visit with prescribing clinician: Visit date not found   Next office visit with prescribing clinician: 11/14/2024                         Would you like a call back once the refill request has been completed: [] Yes [] No    If the office needs to give you a call back, can they leave a voicemail: [] Yes [] No    Tatum Hammer MA  10/21/24, 16:42 EDT

## 2024-10-21 NOTE — TELEPHONE ENCOUNTER
Rx Refill Note  Requested Prescriptions     Pending Prescriptions Disp Refills    empagliflozin (Jardiance) 10 MG tablet tablet 30 tablet 0     Sig: Take 1 tablet by mouth Daily.      Last office visit with prescribing clinician: 7/19/2024   Last telemedicine visit with prescribing clinician: Visit date not found   Next office visit with prescribing clinician: Visit date not found                         Would you like a call back once the refill request has been completed: [] Yes [] No    If the office needs to give you a call back, can they leave a voicemail: [] Yes [] No    Alicia Hoskins MA  10/21/24, 08:49 EDT

## 2024-10-21 NOTE — TELEPHONE ENCOUNTER
CALLED PATIENT BACK ADVISED I NEED A CARDIAC CLEARANCE. PATIENT GAVE ME THE DENTIST OFFICE NUMBER DR CRUZ [phone] CALLED AND SPOKE ANDERSON SHE IS SENDING OVER A CLEARANCE TODAY

## 2024-10-21 NOTE — TELEPHONE ENCOUNTER
Patient contacted, samples placed up front. [facility] contacted, they stated that the rx needed carnification, is was sent in for once a day. New rx sent to for twice daily.

## 2024-10-22 ENCOUNTER — OFFICE VISIT (OUTPATIENT)
Dept: CARDIAC REHAB | Facility: HOSPITAL | Age: 84
End: 2024-10-22

## 2024-10-22 DIAGNOSIS — Z95.2 S/P AVR: Primary | ICD-10-CM

## 2024-10-22 NOTE — TELEPHONE ENCOUNTER
[facility]ILITY[facility]  DR: Cristi  PHONE: [phone]   FAX: [phone]  PROCEDURE:  3 teeth extractions  SCHEDULED: 10/25/24  MEDS TO HOLD: Eliquis and ASA

## 2024-10-23 ENCOUNTER — TELEPHONE (OUTPATIENT)
Dept: CARDIOLOGY | Facility: CLINIC | Age: 84
End: 2024-10-23
Payer: MEDICARE

## 2024-10-23 NOTE — TELEPHONE ENCOUNTER
Noted I will call he around 10 or 10:30 and verify and route back to you    Received PA request from [facility] for cetirizine (ZYRTEC) 10 MG tablet. Submitted on CMM.     Response:

## 2024-10-23 NOTE — TELEPHONE ENCOUNTER
She does need an abx due to her prior valve surgery.  I called to verify her allergies. No answer.  Left message to call back.      I would prefer to send in amoxicillin if she can take that but I see pencillin listed as an allergy

## 2024-10-23 NOTE — TELEPHONE ENCOUNTER
FACILITY: [facility]  DR: GLADYS MUNGUIA  PHONE: [phone]  FAX: [phone]  PROCEDURE: TOOTH EXTRACTION x3  SCHEDULED: 10/25/2024  MEDS TO HOLD: NONE LISTED; HOWEVER, PATIENT IS ON ELIQUIS & ASPIRIN.     PLACED ON PROVIDERS DESK FOR REVIEW.

## 2024-10-24 ENCOUNTER — OFFICE VISIT (OUTPATIENT)
Dept: CARDIAC REHAB | Facility: HOSPITAL | Age: 84
End: 2024-10-24

## 2024-10-24 DIAGNOSIS — Z95.2 S/P AVR: Primary | ICD-10-CM

## 2024-10-24 RX ORDER — AZITHROMYCIN 500 MG/1
500 TABLET, FILM COATED ORAL ONCE
Qty: 1 TABLET | Refills: 0 | Status: SHIPPED | OUTPATIENT
Start: 2024-10-24 | End: 2024-10-24

## 2024-10-24 RX ORDER — APIXABAN 2.5 MG/1
TABLET, FILM COATED ORAL
Qty: 180 TABLET | Refills: 1 | Status: SHIPPED | OUTPATIENT
Start: 2024-10-24

## 2024-10-24 NOTE — TELEPHONE ENCOUNTER
PT INFORMED US THAT SURGERY HAS BEEN MOVED TO 10/30. WANTS TO KNOW DOES SHE NEED TO ADJUST ANY MEDICATIONS OR HOLD ELIQUIS ANY LONGER OR MAKE ANY CHANGES AT ALL.

## 2024-10-24 NOTE — TELEPHONE ENCOUNTER
Rx Refill Note  Requested Prescriptions     Pending Prescriptions Disp Refills    Eliquis 2.5 MG tablet tablet [Pharmacy Med Name: Eliquis Oral Tablet 2.5 MG] 180 tablet 3     Sig: TAKE 1 TABLET EVERY 12 HOURS FOR ATRIAL FIBRILLATION      Last office visit with prescribing clinician: 4/17/2024   Last telemedicine visit with prescribing clinician: Visit date not found   Next office visit with prescribing clinician: 11/14/2024                         Would you like a call back once the refill request has been completed: [] Yes [] No    If the office needs to give you a call back, can they leave a voicemail: [] Yes [] No    Tatum Hammer MA  10/24/24, 08:25 EDT

## 2024-10-25 ENCOUNTER — APPOINTMENT (OUTPATIENT)
Dept: CARDIAC REHAB | Facility: HOSPITAL | Age: 84
End: 2024-10-25

## 2024-10-25 NOTE — TELEPHONE ENCOUNTER
She can hold 2 days after    As above, pt is extremely particular with what he will eat. Will ensure that kitchen staff is aware of food preferences. SLP following pt--- last seen on 6/18 and recommended regular diet with thin liquids; despite this, pt was receiving dysphagia 2 mechanical soft diet and honey thickened liquids. Discussed this case with pt RN (Elisabeth) earlier today, who states that she discussed the discrepancy with MD--- diet order has since been adjusted. No noted issues with BM at this time./other (specify)

## 2024-10-25 NOTE — TELEPHONE ENCOUNTER
CALLED PATIENTS DAUGHTER NATHANAEL AND ADVISED PER LIANA LEARY PT CAN HOLD FOR 2 DAYS AFTER IF. PT DAUGHTER VERBALIZED UNDERSTANDING

## 2024-10-25 NOTE — TELEPHONE ENCOUNTER
TIRED TO CALL PATIENT TO ADVISE THAT SHE NEEDS TO TAKE azithromycin to take 1-2 hours prior to her procedure. CALLED DAUGHTER OF PATIENT SHE IS ON PT HIPAA SHE IS ASKING DOES PT START ELIQUIS BACK THE DAY AFTER SX WHICH HAS BEEN CHARGED TO 10/30 PT NATHANAEL IS WONDERING CAN MOM HOLD 2 TO 3 MORE DAYS AFTER? DUE TO LAST TIME SHE HAD HER TOOTH EXTRACTED SHE WAS STILL BLEEDING A FEW DAYS AFTER. PATIENT'S DAUGHTER IS ASKING CAN YOU CALL HER [phone] TO EXPLAIN AND TALK ABOUT THIS

## 2024-10-29 ENCOUNTER — OFFICE VISIT (OUTPATIENT)
Dept: CARDIAC REHAB | Facility: HOSPITAL | Age: 84
End: 2024-10-29

## 2024-10-29 DIAGNOSIS — Z95.2 S/P AVR: Primary | ICD-10-CM

## 2024-11-01 ENCOUNTER — APPOINTMENT (OUTPATIENT)
Dept: CARDIAC REHAB | Facility: HOSPITAL | Age: 84
End: 2024-11-01

## 2024-11-04 ENCOUNTER — APPOINTMENT (OUTPATIENT)
Dept: CARDIAC REHAB | Facility: HOSPITAL | Age: 84
End: 2024-11-04

## 2024-11-05 ENCOUNTER — OFFICE (OUTPATIENT)
Dept: URBAN - METROPOLITAN AREA CLINIC 64 | Facility: CLINIC | Age: 84
End: 2024-11-05
Payer: MEDICARE

## 2024-11-05 ENCOUNTER — APPOINTMENT (OUTPATIENT)
Dept: CARDIAC REHAB | Facility: HOSPITAL | Age: 84
End: 2024-11-05

## 2024-11-05 ENCOUNTER — OFFICE (OUTPATIENT)
Age: 84
End: 2024-11-05

## 2024-11-05 VITALS
DIASTOLIC BLOOD PRESSURE: 87 MMHG | HEIGHT: 64 IN | HEART RATE: 69 BPM | HEART RATE: 69 BPM | WEIGHT: 108 LBS | SYSTOLIC BLOOD PRESSURE: 142 MMHG | WEIGHT: 108 LBS | HEIGHT: 64 IN | SYSTOLIC BLOOD PRESSURE: 142 MMHG | DIASTOLIC BLOOD PRESSURE: 87 MMHG

## 2024-11-05 DIAGNOSIS — R19.4 CHANGE IN BOWEL HABIT: ICD-10-CM

## 2024-11-05 DIAGNOSIS — R15.0 INCOMPLETE DEFECATION: ICD-10-CM

## 2024-11-05 DIAGNOSIS — K51.90 ULCERATIVE COLITIS, UNSPECIFIED, WITHOUT COMPLICATIONS: ICD-10-CM

## 2024-11-05 DIAGNOSIS — R15.9 FULL INCONTINENCE OF FECES: ICD-10-CM

## 2024-11-05 PROCEDURE — 99213 OFFICE O/P EST LOW 20 MIN: CPT | Performed by: NURSE PRACTITIONER

## 2024-11-08 ENCOUNTER — OFFICE VISIT (OUTPATIENT)
Dept: CARDIAC REHAB | Facility: HOSPITAL | Age: 84
End: 2024-11-08

## 2024-11-08 DIAGNOSIS — Z95.2 S/P AVR: Primary | ICD-10-CM

## 2024-11-12 ENCOUNTER — APPOINTMENT (OUTPATIENT)
Dept: CARDIAC REHAB | Facility: HOSPITAL | Age: 84
End: 2024-11-12

## 2024-11-13 ENCOUNTER — OFFICE VISIT (OUTPATIENT)
Dept: CARDIAC REHAB | Facility: HOSPITAL | Age: 84
End: 2024-11-13

## 2024-11-13 DIAGNOSIS — Z95.2 S/P AVR: Primary | ICD-10-CM

## 2024-11-14 ENCOUNTER — OFFICE VISIT (OUTPATIENT)
Dept: CARDIOLOGY | Facility: CLINIC | Age: 84
End: 2024-11-14
Payer: MEDICARE

## 2024-11-14 VITALS
WEIGHT: 106 LBS | BODY MASS INDEX: 19.38 KG/M2 | HEART RATE: 84 BPM | SYSTOLIC BLOOD PRESSURE: 116 MMHG | OXYGEN SATURATION: 97 % | DIASTOLIC BLOOD PRESSURE: 55 MMHG

## 2024-11-14 DIAGNOSIS — I27.20 SEVERE PULMONARY HYPERTENSION: ICD-10-CM

## 2024-11-14 DIAGNOSIS — Z86.79 S/P MAZE OPERATION FOR ATRIAL FIBRILLATION: ICD-10-CM

## 2024-11-14 DIAGNOSIS — I48.19 PERSISTENT ATRIAL FIBRILLATION: ICD-10-CM

## 2024-11-14 DIAGNOSIS — Z95.1 S/P CABG X 2: ICD-10-CM

## 2024-11-14 DIAGNOSIS — Z79.01 LONG TERM (CURRENT) USE OF ANTICOAGULANTS: ICD-10-CM

## 2024-11-14 DIAGNOSIS — Z98.890 S/P LEFT ATRIAL APPENDAGE LIGATION: ICD-10-CM

## 2024-11-14 DIAGNOSIS — Z95.2 S/P AVR (AORTIC VALVE REPLACEMENT): ICD-10-CM

## 2024-11-14 DIAGNOSIS — I10 ESSENTIAL HYPERTENSION: ICD-10-CM

## 2024-11-14 DIAGNOSIS — Z98.890 S/P MAZE OPERATION FOR ATRIAL FIBRILLATION: ICD-10-CM

## 2024-11-14 DIAGNOSIS — Z98.890 S/P MVR (MITRAL VALVE REPAIR): ICD-10-CM

## 2024-11-14 DIAGNOSIS — I50.32 CHRONIC HEART FAILURE WITH PRESERVED EJECTION FRACTION: Primary | ICD-10-CM

## 2024-11-14 NOTE — PROGRESS NOTES
Subjective:     Encounter Date:11/14/2024      Patient ID: Mindi Quinteros is a 83 y.o. female.    Chief Complaint and history of present illness:       Follow-up for CAD, CABG, A. fib, valvular heart disease     History of Present Illness  :     Ms. Mindi Quinteros  has PMH of     # CAD, cardiac cath 2/7/18  calcified 50% proximal 70% mid LAD and 70% mid LCX, normal LVEF, cardiac cath 5/17/2022 revealed severe two-vessel disease in LAD and LCx.  Echo revealed valvular heart disease with severe AR and MR.  Patient underwent CABG x2 with LIMA to LAD and SVG to lateral marginal and AVR and mitral valve repair and maze and left atrial appendage occlusion 5/19/2022  #CABG x2 with LIMA to LAD and SVG to lateral marginal 5/19/2020     #Valvular heart disease with 5/19/2022 aortic valve replacement with #21 magna pericardial prosthesis and mitral valve repair with #26 physeal ring, left atrial appendage endocardial closure and right and left cryo maze  #Paroxysmal atrial fibrillation, s/p left and right cryo maze and left atrial appendage endocardial closure  [id number] SCORE   [id number] Score: 7 (4/17/2024 11:21 AM)     [id number] Score: 7 (9/24/2023 12:41 PM)   age greater than 75, female gender, CHF, hypertension, vascular disease, diabetes        #  Diabetes  #  Hypertension,  #  Hyperlipidemia  #  ulcerative colitis  #. Chronic left ankle edema due to trauma and surgery.  #  allergy to aspertane  #  hemorrhoidectomy, hysterectomy, bladder repair, left ankle surgery,      Here for  follow-up.  Patient denies any chest pain or shortness of breath.      Patient's arterial blood pressure is 116/55, heart rate 84, O2 sat of 97 % on room air..         Review of records revealed that patient presented through emergency room 5/15/2022 with complaint of nocturnal dyspnea 2 days prior to admission with cough which is resolved but has increasing pedal edema, has chronic diarrhea secondary to ulcerative colitis and  fatigue.  Work-up here revealed elevated proBNP of 2099 and glucose 158.  Chest x-ray showing pulmonary edema and small bilateral pleural effusions and new onset A. Fib.  Echocardiogram 5/15/2022 reveals LV dysfunction EF of 46 to 50% with severe eccentric MR and diastolic dysfunction   Patient was in new onset heart failure on admission. Echocardiogram showed borderline EF, diastolic dysfunction, Severe MR,  Mod-severe AR.  Patient underwent cardiac cath and BEST     5/19/2022 patient underwent valve surgery with aortic valve replacement and mitral valve repair and two-vessel CABG and maze procedure.  5/20/2022 cardioverted to normal sinus rhythm  Echo 5/24/2022 reveals EF of 60 to 65% with biatrial enlargement PA systolic pressure 56 mmHg.  MIKE 9/29/2022 were normal..           5/27/2022: Glucose 201, ALT 83, AST 62, bilirubin 1.4, hemoglobin 10.2  5/31/2022: Glucose 49, ALT 40 AST 50, potassium 3.3 magnesium 1.8, hgb 9.4 .6/1/2022: glucose 168, potassium 4.2, bun 6 creatinine 0.55, ALT 34, AST 48 hgb 8.6.6/3/2022: Glucose 152, potassium 3.8, hemoglobin 11.4..  Labs from 6/1/2023 reveal BMP with elevated glucose.     Labs from 1/29/2023 reveal normal BMP CBC with a hemoglobin of 9.7.  And underwent Holter monitor 6/8/2023 which revealed nonsustained VT.     Labs from 3/22/2024 reveal lipid profile with cholesterol 89, triglycerides 81, HDL low at 27, LDL 45.  CMP with a glucose of 205.  Labs from 7/11/2024 reveal elevated homocystine at 22.7.  Hemoglobin of 10.9.      ASSESSMENT:        # Chronic right heart failure  # Severe TR, pulmonary hypertension, RV enlargement  #Anemia  #CAD, CABG  # paroxysmal atrial fibrillation, s/p maze, left atrial appendage closure  #Chronic HFrEF due to   systolic and diastolic dysfunction from ischemic cardiomyopathy and valvular heart disease and A. fib.  #Mitral regurgitation, s/p mitral valve repair  #Aortic regurgitation, status post tissue aortic valve  replacement  #Cardiomyopathy, possibly due to MR, ischemic cardiomyopathy and A. fib with RVR  #CABG x2 with LIMA to LAD, SVG to lateral marginal, AVR with #21 magna pericardial prosthesis, mitral valve repair with #26 physio ll ring annuloplasty, Maze procedure, CRAIG ligation  #Loss of balance/ataxia/B12 deficiency  #Impaired memory/difficulty finding words  #  hyperlipidemia  #  diabetes   # hypertension, dyslipidemia     PLAN:     Patient states that she was seen by pulmonary and put on nighttime oxygen.  Patient has severe TR already had mitral and aortic valve surgery.  He is not a candidate for redo surgery at the advanced age of 83.  Will continue medical management with aspirin, Eliquis, atorvastatin, furosemide, metoprolol, Aldactone as tolerated to help with right heart failure, CAD, valvular heart disease, hypertension, A-fib, dyslipidemia.  Patient underwent Lexiscan Cardiolite 11/13/2023 which is negative for ischemia and EF is over 70%.        Patient has high BIN7IU1-DWDs score due to female gender, age over 75, hypertension and diabetes making it 5, will benefit from long-term anticoagulation to prevent thromboembolic events.  Will continue Eliquis for now and monitor anemia.  Follow-up with hematology for anemia.     Discussed with multiple family members.        Procedures:  Cardiac cath 5/17/2022 which revealed severe two-vessel disease  BEST which revealed severe AR.  Patient underwent two-vessel CABG, maze, aortic valve replacement and mitral valve repair by  5/19/2022      Procedures     EKG from 3/16/2023 reviewed/interpreted by me reveals sinus rhythm with rate of 83 bpm        Procedures     Lexiscan Cardiolite performed 11/13/2023 reviewed/interpreted by me reveals normal perfusion, negative for ischemia, EF over 70%        Procedures  Echocardiogram 4/17/2024 reviewed/interpreted by me reveals normal LV systolic function with RV enlargement RA enlargement severe tricuspid  regurgitation.  And pulmonary hypertension         Procedures    Copied text in this portion of the note has been reviewed and is accurate as of 11/14/2024  The following portions of the patient's history were reviewed and updated as appropriate: allergies, current medications, past family history, past medical history, past social history, past surgical history and problem list.    Assessment:         [facility]       Diagnosis Plan   1. Chronic heart failure with preserved ejection fraction        2. Severe pulmonary hypertension        3. S/P CABG x 2        4. S/P AVR (aortic valve replacement)        5. S/P MVR (mitral valve repair)        6. S/P Maze operation for atrial fibrillation        7. S/P left atrial appendage ligation        8. Essential hypertension        9. Persistent atrial fibrillation        10. Long term (current) use of anticoagulants               Plan:               Past Medical History:  Past Medical History:   Diagnosis Date    Acute congestive heart failure, unspecified heart failure type 05/15/2022    Age-related osteoporosis without current pathological fracture     prolia([id number], 9/12/2019), restarted prolia(11/3/20)    Allergic     Anemia     Anxiety     Arthritis     CAD (coronary artery disease)     Depression     Diabetes     Elevated cholesterol     HTN (hypertension)     Hyperlipemia     Injury of back     Sinusitis     Type II diabetes mellitus      Past Surgical History:  Past Surgical History:   Procedure Laterality Date    ANKLE ARTHROSCOPY      AORTIC VALVE REPAIR/REPLACEMENT MITRAL VALVE REPAIR/REPLACEMENT N/A 5/19/2022    Procedure: AORTIC VALVE REPAIR/REPLACEMENT MITRAL VALVE REPAIR/REPLACEMENT;  Surgeon: Lane Okeefe MD;  Location: [facility];  Service: Cardiothoracic;  Laterality: N/A;  Mitral Valve repair with 26mm Physio II ring. Aortic   Valve Replacement with 21mm Magna Ease valve.    BELPHAROPTOSIS REPAIR      CARDIAC CATHETERIZATION      CARDIAC CATHETERIZATION  N/A 5/17/2022    Procedure: Left Heart Cath, possible pci;  Surgeon: Natan Terrazas MD;  Location: [facility] CATH INVASIVE LOCATION;  Service: Cardiovascular;  Laterality: N/A;    CATARACT EXTRACTION      CHOLECYSTECTOMY N/A 10/14/2019    Procedure: Laparoscopic cholecystectomy, possible open;  Surgeon: Vijay Guerra DO;  Location: [facility] MAIN OR;  Service: General    COLONOSCOPY      CORONARY ARTERY BYPASS GRAFT N/A 5/19/2022    Procedure: CORONARY ARTERY BYPASS GRAFTING;  Surgeon: Lane Okeefe MD;  Location: [facility] CVOR;  Service: Cardiothoracic;  Laterality: N/A;  CABG X 2 (1 Vein graft, 1 LIMA graft)    COSMETIC SURGERY      ENDOSCOPY      HEMORROIDECTOMY      HYSTERECTOMY      MAZE PROCEDURE N/A 5/19/2022    Procedure: MAZE PROCEDURE;  Surgeon: Lane Okeefe MD;  Location: [facility];  Service: Cardiothoracic;  Laterality: N/A;      Allergies:  Allergies   Allergen Reactions    Asacol [Mesalamine] Swelling    Diclofenac Swelling     Gums swell only per patient    Penicillins Other (See Comments)     Gums swelling per patient.      Home Meds:  Current Meds:     Current Outpatient Medications:     acetaminophen (TYLENOL) 325 MG tablet, Take 2 tablets by mouth Every 6 (Six) Hours As Needed for Mild Pain ., Disp: 30 tablet, Rfl: 0    apixaban (Eliquis) 2.5 MG tablet tablet, Take 1 tablet by mouth Every 12 (Twelve) Hours., Disp: 180 tablet, Rfl: 3    aspirin (aspirin) 81 MG EC tablet, Take 1 tablet by mouth Daily., Disp: , Rfl:     atorvastatin (LIPITOR) 40 MG tablet, TAKE 1 TABLET BY MOUTH EVERY DAY AT BEDTIME, Disp: 90 tablet, Rfl: 2    Calcium Carbonate (CALCIUM 500 PO), Take 2 tablets by mouth Daily., Disp: , Rfl:     Cholecalciferol (VITAMIN D3) 2000 units capsule, Take 1 capsule by mouth Every Evening., Disp: , Rfl:     clobetasol (TEMOVATE) 0.05 % ointment, 30, Disp: , Rfl:     colestipol (COLESTID) 1 g tablet, Take 1 tablet by mouth As Needed., Disp: , Rfl:     cyanocobalamin 1000 MCG/ML  injection, Inject 1 mL into the appropriate muscle as directed by prescriber Every 30 (Thirty) Days., Disp: , Rfl:     fenofibrate 160 MG tablet, Take 1 tablet by mouth Daily., Disp: , Rfl:     ferrous sulfate 325 (65 FE) MG tablet, Take 1 tablet by mouth Daily With Breakfast., Disp: , Rfl:     folic acid (FOLVITE) 1 MG tablet, Take 1 tablet by mouth Daily., Disp: 30 tablet, Rfl: 6    folic acid-pyridoxine-cyanocobalamin (FOLBIC) 2.5-25-2 MG tablet tablet, Take  by mouth Daily., Disp: , Rfl:     folic acid-vit B6-vit B12 (Folbee) 2.5-25-1 MG tablet tablet, Take 1 tablet by mouth 2 (Two) Times a Day., Disp: 180 tablet, Rfl: 3    furosemide (LASIX) 40 MG tablet, Take 1 tablet by mouth 2 (Two) Times a Day. Take 40mg in am and 40mg in pm, Disp: 180 tablet, Rfl: 1    glimepiride (AMARYL) 2 MG tablet, Take 0.5 tablets by mouth Every Morning Before Breakfast., Disp: , Rfl:     hydroxychloroquine (PLAQUENIL) 200 MG tablet, , Disp: , Rfl:     Lactobacillus (PROBIOTIC ACIDOPHILUS PO), Take  by mouth., Disp: , Rfl:     Magnesium 500 MG tablet, Take  by mouth., Disp: , Rfl:     METAMUCIL FIBER PO, Take  by mouth., Disp: , Rfl:     metFORMIN ER (GLUCOPHAGE-XR) 500 MG 24 hr tablet, Take 4 tablets by mouth Daily With Breakfast. 4 pills a day, Disp: , Rfl:     metoprolol succinate XL (TOPROL-XL) 25 MG 24 hr tablet, Take 0.5 tablets by mouth Daily., Disp: 45 tablet, Rfl: 3    multivitamin with minerals tablet tablet, Take 1 tablet by mouth Daily., Disp: , Rfl:     mupirocin (BACTROBAN) 2 % ointment, APPLY OINTMENT TOPICALLY THREE TIMES DAILY TO THE AFFECTED AREA, Disp: , Rfl:     nitroglycerin (NITROSTAT) 0.4 MG SL tablet, Place 1 tablet under the tongue Every 5 (Five) Minutes As Needed for Chest Pain. Take no more than 3 doses in 15 minutes., Disp: , Rfl:     Probiotic Product (Risaquad-2) capsule capsule, Take 1 capsule by mouth Daily., Disp: , Rfl:     spironolactone (ALDACTONE) 25 MG tablet, TAKE 1 TABLET BY MOUTH ONCE DAILY IN  THE MORNING AND 1/2 (ONE-HALF) ONCE DAILY AT 2:00PM, Disp: 135 tablet, Rfl: 1    Zinc 30 MG capsule, Take  by mouth Daily., Disp: , Rfl:     apixaban (ELIQUIS) 2.5 MG tablet tablet, Take 1 tablet by mouth 2 (Two) Times a Day. (Patient not taking: Reported on 11/14/2024), Disp: 28 tablet, Rfl: 0    apixaban (Eliquis) 2.5 MG tablet tablet, TAKE 1 TABLET EVERY 12 HOURS FOR ATRIAL FIBRILLATION (Patient not taking: Reported on 11/14/2024), Disp: 180 tablet, Rfl: 1    Denosumab (PROLIA SC), Inject  under the skin into the appropriate area as directed., Disp: , Rfl:     dicyclomine (BENTYL) 10 MG capsule, Take 1 capsule by mouth Daily., Disp: , Rfl:     empagliflozin (Jardiance) 10 MG tablet tablet, Take 1 tablet by mouth Daily., Disp: 30 tablet, Rfl: 0  Social History:   Social History     Tobacco Use    Smoking status: Never     Passive exposure: Never    Smokeless tobacco: Never   Substance Use Topics    Alcohol use: No      Family History:  Family History   Problem Relation Age of Onset    Diabetes Mother     Heart disease Father     Heart disease Brother     Diabetes Brother     Osteoporosis Paternal Grandmother               Review of Systems   Cardiovascular:  Negative for chest pain, leg swelling and palpitations.   Respiratory:  Negative for shortness of breath.    Neurological:  Negative for dizziness and numbness.     All other systems are negative         Objective:     Physical Exam  /55 (BP Location: Left arm, Patient Position: Sitting, Cuff Size: Adult)   Pulse 84   Wt 48.1 kg (106 lb)   SpO2 97%   BMI 19.38 kg/m²   General:  Appears frail.  Eyes: Sclera is anicteric,  conjunctiva is clear   HEENT:  No JVD.  No carotid bruits  Respiratory: Respirations regular and unlabored at rest.  Clear to auscultation  Cardiovascular: S1,S2, 3/6 systolic  murmur.  Extremities: Left lower extremity venous ulcer seen.  Skin: Color pink. Skin warm and dry to touch. No rashes  No xanthoma  Neuro: Alert and  "awake.    Lab Reviewed:         Natan Terrazas MD  11/14/2024 16:48 EST      EMR Dragon/Transcription:   \"Dictated utilizing Dragon dictation\".        "

## 2024-11-15 ENCOUNTER — OFFICE VISIT (OUTPATIENT)
Dept: CARDIAC REHAB | Facility: HOSPITAL | Age: 84
End: 2024-11-15

## 2024-11-15 DIAGNOSIS — Z95.2 S/P AVR: Primary | ICD-10-CM

## 2024-11-15 RX ORDER — CYANOCOBALAMIN/FOLIC AC/VIT B6 1-2.5-25MG
1 TABLET ORAL 2 TIMES DAILY
Qty: 180 TABLET | Refills: 3 | OUTPATIENT
Start: 2024-11-15

## 2024-11-15 RX ORDER — AZITHROMYCIN 500 MG/1
500 TABLET, FILM COATED ORAL ONCE
Qty: 1 TABLET | Refills: 0 | Status: SHIPPED | OUTPATIENT
Start: 2024-11-15 | End: 2024-11-15

## 2024-11-15 RX ORDER — SPIRONOLACTONE 25 MG/1
TABLET ORAL
Qty: 135 TABLET | Refills: 2 | Status: SHIPPED | OUTPATIENT
Start: 2024-11-15

## 2024-11-15 RX ORDER — ATORVASTATIN CALCIUM 40 MG/1
40 TABLET, FILM COATED ORAL
Qty: 90 TABLET | Refills: 2 | Status: SHIPPED | OUTPATIENT
Start: 2024-11-15

## 2024-11-15 NOTE — TELEPHONE ENCOUNTER
Rx Refill Note  Requested Prescriptions     Pending Prescriptions Disp Refills    atorvastatin (LIPITOR) 40 MG tablet 90 tablet 2     Sig: Take 1 tablet by mouth every night at bedtime.    spironolactone (ALDACTONE) 25 MG tablet 135 tablet 1     Sig: TAKE 1 TABLET BY MOUTH ONCE DAILY IN THE MORNING AND 1/2 (ONE-HALF) ONCE DAILY AT 2:00PM      Last office visit with prescribing clinician: 11/14/2024   Last telemedicine visit with prescribing clinician: Visit date not found   Next office visit with prescribing clinician: 11/18/2025                         Would you like a call back once the refill request has been completed: [] Yes [] No    If the office needs to give you a call back, can they leave a voicemail: [] Yes [] No    Tatum Hammer MA  11/15/24, 09:31 EST

## 2024-11-15 NOTE — TELEPHONE ENCOUNTER
Rx Refill Note  Requested Prescriptions     Pending Prescriptions Disp Refills    azithromycin (Zithromax) 500 MG tablet 1 tablet 0     Sig: Take 1 tablet by mouth 1 (One) Time for 1 dose. Prior to dental procedure      Last office visit with prescribing clinician: 7/19/2024   Last telemedicine visit with prescribing clinician: Visit date not found   Next office visit with prescribing clinician: Visit date not found                         Would you like a call back once the refill request has been completed: [] Yes [] No    If the office needs to give you a call back, can they leave a voicemail: [] Yes [] No    Alicia Hoskins MA  11/15/24, 08:30 EST

## 2024-11-18 ENCOUNTER — TELEPHONE (OUTPATIENT)
Dept: CARDIOLOGY | Facility: CLINIC | Age: 84
End: 2024-11-18
Payer: MEDICARE

## 2024-11-18 RX ORDER — METOPROLOL SUCCINATE 25 MG/1
12.5 TABLET, EXTENDED RELEASE ORAL
Qty: 45 TABLET | Refills: 3 | Status: SHIPPED | OUTPATIENT
Start: 2024-11-18 | End: 2024-11-21 | Stop reason: SDUPTHER

## 2024-11-18 NOTE — TELEPHONE ENCOUNTER
Detail Level: Simple
Incoming Refill Request      Medication requested (name and dose): metprolol 1 qd    Pharmacy where request should be sent: [facility] [address]     Additional details provided by patient: needs to say 1 qd     Best call back number: [phone]    Does the patient have less than a 3 day supply:  [x] Yes  [] No    Desi Matthew Rep  11/18/24, 12:12 EST          
Rx Refill Note  Requested Prescriptions      No prescriptions requested or ordered in this encounter      Last office visit with prescribing clinician: 11/14/2024   Last telemedicine visit with prescribing clinician: Visit date not found   Next office visit with prescribing clinician: 11/18/2025                         Would you like a call back once the refill request has been completed: [] Yes [] No    If the office needs to give you a call back, can they leave a voicemail: [] Yes [] No    Tatum Hammer MA  11/18/24, 14:12 EST    
Add 09449 Cpt? (Important Note: In 2017 The Use Of 11392 Is Being Tracked By Cms To Determine Future Global Period Reimbursement For Global Periods): yes

## 2024-11-19 ENCOUNTER — OFFICE VISIT (OUTPATIENT)
Dept: CARDIAC REHAB | Facility: HOSPITAL | Age: 84
End: 2024-11-19

## 2024-11-19 DIAGNOSIS — Z95.2 S/P AVR: Primary | ICD-10-CM

## 2024-11-21 RX ORDER — METOPROLOL SUCCINATE 25 MG/1
25 TABLET, EXTENDED RELEASE ORAL
Qty: 90 TABLET | Refills: 1 | Status: SHIPPED | OUTPATIENT
Start: 2024-11-21

## 2024-11-21 NOTE — TELEPHONE ENCOUNTER
Rx Refill Note  Requested Prescriptions      No prescriptions requested or ordered in this encounter      Last office visit with prescribing clinician: 11/14/2024   Last telemedicine visit with prescribing clinician: Visit date not found   Next office visit with prescribing clinician: 11/18/2025                         Would you like a call back once the refill request has been completed: [] Yes [] No    If the office needs to give you a call back, can they leave a voicemail: [] Yes [] No    Tatum Hammer MA  11/21/24, 10:16 EST

## 2024-11-22 ENCOUNTER — OFFICE VISIT (OUTPATIENT)
Dept: CARDIAC REHAB | Facility: HOSPITAL | Age: 84
End: 2024-11-22

## 2024-11-22 DIAGNOSIS — Z95.2 S/P AVR: Primary | ICD-10-CM

## 2024-11-26 ENCOUNTER — OFFICE VISIT (OUTPATIENT)
Dept: CARDIAC REHAB | Facility: HOSPITAL | Age: 84
End: 2024-11-26

## 2024-11-26 DIAGNOSIS — Z95.2 S/P AVR: Primary | ICD-10-CM

## 2024-11-29 ENCOUNTER — APPOINTMENT (OUTPATIENT)
Dept: CARDIAC REHAB | Facility: HOSPITAL | Age: 84
End: 2024-11-29

## 2024-12-02 ENCOUNTER — OFFICE VISIT (OUTPATIENT)
Dept: CARDIAC REHAB | Facility: HOSPITAL | Age: 84
End: 2024-12-02

## 2024-12-02 DIAGNOSIS — Z95.2 S/P AVR: Primary | ICD-10-CM

## 2024-12-03 ENCOUNTER — APPOINTMENT (OUTPATIENT)
Dept: CARDIAC REHAB | Facility: HOSPITAL | Age: 84
End: 2024-12-03

## 2024-12-03 NOTE — TELEPHONE ENCOUNTER
Caller: Mindi Quinteros    Relationship: Self    Best call back number: [phone]     Requested Prescriptions:   Requested Prescriptions     Pending Prescriptions Disp Refills    folic acid-vit B6-vit B12 (Folbee) 2.5-25-1 MG tablet tablet 180 tablet 3     Sig: Take 1 tablet by mouth 2 (Two) Times a Day.        Pharmacy where request should be sent: [id number] [address] - [phone] [facility] [phone]      Last office visit with prescribing clinician: 7/19/2024   Last telemedicine visit with prescribing clinician: Visit date not found   Next office visit with prescribing clinician: Visit date not found     Additional details provided by patient: PT IS COMPLETELY OUT OF MEDICATION.     Does the patient have less than a 3 day supply:  [x] Yes  [] No    Would you like a call back once the refill request has been completed: [] Yes [x] No    If the office needs to give you a call back, can they leave a voicemail: [] Yes [x] No

## 2024-12-04 ENCOUNTER — OFFICE VISIT (OUTPATIENT)
Dept: CARDIAC REHAB | Facility: HOSPITAL | Age: 84
End: 2024-12-04

## 2024-12-04 DIAGNOSIS — Z95.2 S/P AVR: Primary | ICD-10-CM

## 2024-12-04 RX ORDER — CYANOCOBALAMIN/FOLIC AC/VIT B6 1-2.5-25MG
1 TABLET ORAL 2 TIMES DAILY
Qty: 180 TABLET | Refills: 3 | Status: SHIPPED | OUTPATIENT
Start: 2024-12-04

## 2024-12-06 ENCOUNTER — OFFICE VISIT (OUTPATIENT)
Dept: CARDIAC REHAB | Facility: HOSPITAL | Age: 84
End: 2024-12-06

## 2024-12-06 DIAGNOSIS — Z95.2 S/P AVR: Primary | ICD-10-CM

## 2024-12-09 ENCOUNTER — OFFICE VISIT (OUTPATIENT)
Dept: CARDIAC REHAB | Facility: HOSPITAL | Age: 84
End: 2024-12-09

## 2024-12-09 DIAGNOSIS — Z95.2 S/P AVR: Primary | ICD-10-CM

## 2024-12-10 ENCOUNTER — APPOINTMENT (OUTPATIENT)
Dept: CARDIAC REHAB | Facility: HOSPITAL | Age: 84
End: 2024-12-10

## 2024-12-13 ENCOUNTER — APPOINTMENT (OUTPATIENT)
Dept: CARDIAC REHAB | Facility: HOSPITAL | Age: 84
End: 2024-12-13

## 2024-12-17 ENCOUNTER — TELEPHONE (OUTPATIENT)
Dept: CARDIOLOGY | Facility: CLINIC | Age: 84
End: 2024-12-17
Payer: MEDICARE

## 2024-12-17 ENCOUNTER — OFFICE VISIT (OUTPATIENT)
Dept: CARDIAC REHAB | Facility: HOSPITAL | Age: 84
End: 2024-12-17

## 2024-12-17 DIAGNOSIS — Z95.2 S/P AVR: Primary | ICD-10-CM

## 2024-12-17 NOTE — TELEPHONE ENCOUNTER
Rx Refill Note  Requested Prescriptions      No prescriptions requested or ordered in this encounter      Last office visit with prescribing clinician: 11/14/2024   Last telemedicine visit with prescribing clinician: Visit date not found   Next office visit with prescribing clinician: 11/18/2025                         Would you like a call back once the refill request has been completed: [] Yes [] No    If the office needs to give you a call back, can they leave a voicemail: [] Yes [] No    Tatum Hammer MA  12/17/24, 16:56 EST

## 2024-12-17 NOTE — TELEPHONE ENCOUNTER
Incoming Refill Request      Medication requested (name and dose): JARDIANCE 10 MG  FUROSEMIDE 40 MG    Pharmacy where request should be sent: [facility] [address]    Additional details provided by patient: REQUESTING 90 DAY SUPPLY    Best call back number: [phone]    Does the patient have less than a 3 day supply:  [] Yes  [x] No    Desi Ramsey Rep  12/17/24, 16:02 EST

## 2024-12-18 ENCOUNTER — OFFICE VISIT (OUTPATIENT)
Dept: CARDIAC SURGERY | Facility: CLINIC | Age: 84
End: 2024-12-18
Payer: MEDICARE

## 2024-12-18 VITALS
OXYGEN SATURATION: 98 % | RESPIRATION RATE: 18 BRPM | HEART RATE: 73 BPM | SYSTOLIC BLOOD PRESSURE: 141 MMHG | DIASTOLIC BLOOD PRESSURE: 73 MMHG | BODY MASS INDEX: 19.51 KG/M2 | WEIGHT: 106 LBS | HEIGHT: 62 IN

## 2024-12-18 DIAGNOSIS — I77.819 AORTIC ECTASIA: ICD-10-CM

## 2024-12-18 DIAGNOSIS — I25.10 CORONARY ARTERY DISEASE INVOLVING NATIVE CORONARY ARTERY OF NATIVE HEART WITHOUT ANGINA PECTORIS: Chronic | ICD-10-CM

## 2024-12-18 DIAGNOSIS — I48.91 ATRIAL FIBRILLATION, UNSPECIFIED TYPE: ICD-10-CM

## 2024-12-18 DIAGNOSIS — I10 PRIMARY HYPERTENSION: Primary | Chronic | ICD-10-CM

## 2024-12-18 RX ORDER — FUROSEMIDE 40 MG/1
40 TABLET ORAL 2 TIMES DAILY
Qty: 180 TABLET | Refills: 3 | Status: SHIPPED | OUTPATIENT
Start: 2024-12-18

## 2024-12-19 ENCOUNTER — OFFICE VISIT (OUTPATIENT)
Dept: CARDIAC REHAB | Facility: HOSPITAL | Age: 84
End: 2024-12-19

## 2024-12-19 DIAGNOSIS — I71.20 THORACIC AORTIC ANEURYSM WITHOUT RUPTURE, UNSPECIFIED PART: ICD-10-CM

## 2024-12-19 DIAGNOSIS — I77.819 AORTIC ECTASIA: Primary | ICD-10-CM

## 2024-12-19 DIAGNOSIS — Z95.2 S/P AVR: Primary | ICD-10-CM

## 2024-12-19 DIAGNOSIS — Z95.2 S/P AVR (AORTIC VALVE REPLACEMENT): ICD-10-CM

## 2024-12-19 NOTE — PROGRESS NOTES
12/19/2024      Subjective:      Dayana Tipton MD    Chief Complaint: Follow-up ascending aortic dilatation    History of Present Illness:       Dear Dayana Rodriguez MD and Colleagues,    It was nice to see Mindi Quinteros in [facility] for follow-up. She is a 84 y.o. female with known hx of ascending aortic dilatation, severe AI, moderate MR, CAD, atrial fib with chronic anticoaguation, HF, HTN, HLD, type 2 DM, ulcerative colitis, and frailty.  She underwent tissue AVR (21 mm Magna)/ MV repair (26 mm Physio II ring annuloplasty)/ CABG x2 with LIMA to mid LAD, SVG to lateral marginal/ right and left maze procedure with left atrial appendage endocardial closure by Dr. Okeefe in May 2022.  Her ascending aortic ectasia was originally discovered with a CT scan of the chest while hospitalized after her cardiac surgery.  Since last being seen, she reports concerns of forgetfulness.  She states she forgets why she went into the kitchen or where she is driving.  Her daughter is asking if ginko biloba is something she should be taking to help with her forgetfulness.  She comes in today for routine follow-up for aneurysm surveillence.  The CT of chest without contrast on 10/17/2024 was reviewed.  The aortic root measures 2.8-3 cm, ascending aorta measures 3.9-4 cm, and DTA measures 2.6 cm.  These measurements are stable.  Transthoracic echo 4/2024 showed EF 54%, severe tricuspid regurgitation, and RVSP 45-55.  The severe tricuspid regurgitation is new.  She denies shortness of breath, chest/back pain, numbness/tingling/pain of extremities.  No vascular deficiencies or hyperextensible or hypermobile joints are noted on exam.  The patient's family history is negative for aneurysms or dissections, negative for connective tissue disease, and negative for coronary disease in first degree family members.  She has never smoked.  She continues to drive.  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  She  reports she has continued seeing Dr. Nguyen for anemia.  She continues to do cardiac rehab twice weekly. Her blood pressure today is 141/73.  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  She is .  She is with her daughter for her appt today.        Patient Active Problem List   Diagnosis    Age-related osteoporosis without current pathological fracture    Angina pectoris    CAD (coronary artery disease)    Dyspnea on exertion    Hyperlipidemia    Hypertension    Diabetes mellitus with coincident hypertension    Vitamin D deficiency    Cholelithiasis and acute cholecystitis with obstruction    Family hx osteoporosis    Ulcerative colitis    Chronic heart failure with preserved ejection fraction    Acute congestive heart failure, unspecified heart failure type    Mitral valve insufficiency    Nonrheumatic aortic valve insufficiency    Hypomagnesemia    S/P CABG x 2    S/P AVR (aortic valve replacement)    S/P MVR (mitral valve repair)    S/P Maze operation for atrial fibrillation    S/P left atrial appendage ligation    Acute kidney failure, unspecified    Acute myocardial infarction, unspecified    Acute on chronic diastolic (congestive) heart failure    Anxiety disorder, unspecified    Atherosclerosis of autologous vein coronary artery bypass graft(s) with unstable angina pectoris    Chronic sinusitis, unspecified    Chronic thromboembolic pulmonary hypertension    Depression, unspecified    Difficulty in walking, not elsewhere classified    Generalized muscle weakness    Pleural effusion in other conditions classified elsewhere    Presence of aortocoronary bypass graft    Unspecified atrial fibrillation    Unspecified osteoarthritis, unspecified site    Unsteadiness on feet    Weakness    Cardiomegaly    Ascending aortic ectasia    Moderate tricuspid regurgitation    Acute coronary thrombosis not resulting in myocardial infarction    Altered bowel habits     Altered taste    Anemia    Benign neoplasm of colon    Colon polyps    Diverticulosis of colon    Encounter for blood transfusion    Family history of colonic polyps    Fecal incontinence    Functional diarrhea    Hemorrhoids    Other abnormalities of heart beat    Personal history of colonic polyps    Rectal bleeding    Severe pulmonary hypertension       Past Medical History:   Diagnosis Date    Acute congestive heart failure, unspecified heart failure type 05/15/2022    Age-related osteoporosis without current pathological fracture     prolia([id number], 9/12/2019), restarted prolia(11/3/20)    Allergic     Anemia     Anxiety     Arthritis     CAD (coronary artery disease)     Depression     Diabetes     Elevated cholesterol     HTN (hypertension)     Hyperlipemia     Injury of back     Sinusitis     Type II diabetes mellitus        Past Surgical History:   Procedure Laterality Date    ANKLE ARTHROSCOPY      AORTIC VALVE REPAIR/REPLACEMENT MITRAL VALVE REPAIR/REPLACEMENT N/A 5/19/2022    Procedure: AORTIC VALVE REPAIR/REPLACEMENT MITRAL VALVE REPAIR/REPLACEMENT;  Surgeon: Lane Okeefe MD;  Location: [facility];  Service: Cardiothoracic;  Laterality: N/A;  Mitral Valve repair with 26mm Physio II ring. Aortic   Valve Replacement with 21mm Magna Ease valve.    BELPHAROPTOSIS REPAIR      CARDIAC CATHETERIZATION      CARDIAC CATHETERIZATION N/A 5/17/2022    Procedure: Left Heart Cath, possible pci;  Surgeon: Natan Terrazas MD;  Location: [facility] CATH INVASIVE LOCATION;  Service: Cardiovascular;  Laterality: N/A;    CATARACT EXTRACTION      CHOLECYSTECTOMY N/A 10/14/2019    Procedure: Laparoscopic cholecystectomy, possible open;  Surgeon: Vijay Guerra DO;  Location: [facility] MAIN OR;  Service: General    COLONOSCOPY      CORONARY ARTERY BYPASS GRAFT N/A 5/19/2022    Procedure: CORONARY ARTERY BYPASS GRAFTING;  Surgeon: Lane Okeefe MD;  Location: [facility] CVOR;  Service: Cardiothoracic;  Laterality:  N/A;  CABG X 2 (1 Vein graft, 1 LIMA graft)    COSMETIC SURGERY      ENDOSCOPY      HEMORROIDECTOMY      HYSTERECTOMY      MAZE PROCEDURE N/A 5/19/2022    Procedure: MAZE PROCEDURE;  Surgeon: Lane Okeefe MD;  Location: [facility];  Service: Cardiothoracic;  Laterality: N/A;       Allergies   Allergen Reactions    Asacol [Mesalamine] Swelling    Diclofenac Swelling     Gums swell only per patient    Penicillins Other (See Comments)     Gums swelling per patient.          Current Outpatient Medications:     acetaminophen (TYLENOL) 325 MG tablet, Take 2 tablets by mouth Every 6 (Six) Hours As Needed for Mild Pain ., Disp: 30 tablet, Rfl: 0    apixaban (Eliquis) 2.5 MG tablet tablet, Take 1 tablet by mouth Every 12 (Twelve) Hours., Disp: 180 tablet, Rfl: 3    apixaban (ELIQUIS) 2.5 MG tablet tablet, Take 1 tablet by mouth 2 (Two) Times a Day. (Patient not taking: Reported on 12/18/2024), Disp: 28 tablet, Rfl: 0    apixaban (Eliquis) 2.5 MG tablet tablet, TAKE 1 TABLET EVERY 12 HOURS FOR ATRIAL FIBRILLATION (Patient not taking: Reported on 12/18/2024), Disp: 180 tablet, Rfl: 1    aspirin (aspirin) 81 MG EC tablet, Take 1 tablet by mouth Daily., Disp: , Rfl:     atorvastatin (LIPITOR) 40 MG tablet, Take 1 tablet by mouth every night at bedtime., Disp: 90 tablet, Rfl: 2    Calcium Carbonate (CALCIUM 500 PO), Take 2 tablets by mouth Daily., Disp: , Rfl:     Cholecalciferol (VITAMIN D3) 2000 units capsule, Take 1 capsule by mouth Every Evening., Disp: , Rfl:     clobetasol (TEMOVATE) 0.05 % ointment, 30, Disp: , Rfl:     colestipol (COLESTID) 1 g tablet, Take 1 tablet by mouth As Needed., Disp: , Rfl:     cyanocobalamin 1000 MCG/ML injection, Inject 1 mL into the appropriate muscle as directed by prescriber Every 30 (Thirty) Days., Disp: , Rfl:     Denosumab (PROLIA SC), Inject  under the skin into the appropriate area as directed., Disp: , Rfl:     dicyclomine (BENTYL) 10 MG capsule, Take 1 capsule by mouth Daily.,  Disp: , Rfl:     empagliflozin (Jardiance) 10 MG tablet tablet, Take 1 tablet by mouth Daily., Disp: 90 tablet, Rfl: 3    fenofibrate 160 MG tablet, Take 1 tablet by mouth Daily., Disp: , Rfl:     ferrous sulfate 325 (65 FE) MG tablet, Take 1 tablet by mouth Daily With Breakfast., Disp: , Rfl:     folic acid (FOLVITE) 1 MG tablet, Take 1 tablet by mouth Daily., Disp: 30 tablet, Rfl: 6    folic acid-pyridoxine-cyanocobalamin (FOLBIC) 2.5-25-2 MG tablet tablet, Take  by mouth Daily., Disp: , Rfl:     folic acid-vit B6-vit B12 (Folbee) 2.5-25-1 MG tablet tablet, Take 1 tablet by mouth 2 (Two) Times a Day., Disp: 180 tablet, Rfl: 3    furosemide (LASIX) 40 MG tablet, Take 1 tablet by mouth 2 (Two) Times a Day. Take 40mg in am and 40mg in pm, Disp: 180 tablet, Rfl: 3    glimepiride (AMARYL) 2 MG tablet, Take 0.5 tablets by mouth Every Morning Before Breakfast., Disp: , Rfl:     Lactobacillus (PROBIOTIC ACIDOPHILUS PO), Take  by mouth., Disp: , Rfl:     Magnesium 500 MG tablet, Take  by mouth., Disp: , Rfl:     METAMUCIL FIBER PO, Take  by mouth., Disp: , Rfl:     metFORMIN ER (GLUCOPHAGE-XR) 500 MG 24 hr tablet, Take 4 tablets by mouth Daily With Breakfast. 4 pills a day, Disp: , Rfl:     metoprolol succinate XL (TOPROL-XL) 25 MG 24 hr tablet, Take 1 tablet by mouth Daily., Disp: 90 tablet, Rfl: 1    multivitamin with minerals tablet tablet, Take 1 tablet by mouth Daily., Disp: , Rfl:     mupirocin (BACTROBAN) 2 % ointment, APPLY OINTMENT TOPICALLY THREE TIMES DAILY TO THE AFFECTED AREA, Disp: , Rfl:     nitroglycerin (NITROSTAT) 0.4 MG SL tablet, Place 1 tablet under the tongue Every 5 (Five) Minutes As Needed for Chest Pain. Take no more than 3 doses in 15 minutes., Disp: , Rfl:     Probiotic Product (Risaquad-2) capsule capsule, Take 1 capsule by mouth Daily., Disp: , Rfl:     spironolactone (ALDACTONE) 25 MG tablet, TAKE 1 TABLET BY MOUTH ONCE DAILY IN THE MORNING AND 1/2 (ONE-HALF) ONCE DAILY AT 2:00PM, Disp: 135  tablet, Rfl: 2    Zinc 30 MG capsule, Take  by mouth Daily., Disp: , Rfl:     Social History     Socioeconomic History    Marital status:    Tobacco Use    Smoking status: Never     Passive exposure: Never    Smokeless tobacco: Never   Vaping Use    Vaping status: Never Used   Substance and Sexual Activity    Alcohol use: No    Drug use: No    Sexual activity: Defer       Family History   Problem Relation Age of Onset    Diabetes Mother     Heart disease Father     Heart disease Brother     Diabetes Brother     Osteoporosis Paternal Grandmother            Review of Systems:  Review of Systems   Respiratory:  Negative for shortness of breath.    Cardiovascular:  Negative for chest pain, palpitations and leg swelling.   Neurological:  Negative for dizziness, weakness and light-headedness.   Psychiatric/Behavioral:          Reports memory issues       Physical Exam:    Vital Signs:  Weight: 48.1 kg (106 lb)   Body mass index is 19.39 kg/m².  Temp: (not recorded)   Heart Rate: 73   BP: 141/73     Physical Exam  Vitals and nursing note reviewed.   Constitutional:       General: She is awake.      Appearance: Normal appearance. She is well-developed and well-groomed.   HENT:      Head: Normocephalic and atraumatic.      Nose: Nose normal.      Mouth/Throat:      Lips: Pink.      Mouth: Mucous membranes are moist.      Pharynx: Uvula midline.   Eyes:      General: Lids are normal. No scleral icterus.     Extraocular Movements: Extraocular movements intact.      Conjunctiva/sclera: Conjunctivae normal.      Pupils: Pupils are equal, round, and reactive to light.   Neck:      Thyroid: No thyroid mass or thyromegaly.      Vascular: Normal carotid pulses. No carotid bruit, hepatojugular reflux or JVD.      Trachea: Trachea normal.   Cardiovascular:      Rate and Rhythm: Normal rate and regular rhythm.      Pulses:           Carotid pulses are 2+ on the right side and 2+ on the left side.       Radial pulses are 2+ on  the right side and 2+ on the left side.        Femoral pulses are 2+ on the right side and 2+ on the left side.       Popliteal pulses are 2+ on the right side and 2+ on the left side.        Dorsalis pedis pulses are 2+ on the right side and 2+ on the left side.        Posterior tibial pulses are 2+ on the right side and 2+ on the left side.      Heart sounds: Murmur heard.      Systolic murmur is present with a grade of 2/6.   Pulmonary:      Effort: Pulmonary effort is normal.      Breath sounds: Normal breath sounds.   Abdominal:      General: Bowel sounds are normal. There is no distension.      Palpations: Abdomen is soft.      Tenderness: There is no abdominal tenderness.   Musculoskeletal:      Cervical back: Neck supple.      Right lower leg: No edema.      Left lower leg: No edema.      Comments: Gait steady and strong without use of assistive devices   Lymphadenopathy:      Cervical: No cervical adenopathy.      Upper Body:      Right upper body: No supraclavicular adenopathy.      Left upper body: No supraclavicular adenopathy.   Skin:     General: Skin is warm and dry.      Capillary Refill: Capillary refill takes less than 2 seconds.      Findings: No erythema or rash.      Nails: There is no clubbing.             Comments: Sternotomy/SVHS well healed.   Neurological:      Mental Status: She is alert and oriented to person, place, and time.      GCS: GCS eye subscore is 4. GCS verbal subscore is 5. GCS motor subscore is 6.   Psychiatric:         Attention and Perception: Attention and perception normal.         Mood and Affect: Mood and affect normal.         Speech: Speech normal.         Behavior: Behavior normal. Behavior is cooperative.         Thought Content: Thought content normal.         Cognition and Memory: Cognition and memory normal.         Judgment: Judgment normal.          Assessment:     Ascending aortic ectasia, 3.9-4 cm--stable  Severe tricuspid insufficiency--new on echo  VHD,  aortic insufficiency/mitral regurgitation --s/p AVR with 21 mm Magna pericardial prosthesis/ mitral valve repair with 26 mm physio II ring annuloplasty (Sary, 5/2022)  CAD--s/p CABG x2 with LIMA to mid LAD, SVG to lateral marginal (Sary, 5/2022)  PAF with chronic anticoagulation with Eliquis--s/p right/ left cryo-maze with left atrial appendage endocardial closure (Sary, 5/2022)  Anemia, chronic--followed by Dr. Nguyen  Memory issues, forgetfulness--deferred to PCP  HTN--stable    Recommendation/Plan:       Continued risk factor modification of hypertension with a goal blood pressure less than 130/80, hyperlipidemia optimization, and continued avoidance of tobacco/nicotine products.    We discussed the importance of avoidance of over the counter decongestants and stimulants, specifically pseudoephedrine, for hypertension and aneurysm management.    Initiation of low aerobic exercise is indicated to help reduce body habitus, assist with blood pressure and cholesterol control.       Although risk of rupture is low, emergency department presentation is warranted for acute chest, back, or abdominal pain, syncope, or stroke like symptoms.    Follow up in [facility] in one year(s) with CT scan of chest without contrast and echocardiogram.  Her aorta is stable.  She does have what appears to be new severe tricuspid insufficiency.  I d/w Dr. Okeefe--he will review echo and provide any recs if needed.  I did not see any memory deficit today.  In fact, she asked very detailed questions and wrote notes about our conversation.  Her BP is slightly elevated today but not to the point I think we would need to adjust her medications.    I spent approximately 30 minutes total in evaluating the data, examining the patient, discussing the options and counseling.  Independent interpretation of imaging.  Imaging shown to pt/sangeetha.     Thank you for allowing us to participate in her care.    Regards,    Clover Beck,  APRN

## 2024-12-20 ENCOUNTER — APPOINTMENT (OUTPATIENT)
Dept: CARDIAC REHAB | Facility: HOSPITAL | Age: 84
End: 2024-12-20

## 2024-12-23 ENCOUNTER — OFFICE VISIT (OUTPATIENT)
Dept: CARDIAC REHAB | Facility: HOSPITAL | Age: 84
End: 2024-12-23

## 2024-12-23 DIAGNOSIS — Z95.2 S/P AVR: Primary | ICD-10-CM

## 2024-12-23 RX ORDER — FOLIC ACID 1 MG/1
1 TABLET ORAL DAILY
Qty: 30 TABLET | Refills: 6 | Status: SHIPPED | OUTPATIENT
Start: 2024-12-23

## 2024-12-24 ENCOUNTER — APPOINTMENT (OUTPATIENT)
Dept: CARDIAC REHAB | Facility: HOSPITAL | Age: 84
End: 2024-12-24

## 2024-12-26 ENCOUNTER — TRANSCRIBE ORDERS (OUTPATIENT)
Dept: ADMINISTRATIVE | Facility: HOSPITAL | Age: 84
End: 2024-12-26
Payer: MEDICARE

## 2024-12-26 DIAGNOSIS — R09.89 DECREASED PEDAL PULSES: Primary | ICD-10-CM

## 2024-12-27 ENCOUNTER — OFFICE VISIT (OUTPATIENT)
Dept: CARDIAC REHAB | Facility: HOSPITAL | Age: 84
End: 2024-12-27

## 2024-12-27 DIAGNOSIS — Z95.2 S/P AVR: Primary | ICD-10-CM

## 2024-12-31 ENCOUNTER — OFFICE VISIT (OUTPATIENT)
Dept: CARDIAC REHAB | Facility: HOSPITAL | Age: 84
End: 2024-12-31

## 2024-12-31 DIAGNOSIS — Z95.2 S/P AVR: Primary | ICD-10-CM

## 2025-01-02 ENCOUNTER — HOSPITAL ENCOUNTER (OUTPATIENT)
Dept: CARDIOLOGY | Facility: HOSPITAL | Age: 85
Discharge: HOME | End: 2025-01-02
Admitting: FAMILY MEDICINE
Payer: MEDICARE

## 2025-01-02 DIAGNOSIS — R09.89 DECREASED PEDAL PULSES: ICD-10-CM

## 2025-01-02 LAB
BH CV LOWER ARTERIAL LEFT ABI RATIO: 1.24
BH CV LOWER ARTERIAL LEFT DORSALIS PEDIS SYS MAX: 121
BH CV LOWER ARTERIAL LEFT GREAT TOE SYS MAX: 78
BH CV LOWER ARTERIAL LEFT POST TIBIAL SYS MAX: 130
BH CV LOWER ARTERIAL LEFT TBI RATIO: 0.74
BH CV LOWER ARTERIAL RIGHT ABI RATIO: 1.25
BH CV LOWER ARTERIAL RIGHT DORSALIS PEDIS SYS MAX: 124
BH CV LOWER ARTERIAL RIGHT GREAT TOE SYS MAX: 80
BH CV LOWER ARTERIAL RIGHT POST TIBIAL SYS MAX: 131
BH CV LOWER ARTERIAL RIGHT TBI RATIO: 0.76
UPPER ARTERIAL LEFT ARM BRACHIAL SYS MAX: 105
UPPER ARTERIAL RIGHT ARM BRACHIAL SYS MAX: 103

## 2025-01-02 PROCEDURE — 93922 UPR/L XTREMITY ART 2 LEVELS: CPT

## 2025-01-03 ENCOUNTER — OFFICE VISIT (OUTPATIENT)
Dept: CARDIAC REHAB | Facility: HOSPITAL | Age: 85
End: 2025-01-03

## 2025-01-03 DIAGNOSIS — Z95.2 S/P AVR: Primary | ICD-10-CM

## 2025-01-07 ENCOUNTER — APPOINTMENT (OUTPATIENT)
Dept: CARDIAC REHAB | Facility: HOSPITAL | Age: 85
End: 2025-01-07

## 2025-01-10 ENCOUNTER — APPOINTMENT (OUTPATIENT)
Dept: CARDIAC REHAB | Facility: HOSPITAL | Age: 85
End: 2025-01-10

## 2025-01-14 ENCOUNTER — OFFICE VISIT (OUTPATIENT)
Dept: CARDIAC REHAB | Facility: HOSPITAL | Age: 85
End: 2025-01-14

## 2025-01-14 DIAGNOSIS — Z95.2 S/P AVR: Primary | ICD-10-CM

## 2025-01-17 ENCOUNTER — OFFICE VISIT (OUTPATIENT)
Dept: CARDIAC REHAB | Facility: HOSPITAL | Age: 85
End: 2025-01-17

## 2025-01-17 DIAGNOSIS — Z95.2 S/P AVR: Primary | ICD-10-CM

## 2025-01-21 ENCOUNTER — OFFICE VISIT (OUTPATIENT)
Dept: CARDIAC REHAB | Facility: HOSPITAL | Age: 85
End: 2025-01-21

## 2025-01-21 DIAGNOSIS — Z95.2 S/P AVR: Primary | ICD-10-CM

## 2025-01-24 ENCOUNTER — OFFICE VISIT (OUTPATIENT)
Dept: CARDIAC REHAB | Facility: HOSPITAL | Age: 85
End: 2025-01-24

## 2025-01-24 DIAGNOSIS — Z95.2 S/P AVR: Primary | ICD-10-CM

## 2025-01-28 ENCOUNTER — OFFICE VISIT (OUTPATIENT)
Dept: CARDIAC REHAB | Facility: HOSPITAL | Age: 85
End: 2025-01-28

## 2025-01-28 DIAGNOSIS — Z95.2 S/P AVR: Primary | ICD-10-CM

## 2025-01-31 ENCOUNTER — OFFICE VISIT (OUTPATIENT)
Dept: CARDIAC REHAB | Facility: HOSPITAL | Age: 85
End: 2025-01-31

## 2025-01-31 DIAGNOSIS — Z95.2 S/P AVR: Primary | ICD-10-CM

## 2025-02-04 ENCOUNTER — APPOINTMENT (OUTPATIENT)
Dept: CARDIAC REHAB | Facility: HOSPITAL | Age: 85
End: 2025-02-04

## 2025-02-07 ENCOUNTER — OFFICE VISIT (OUTPATIENT)
Dept: CARDIAC REHAB | Facility: HOSPITAL | Age: 85
End: 2025-02-07

## 2025-02-07 DIAGNOSIS — Z95.2 S/P AVR: Primary | ICD-10-CM

## 2025-02-10 ENCOUNTER — OFFICE VISIT (OUTPATIENT)
Dept: CARDIAC REHAB | Facility: HOSPITAL | Age: 85
End: 2025-02-10
Payer: MEDICARE

## 2025-02-10 DIAGNOSIS — Z95.2 S/P AVR: Primary | ICD-10-CM

## 2025-02-11 ENCOUNTER — APPOINTMENT (OUTPATIENT)
Dept: CARDIAC REHAB | Facility: HOSPITAL | Age: 85
End: 2025-02-11

## 2025-02-13 ENCOUNTER — OFFICE (OUTPATIENT)
Dept: URBAN - METROPOLITAN AREA CLINIC 64 | Facility: CLINIC | Age: 85
End: 2025-02-13
Payer: COMMERCIAL

## 2025-02-13 VITALS
WEIGHT: 107 LBS | HEIGHT: 64 IN | HEART RATE: 73 BPM | DIASTOLIC BLOOD PRESSURE: 69 MMHG | SYSTOLIC BLOOD PRESSURE: 105 MMHG

## 2025-02-13 DIAGNOSIS — R15.9 FULL INCONTINENCE OF FECES: ICD-10-CM

## 2025-02-13 DIAGNOSIS — K51.90 ULCERATIVE COLITIS, UNSPECIFIED, WITHOUT COMPLICATIONS: ICD-10-CM

## 2025-02-13 DIAGNOSIS — R19.4 CHANGE IN BOWEL HABIT: ICD-10-CM

## 2025-02-13 PROCEDURE — 99213 OFFICE O/P EST LOW 20 MIN: CPT | Performed by: NURSE PRACTITIONER

## 2025-02-14 ENCOUNTER — OFFICE VISIT (OUTPATIENT)
Dept: CARDIAC REHAB | Facility: HOSPITAL | Age: 85
End: 2025-02-14

## 2025-02-14 DIAGNOSIS — Z95.2 S/P AVR: Primary | ICD-10-CM

## 2025-02-18 ENCOUNTER — OFFICE VISIT (OUTPATIENT)
Dept: PULMONOLOGY | Facility: HOSPITAL | Age: 85
End: 2025-02-18
Payer: MEDICARE

## 2025-02-18 VITALS
HEIGHT: 62 IN | BODY MASS INDEX: 19.69 KG/M2 | RESPIRATION RATE: 12 BRPM | OXYGEN SATURATION: 97 % | DIASTOLIC BLOOD PRESSURE: 77 MMHG | SYSTOLIC BLOOD PRESSURE: 118 MMHG | WEIGHT: 107 LBS | HEART RATE: 72 BPM

## 2025-02-18 DIAGNOSIS — I50.22 CHRONIC SYSTOLIC CHF (CONGESTIVE HEART FAILURE): Primary | ICD-10-CM

## 2025-02-18 PROCEDURE — G0463 HOSPITAL OUTPT CLINIC VISIT: HCPCS

## 2025-02-18 RX ORDER — TRIAMCINOLONE ACETONIDE 1 MG/G
1 OINTMENT TOPICAL 2 TIMES DAILY
COMMUNITY

## 2025-02-18 RX ORDER — BLOOD SUGAR DIAGNOSTIC
STRIP MISCELLANEOUS
COMMUNITY
Start: 2025-01-29

## 2025-02-18 NOTE — PROGRESS NOTES
PULMONARY/ CRITICAL CARE/ SLEEP MEDICINE OUTPATIENT CONSULT/ FOLLOW UP NOTE        Patient Name:  Mindi Quinteros    :  1940    Medical Record:  [id number]    PRIMARY CARE PHYSICIAN     Dayana Tipton MD    REASON FOR CONSULTATION    Mindi Quinteros is a 84 y.o. female who is referred for consultation for hypoxia  REVIEW OF SYSTEMS    Constitutional:  Denies fever or chills   Eyes:  Denies change in visual acuity   HENT:  Denies nasal congestion or sore throat   Respiratory:  Denies cough or shortness of breath   Cardiovascular:  Denies chest pain or edema   GI:  Denies abdominal pain, nausea, vomiting, bloody stools or diarrhea   :  Denies dysuria   Musculoskeletal:  Denies back pain or joint pain   Integument:  Denies rash   Neurologic:  Denies headache, focal weakness or sensory changes   Endocrine:  Denies polyuria or polydipsia   Lymphatic:  Denies swollen glands   Psychiatric:  Denies depression or anxiety     MEDICAL HISTORY    Past Medical History:   Diagnosis Date    Acute congestive heart failure, unspecified heart failure type 05/15/2022    Age-related osteoporosis without current pathological fracture     prolia([id number], 2019), restarted prolia(11/3/20)    Allergic     Anemia     Anxiety     Arthritis     CAD (coronary artery disease)     Depression     Diabetes     Elevated cholesterol     HTN (hypertension)     Hyperlipemia     Injury of back     Sinusitis     Type II diabetes mellitus         SURGICAL HISTORY    Past Surgical History:   Procedure Laterality Date    ANKLE ARTHROSCOPY      AORTIC VALVE REPAIR/REPLACEMENT MITRAL VALVE REPAIR/REPLACEMENT N/A 2022    Procedure: AORTIC VALVE REPAIR/REPLACEMENT MITRAL VALVE REPAIR/REPLACEMENT;  Surgeon: Lane Okeefe MD;  Location: [facility];  Service: Cardiothoracic;  Laterality: N/A;  Mitral Valve repair with 26mm Physio II ring. Aortic   Valve Replacement with 21mm Magna Ease valve.    BELPHAROPTOSIS REPAIR      CARDIAC  CATHETERIZATION      CARDIAC CATHETERIZATION N/A 5/17/2022    Procedure: Left Heart Cath, possible pci;  Surgeon: Natan Terrazas MD;  Location: [facility] CATH INVASIVE LOCATION;  Service: Cardiovascular;  Laterality: N/A;    CATARACT EXTRACTION      CHOLECYSTECTOMY N/A 10/14/2019    Procedure: Laparoscopic cholecystectomy, possible open;  Surgeon: Vijay Guerra DO;  Location: [facility] MAIN OR;  Service: General    COLONOSCOPY      CORONARY ARTERY BYPASS GRAFT N/A 5/19/2022    Procedure: CORONARY ARTERY BYPASS GRAFTING;  Surgeon: Lane Okeefe MD;  Location: [facility] CVOR;  Service: Cardiothoracic;  Laterality: N/A;  CABG X 2 (1 Vein graft, 1 LIMA graft)    COSMETIC SURGERY      ENDOSCOPY      HEMORROIDECTOMY      HYSTERECTOMY      MAZE PROCEDURE N/A 5/19/2022    Procedure: MAZE PROCEDURE;  Surgeon: Lane Okeefe MD;  Location: [facility];  Service: Cardiothoracic;  Laterality: N/A;        FAMILY HISTORY    Family History   Problem Relation Age of Onset    Diabetes Mother     Heart disease Father     Heart disease Brother     Diabetes Brother     Osteoporosis Paternal Grandmother        SOCIAL HISTORY    Social History     Tobacco Use    Smoking status: Never     Passive exposure: Never    Smokeless tobacco: Never   Substance Use Topics    Alcohol use: No        ALLERGIES    Allergies   Allergen Reactions    Asacol [Mesalamine] Swelling    Diclofenac Swelling     Gums swell only per patient    Penicillins Other (See Comments)     Gums swelling per patient.          MEDICATIONS    Current Outpatient Medications on File Prior to Visit   Medication Sig Dispense Refill    Accu-Chek Guide Test test strip USE 1 EACH ONCE DAILY      acetaminophen (TYLENOL) 325 MG tablet Take 2 tablets by mouth Every 6 (Six) Hours As Needed for Mild Pain . 30 tablet 0    apixaban (Eliquis) 2.5 MG tablet tablet TAKE 1 TABLET EVERY 12 HOURS FOR ATRIAL FIBRILLATION 180 tablet 1    aspirin (aspirin) 81 MG EC tablet Take 1 tablet  by mouth Daily.      atorvastatin (LIPITOR) 40 MG tablet Take 1 tablet by mouth every night at bedtime. 90 tablet 2    Calcium Carbonate (CALCIUM 500 PO) Take 2 tablets by mouth Daily.      Cholecalciferol (VITAMIN D3) 2000 units capsule Take 1 capsule by mouth Every Evening.      clobetasol (TEMOVATE) 0.05 % ointment 30      colestipol (COLESTID) 1 g tablet Take 1 tablet by mouth As Needed.      cyanocobalamin 1000 MCG/ML injection Inject 1 mL into the appropriate muscle as directed by prescriber Every 30 (Thirty) Days.      Denosumab (PROLIA SC) Inject  under the skin into the appropriate area as directed.      dicyclomine (BENTYL) 10 MG capsule Take 1 capsule by mouth Daily.      empagliflozin (Jardiance) 10 MG tablet tablet Take 1 tablet by mouth Daily. 90 tablet 3    fenofibrate 160 MG tablet Take 1 tablet by mouth Daily.      ferrous sulfate 325 (65 FE) MG tablet Take 1 tablet by mouth Daily With Breakfast.      folic acid (FOLVITE) 1 MG tablet Take 1 tablet by mouth Daily. 30 tablet 6    folic acid-pyridoxine-cyanocobalamin (FOLBIC) 2.5-25-2 MG tablet tablet Take  by mouth Daily.      folic acid-vit B6-vit B12 (Folbee) 2.5-25-1 MG tablet tablet Take 1 tablet by mouth 2 (Two) Times a Day. 180 tablet 3    furosemide (LASIX) 40 MG tablet Take 1 tablet by mouth 2 (Two) Times a Day. Take 40mg in am and 40mg in pm 180 tablet 3    glimepiride (AMARYL) 2 MG tablet Take 0.5 tablets by mouth Every Morning Before Breakfast. (Patient taking differently: Take 1.5 tablets by mouth Every Evening.)      Lactobacillus (PROBIOTIC ACIDOPHILUS PO) Take  by mouth.      Magnesium 500 MG tablet Take  by mouth.      METAMUCIL FIBER PO Take  by mouth.      metFORMIN ER (GLUCOPHAGE-XR) 500 MG 24 hr tablet Take 4 tablets by mouth Daily With Breakfast. 4 pills a day      metoprolol succinate XL (TOPROL-XL) 25 MG 24 hr tablet Take 1 tablet by mouth Daily. 90 tablet 1    multivitamin with minerals tablet tablet Take 1 tablet by mouth  "Daily.      nitroglycerin (NITROSTAT) 0.4 MG SL tablet Place 1 tablet under the tongue Every 5 (Five) Minutes As Needed for Chest Pain. Take no more than 3 doses in 15 minutes.      Probiotic Product (Risaquad-2) capsule capsule Take 1 capsule by mouth Daily.      spironolactone (ALDACTONE) 25 MG tablet TAKE 1 TABLET BY MOUTH ONCE DAILY IN THE MORNING AND 1/2 (ONE-HALF) ONCE DAILY AT 2:00PM 135 tablet 2    Zinc 30 MG capsule Take  by mouth Daily.      triamcinolone (KENALOG) 0.1 % ointment Apply 1 Application topically to the appropriate area as directed 2 (Two) Times a Day.      [DISCONTINUED] apixaban (Eliquis) 2.5 MG tablet tablet Take 1 tablet by mouth Every 12 (Twelve) Hours. 180 tablet 3    [DISCONTINUED] apixaban (ELIQUIS) 2.5 MG tablet tablet Take 1 tablet by mouth 2 (Two) Times a Day. (Patient not taking: Reported on 12/18/2024) 28 tablet 0    [DISCONTINUED] mupirocin (BACTROBAN) 2 % ointment APPLY OINTMENT TOPICALLY THREE TIMES DAILY TO THE AFFECTED AREA       No current facility-administered medications on file prior to visit.       PHYSICAL EXAM    Vitals:    02/18/25 1140   BP: 118/77   BP Location: Right arm   Patient Position: Sitting   Cuff Size: Adult   Pulse: 72   Resp: 12   SpO2: 97%   Weight: 48.5 kg (107 lb)   Height: 157.5 cm (62\")        Constitutional:  Well developed, well nourished, no acute distress, non-toxic appearance   Eyes:  PERRL, conjunctiva normal   HENT:  Atraumatic, external ears normal, nose normal, oropharynx moist, no pharyngeal exudates. mallampatti   Neck- normal range of motion, no tenderness, supple   Respiratory:  No respiratory distress, normal breath sounds, no rales, no wheezing   Cardiovascular:  Normal rate, normal rhythm, no murmurs, no gallops, no rubs   GI:  Soft, nondistended, normal bowel sounds, nontender, no organomegaly, no mass, no rebound, no guarding   :  No costovertebral angle tenderness   Musculoskeletal:  No edema, no tenderness, no deformities. " Back- no tenderness  Integument:  Well hydrated, no rash   Lymphatic:  No lymphadenopathy noted   Neurologic:  Alert & oriented x 3, CN 2-12 normal, normal motor function, normal sensory function, no focal deficits noted   Psychiatric:  Speech and behavior appropriate     No radiology results for the last 90 days.   Results for orders placed during the hospital encounter of 04/17/24    Adult Transthoracic Echo Complete W/ Cont if Necessary Per Protocol    Interpretation Summary    Left ventricular systolic function is normal. Calculated left ventricular EF = 54%    Left ventricular diastolic dysfunction is noted.    Moderately reduced right ventricular systolic function noted.    The right ventricular cavity is severely dilated.    The left atrial cavity is severely dilated.    The right atrial cavity is severely  dilated.    Severe tricuspid valve regurgitation is present.    Estimated right ventricular systolic pressure from tricuspid regurgitation is moderately elevated (45-55 mmHg).    Conclusion      Normal LV size and contractility EF of 55 to 60%  Abnormal diastolic function with restrictive filling seen.  Severe RV enlargement and RA enlargement seen.  Severe left atrial enlargement  Mitral valve repair with annulus ring present.  Leaflets appear to have good cusp separation.  No significant MR seen.  Prosthetic aortic valve appears structurally normal with adequate cusp separation.  Tricuspid valve appears structurally normal.  Severe tricuspid regurgitation seen.  Plethoric IVC consistent with high right atrial pressure seen.  Calculated RV systolic pressure of 53 mmHg consistent with moderate to severe pulmonary hypertension seen.  Pulmonic valve is not well-visualized.    No pericardial effusion seen.  Proximal aorta appears normal in size.      ASSESSMENT & PLAN:      83-year-old nonsmoker, CABG and tissue AVR and mitral valve repair and maze  dilated RV and LV, EF 30% to 35% referred to us because  repeat echo showed RVSP 45 to 50, EF 50%, CT chest 10/07/2023 moderate hiatal hernia     congestive heart failure  Pulmonary edema  Coronary artery disease status post CABG and tissue AVR and mitral valve repair and maze  dilated RV and LV, EF 30% to 35%  Diabetes mellitus  Hypertension  Hyperlipidemia  Vitamin D deficiency  Ulcerative colitis  Osteoporosis     Plan  Overnight oximetry June 2024 showed nocturnal hypoxia patient spent 71 minutes with O2 sats below 89%    Cont  nocturnal oxygen 2 L during sleep  Patient is compliant and benefiting    This document has been electronically signed by  Ceasar Dennison MD  11:59 EST

## 2025-02-21 ENCOUNTER — OFFICE VISIT (OUTPATIENT)
Dept: CARDIAC REHAB | Facility: HOSPITAL | Age: 85
End: 2025-02-21

## 2025-02-21 DIAGNOSIS — Z95.2 S/P AVR: Primary | ICD-10-CM

## 2025-02-25 ENCOUNTER — OFFICE VISIT (OUTPATIENT)
Dept: CARDIAC REHAB | Facility: HOSPITAL | Age: 85
End: 2025-02-25

## 2025-02-25 DIAGNOSIS — Z95.2 S/P AVR: Primary | ICD-10-CM

## 2025-02-28 ENCOUNTER — OFFICE VISIT (OUTPATIENT)
Dept: CARDIAC REHAB | Facility: HOSPITAL | Age: 85
End: 2025-02-28

## 2025-02-28 DIAGNOSIS — Z95.2 S/P AVR: Primary | ICD-10-CM

## 2025-03-04 ENCOUNTER — OFFICE VISIT (OUTPATIENT)
Dept: CARDIAC REHAB | Facility: HOSPITAL | Age: 85
End: 2025-03-04

## 2025-03-04 DIAGNOSIS — Z95.2 S/P AVR: Primary | ICD-10-CM

## 2025-03-07 ENCOUNTER — OFFICE VISIT (OUTPATIENT)
Dept: CARDIAC REHAB | Facility: HOSPITAL | Age: 85
End: 2025-03-07

## 2025-03-07 DIAGNOSIS — Z95.2 S/P AVR: Primary | ICD-10-CM

## 2025-03-14 ENCOUNTER — OFFICE VISIT (OUTPATIENT)
Dept: CARDIAC REHAB | Facility: HOSPITAL | Age: 85
End: 2025-03-14

## 2025-03-14 DIAGNOSIS — Z95.2 S/P AVR: Primary | ICD-10-CM

## 2025-03-18 ENCOUNTER — OFFICE VISIT (OUTPATIENT)
Dept: CARDIAC REHAB | Facility: HOSPITAL | Age: 85
End: 2025-03-18

## 2025-03-18 DIAGNOSIS — Z95.2 S/P AVR: Primary | ICD-10-CM

## 2025-03-25 ENCOUNTER — OFFICE VISIT (OUTPATIENT)
Dept: CARDIAC REHAB | Facility: HOSPITAL | Age: 85
End: 2025-03-25

## 2025-03-25 DIAGNOSIS — Z95.2 S/P AVR: Primary | ICD-10-CM

## 2025-03-28 ENCOUNTER — OFFICE VISIT (OUTPATIENT)
Dept: CARDIAC REHAB | Facility: HOSPITAL | Age: 85
End: 2025-03-28

## 2025-03-28 DIAGNOSIS — Z95.2 S/P AVR: Primary | ICD-10-CM

## 2025-04-01 ENCOUNTER — OFFICE VISIT (OUTPATIENT)
Dept: CARDIAC REHAB | Facility: HOSPITAL | Age: 85
End: 2025-04-01

## 2025-04-01 DIAGNOSIS — Z95.2 S/P AVR: Primary | ICD-10-CM

## 2025-04-04 ENCOUNTER — OFFICE VISIT (OUTPATIENT)
Dept: CARDIAC REHAB | Facility: HOSPITAL | Age: 85
End: 2025-04-04

## 2025-04-04 DIAGNOSIS — Z95.2 S/P AVR: Primary | ICD-10-CM

## 2025-04-07 ENCOUNTER — OFFICE VISIT (OUTPATIENT)
Dept: CARDIAC REHAB | Facility: HOSPITAL | Age: 85
End: 2025-04-07

## 2025-04-07 DIAGNOSIS — Z95.2 S/P AVR: Primary | ICD-10-CM

## 2025-04-08 ENCOUNTER — APPOINTMENT (OUTPATIENT)
Dept: CARDIAC REHAB | Facility: HOSPITAL | Age: 85
End: 2025-04-08

## 2025-04-11 ENCOUNTER — APPOINTMENT (OUTPATIENT)
Dept: CARDIAC REHAB | Facility: HOSPITAL | Age: 85
End: 2025-04-11

## 2025-04-14 ENCOUNTER — OFFICE VISIT (OUTPATIENT)
Dept: CARDIAC REHAB | Facility: HOSPITAL | Age: 85
End: 2025-04-14

## 2025-04-14 DIAGNOSIS — Z95.2 S/P AVR: Primary | ICD-10-CM

## 2025-04-15 ENCOUNTER — APPOINTMENT (OUTPATIENT)
Dept: CARDIAC REHAB | Facility: HOSPITAL | Age: 85
End: 2025-04-15

## 2025-04-16 ENCOUNTER — OFFICE VISIT (OUTPATIENT)
Dept: CARDIAC REHAB | Facility: HOSPITAL | Age: 85
End: 2025-04-16

## 2025-04-16 DIAGNOSIS — Z95.2 S/P AVR: Primary | ICD-10-CM

## 2025-04-18 ENCOUNTER — OFFICE VISIT (OUTPATIENT)
Dept: CARDIAC REHAB | Facility: HOSPITAL | Age: 85
End: 2025-04-18

## 2025-04-18 DIAGNOSIS — Z95.2 S/P AVR: Primary | ICD-10-CM

## 2025-04-22 ENCOUNTER — OFFICE VISIT (OUTPATIENT)
Dept: CARDIAC REHAB | Facility: HOSPITAL | Age: 85
End: 2025-04-22

## 2025-04-22 DIAGNOSIS — Z95.2 S/P AVR: Primary | ICD-10-CM

## 2025-04-25 ENCOUNTER — OFFICE VISIT (OUTPATIENT)
Dept: CARDIAC REHAB | Facility: HOSPITAL | Age: 85
End: 2025-04-25

## 2025-04-25 DIAGNOSIS — Z95.2 S/P AVR: Primary | ICD-10-CM

## 2025-04-29 ENCOUNTER — OFFICE VISIT (OUTPATIENT)
Dept: CARDIAC REHAB | Facility: HOSPITAL | Age: 85
End: 2025-04-29

## 2025-04-29 DIAGNOSIS — Z95.2 S/P AVR: Primary | ICD-10-CM

## 2025-05-02 ENCOUNTER — OFFICE VISIT (OUTPATIENT)
Dept: CARDIAC REHAB | Facility: HOSPITAL | Age: 85
End: 2025-05-02

## 2025-05-02 DIAGNOSIS — Z95.1 S/P CABG (CORONARY ARTERY BYPASS GRAFT): ICD-10-CM

## 2025-05-02 DIAGNOSIS — Z95.2 S/P AVR: Primary | ICD-10-CM

## 2025-05-05 ENCOUNTER — OFFICE VISIT (OUTPATIENT)
Dept: CARDIAC REHAB | Facility: HOSPITAL | Age: 85
End: 2025-05-05

## 2025-05-05 DIAGNOSIS — Z95.2 S/P AVR: Primary | ICD-10-CM

## 2025-05-06 RX ORDER — METOPROLOL SUCCINATE 25 MG/1
25 TABLET, EXTENDED RELEASE ORAL DAILY
Qty: 90 TABLET | Refills: 1 | Status: SHIPPED | OUTPATIENT
Start: 2025-05-06

## 2025-05-06 NOTE — TELEPHONE ENCOUNTER
Rx Refill Note  Requested Prescriptions     Pending Prescriptions Disp Refills    metoprolol succinate XL (TOPROL-XL) 25 MG 24 hr tablet [Pharmacy Med Name: Metoprolol Succinate ER 25 MG Oral Tablet Extended Release 24 Hour] 90 tablet 1     Sig: Take 1 tablet by mouth once daily      Last office visit with prescribing clinician: 11/14/2024   Last telemedicine visit with prescribing clinician: Visit date not found   Next office visit with prescribing clinician: 11/18/2025                         Would you like a call back once the refill request has been completed: [] Yes [] No    If the office needs to give you a call back, can they leave a voicemail: [] Yes [] No    Alicia Hoskins MA  05/06/25, 10:19 EDT

## 2025-05-13 ENCOUNTER — OFFICE VISIT (OUTPATIENT)
Dept: CARDIAC REHAB | Facility: HOSPITAL | Age: 85
End: 2025-05-13

## 2025-05-13 DIAGNOSIS — Z95.2 S/P AVR: Primary | ICD-10-CM

## 2025-05-16 ENCOUNTER — OFFICE VISIT (OUTPATIENT)
Dept: CARDIAC REHAB | Facility: HOSPITAL | Age: 85
End: 2025-05-16

## 2025-05-16 DIAGNOSIS — Z95.2 S/P AVR: Primary | ICD-10-CM

## 2025-05-20 ENCOUNTER — OFFICE VISIT (OUTPATIENT)
Dept: CARDIAC REHAB | Facility: HOSPITAL | Age: 85
End: 2025-05-20

## 2025-05-20 DIAGNOSIS — Z95.2 S/P AVR: Primary | ICD-10-CM

## 2025-05-23 ENCOUNTER — OFFICE VISIT (OUTPATIENT)
Dept: CARDIAC REHAB | Facility: HOSPITAL | Age: 85
End: 2025-05-23

## 2025-05-23 DIAGNOSIS — Z95.2 S/P AVR: Primary | ICD-10-CM

## 2025-05-27 ENCOUNTER — OFFICE VISIT (OUTPATIENT)
Dept: CARDIAC REHAB | Facility: HOSPITAL | Age: 85
End: 2025-05-27

## 2025-05-27 DIAGNOSIS — Z95.2 S/P AVR: Primary | ICD-10-CM

## 2025-05-30 ENCOUNTER — OFFICE VISIT (OUTPATIENT)
Dept: CARDIAC REHAB | Facility: HOSPITAL | Age: 85
End: 2025-05-30

## 2025-05-30 DIAGNOSIS — Z95.2 S/P AVR: Primary | ICD-10-CM

## 2025-06-03 ENCOUNTER — OFFICE VISIT (OUTPATIENT)
Dept: CARDIAC REHAB | Facility: HOSPITAL | Age: 85
End: 2025-06-03

## 2025-06-03 DIAGNOSIS — Z95.1 S/P CABG (CORONARY ARTERY BYPASS GRAFT): ICD-10-CM

## 2025-06-03 DIAGNOSIS — Z95.2 S/P AVR: Primary | ICD-10-CM

## 2025-06-05 ENCOUNTER — OFFICE VISIT (OUTPATIENT)
Dept: CARDIAC REHAB | Facility: HOSPITAL | Age: 85
End: 2025-06-05

## 2025-06-05 DIAGNOSIS — Z95.2 S/P AVR: Primary | ICD-10-CM

## 2025-06-06 ENCOUNTER — APPOINTMENT (OUTPATIENT)
Dept: CARDIAC REHAB | Facility: HOSPITAL | Age: 85
End: 2025-06-06

## 2025-06-09 ENCOUNTER — OFFICE (OUTPATIENT)
Dept: URBAN - METROPOLITAN AREA CLINIC 64 | Facility: CLINIC | Age: 85
End: 2025-06-09
Payer: MEDICARE

## 2025-06-09 ENCOUNTER — OFFICE VISIT (OUTPATIENT)
Dept: CARDIAC REHAB | Facility: HOSPITAL | Age: 85
End: 2025-06-09

## 2025-06-09 VITALS
WEIGHT: 106 LBS | HEART RATE: 88 BPM | DIASTOLIC BLOOD PRESSURE: 65 MMHG | HEIGHT: 64 IN | SYSTOLIC BLOOD PRESSURE: 102 MMHG

## 2025-06-09 DIAGNOSIS — Z95.2 S/P AVR: Primary | ICD-10-CM

## 2025-06-09 DIAGNOSIS — R15.0 INCOMPLETE DEFECATION: ICD-10-CM

## 2025-06-09 DIAGNOSIS — K51.90 ULCERATIVE COLITIS, UNSPECIFIED, WITHOUT COMPLICATIONS: ICD-10-CM

## 2025-06-09 DIAGNOSIS — R15.9 FULL INCONTINENCE OF FECES: ICD-10-CM

## 2025-06-09 DIAGNOSIS — Z95.1 S/P CABG (CORONARY ARTERY BYPASS GRAFT): ICD-10-CM

## 2025-06-09 PROCEDURE — 99213 OFFICE O/P EST LOW 20 MIN: CPT | Performed by: NURSE PRACTITIONER

## 2025-06-10 ENCOUNTER — APPOINTMENT (OUTPATIENT)
Dept: CARDIAC REHAB | Facility: HOSPITAL | Age: 85
End: 2025-06-10

## 2025-06-12 ENCOUNTER — APPOINTMENT (OUTPATIENT)
Dept: CARDIAC REHAB | Facility: HOSPITAL | Age: 85
End: 2025-06-12

## 2025-06-12 LAB — CALPROTECTIN, FECAL: 171 UG/G — HIGH (ref 0–120)

## 2025-06-13 ENCOUNTER — APPOINTMENT (OUTPATIENT)
Dept: CARDIAC REHAB | Facility: HOSPITAL | Age: 85
End: 2025-06-13

## 2025-06-13 ENCOUNTER — OFFICE VISIT (OUTPATIENT)
Dept: CARDIAC REHAB | Facility: HOSPITAL | Age: 85
End: 2025-06-13

## 2025-06-13 DIAGNOSIS — Z95.1 S/P CABG (CORONARY ARTERY BYPASS GRAFT): Primary | ICD-10-CM

## 2025-06-13 LAB
FOOD ALLERGY PROFILE: CLASS DESCRIPTION: (no result)
FOOD ALLERGY PROFILE: F001-IGE EGG WHITE: 0.21 KU/L (ref 0–?)
FOOD ALLERGY PROFILE: F002-IGE MILK: <0.1 KU/L
FOOD ALLERGY PROFILE: F003-IGE CODFISH: <0.1 KU/L
FOOD ALLERGY PROFILE: F004-IGE WHEAT: 0.16 KU/L (ref 0–?)
FOOD ALLERGY PROFILE: F008-IGE CORN: <0.1 KU/L
FOOD ALLERGY PROFILE: F010-IGE SESAME SEED: <0.1 KU/L
FOOD ALLERGY PROFILE: F013-IGE PEANUT: <0.1 KU/L
FOOD ALLERGY PROFILE: F014-IGE SOYBEAN: <0.1 KU/L
FOOD ALLERGY PROFILE: F024-IGE SHRIMP: <0.1 KU/L
FOOD ALLERGY PROFILE: F207-IGE CLAM: <0.1 KU/L
FOOD ALLERGY PROFILE: F256-IGE WALNUT: <0.1 KU/L
FOOD ALLERGY PROFILE: F338-IGE SCALLOP: <0.1 KU/L

## 2025-06-16 ENCOUNTER — OFFICE VISIT (OUTPATIENT)
Dept: CARDIAC REHAB | Facility: HOSPITAL | Age: 85
End: 2025-06-16

## 2025-06-16 DIAGNOSIS — Z95.1 S/P CABG (CORONARY ARTERY BYPASS GRAFT): Primary | ICD-10-CM

## 2025-06-16 DIAGNOSIS — Z95.2 S/P AVR: ICD-10-CM

## 2025-06-17 ENCOUNTER — APPOINTMENT (OUTPATIENT)
Dept: CARDIAC REHAB | Facility: HOSPITAL | Age: 85
End: 2025-06-17

## 2025-06-19 ENCOUNTER — OFFICE VISIT (OUTPATIENT)
Dept: CARDIAC REHAB | Facility: HOSPITAL | Age: 85
End: 2025-06-19

## 2025-06-19 DIAGNOSIS — Z95.1 S/P CABG (CORONARY ARTERY BYPASS GRAFT): Primary | ICD-10-CM

## 2025-06-20 ENCOUNTER — APPOINTMENT (OUTPATIENT)
Dept: CARDIAC REHAB | Facility: HOSPITAL | Age: 85
End: 2025-06-20

## 2025-06-23 ENCOUNTER — OFFICE VISIT (OUTPATIENT)
Dept: CARDIAC REHAB | Facility: HOSPITAL | Age: 85
End: 2025-06-23

## 2025-06-23 DIAGNOSIS — Z95.1 S/P CABG (CORONARY ARTERY BYPASS GRAFT): Primary | ICD-10-CM

## 2025-06-24 ENCOUNTER — APPOINTMENT (OUTPATIENT)
Dept: CARDIAC REHAB | Facility: HOSPITAL | Age: 85
End: 2025-06-24

## 2025-06-26 ENCOUNTER — APPOINTMENT (OUTPATIENT)
Dept: CARDIAC REHAB | Facility: HOSPITAL | Age: 85
End: 2025-06-26

## 2025-06-27 ENCOUNTER — APPOINTMENT (OUTPATIENT)
Dept: CARDIAC REHAB | Facility: HOSPITAL | Age: 85
End: 2025-06-27

## 2025-06-30 ENCOUNTER — OFFICE VISIT (OUTPATIENT)
Dept: CARDIAC REHAB | Facility: HOSPITAL | Age: 85
End: 2025-06-30

## 2025-06-30 DIAGNOSIS — Z95.1 S/P CABG (CORONARY ARTERY BYPASS GRAFT): Primary | ICD-10-CM

## 2025-06-30 DIAGNOSIS — Z95.2 S/P AVR: ICD-10-CM

## 2025-07-03 ENCOUNTER — OFFICE VISIT (OUTPATIENT)
Dept: CARDIAC REHAB | Facility: HOSPITAL | Age: 85
End: 2025-07-03

## 2025-07-03 DIAGNOSIS — Z95.1 S/P CABG (CORONARY ARTERY BYPASS GRAFT): Primary | ICD-10-CM

## 2025-07-07 ENCOUNTER — OFFICE VISIT (OUTPATIENT)
Dept: CARDIAC REHAB | Facility: HOSPITAL | Age: 85
End: 2025-07-07

## 2025-07-07 DIAGNOSIS — Z95.1 S/P CABG (CORONARY ARTERY BYPASS GRAFT): Primary | ICD-10-CM

## 2025-07-14 ENCOUNTER — OFFICE VISIT (OUTPATIENT)
Dept: CARDIAC REHAB | Facility: HOSPITAL | Age: 85
End: 2025-07-14

## 2025-07-14 DIAGNOSIS — Z95.1 S/P CABG (CORONARY ARTERY BYPASS GRAFT): Primary | ICD-10-CM

## 2025-07-21 ENCOUNTER — OFFICE VISIT (OUTPATIENT)
Dept: CARDIAC REHAB | Facility: HOSPITAL | Age: 85
End: 2025-07-21

## 2025-07-21 DIAGNOSIS — Z95.1 S/P CABG (CORONARY ARTERY BYPASS GRAFT): Primary | ICD-10-CM

## 2025-07-24 ENCOUNTER — OFFICE VISIT (OUTPATIENT)
Dept: CARDIAC REHAB | Facility: HOSPITAL | Age: 85
End: 2025-07-24

## 2025-07-24 DIAGNOSIS — Z95.1 S/P CABG (CORONARY ARTERY BYPASS GRAFT): Primary | ICD-10-CM

## 2025-07-28 ENCOUNTER — OFFICE VISIT (OUTPATIENT)
Dept: CARDIAC REHAB | Facility: HOSPITAL | Age: 85
End: 2025-07-28

## 2025-07-28 DIAGNOSIS — Z95.1 S/P CABG (CORONARY ARTERY BYPASS GRAFT): Primary | ICD-10-CM

## 2025-07-31 ENCOUNTER — OFFICE VISIT (OUTPATIENT)
Dept: CARDIAC REHAB | Facility: HOSPITAL | Age: 85
End: 2025-07-31

## 2025-07-31 DIAGNOSIS — Z95.1 S/P CABG (CORONARY ARTERY BYPASS GRAFT): Primary | ICD-10-CM

## 2025-08-04 ENCOUNTER — OFFICE VISIT (OUTPATIENT)
Dept: CARDIAC REHAB | Facility: HOSPITAL | Age: 85
End: 2025-08-04

## 2025-08-04 DIAGNOSIS — Z95.1 S/P CABG (CORONARY ARTERY BYPASS GRAFT): Primary | ICD-10-CM

## 2025-08-12 ENCOUNTER — HOSPITAL ENCOUNTER (EMERGENCY)
Facility: HOSPITAL | Age: 85
Discharge: HOME | End: 2025-08-12
Attending: EMERGENCY MEDICINE
Payer: MEDICARE

## 2025-08-12 VITALS
SYSTOLIC BLOOD PRESSURE: 107 MMHG | TEMPERATURE: 98.1 F | WEIGHT: 106.7 LBS | BODY MASS INDEX: 18.91 KG/M2 | OXYGEN SATURATION: 98 % | HEIGHT: 63 IN | DIASTOLIC BLOOD PRESSURE: 60 MMHG | HEART RATE: 67 BPM | RESPIRATION RATE: 15 BRPM

## 2025-08-12 DIAGNOSIS — S81.812A NONINFECTED SKIN TEAR OF LEFT LOWER EXTREMITY, INITIAL ENCOUNTER: Primary | ICD-10-CM

## 2025-08-12 PROCEDURE — 99282 EMERGENCY DEPT VISIT SF MDM: CPT

## 2025-08-21 ENCOUNTER — TELEPHONE (OUTPATIENT)
Dept: CARDIOLOGY | Facility: CLINIC | Age: 85
End: 2025-08-21
Payer: MEDICARE

## (undated) DEVICE — COUNT NDL MAG XLG

## (undated) DEVICE — SUT PROLN 4/0 V5 36IN 8935H

## (undated) DEVICE — ENDOPATH XCEL WITH OPTIVIEW TECHNOLOGY UNIVERSAL TROCAR STABILITY SLEEVES: Brand: ENDOPATH XCEL OPTIVIEW

## (undated) DEVICE — GENERAL LAPAROSCOPY CDS: Brand: MEDLINE INDUSTRIES, INC.

## (undated) DEVICE — TEMP PACING WIRE: Brand: MYO/WIRE

## (undated) DEVICE — CATH DIAG IMPULSE PIG .056 6F 110CM

## (undated) DEVICE — TUBING, SUCTION, 1/4" X 12', STRAIGHT: Brand: MEDLINE

## (undated) DEVICE — CONNECT Y INTERSEPT W/LL 3/8 X 3/8 X 3/8IN

## (undated) DEVICE — RETRV BG SPECI GENISTRONG MD 240ML

## (undated) DEVICE — SOL NACL 0.9PCT 1000ML

## (undated) DEVICE — 2, DISPOSABLE SUCTION/IRRIGATOR WITH DISPOSABLE TIP: Brand: STRYKEFLOW

## (undated) DEVICE — BOOT SUT XRAY DETECT STD YEL/BLU CA/50

## (undated) DEVICE — CATH DIAG IMPULSE FL4 6F 100CM

## (undated) DEVICE — PK PERFUS W/TB CUST ADLT

## (undated) DEVICE — SOL IRR H2O BTL 1000ML STRL

## (undated) DEVICE — GLV SURG BIOGEL LTX PF 7 1/2

## (undated) DEVICE — ELECTRD DEFIB M/FUNC PROPADZ STRL 2PK

## (undated) DEVICE — SUT SILK 2 SUTUPAK TIE 60IN SA8H 2STRAND

## (undated) DEVICE — Device

## (undated) DEVICE — LAPAROSCOPIC GAS CONDITIONING DEVICE.: Brand: INSUFLOW

## (undated) DEVICE — BG BLD SYS

## (undated) DEVICE — ENDOPATH 5MM CURVED SCISSORS WITH MONOPOLAR CAUTERY: Brand: ENDOPATH

## (undated) DEVICE — SUT SILK 2/0 TIES 18IN A185H

## (undated) DEVICE — CANN RETRGR STYLET RSCP 15F

## (undated) DEVICE — PRESSURE TUBING: Brand: TRUWAVE

## (undated) DEVICE — CUFF SCD HEMOFORCE SEQ CALF STD MD

## (undated) DEVICE — BLOOD TRANSFUSION FILTER: Brand: HAEMONETICS

## (undated) DEVICE — SAFEDGE 2108 SERIES SAGITTAL BLADE STERNUM REVISION (40.3 X 0.64 X 48.4MM): Brand: SAFEDGE

## (undated) DEVICE — PK PROC TURNOVER

## (undated) DEVICE — SUT ETHLN 2/0 PS 18IN 585H

## (undated) DEVICE — CABL BIPOL W/ALLGTR CLIP/SM 12FT

## (undated) DEVICE — TOWEL,OR,DSP,ST,WHITE,DLX,4/PK,20PK/CS: Brand: MEDLINE

## (undated) DEVICE — DRSNG SURESITE WNDW 2.38X2.75

## (undated) DEVICE — SUT ETHIB 2/0 CV V7 30IN 10X72

## (undated) DEVICE — CANN VESL CORNRY STR W/4MM BALN

## (undated) DEVICE — PK PERFUS CUST W/CARDIOPLEGIA

## (undated) DEVICE — PK OPN HEART WHT WRP 50

## (undated) DEVICE — SPONGE,DISSECTOR,ROUND CHERRY,XR,ST,5/PK: Brand: MEDLINE

## (undated) DEVICE — CONTAINER,SPECIMEN,OR STERILE,4OZ: Brand: MEDLINE

## (undated) DEVICE — RADIFOCUS GLIDEWIRE: Brand: GLIDEWIRE

## (undated) DEVICE — ENDOPATH PNEUMONEEDLE INSUFFLATION NEEDLES WITH LUER LOCK CONNECTORS 120MM: Brand: ENDOPATH

## (undated) DEVICE — SUT PROLN 7/0 BV1 D/A 30IN 8703H

## (undated) DEVICE — SUT SILK 0 CT1 CR8 18IN C021D

## (undated) DEVICE — SYR LUERLOK 50ML

## (undated) DEVICE — GAUZE,SPONGE,4"X4",32PLY,XRAY,STRL,LF: Brand: MEDLINE

## (undated) DEVICE — SOL LACTATED RINGER 1000ML

## (undated) DEVICE — PROB CRYOABL CARD CARDIOBLATE/CRYOFLEX 25TO100MM 10CM 1P/U

## (undated) DEVICE — CATH ART FEM ST 18G 10.8CM

## (undated) DEVICE — ROTATING SURGICAL PUNCHES, 1 PER POUCH: Brand: A&E MEDICAL / ROTATING SURGICAL PUNCHES

## (undated) DEVICE — PINNACLE INTRODUCER SHEATH: Brand: PINNACLE

## (undated) DEVICE — BLAKE SILICONE DRAIN, 19 FR ROUND, HUBLESS WITH 1/4" BENDABLE TROCAR: Brand: BLAKE

## (undated) DEVICE — ENDOPATH XCEL DILATING TIP TROCARS WITH STABILITY SLEEVES: Brand: ENDOPATH XCEL

## (undated) DEVICE — SUT SILK 2/0 SH CR8 18IN CR8 C012D

## (undated) DEVICE — DRAPE SHEET ULTRAGARD: Brand: MEDLINE

## (undated) DEVICE — INTENDED FOR TISSUE SEPARATION, AND OTHER PROCEDURES THAT REQUIRE A SHARP SURGICAL BLADE TO PUNCTURE OR CUT.: Brand: BARD-PARKER ® CARBON RIB-BACK BLADES

## (undated) DEVICE — GAUZE,SPONGE,4"X4",16PLY,XRAY,STRL,LF: Brand: MEDLINE

## (undated) DEVICE — SUT PROLN 5/0 RB2 D/A 30IN 8710H

## (undated) DEVICE — BLD SCLPL BEAVR MINI STR 2BVL 180D LF

## (undated) DEVICE — CORONARY ARTERY BYPASS GRAFT MARKERS, STAINLESS STEEL, DISTAL, WITHOUT HOLDER: Brand: ANASTOMARK CORONARY ARTERY BYPASS GRAFT MARKERS, STAINLESS STEEL, DISTAL

## (undated) DEVICE — TP UMB COTN 1/8X36 U12T

## (undated) DEVICE — SUT VIC 3/0 SH 27IN J416H

## (undated) DEVICE — SYS PERFUS SEP PLATLT W TIPS CUST

## (undated) DEVICE — PK ATS CUST W CARDIOTOMY RESEVOIR

## (undated) DEVICE — ANTIBACTERIAL UNDYED BRAIDED (POLYGLACTIN 910), SYNTHETIC ABSORBABLE SUTURE: Brand: COATED VICRYL

## (undated) DEVICE — GLV SURG BIOGEL SENSR LTX PF SZ7.5

## (undated) DEVICE — SUT PDS 0 CT-1 Z340H

## (undated) DEVICE — SUT PROLN 4/0 V7 36IN 8975H

## (undated) DEVICE — SUT PROLENE PP 7/0 BV175-6 24IN

## (undated) DEVICE — SUT SILK 4/0 TIES 18IN A183H

## (undated) DEVICE — CATHETER,URETHRAL,REDRUBBER,STERILE,20FR: Brand: MEDLINE

## (undated) DEVICE — SOL IRR NACL 0.9PCT BT 1000ML

## (undated) DEVICE — SUT PROLN 2/0 V5 48IN D9694

## (undated) DEVICE — GW DIAG EMERALD HEPCOAT MOVE JTIP STD .035 3MM 150CM

## (undated) DEVICE — SUT VIC 0/0 UR6 27IN DYED J603H

## (undated) DEVICE — SUT PROLN 5/0 V5 36IN 8934H

## (undated) DEVICE — 3M™ TEGADERM™ IV TRANSPARENT FILM DRESSING WITH BORDER 100 BAGS/CARTON 4 CARTONS/CASE 1633: Brand: 3M™ TEGADERM™

## (undated) DEVICE — BLOWER MISTER CLEARVIEW W/TBG

## (undated) DEVICE — PRESSURE MONITORING ACCESSORY: Brand: VAMP

## (undated) DEVICE — CATH DIAG IMPULSE FR4 6F 100CM

## (undated) DEVICE — SPNG DRN AMD EXCILON 6PLY 4X4IN PK/2

## (undated) DEVICE — SUT VIC 0 SUTUPAK TIES 18IN J906G

## (undated) DEVICE — IRRIGATOR BULB ASEPTO 60CC STRL

## (undated) DEVICE — 3M™ STERI-STRIP™ REINFORCED ADHESIVE SKIN CLOSURES, R1547, 1/2 IN X 4 IN (12 MM X 100 MM), 6 STRIPS/ENVELOPE: Brand: 3M™ STERI-STRIP™

## (undated) DEVICE — SENSR CERBRL O2 PK/2

## (undated) DEVICE — STPCK 3WY W 14IN PRESS LN

## (undated) DEVICE — SUT PROLN 3/0 V7 D/A 36IN 8976H

## (undated) DEVICE — SOL IRRIG H2O 1000ML STRL

## (undated) DEVICE — PK TRY HEART CATH 50

## (undated) DEVICE — ELECTRD BLD EZ CLN STD 6.5IN

## (undated) DEVICE — CANN AORT SARNS SFT/FLW VNTD 21F7MM

## (undated) DEVICE — SUCTION CANISTER, 3000CC,SAFELINER: Brand: DEROYAL

## (undated) DEVICE — APPL HEMO SURG ARISTA/AH/FLEXITIP XL 38CM

## (undated) DEVICE — SOL NACL INJ .9PCT 500ML

## (undated) DEVICE — SUT PROLN 6/0 RB2 D/A 30IN 8711H

## (undated) DEVICE — ST PERFUS M/

## (undated) DEVICE — CANN AORT ROOT DLP VNT/8IN 14G 7F